# Patient Record
Sex: FEMALE | Race: WHITE | NOT HISPANIC OR LATINO | Employment: OTHER | URBAN - METROPOLITAN AREA
[De-identification: names, ages, dates, MRNs, and addresses within clinical notes are randomized per-mention and may not be internally consistent; named-entity substitution may affect disease eponyms.]

---

## 2020-02-20 ENCOUNTER — OFFICE VISIT (OUTPATIENT)
Dept: FAMILY MEDICINE | Facility: CLINIC | Age: 65
End: 2020-02-20
Payer: MEDICARE

## 2020-02-20 VITALS
HEIGHT: 61 IN | TEMPERATURE: 99 F | SYSTOLIC BLOOD PRESSURE: 152 MMHG | BODY MASS INDEX: 26.62 KG/M2 | WEIGHT: 141 LBS | HEART RATE: 93 BPM | DIASTOLIC BLOOD PRESSURE: 81 MMHG

## 2020-02-20 DIAGNOSIS — Z11.59 NEED FOR HEPATITIS C SCREENING TEST: ICD-10-CM

## 2020-02-20 DIAGNOSIS — Z23 NEEDS FLU SHOT: ICD-10-CM

## 2020-02-20 DIAGNOSIS — Z78.0 ASYMPTOMATIC MENOPAUSE: ICD-10-CM

## 2020-02-20 DIAGNOSIS — Z13.220 NEED FOR LIPID SCREENING: ICD-10-CM

## 2020-02-20 DIAGNOSIS — Z12.31 ENCOUNTER FOR SCREENING MAMMOGRAM FOR BREAST CANCER: ICD-10-CM

## 2020-02-20 DIAGNOSIS — R03.0 SINGLE EPISODE OF ELEVATED BLOOD PRESSURE: ICD-10-CM

## 2020-02-20 DIAGNOSIS — M54.6 THORACIC SPINE PAIN: Primary | ICD-10-CM

## 2020-02-20 PROBLEM — M54.50 CHRONIC MIDLINE LOW BACK PAIN WITHOUT SCIATICA: Status: ACTIVE | Noted: 2020-02-20

## 2020-02-20 PROBLEM — G89.29 CHRONIC MIDLINE LOW BACK PAIN WITHOUT SCIATICA: Status: ACTIVE | Noted: 2020-02-20

## 2020-02-20 PROCEDURE — 90662 IIV NO PRSV INCREASED AG IM: CPT | Mod: S$GLB,,, | Performed by: INTERNAL MEDICINE

## 2020-02-20 PROCEDURE — 3008F BODY MASS INDEX DOCD: CPT | Mod: S$GLB,,, | Performed by: INTERNAL MEDICINE

## 2020-02-20 PROCEDURE — 1101F PT FALLS ASSESS-DOCD LE1/YR: CPT | Mod: S$GLB,,, | Performed by: INTERNAL MEDICINE

## 2020-02-20 PROCEDURE — 1101F PR PT FALLS ASSESS DOC 0-1 FALLS W/OUT INJ PAST YR: ICD-10-PCS | Mod: S$GLB,,, | Performed by: INTERNAL MEDICINE

## 2020-02-20 PROCEDURE — G0008 FLU VACCINE - HIGH DOSE (65+) PRESERVATIVE FREE IM: ICD-10-PCS | Mod: S$GLB,,, | Performed by: INTERNAL MEDICINE

## 2020-02-20 PROCEDURE — G0008 ADMIN INFLUENZA VIRUS VAC: HCPCS | Mod: S$GLB,,, | Performed by: INTERNAL MEDICINE

## 2020-02-20 PROCEDURE — 99203 OFFICE O/P NEW LOW 30 MIN: CPT | Mod: 25,S$GLB,, | Performed by: INTERNAL MEDICINE

## 2020-02-20 PROCEDURE — 90662 FLU VACCINE - HIGH DOSE (65+) PRESERVATIVE FREE IM: ICD-10-PCS | Mod: S$GLB,,, | Performed by: INTERNAL MEDICINE

## 2020-02-20 PROCEDURE — 3008F PR BODY MASS INDEX (BMI) DOCUMENTED: ICD-10-PCS | Mod: S$GLB,,, | Performed by: INTERNAL MEDICINE

## 2020-02-20 PROCEDURE — 99203 PR OFFICE/OUTPT VISIT, NEW, LEVL III, 30-44 MIN: ICD-10-PCS | Mod: 25,S$GLB,, | Performed by: INTERNAL MEDICINE

## 2020-02-20 RX ORDER — LORATADINE 10 MG/1
10 TABLET ORAL DAILY
COMMUNITY

## 2020-02-20 RX ORDER — OMEPRAZOLE 20 MG/1
20 CAPSULE, DELAYED RELEASE ORAL DAILY
COMMUNITY

## 2020-02-20 NOTE — PROGRESS NOTES
Subjective:       Patient ID: Masha Hobbs is a 64 y.o. female.    Chief Complaint: Back Pain and Establish Care    This is the 1st appointment for patient Ms. Masha Hobbs who is a pleasant 64-year-old  female.    She has been referred by 1 of my established patient's.    Thus far she has not been diagnosed with any your medical issues.  She wants to establish primary physician given the fact that she has turned 64 now.    For last several weeks or so she has been experiencing midthoracic pain which was instigated by some med ablation done by a physical therapist she does not have a history of fall or trauma.  No history of flash injury.  This pain centers around midthoracic spine around T8 or T9.  Radiates to either side.  She denies any shortness of breath, cough or expectoration.  She denies any abdominal pain. No relationship with eating food.  Bending and stretching might either cause some discomfort or relief.  Pressure over the port does not make any significant difference.  Gradually the discomfort seems to be getting better over the days.    Thus far patient does not have any fever.  She did not have any doses of malignancy.  She is not on any anticoagulation.    She also has sinus problems for which she takes Claritin-D and day.  (Her blood pressure elevated in the office).  She is asymptomatic and denies any complains of petitioned, headaches, shortness of breath or feeling tired of fatigue.  In fact Claritin-D seems to help her with the sinus problem.  She has never checked her blood pressures at home.    She also has mild reflux symptoms for which she takes over-the-counter omeprazole.  For hair and nails she takes oral biotin.    Back Pain   This is a chronic problem. The current episode started more than 1 year ago. The problem occurs intermittently. The problem has been waxing and waning since onset. The pain is present in the thoracic spine. The quality of the pain is described as  aching. Radiates to: Laterally to each side. Pertinent negatives include no chest pain, dysuria, fever, headaches or pelvic pain. She has tried walking and bed rest for the symptoms. The treatment provided mild relief.       History reviewed. No pertinent past medical history.  Social History     Socioeconomic History    Marital status:      Spouse name: Not on file    Number of children: Not on file    Years of education: Not on file    Highest education level: Not on file   Occupational History    Not on file   Social Needs    Financial resource strain: Not on file    Food insecurity:     Worry: Not on file     Inability: Not on file    Transportation needs:     Medical: Not on file     Non-medical: Not on file   Tobacco Use    Smoking status: Former Smoker    Smokeless tobacco: Never Used   Substance and Sexual Activity    Alcohol use: Yes    Drug use: Not Currently    Sexual activity: Yes     Partners: Male   Lifestyle    Physical activity:     Days per week: Not on file     Minutes per session: Not on file    Stress: Not at all   Relationships    Social connections:     Talks on phone: Not on file     Gets together: Not on file     Attends Baptism service: Not on file     Active member of club or organization: Not on file     Attends meetings of clubs or organizations: Not on file     Relationship status: Not on file   Other Topics Concern    Not on file   Social History Narrative    Not on file     Past Surgical History:   Procedure Laterality Date    TONSILLECTOMY       Family History   Problem Relation Age of Onset    Diabetes Mother     Heart disease Mother     Cancer Mother     Cancer Father     Hyperlipidemia Father     Heart disease Father     Diabetes Father     Stroke Father        Review of Systems   Constitutional: Negative for activity change, chills, fatigue, fever and unexpected weight change.   HENT: Positive for congestion. Negative for facial swelling,  "hearing loss, postnasal drip, sinus pressure, trouble swallowing and voice change.    Eyes: Negative for pain, discharge and visual disturbance.   Respiratory: Negative for cough, chest tightness and shortness of breath.    Cardiovascular: Negative for chest pain, palpitations and leg swelling.   Gastrointestinal: Negative for abdominal distention, anal bleeding, constipation and diarrhea.        Reflux symptoms stable with omeprazole.   Genitourinary: Negative for difficulty urinating, dysuria, flank pain, frequency, menstrual problem and pelvic pain.   Musculoskeletal: Positive for back pain. Negative for arthralgias and joint swelling.        Midthoracic pain centered around T7 or T8.   Skin: Negative for color change, pallor and rash.        Patient takes over-the-counter biotin supplement for hair and nails.   Allergic/Immunologic: Negative for environmental allergies, food allergies and immunocompromised state.   Neurological: Negative for dizziness, tremors, seizures, syncope, light-headedness and headaches.   Hematological: Negative for adenopathy. Does not bruise/bleed easily.   Psychiatric/Behavioral: Positive for sleep disturbance. Negative for agitation, confusion and dysphoric mood. The patient is not nervous/anxious.          Objective:      Blood pressure (!) 152/81, pulse 93, height 5' 1" (1.549 m), weight 64 kg (141 lb), last menstrual period 01/01/1942. Body mass index is 26.64 kg/m².  Physical Exam   Constitutional: She is oriented to person, place, and time. She appears well-developed and well-nourished. She is cooperative. No distress.   BMI is 26.   HENT:   Head: Normocephalic and atraumatic.   Mouth/Throat: No oropharyngeal exudate.   Eyes: Conjunctivae, EOM and lids are normal. Lids are everted and swept, no foreign bodies found. No scleral icterus. Right pupil is round and reactive. Left pupil is round and reactive.   Neck: Trachea normal and normal range of motion. Neck supple. No tracheal " deviation present. No thyromegaly present.   Cardiovascular: Normal rate, regular rhythm, S1 normal, S2 normal and normal heart sounds.   Pulmonary/Chest: Breath sounds normal. No stridor. No respiratory distress. She has no wheezes. She has no rales.   Abdominal: Soft. Bowel sounds are normal. She exhibits no distension. There is no tenderness. There is no rigidity and no guarding.   Musculoskeletal: Normal range of motion. She exhibits no deformity.        Thoracic back: She exhibits pain. She exhibits no tenderness, no swelling, no edema and no deformity.        Back:    Lymphadenopathy:     She has no cervical adenopathy.     She has no axillary adenopathy.   Neurological: She is alert and oriented to person, place, and time. She displays no atrophy, no tremor and normal reflexes. No sensory deficit. She exhibits normal muscle tone. Coordination and gait normal.   Patient's muscle strength is good.  No sensory loss.  He is able to bend the spine without any problems.   Skin: Skin is warm and dry. No rash noted. There is erythema. No pallor.   Psychiatric: She has a normal mood and affect. Her behavior is normal.   Nursing note and vitals reviewed.        Assessment:       1. Thoracic spine pain    2. Single episode of elevated blood pressure    3. Asymptomatic menopause    4. Encounter for screening mammogram for breast cancer    5. Need for hepatitis C screening test    6. Needs flu shot    7. Need for lipid screening           No visits with results within 3 Month(s) from this visit.   Latest known visit with results is:   No results found for any previous visit.    I suspect patient spinal pain could be mild compression fracture.  I have recommended her stretch exercises.  No alram symptoms.  Warm compresses.  Will consider imaging in 6 weeks if no better.    Patient's blood pressures are elevated and I have advised her to stop Claritin-D.  She can take plain over-the-counter Claritin or Zyrtec.  We may  To get better and follow your care plan as instructed. consider over-the-counter sprays like Flonase or prescription sprays.  Salt water gargles and steam inhalation will be helpful or long-term basis for sinus allergies.  Avoid allergens.      Plan:           Thoracic spine pain  Comments:  Her for therapist was manipulating her back and she started experiencing pain in the midthoracic spine.  It radiates to the sides.  No history of fall.    Single episode of elevated blood pressure  Comments:  Patient's blood pressures are elevated and I have advised her to stop completely Claritin-D is.  She can take a plain Claritin and we may consider sinus sprays     Asymptomatic menopause  -     DXA Bone Density Appendicular Skeleton; Future; Expected date: 02/20/2020    Encounter for screening mammogram for breast cancer  -     Mammo Digital Screening Bilat; Future; Expected date: 02/20/2020    Need for hepatitis C screening test  -     Hepatitis C antibody; Future; Expected date: 02/20/2020    Needs flu shot  -     Influenza - High Dose (65+) (PF) (IM)    Need for lipid screening  -     Lipid panel; Future; Expected date: 02/20/2020     Patient is welcome to the practice at this point.    She has midthoracic pain and I have recommended her some stretch exercises and warm compresses.  If it does not get better in another 4-6 weeks then will consider imaging like x-ray or MRI.    Patient's blood pressures are elevated and I have advised her to stop the Claritin-D.  She can take plain Claritin.  On her way back to home she can stop by the pharmacy and again recheck her blood pressures.  I would encourage her to invest in a blood pressure machine.    Preventive care issues like mammograms will be ordered.  Today she will get a flu shot also.  I will do a screening for cholesterol and hepatitis C also.    I would like to see her back in 1 month time to review her for back pain and review her blood pressures again.        Current Outpatient Medications:     BIOTIN ORAL, Take by  mouth., Disp: , Rfl:     loratadine (CLARITIN) 10 mg tablet, Take 10 mg by mouth once daily., Disp: , Rfl:     omeprazole (PRILOSEC) 20 MG capsule, Take 20 mg by mouth once daily., Disp: , Rfl:     vit [id number] (BIOFLEX) 051-93-86-40 mg Tab, Take by mouth., Disp: , Rfl:     vits A-C-E-B complx-min-lutein (LIPOTRIAD, WITH LUTEIN,) 5,000 unit- 120 mg-60 unit TbSR, Take by mouth., Disp: , Rfl:

## 2020-02-20 NOTE — PATIENT INSTRUCTIONS
Back Care Tips    Caring for your back  These are things you can do to prevent a recurrence of acute back pain and to reduce symptoms from chronic back pain:  · Maintain a healthy weight. If you are overweight, losing weight will help most types of back pain.  · Exercise is an important part of recovery from most types of back pain. The muscles behind and in front of the spine support the back. This means strengthening both the back muscles and the abdominal muscles will provide better support for your spine.   · Swimming and brisk walking are good overall exercises to improve your fitness level.  · Practice safe lifting methods (below).  · Practice good posture when sitting, standing and walking. Avoid prolonged sitting. This puts more stress on the lower back than standing or walking.  · Wear quality shoes with sufficient arch support. Foot and ankle alignment can affect back symptoms. Women should avoid wearing high heels.  · Therapeutic massage can help relax the back muscles without stretching them.  · During the first 24 to 72 hours after an acute injury or flare-up of chronic back pain, apply an ice pack to the painful area for 20 minutes and then remove it for 20 minutes, over a period of 60 to 90 minutes, or several times a day. As a safety precaution, do not use a heating pad at bedtime. Sleeping on a heating pad can lead to skin burns or tissue damage.  · You can alternate ice and heat therapies.  Medications  Talk to your healthcare provider before using medicines, especially if you have other medical problems or are taking other medicines.  · You may use acetaminophen or ibuprofen to control pain, unless your healthcare provider prescribed other pain medicine. If you have chronic conditions like diabetes, liver or kidney disease, stomach ulcers, or gastrointestinal bleeding, or are taking blood thinners, talk with your healthcare provider before taking any medicines.  · Be careful if you are given  prescription pain medicines, narcotics, or medicine for muscle spasm. They can cause drowsiness, affect your coordination, reflexes, and judgment. Do not drive or operate heavy machinery while taking these types of medicines. Take prescription pain medicine only as prescribed by your healthcare provider.  Lumbar stretch  Here is a simple stretching exercise that will help relax muscle spasm and keep your back more limber. If exercise makes your back pain worse, dont do it.  · Lie on your back with your knees bent and both feet on the ground.  · Slowly raise your left knee to your chest as you flatten your lower back against the floor. Hold for 5 seconds.  · Relax and repeat the exercise with your right knee.  · Do 10 of these exercises for each leg.  Safe lifting method  · Dont bend over at the waist to lift an object off the floor.  Instead, bend your knees and hips in a squat.   · Keep your back and head upright  · Hold the object close to your body, directly in front of you.  · Straighten your legs to lift the object.   · Lower the object to the floor in the reverse fashion.  · If you must slide something across the floor, push it.  Posture tips  Sitting  Sit in chairs with straight backs or low-back support. Keep your knees lower than your hips, with your feet flat on the floor.  When driving, sit up straight. Adjust the seat forward so you are not leaning toward the steering wheel.  A small pillow or rolled towel behind your lower back may help if you are driving long distances.   Standing  When standing for long periods, shift most of your weight to one leg at a time. Alternate legs every few minutes.   Sleeping  The best way to sleep is on your side with your knees bent. Put a low pillow under your head to support your neck in a neutral spine position. Avoid thick pillows that bend your neck to one side. Put a pillow between your legs to further relax your lower back. If you sleep on your back, put pillows  under your knees to support your legs in a slightly flexed position. Use a firm mattress. If your mattress sags, replace it, or use a 1/2-inch plywood board under the mattress to add support.  Follow-up care  Follow up with your healthcare provider, or as advised.  If X-rays, a CT scan or an MRI scan were taken, they will be reviewed by a radiologist. You will be notified of any new findings that may affect your care.  Call 911  Seek emergency medical care if any of the following occur:  · Trouble breathing  · Confusion  · Very drowsy  · Fainting or loss of consciousness  · Rapid or very slow heart rate  · Loss of  bowel or bladder control  When to seek medical care  Call your healthcare provider if any of the following occur:  · Pain becomes worse or spreads to your arms or legs  · Weakness or numbness in one or both arms or legs  · Numbness in the groin area  Date Last Reviewed: 6/1/2016  © 4729-5976 [employer]. [address] [place] [address]. All rights reserved. This information is not intended as a substitute for professional medical care. Always follow your healthcare professional's instructions.        Back Pain (Acute or Chronic)    Back pain is one of the most common problems. The good news is that most people feel better in 1 to 2 weeks, and most of the rest in 1 to 2 months. Most people can remain active.  People experience and describe pain differently; not everyone is the same.  · The pain can be sharp, stabbing, shooting, aching, cramping or burning.  · Movement, standing, bending, lifting, sitting, or walking may worsen pain.  · It can be localized to one spot or area, or it can be more generalized.  · It can spread or radiate upwards, to the front, or go down your arms or legs (sciatica).  · It can cause muscle spasm.  Most of the time, mechanical problems with the muscles or spine cause the pain. Mechanical problems are usually caused by an injury to the muscles or  ligaments. While illness can cause back pain, it is usually not caused by a serious illness. Mechanical problems include:   · Physical activity such as sports, exercise, work, or normal activity  · Overexertion, lifting, pushing, pulling incorrectly or too aggressively  · Sudden twisting, bending, or stretching from an accident, or accidental movement  · Poor posture  · Stretching or moving wrong, without noticing pain at the time  · Poor coordination, lack of regular exercise (check with your doctor about this)  · Spinal disc disease or arthritis  · Stress  Pain can also be related to pregnancy, or illness like appendicitis, bladder or kidney infections, pelvic infections, and many other things.  Acute back pain usually gets better in 1 to 2 weeks. Back pain related to disk disease, arthritis in the spinal joints or spinal stenosis (narrowing of the spinal canal) can become chronic and last for months or years.  Unless you had a physical injury (for example, a car accident or fall) X-rays are usually not needed for the initial evaluation of back pain. If pain continues and does not respond to medical treatment, X-rays and other tests may be needed.  Home care  Try these home care recommendations:  · When in bed, try to find a position of comfort. A firm mattress is best. Try lying flat on your back with pillows under your knees. You can also try lying on your side with your knees bent up towards your chest and a pillow between your knees.  · At first, do not try to stretch out the sore spots. If there is a strain, it is not like the good soreness you get after exercising without an injury. In this case, stretching may make it worse.  · Avoid prolong sitting, long car rides, or travel. This puts more stress on the lower back than standing or walking.  · During the first 24 to 72 hours after an acute injury or flare up of chronic back pain, apply an ice pack to the painful area for 20 minutes and then remove it for  20 minutes. Do this over a period of 60 to 90 minutes or several times a day. This will reduce swelling and pain. Wrap the ice pack in a thin towel or plastic to protect your skin.  · You can start with ice, then switch to heat. Heat (hot shower, hot bath, or heating pad) reduces pain and works well for muscle spasms. Heat can be applied to the painful area for 20 minutes then remove it for 20 minutes. Do this over a period of 60 to 90 minutes or several times a day. Do not sleep on a heating pad. It can lead to skin burns or tissue damage.  · You can alternate ice and heat therapy. Talk with your doctor about the best treatment for your back pain.  · Therapeutic massage can help relax the back muscles without stretching them.  · Be aware of safe lifting methods and do not lift anything without stretching first.  Medicines  Talk to your doctor before using medicine, especially if you have other medical problems or are taking other medicines.  · You may use over-the-counter medicine as directed on the bottle to control pain, unless another pain medicine was prescribed. If you have chronic conditions like diabetes, liver or kidney disease, stomach ulcers, or gastrointestinal bleeding, or are taking blood thinners, talk to your doctor before taking any medicine.  · Be careful if you are given a prescription medicines, narcotics, or medicine for muscle spasms. They can cause drowsiness, affect your coordination, reflexes, and judgement. Do not drive or operate heavy machinery.  Follow-up care  Follow up with your healthcare provider, or as advised.   A radiologist will review any X-rays that were taken. Your provide will notify you of any new findings that may affect your care.  Call 911  Call emergency services if any of the following occur:  · Trouble breathing  · Confusion  · Very drowsy or trouble awakening  · Fainting or loss of consciousness  · Rapid or very slow heart rate  · Loss of bowel or bladder  control  When to seek medical advice  Call your healthcare provider right away if any of these occur:   · Pain becomes worse or spreads to your legs  · Weakness or numbness in one or both legs  · Numbness in the groin or genital area  Date Last Reviewed: 7/1/2016 © 2000-2017 [employer]. [address] [place] [address]. All rights reserved. This information is not intended as a substitute for professional medical care. Always follow your healthcare professional's instructions.        Taking Your Blood Pressure  Blood pressure is the force of blood against the artery wall as it moves from the heart through the blood vessels. You can take your own blood pressure reading using a digital monitor. Take your readings the same each time, using the same arm. Take readings as often as your healthcare provider instructs.  About blood pressure monitors  Blood pressure monitors are designed for certain ages and cases. You can find monitors for older adults, for pregnant women, and for children. Make sure the one you choose is the right one for your age and situation.  The American Heart Association recommends an automatic cuff monitor that fits on your upper arm (bicep). The cuff should fit your arm size. A cuff thats too large or too small will not give an accurate reading. Measure around your upper arm to find your size.  Monitors that attach to your finger or wrist are not as accurate as monitors for your upper arm.  Ask your healthcare provider for help in choosing a monitor. Bring your monitor to your next provider visit if you need help in using it the correct way.  The steps below are general instructions for using an automatic digital monitor.  Step 1. Relax    · Take your blood pressure at the same time every day, such as in the morning or evening, or at the time your healthcare provider recommends.  · Wait at least a half-hour after smoking, eating, or exercising. Don't drink coffee, tea,  soda, or other caffeinated beverages before checking your blood pressure.  · Sit comfortably at a table with both feet on the floor. Do not cross your legs or feet. Place the monitor near you.  · Rest for a few minutes before you begin.  Step 2. Wrap the cuff    · Place your arm on the table, palm up. Your arm should be at the level of your heart. Wrap the cuff around your upper arm, just above your elbow. Its best done on bare skin, not over clothing. Most cuffs will indicate where the brachial artery (the blood vessel in the middle of the arm at the inner side of the elbow) should line up with the cuff. Look in your monitor's instruction booklet for an illustration. You can also bring your cuff to your healthcare provider and have them show you how to correctly place the cuff.  Step 3. Inflate the cuff    · Push the button that starts the pump.  · The cuff will tighten, then loosen.  · The numbers will change. When they stop changing, your blood pressure reading will appear.  · Take 2 or 3 readings one minute apart.  Step 4. Write down the results of each reading    · Write down your blood pressure numbers for each reading. Note the date and time. Keep your results in one place, such as a notebook. Even if your monitor has a built-in memory, keep a hard copy of the readings.  · Remove the cuff from your arm. Turn off the machine.  · Bring your blood pressure records with your healthcare providers at each visit.  · If you start a new blood pressure medicine, note the day you started the new medicine. Also note the day if you change the dose of your medicine. This information goes on your blood pressure recording sheet. This will help your healthcare provider monitor how well the medicine changes are working.  · Ask your healthcare provider what numbers should prompt you to call him or her. Also ask what numbers should prompt you to get help right away.  Date Last Reviewed: 11/1/2016  © 9101-7526 [employer]. [address] [place] [address], [place] [address]. All rights reserved. This information is not intended as a substitute for professional medical care. Always follow your healthcare professional's instructions.

## 2020-02-21 ENCOUNTER — PATIENT MESSAGE (OUTPATIENT)
Dept: FAMILY MEDICINE | Facility: CLINIC | Age: 65
End: 2020-02-21

## 2020-03-02 ENCOUNTER — HOSPITAL ENCOUNTER (OUTPATIENT)
Dept: RADIOLOGY | Facility: HOSPITAL | Age: 65
Discharge: HOME | End: 2020-03-02
Attending: INTERNAL MEDICINE
Payer: MEDICARE

## 2020-03-02 VITALS — HEIGHT: 61 IN | BODY MASS INDEX: 26.65 KG/M2 | WEIGHT: 141.13 LBS

## 2020-03-02 DIAGNOSIS — Z12.31 ENCOUNTER FOR SCREENING MAMMOGRAM FOR BREAST CANCER: ICD-10-CM

## 2020-03-02 DIAGNOSIS — Z78.0 ASYMPTOMATIC MENOPAUSE: ICD-10-CM

## 2020-03-02 PROCEDURE — 77080 DXA BONE DENSITY AXIAL: CPT | Mod: TC,PO

## 2020-03-02 PROCEDURE — 77067 SCR MAMMO BI INCL CAD: CPT | Mod: TC,PO

## 2020-03-04 PROBLEM — M81.0 AGE-RELATED OSTEOPOROSIS WITHOUT CURRENT PATHOLOGICAL FRACTURE: Status: ACTIVE | Noted: 2020-03-04

## 2021-10-16 ENCOUNTER — HOSPITAL ENCOUNTER (EMERGENCY)
Facility: HOSPITAL | Age: 66
Discharge: HOME | End: 2021-10-16
Attending: EMERGENCY MEDICINE
Payer: MEDICARE

## 2021-10-16 VITALS
BODY MASS INDEX: 28.32 KG/M2 | DIASTOLIC BLOOD PRESSURE: 88 MMHG | OXYGEN SATURATION: 97 % | RESPIRATION RATE: 18 BRPM | HEIGHT: 61 IN | SYSTOLIC BLOOD PRESSURE: 175 MMHG | HEART RATE: 90 BPM | TEMPERATURE: 99 F | WEIGHT: 150 LBS

## 2021-10-16 DIAGNOSIS — S52.601A CLOSED FRACTURE OF DISTAL ENDS OF RIGHT RADIUS AND ULNA, INITIAL ENCOUNTER: Primary | ICD-10-CM

## 2021-10-16 DIAGNOSIS — T14.90XA INJURY: ICD-10-CM
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
## 2021-10-16 DIAGNOSIS — M25.531 RIGHT WRIST PAIN: ICD-10-CM

## 2021-10-16 DIAGNOSIS — S52.501A CLOSED FRACTURE OF DISTAL ENDS OF RIGHT RADIUS AND ULNA, INITIAL ENCOUNTER: Primary | ICD-10-CM

## 2021-10-16 PROCEDURE — 99283 EMERGENCY DEPT VISIT LOW MDM: CPT

## 2021-10-16 PROCEDURE — 25000003 PHARM REV CODE 250: Performed by: NURSE PRACTITIONER

## 2021-10-16 PROCEDURE — 29125 APPL SHORT ARM SPLINT STATIC: CPT | Mod: RT

## 2021-10-16 RX ORDER — HYDROCODONE BITARTRATE AND ACETAMINOPHEN 7.5; 325 MG/1; MG/1
1 TABLET ORAL EVERY 4 HOURS PRN
Qty: 11 TABLET | Refills: 0 | Status: SHIPPED | OUTPATIENT
Start: 2021-10-16

## 2021-10-16 RX ORDER — IBUPROFEN 400 MG/1
400 TABLET ORAL EVERY 6 HOURS PRN
Qty: 20 TABLET | Refills: 0 | Status: SHIPPED | OUTPATIENT
Start: 2021-10-16

## 2021-10-16 RX ORDER — HYDROCODONE BITARTRATE AND ACETAMINOPHEN 7.5; 325 MG/1; MG/1
1 TABLET ORAL
Status: DISCONTINUED | OUTPATIENT
Start: 2021-10-16 | End: 2021-10-17 | Stop reason: HOSPADM

## 2021-10-16 RX ADMIN — IBUPROFEN 600 MG: 400 TABLET ORAL at 08:10

## 2024-06-06 ENCOUNTER — HOSPITAL ENCOUNTER (EMERGENCY)
Facility: HOSPITAL | Age: 69
Discharge: HOME | End: 2024-06-06
Attending: EMERGENCY MEDICINE
Payer: MEDICARE

## 2024-06-06 VITALS
SYSTOLIC BLOOD PRESSURE: 153 MMHG | HEIGHT: 62 IN | BODY MASS INDEX: 25.21 KG/M2 | WEIGHT: 137 LBS | TEMPERATURE: 98 F | OXYGEN SATURATION: 100 % | DIASTOLIC BLOOD PRESSURE: 80 MMHG | RESPIRATION RATE: 18 BRPM | HEART RATE: 112 BPM
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
## 2024-06-06 DIAGNOSIS — T07.XXXA MULTIPLE ABRASIONS: ICD-10-CM

## 2024-06-06 DIAGNOSIS — S30.1XXA CONTUSION OF ABDOMINAL WALL, INITIAL ENCOUNTER: ICD-10-CM

## 2024-06-06 DIAGNOSIS — R07.89 CHEST WALL PAIN: ICD-10-CM

## 2024-06-06 DIAGNOSIS — S20.212A RIB CONTUSION, LEFT, INITIAL ENCOUNTER: ICD-10-CM

## 2024-06-06 LAB
ALBUMIN SERPL BCP-MCNC: 4.4 G/DL (ref 3.5–5.2)
ALP SERPL-CCNC: 64 U/L (ref 55–135)
ALT SERPL W/O P-5'-P-CCNC: 19 U/L (ref 10–44)
ANION GAP SERPL CALC-SCNC: 8 MMOL/L (ref 8–16)
AST SERPL-CCNC: 21 U/L (ref 10–40)
BASOPHILS # BLD AUTO: 0.08 K/UL (ref 0–0.2)
BASOPHILS NFR BLD: 0.7 % (ref 0–1.9)
BILIRUB SERPL-MCNC: 0.4 MG/DL (ref 0.1–1)
BUN SERPL-MCNC: 24 MG/DL (ref 8–23)
CALCIUM SERPL-MCNC: 10 MG/DL (ref 8.7–10.5)
CHLORIDE SERPL-SCNC: 104 MMOL/L (ref 95–110)
CO2 SERPL-SCNC: 26 MMOL/L (ref 23–29)
CREAT SERPL-MCNC: 0.9 MG/DL (ref 0.5–1.4)
CREAT SERPL-MCNC: 0.9 MG/DL (ref 0.5–1.4)
DIFFERENTIAL METHOD BLD: ABNORMAL
EOSINOPHIL # BLD AUTO: 0 K/UL (ref 0–0.5)
EOSINOPHIL NFR BLD: 0.3 % (ref 0–8)
ERYTHROCYTE [DISTWIDTH] IN BLOOD BY AUTOMATED COUNT: 13.2 % (ref 11.5–14.5)
EST. GFR  (NO RACE VARIABLE): >60 ML/MIN/1.73 M^2
GLUCOSE SERPL-MCNC: 82 MG/DL (ref 70–110)
HCT VFR BLD AUTO: 40.9 % (ref 37–48.5)
HGB BLD-MCNC: 13.5 G/DL (ref 12–16)
IMM GRANULOCYTES # BLD AUTO: 0.03 K/UL (ref 0–0.04)
IMM GRANULOCYTES NFR BLD AUTO: 0.3 % (ref 0–0.5)
INR PPP: 1 (ref 0.8–1.2)
LIPASE SERPL-CCNC: 8 U/L (ref 4–60)
LYMPHOCYTES # BLD AUTO: 2.5 K/UL (ref 1–4.8)
LYMPHOCYTES NFR BLD: 21.4 % (ref 18–48)
MCH RBC QN AUTO: 31.3 PG (ref 27–31)
MCHC RBC AUTO-ENTMCNC: 33 G/DL (ref 32–36)
MCV RBC AUTO: 95 FL (ref 82–98)
MONOCYTES # BLD AUTO: 0.9 K/UL (ref 0.3–1)
MONOCYTES NFR BLD: 7.4 % (ref 4–15)
NEUTROPHILS # BLD AUTO: 8.1 K/UL (ref 1.8–7.7)
NEUTROPHILS NFR BLD: 69.9 % (ref 38–73)
NRBC BLD-RTO: 0 /100 WBC
OHS QRS DURATION: 88 MS
OHS QTC CALCULATION: 623 MS
PLATELET # BLD AUTO: 317 K/UL (ref 150–450)
PMV BLD AUTO: 9.5 FL (ref 9.2–12.9)
POTASSIUM SERPL-SCNC: 4.2 MMOL/L (ref 3.5–5.1)
PROT SERPL-MCNC: 8.1 G/DL (ref 6–8.4)
PROTHROMBIN TIME: 11 SEC (ref 9–12.5)
RBC # BLD AUTO: 4.31 M/UL (ref 4–5.4)
SAMPLE: NORMAL
SODIUM SERPL-SCNC: 138 MMOL/L (ref 136–145)
WBC # BLD AUTO: 11.6 K/UL (ref 3.9–12.7)

## 2024-06-06 PROCEDURE — 83690 ASSAY OF LIPASE: CPT | Performed by: EMERGENCY MEDICINE

## 2024-06-06 PROCEDURE — 85025 COMPLETE CBC W/AUTO DIFF WBC: CPT | Performed by: EMERGENCY MEDICINE

## 2024-06-06 PROCEDURE — 93010 ELECTROCARDIOGRAM REPORT: CPT | Mod: ,,, | Performed by: INTERNAL MEDICINE

## 2024-06-06 PROCEDURE — 85610 PROTHROMBIN TIME: CPT | Performed by: EMERGENCY MEDICINE

## 2024-06-06 PROCEDURE — 25500020 PHARM REV CODE 255: Performed by: EMERGENCY MEDICINE

## 2024-06-06 PROCEDURE — 82565 ASSAY OF CREATININE: CPT

## 2024-06-06 PROCEDURE — 90715 TDAP VACCINE 7 YRS/> IM: CPT | Performed by: PHYSICIAN ASSISTANT

## 2024-06-06 PROCEDURE — 93005 ELECTROCARDIOGRAM TRACING: CPT | Performed by: INTERNAL MEDICINE

## 2024-06-06 PROCEDURE — 25000003 PHARM REV CODE 250: Performed by: EMERGENCY MEDICINE

## 2024-06-06 PROCEDURE — 99285 EMERGENCY DEPT VISIT HI MDM: CPT | Mod: 25

## 2024-06-06 PROCEDURE — 80053 COMPREHEN METABOLIC PANEL: CPT | Performed by: EMERGENCY MEDICINE

## 2024-06-06 PROCEDURE — 63600175 PHARM REV CODE 636 W HCPCS: Performed by: PHYSICIAN ASSISTANT

## 2024-06-06 PROCEDURE — 90471 IMMUNIZATION ADMIN: CPT | Performed by: PHYSICIAN ASSISTANT

## 2024-06-06 RX ORDER — HYDROCODONE BITARTRATE AND ACETAMINOPHEN 5; 325 MG/1; MG/1
1 TABLET ORAL EVERY 6 HOURS PRN
Qty: 12 TABLET | Refills: 0 | Status: SHIPPED | OUTPATIENT
Start: 2024-06-06

## 2024-06-06 RX ORDER — BACITRACIN 500 [USP'U]/G
OINTMENT TOPICAL
Status: COMPLETED | OUTPATIENT
Start: 2024-06-06 | End: 2024-06-06

## 2024-06-06 RX ORDER — METHOCARBAMOL 500 MG/1
500 TABLET, FILM COATED ORAL 3 TIMES DAILY PRN
Qty: 20 TABLET | Refills: 0 | Status: SHIPPED | OUTPATIENT
Start: 2024-06-06 | End: 2024-06-16

## 2024-06-06 RX ADMIN — IOHEXOL 100 ML: 350 INJECTION, SOLUTION INTRAVENOUS at 10:06

## 2024-06-06 RX ADMIN — CLOSTRIDIUM TETANI TOXOID ANTIGEN (FORMALDEHYDE INACTIVATED), CORYNEBACTERIUM DIPHTHERIAE TOXOID ANTIGEN (FORMALDEHYDE INACTIVATED), BORDETELLA PERTUSSIS TOXOID ANTIGEN (GLUTARALDEHYDE INACTIVATED), BORDETELLA PERTUSSIS FILAMENTOUS HEMAGGLUTININ ANTIGEN (FORMALDEHYDE INACTIVATED), BORDETELLA PERTUSSIS PERTACTIN ANTIGEN, AND BORDETELLA PERTUSSIS FIMBRIAE 2/3 ANTIGEN 0.5 ML: 5; 2; 2.5; 5; 3; 5 INJECTION, SUSPENSION INTRAMUSCULAR at 04:06

## 2024-06-06 RX ADMIN — BACITRACIN: 500 OINTMENT TOPICAL at 09:06
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
## 2024-06-13 ENCOUNTER — OFFICE VISIT (OUTPATIENT)
Dept: FAMILY MEDICINE | Facility: CLINIC | Age: 69
End: 2024-06-13
Payer: MEDICARE

## 2024-06-13 VITALS
HEIGHT: 62 IN | TEMPERATURE: 99 F | RESPIRATION RATE: 18 BRPM | OXYGEN SATURATION: 99 % | HEART RATE: 108 BPM | WEIGHT: 137 LBS | SYSTOLIC BLOOD PRESSURE: 160 MMHG | DIASTOLIC BLOOD PRESSURE: 100 MMHG | BODY MASS INDEX: 25.21 KG/M2
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
## 2024-06-13 DIAGNOSIS — M81.0 AGE-RELATED OSTEOPOROSIS WITHOUT CURRENT PATHOLOGICAL FRACTURE: ICD-10-CM

## 2024-06-13 DIAGNOSIS — M85.80 OSTEOPENIA, UNSPECIFIED LOCATION: Primary | ICD-10-CM

## 2024-06-13 DIAGNOSIS — Z12.11 COLON CANCER SCREENING: ICD-10-CM

## 2024-06-13 DIAGNOSIS — E78.2 MIXED HYPERLIPIDEMIA: ICD-10-CM

## 2024-06-13 PROCEDURE — 1125F AMNT PAIN NOTED PAIN PRSNT: CPT | Mod: HCNC,CPTII,S$GLB, | Performed by: FAMILY MEDICINE

## 2024-06-13 PROCEDURE — 1159F MED LIST DOCD IN RCRD: CPT | Mod: HCNC,CPTII,S$GLB, | Performed by: FAMILY MEDICINE

## 2024-06-13 PROCEDURE — 3008F BODY MASS INDEX DOCD: CPT | Mod: HCNC,CPTII,S$GLB, | Performed by: FAMILY MEDICINE

## 2024-06-13 PROCEDURE — 3288F FALL RISK ASSESSMENT DOCD: CPT | Mod: HCNC,CPTII,S$GLB, | Performed by: FAMILY MEDICINE

## 2024-06-13 PROCEDURE — 99999 PR PBB SHADOW E&M-EST. PATIENT-LVL IV: CPT | Mod: PBBFAC,HCNC,, | Performed by: FAMILY MEDICINE

## 2024-06-13 PROCEDURE — 99213 OFFICE O/P EST LOW 20 MIN: CPT | Mod: HCNC,S$GLB,, | Performed by: FAMILY MEDICINE

## 2024-06-13 PROCEDURE — 3077F SYST BP >= 140 MM HG: CPT | Mod: HCNC,CPTII,S$GLB, | Performed by: FAMILY MEDICINE

## 2024-06-13 PROCEDURE — 1101F PT FALLS ASSESS-DOCD LE1/YR: CPT | Mod: HCNC,CPTII,S$GLB, | Performed by: FAMILY MEDICINE

## 2024-06-13 PROCEDURE — 3080F DIAST BP >= 90 MM HG: CPT | Mod: HCNC,CPTII,S$GLB, | Performed by: FAMILY MEDICINE
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
## 2024-06-27 ENCOUNTER — CLINICAL SUPPORT (OUTPATIENT)
Dept: FAMILY MEDICINE | Facility: CLINIC | Age: 69
End: 2024-06-27
Payer: MEDICARE

## 2024-06-27 VITALS — SYSTOLIC BLOOD PRESSURE: 126 MMHG | DIASTOLIC BLOOD PRESSURE: 82 MMHG

## 2024-06-27 DIAGNOSIS — I10 HYPERTENSION, UNSPECIFIED TYPE: Primary | ICD-10-CM

## 2024-06-27 PROCEDURE — 99999 PR PBB SHADOW E&M-EST. PATIENT-LVL I: CPT | Mod: PBBFAC,HCNC,,

## 2024-07-02 ENCOUNTER — OFFICE VISIT (OUTPATIENT)
Dept: FAMILY MEDICINE | Facility: CLINIC | Age: 69
End: 2024-07-02
Payer: MEDICARE

## 2024-07-02 VITALS
SYSTOLIC BLOOD PRESSURE: 134 MMHG | HEIGHT: 62 IN | BODY MASS INDEX: 25.4 KG/M2 | HEART RATE: 101 BPM | TEMPERATURE: 98 F | WEIGHT: 138 LBS | RESPIRATION RATE: 18 BRPM | OXYGEN SATURATION: 99 % | DIASTOLIC BLOOD PRESSURE: 78 MMHG

## 2024-07-02 DIAGNOSIS — M81.0 AGE-RELATED OSTEOPOROSIS WITHOUT CURRENT PATHOLOGICAL FRACTURE: ICD-10-CM

## 2024-07-02 DIAGNOSIS — R07.81 RIB PAIN ON LEFT SIDE: Primary | ICD-10-CM

## 2024-07-02 DIAGNOSIS — M54.50 CHRONIC MIDLINE LOW BACK PAIN WITHOUT SCIATICA: ICD-10-CM

## 2024-07-02 DIAGNOSIS — G89.29 CHRONIC MIDLINE LOW BACK PAIN WITHOUT SCIATICA: ICD-10-CM

## 2024-07-02 DIAGNOSIS — Z12.31 ENCOUNTER FOR SCREENING MAMMOGRAM FOR MALIGNANT NEOPLASM OF BREAST: ICD-10-CM

## 2024-07-02 PROCEDURE — 3078F DIAST BP <80 MM HG: CPT | Mod: HCNC,CPTII,S$GLB, | Performed by: FAMILY MEDICINE

## 2024-07-02 PROCEDURE — 1125F AMNT PAIN NOTED PAIN PRSNT: CPT | Mod: HCNC,CPTII,S$GLB, | Performed by: FAMILY MEDICINE

## 2024-07-02 PROCEDURE — 1101F PT FALLS ASSESS-DOCD LE1/YR: CPT | Mod: HCNC,CPTII,S$GLB, | Performed by: FAMILY MEDICINE

## 2024-07-02 PROCEDURE — 3288F FALL RISK ASSESSMENT DOCD: CPT | Mod: HCNC,CPTII,S$GLB, | Performed by: FAMILY MEDICINE

## 2024-07-02 PROCEDURE — 3008F BODY MASS INDEX DOCD: CPT | Mod: HCNC,CPTII,S$GLB, | Performed by: FAMILY MEDICINE

## 2024-07-02 PROCEDURE — 99999 PR PBB SHADOW E&M-EST. PATIENT-LVL IV: CPT | Mod: PBBFAC,HCNC,, | Performed by: FAMILY MEDICINE

## 2024-07-02 PROCEDURE — 1159F MED LIST DOCD IN RCRD: CPT | Mod: HCNC,CPTII,S$GLB, | Performed by: FAMILY MEDICINE

## 2024-07-02 PROCEDURE — 99213 OFFICE O/P EST LOW 20 MIN: CPT | Mod: HCNC,S$GLB,, | Performed by: FAMILY MEDICINE

## 2024-07-02 PROCEDURE — 3075F SYST BP GE 130 - 139MM HG: CPT | Mod: HCNC,CPTII,S$GLB, | Performed by: FAMILY MEDICINE
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
## 2024-07-10 ENCOUNTER — HOSPITAL ENCOUNTER (OUTPATIENT)
Dept: RADIOLOGY | Facility: HOSPITAL | Age: 69
Discharge: HOME | End: 2024-07-10
Attending: FAMILY MEDICINE
Payer: MEDICARE

## 2024-07-10 VITALS — WEIGHT: 138 LBS | HEIGHT: 62 IN | BODY MASS INDEX: 25.4 KG/M2

## 2024-07-10 DIAGNOSIS — Z12.31 ENCOUNTER FOR SCREENING MAMMOGRAM FOR MALIGNANT NEOPLASM OF BREAST: ICD-10-CM

## 2024-07-10 DIAGNOSIS — R07.81 RIB PAIN ON LEFT SIDE: ICD-10-CM

## 2024-07-10 PROCEDURE — 77063 BREAST TOMOSYNTHESIS BI: CPT | Mod: 26,,, | Performed by: RADIOLOGY

## 2024-07-10 PROCEDURE — 77067 SCR MAMMO BI INCL CAD: CPT | Mod: TC,PO

## 2024-07-10 PROCEDURE — 77063 BREAST TOMOSYNTHESIS BI: CPT | Mod: TC,PO

## 2024-07-10 PROCEDURE — 77067 SCR MAMMO BI INCL CAD: CPT | Mod: 26,,, | Performed by: RADIOLOGY

## 2024-08-02 ENCOUNTER — PATIENT MESSAGE (OUTPATIENT)
Dept: ADMINISTRATIVE | Facility: HOSPITAL | Age: 69
End: 2024-08-02
Payer: MEDICARE

## 2024-08-12 ENCOUNTER — CLINICAL SUPPORT (OUTPATIENT)
Dept: REHABILITATION | Facility: HOSPITAL | Age: 69
End: 2024-08-12
Payer: MEDICARE

## 2024-08-12 ENCOUNTER — HOSPITAL ENCOUNTER (OUTPATIENT)
Dept: RADIOLOGY | Facility: HOSPITAL | Age: 69
Discharge: HOME | End: 2024-08-12
Attending: FAMILY MEDICINE
Payer: MEDICARE

## 2024-08-12 DIAGNOSIS — M81.0 AGE-RELATED OSTEOPOROSIS WITHOUT CURRENT PATHOLOGICAL FRACTURE: ICD-10-CM

## 2024-08-12 DIAGNOSIS — M81.0 OSTEOPOROSIS, UNSPECIFIED OSTEOPOROSIS TYPE, UNSPECIFIED PATHOLOGICAL FRACTURE PRESENCE: Primary | ICD-10-CM

## 2024-08-12 DIAGNOSIS — M85.80 OSTEOPENIA, UNSPECIFIED LOCATION: ICD-10-CM

## 2024-08-12 DIAGNOSIS — R29.898 WEAKNESS OF BOTH SHOULDERS: ICD-10-CM

## 2024-08-12 DIAGNOSIS — M53.84 DECREASED ROM OF THORACIC SPINE: Primary | ICD-10-CM

## 2024-08-12 DIAGNOSIS — R07.81 RIB PAIN ON LEFT SIDE: ICD-10-CM

## 2024-08-12 PROCEDURE — 77080 DXA BONE DENSITY AXIAL: CPT | Mod: 26,ICN,, | Performed by: RADIOLOGY

## 2024-08-12 PROCEDURE — 97161 PT EVAL LOW COMPLEX 20 MIN: CPT | Mod: HCNC,PN

## 2024-08-12 PROCEDURE — 97530 THERAPEUTIC ACTIVITIES: CPT | Mod: HCNC,PN

## 2024-08-12 PROCEDURE — 77080 DXA BONE DENSITY AXIAL: CPT | Mod: TC,PO

## 2024-08-12 RX ORDER — IBANDRONATE SODIUM 150 MG/1
150 TABLET, FILM COATED ORAL
Qty: 1 TABLET | Refills: 11 | Status: SHIPPED | OUTPATIENT
Start: 2024-08-12 | End: 2025-08-12

## 2024-08-12 RX ORDER — LYSINE HCL 500 MG
1 TABLET ORAL 2 TIMES DAILY
Qty: 180 TABLET | Refills: 2 | Status: SHIPPED | OUTPATIENT
Start: 2024-08-12 | End: 2025-02-11

## 2024-08-12 NOTE — PROGRESS NOTES
The bone density scan shows osteoporosis and osteopenia.  I would recommend Boniva once a month plus calcium and vitamin-D I will send in prescriptions.

## 2024-08-13 ENCOUNTER — TELEPHONE (OUTPATIENT)
Dept: FAMILY MEDICINE | Facility: CLINIC | Age: 69
End: 2024-08-13
Payer: MEDICARE

## 2024-08-13 PROBLEM — R29.898 WEAKNESS OF BOTH SHOULDERS: Status: ACTIVE | Noted: 2024-08-13

## 2024-08-13 PROBLEM — M53.84 DECREASED ROM OF THORACIC SPINE: Status: ACTIVE | Noted: 2024-08-13
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
## 2024-08-13 NOTE — TELEPHONE ENCOUNTER
----- Message from Roney Bang MD sent at 8/12/2024 11:39 AM CDT -----  The bone density scan shows osteoporosis and osteopenia.  I would recommend Boniva once a month plus calcium and vitamin-D I will send in prescriptions.

## 2024-08-30 ENCOUNTER — PATIENT MESSAGE (OUTPATIENT)
Dept: ADMINISTRATIVE | Facility: CLINIC | Age: 69
End: 2024-08-30
Payer: MEDICARE

## 2024-09-16 ENCOUNTER — HOSPITAL ENCOUNTER (INPATIENT)
Facility: HOSPITAL | Age: 69
LOS: 13 days | Discharge: HOME HEALTH | DRG: 094 | End: 2024-09-30
Attending: EMERGENCY MEDICINE | Admitting: INTERNAL MEDICINE
Payer: MEDICARE

## 2024-09-16 DIAGNOSIS — G93.40 ACUTE ENCEPHALOPATHY: ICD-10-CM

## 2024-09-16 DIAGNOSIS — R01.1 HEART MURMUR: ICD-10-CM

## 2024-09-16 DIAGNOSIS — G03.9 MENINGITIS: Primary | ICD-10-CM

## 2024-09-16 DIAGNOSIS — R00.0 TACHYCARDIA: ICD-10-CM

## 2024-09-16 LAB
ALBUMIN SERPL BCP-MCNC: 4.4 G/DL (ref 3.5–5.2)
ALP SERPL-CCNC: 76 U/L (ref 55–135)
ALT SERPL W/O P-5'-P-CCNC: 11 U/L (ref 10–44)
ANION GAP SERPL CALC-SCNC: 12 MMOL/L (ref 8–16)
AST SERPL-CCNC: 16 U/L (ref 10–40)
BACTERIA #/AREA URNS HPF: NORMAL /HPF
BASOPHILS # BLD AUTO: 0.04 K/UL (ref 0–0.2)
BASOPHILS NFR BLD: 0.3 % (ref 0–1.9)
BILIRUB SERPL-MCNC: 0.8 MG/DL (ref 0.1–1)
BILIRUB UR QL STRIP: NEGATIVE
BUN SERPL-MCNC: 18 MG/DL (ref 8–23)
CALCIUM SERPL-MCNC: 9.5 MG/DL (ref 8.7–10.5)
CHLORIDE SERPL-SCNC: 100 MMOL/L (ref 95–110)
CLARITY CSF: ABNORMAL
CLARITY UR: CLEAR
CO2 SERPL-SCNC: 23 MMOL/L (ref 23–29)
COLOR CSF: COLORLESS
COLOR UR: YELLOW
CREAT SERPL-MCNC: 0.9 MG/DL (ref 0.5–1.4)
CSF TUBE NUMBER: 1
CSF TUBE NUMBER: 1
DIFFERENTIAL METHOD BLD: ABNORMAL
EOSINOPHIL # BLD AUTO: 0 K/UL (ref 0–0.5)
EOSINOPHIL NFR BLD: 0 % (ref 0–8)
ERYTHROCYTE [DISTWIDTH] IN BLOOD BY AUTOMATED COUNT: 13 % (ref 11.5–14.5)
EST. GFR  (NO RACE VARIABLE): >60 ML/MIN/1.73 M^2
GLUCOSE CSF-MCNC: 50 MG/DL (ref 40–70)
GLUCOSE SERPL-MCNC: 130 MG/DL (ref 70–110)
GLUCOSE UR QL STRIP: NEGATIVE
HCT VFR BLD AUTO: 42.4 % (ref 37–48.5)
HGB BLD-MCNC: 14 G/DL (ref 12–16)
HGB UR QL STRIP: NEGATIVE
HYALINE CASTS #/AREA URNS LPF: 1 /LPF
IMM GRANULOCYTES # BLD AUTO: 0.06 K/UL (ref 0–0.04)
IMM GRANULOCYTES NFR BLD AUTO: 0.4 % (ref 0–0.5)
INFLUENZA A, MOLECULAR: NEGATIVE
INFLUENZA B, MOLECULAR: NEGATIVE
KETONES UR QL STRIP: ABNORMAL
LDH SERPL L TO P-CCNC: 1.18 MMOL/L (ref 0.5–2.2)
LEUKOCYTE ESTERASE UR QL STRIP: NEGATIVE
LYMPHOCYTES # BLD AUTO: 1.1 K/UL (ref 1–4.8)
LYMPHOCYTES NFR BLD: 7.7 % (ref 18–48)
LYMPHOCYTES NFR CSF MANUAL: 7 % (ref 40–80)
MAGNESIUM SERPL-MCNC: 1.9 MG/DL (ref 1.6–2.6)
MCH RBC QN AUTO: 31.7 PG (ref 27–31)
MCHC RBC AUTO-ENTMCNC: 33 G/DL (ref 32–36)
MCV RBC AUTO: 96 FL (ref 82–98)
MICROSCOPIC COMMENT: NORMAL
MONOCYTES # BLD AUTO: 0.5 K/UL (ref 0.3–1)
MONOCYTES NFR BLD: 3.5 % (ref 4–15)
MONOS+MACROS NFR CSF MANUAL: 4 % (ref 15–45)
NEUTROPHILS # BLD AUTO: 12.2 K/UL (ref 1.8–7.7)
NEUTROPHILS NFR BLD: 88.1 % (ref 38–73)
NEUTROPHILS NFR CSF MANUAL: 89 % (ref 0–6)
NITRITE UR QL STRIP: NEGATIVE
NRBC BLD-RTO: 0 /100 WBC
PH UR STRIP: 6 [PH] (ref 5–8)
PHOSPHATE SERPL-MCNC: 2 MG/DL (ref 2.7–4.5)
PLATELET # BLD AUTO: 350 K/UL (ref 150–450)
PMV BLD AUTO: 9.7 FL (ref 9.2–12.9)
POTASSIUM SERPL-SCNC: 4.1 MMOL/L (ref 3.5–5.1)
PROT CSF-MCNC: 173 MG/DL (ref 15–40)
PROT SERPL-MCNC: 8.8 G/DL (ref 6–8.4)
PROT UR QL STRIP: ABNORMAL
RBC # BLD AUTO: 4.41 M/UL (ref 4–5.4)
RBC # CSF: 14 /CU MM
RBC #/AREA URNS HPF: 1 /HPF (ref 0–4)
SAMPLE: NORMAL
SARS-COV-2 RDRP RESP QL NAA+PROBE: NEGATIVE
SODIUM SERPL-SCNC: 135 MMOL/L (ref 136–145)
SP GR UR STRIP: 1.03 (ref 1–1.03)
SPECIMEN SOURCE: NORMAL
SPECIMEN VOL CSF: 0.5 ML
SQUAMOUS #/AREA URNS HPF: 1 /HPF
URN SPEC COLLECT METH UR: ABNORMAL
UROBILINOGEN UR STRIP-ACNC: NEGATIVE EU/DL
WBC # BLD AUTO: 13.86 K/UL (ref 3.9–12.7)
WBC # CSF: 2038 /CU MM (ref 0–5)
WBC #/AREA URNS HPF: 3 /HPF (ref 0–5)

## 2024-09-16 PROCEDURE — 93010 ELECTROCARDIOGRAM REPORT: CPT | Mod: ,,, | Performed by: INTERNAL MEDICINE

## 2024-09-16 PROCEDURE — 87205 SMEAR GRAM STAIN: CPT | Performed by: EMERGENCY MEDICINE

## 2024-09-16 PROCEDURE — 009U3ZX DRAINAGE OF SPINAL CANAL, PERCUTANEOUS APPROACH, DIAGNOSTIC: ICD-10-PCS | Performed by: EMERGENCY MEDICINE

## 2024-09-16 PROCEDURE — 84100 ASSAY OF PHOSPHORUS: CPT

## 2024-09-16 PROCEDURE — 63600175 PHARM REV CODE 636 W HCPCS: Performed by: EMERGENCY MEDICINE

## 2024-09-16 PROCEDURE — 82945 GLUCOSE OTHER FLUID: CPT | Performed by: EMERGENCY MEDICINE

## 2024-09-16 PROCEDURE — G0378 HOSPITAL OBSERVATION PER HR: HCPCS

## 2024-09-16 PROCEDURE — 93005 ELECTROCARDIOGRAM TRACING: CPT | Performed by: INTERNAL MEDICINE

## 2024-09-16 PROCEDURE — 81001 URINALYSIS AUTO W/SCOPE: CPT

## 2024-09-16 PROCEDURE — 87502 INFLUENZA DNA AMP PROBE: CPT | Performed by: EMERGENCY MEDICINE

## 2024-09-16 PROCEDURE — 96361 HYDRATE IV INFUSION ADD-ON: CPT

## 2024-09-16 PROCEDURE — 99285 EMERGENCY DEPT VISIT HI MDM: CPT | Mod: 25

## 2024-09-16 PROCEDURE — 96375 TX/PRO/DX INJ NEW DRUG ADDON: CPT

## 2024-09-16 PROCEDURE — 96367 TX/PROPH/DG ADDL SEQ IV INF: CPT

## 2024-09-16 PROCEDURE — 89051 BODY FLUID CELL COUNT: CPT | Performed by: EMERGENCY MEDICINE

## 2024-09-16 PROCEDURE — 85025 COMPLETE CBC W/AUTO DIFF WBC: CPT

## 2024-09-16 PROCEDURE — 25000003 PHARM REV CODE 250: Performed by: EMERGENCY MEDICINE

## 2024-09-16 PROCEDURE — 51701 INSERT BLADDER CATHETER: CPT

## 2024-09-16 PROCEDURE — 87040 BLOOD CULTURE FOR BACTERIA: CPT

## 2024-09-16 PROCEDURE — 96365 THER/PROPH/DIAG IV INF INIT: CPT

## 2024-09-16 PROCEDURE — 83735 ASSAY OF MAGNESIUM: CPT

## 2024-09-16 PROCEDURE — 87483 CNS DNA AMP PROBE TYPE 12-25: CPT | Performed by: INTERNAL MEDICINE

## 2024-09-16 PROCEDURE — 84157 ASSAY OF PROTEIN OTHER: CPT | Performed by: EMERGENCY MEDICINE

## 2024-09-16 PROCEDURE — U0002 COVID-19 LAB TEST NON-CDC: HCPCS | Performed by: EMERGENCY MEDICINE

## 2024-09-16 PROCEDURE — 87070 CULTURE OTHR SPECIMN AEROBIC: CPT | Performed by: EMERGENCY MEDICINE

## 2024-09-16 PROCEDURE — 96366 THER/PROPH/DIAG IV INF ADDON: CPT

## 2024-09-16 PROCEDURE — 80053 COMPREHEN METABOLIC PANEL: CPT

## 2024-09-16 PROCEDURE — 36415 COLL VENOUS BLD VENIPUNCTURE: CPT

## 2024-09-16 RX ORDER — ACETAMINOPHEN 500 MG
1000 TABLET ORAL
Status: COMPLETED | OUTPATIENT
Start: 2024-09-16 | End: 2024-09-16

## 2024-09-16 RX ORDER — DEXAMETHASONE SODIUM PHOSPHATE 4 MG/ML
10 INJECTION, SOLUTION INTRA-ARTICULAR; INTRALESIONAL; INTRAMUSCULAR; INTRAVENOUS; SOFT TISSUE
Status: COMPLETED | OUTPATIENT
Start: 2024-09-16 | End: 2024-09-16

## 2024-09-16 RX ORDER — VALACYCLOVIR HYDROCHLORIDE 500 MG/1
1000 TABLET, FILM COATED ORAL
Status: COMPLETED | OUTPATIENT
Start: 2024-09-16 | End: 2024-09-16

## 2024-09-16 RX ORDER — KETOROLAC TROMETHAMINE 30 MG/ML
10 INJECTION, SOLUTION INTRAMUSCULAR; INTRAVENOUS
Status: COMPLETED | OUTPATIENT
Start: 2024-09-16 | End: 2024-09-16

## 2024-09-16 RX ORDER — LIDOCAINE HYDROCHLORIDE 10 MG/ML
5 INJECTION, SOLUTION EPIDURAL; INFILTRATION; INTRACAUDAL; PERINEURAL
Status: COMPLETED | OUTPATIENT
Start: 2024-09-16 | End: 2024-09-16

## 2024-09-16 RX ADMIN — VANCOMYCIN HYDROCHLORIDE 1250 MG: 1.25 INJECTION, POWDER, LYOPHILIZED, FOR SOLUTION INTRAVENOUS at 07:09

## 2024-09-16 RX ADMIN — SODIUM CHLORIDE, POTASSIUM CHLORIDE, SODIUM LACTATE AND CALCIUM CHLORIDE 1000 ML: 600; 310; 30; 20 INJECTION, SOLUTION INTRAVENOUS at 02:09

## 2024-09-16 RX ADMIN — LIDOCAINE HYDROCHLORIDE 50 MG: 10 INJECTION, SOLUTION EPIDURAL; INFILTRATION; INTRACAUDAL; PERINEURAL at 05:09

## 2024-09-16 RX ADMIN — DEXAMETHASONE SODIUM PHOSPHATE 10 MG: 4 INJECTION, SOLUTION INTRA-ARTICULAR; INTRALESIONAL; INTRAMUSCULAR; INTRAVENOUS; SOFT TISSUE at 07:09

## 2024-09-16 RX ADMIN — KETOROLAC TROMETHAMINE 10 MG: 30 INJECTION, SOLUTION INTRAMUSCULAR; INTRAVENOUS at 07:09

## 2024-09-16 RX ADMIN — ACETAMINOPHEN 1000 MG: 500 TABLET ORAL at 02:09

## 2024-09-16 RX ADMIN — CEFTRIAXONE SODIUM 2 G: 2 INJECTION, POWDER, FOR SOLUTION INTRAMUSCULAR; INTRAVENOUS at 07:09

## 2024-09-16 RX ADMIN — VALACYCLOVIR HYDROCHLORIDE 1000 MG: 500 TABLET, FILM COATED ORAL at 07:09

## 2024-09-16 NOTE — FIRST PROVIDER EVALUATION
Emergency Department TeleTriage Encounter Note      CHIEF COMPLAINT    Chief Complaint   Patient presents with    Altered Mental Status     Confusion that began yesterday at 4pm per pt's  along with weakness - pt presents to ER with fever and can state name and  and situation but not president.  Patient have been vomiting since 1pm yesterday    Fever    Headache    Emesis       VITAL SIGNS   Initial Vitals [24 1309]   BP Pulse Resp Temp SpO2   (!) 151/67 110 18 (!) 100.4 °F (38 °C) 95 %      MAP       --            ALLERGIES    Review of patient's allergies indicates:  No Known Allergies    PROVIDER TRIAGE NOTE  This is a teletriage evaluation of a 68 y.o. female presenting to the ED complaining of confusion. Patient confused, vomiting, and complaining of generalized weakness since yesterday afternoon.  denies any URI symptoms.     Patient is alert. She speaks in complete sentences. She is sitting upright in the chair in no distress.     Initial orders will be placed and care will be transferred to an alternate provider when patient is roomed for a full evaluation. Any additional orders and the final disposition will be determined by that provider.         ORDERS  Labs Reviewed   CULTURE, BLOOD   CULTURE, BLOOD   CBC W/ AUTO DIFFERENTIAL   COMPREHENSIVE METABOLIC PANEL   MAGNESIUM   PHOSPHORUS   URINALYSIS, REFLEX TO URINE CULTURE   POCT LACTATE       ED Orders (720h ago, onward)      Start Ordered     Status Ordering Provider    24 1719 24 1318  Lactic Acid Plasma #2  In 4 hours         Ordered ARTUR NAPOLES A    24 1329 24 1329  ED Preference List Used to Initiate Sepsis Orders  Until discontinued         Ordered CHANCE NERI    24 1329 24 1329  EKG 12-lead  Once         Ordered CHANCE NERI.    24 1319 24 1318  Blood culture x two cultures. Draw prior to antibiotics  Every 15 min      Comments: Aerobic and anaerobic     Order ID Start  Status Ordering Provider   [id number] 09/16/24 1319 Pending Collection POWER, ARTUR A   [id number] 09/16/24 1334 Pending Collection POWER, ARTUR A       Ordered POWER, ARTUR A    09/16/24 1319 09/16/24 1318  CBC auto differential  STAT         Pending Collection POWER, ARTUR A    09/16/24 1319 09/16/24 1318  Comprehensive metabolic panel  STAT         Pending Collection POWER, ARTUR A    09/16/24 1319 09/16/24 1318  POCT Lactate #1  Once         Ordered POWER, ARTUR A    09/16/24 1319 09/16/24 1318  Magnesium  STAT         Pending Collection POWER, ARTUR A    09/16/24 1319 09/16/24 1318  Phosphorus  STAT         Pending Collection POWER, ARTUR A    09/16/24 1319 09/16/24 1318  X-Ray Chest 1 View  1 time imaging         Ordered POWER, ARTUR A    09/16/24 1319 09/16/24 1318  Bed rest  Until discontinued         Ordered POWER, ARTUR A    09/16/24 1319 09/16/24 1318  Cardiac Monitoring - Adult  Continuous        Comments: Notify Physician If:    Ordered POWER, ARTUR A    09/16/24 1319 09/16/24 1318  Pulse Oximetry Continuous  Continuous         Ordered POWER, ARTUR A    09/16/24 1319 09/16/24 1318  Strict intake and output  Until discontinued         Ordered POWER, ARTUR A    09/16/24 1319 09/16/24 1318  Urinalysis, Reflex to Urine Culture Urine, Clean Catch  STAT         Ordered POWER, ARTUR A    09/16/24 1319 09/16/24 1318  Saline lock IV  Once         Ordered POWER, ARTUR A    Unscheduled 09/16/24 1318  Vital signs  Per comments      Comments: Every 15 minutes until SBP greater than 90 or MAP greater than 65, then every 30 minutes times one hours then every one hour    Ordered POWER, ARTUR A              Virtual Visit Note: The provider triage portion of this emergency department evaluation and documentation was performed via [employer], a HIPAA-compliant telemedicine application, in concert with a tele-presenter in the room. A face to face patient evaluation with one  of my colleagues will occur once the patient is placed in an emergency department room.      DISCLAIMER: This note was prepared with [employer] voice recognition transcription software. Garbled syntax, mangled pronouns, and other bizarre constructions may be attributed to that software system.

## 2024-09-16 NOTE — ED PROVIDER NOTES
Chief complaint:  Altered Mental Status (Confusion that began yesterday at 4pm per pt's  along with weakness - pt presents to ER with fever and can state name and  and situation but not president.  Patient have been vomiting since 1pm yesterday), Fever, Headache, and Emesis      HPI:  Masha Hobbs is a 68 y.o. female presenting with a two day history of fever up to 102 accompanied by frontal headache with nonbilious, nonbloody emesis and confusion.  Patient denies neck stiffness or neck pain.  Confusion was present yesterday with inappropriate answers to questions mildly residual but improved today.  She denies focal numbness or weakness, loss of consciousness or seizure, visual change, difficulty swallowing.  No travel history or known sick contacts.  She has not taken anything for symptoms today.  She denies cough or congestion.  She has had body aches malaise as well.  She denies urinary complaints such as frequency, urgency, dysuria, hematuria.    ROS: As per HPI and below:  No chest pain, dyspnea, abdominal pain, diarrhea, rash, swelling.    Review of patient's allergies indicates:  No Known Allergies    Patient's Medications   New Prescriptions    No medications on file   Previous Medications    BIOTIN ORAL    Take by mouth.    CALCIUM CARBONATE-VIT D3- MG CALCIUM- 400 UNIT TAB    Take 1 tablet by mouth 2 (two) times a day. for 365 doses    IBANDRONATE (BONIVA) 150 MG TABLET    Take 1 tablet (150 mg total) by mouth every 30 days.    IBUPROFEN (ADVIL,MOTRIN) 400 MG TABLET    Take 1 tablet (400 mg total) by mouth every 6 (six) hours as needed.    OMEPRAZOLE (PRILOSEC) 20 MG CAPSULE    Take 20 mg by mouth once daily.    VITS A-C-E-B COMPLX-MIN-LUTEIN (LIPOTRIAD, WITH LUTEIN,) 5,000 UNIT- 120 MG-60 UNIT TBSR    Take by mouth.   Modified Medications    No medications on file   Discontinued Medications    No medications on file       PMH:  As per HPI and below:  Past Medical History:   Diagnosis  Date    Age-related osteoporosis without current pathological fracture 3/4/2020    Based upon bone density showing osteoporosis both at hips and lumbar spine.  Patient notified.  May consider bisphosphonates.     Past Surgical History:   Procedure Laterality Date    TONSILLECTOMY      WRIST SURGERY Right        Social History     Socioeconomic History    Marital status:      Spouse name: Elias Hobbs    Number of children: 2   Occupational History    Occupation: Part Time realtor/   Tobacco Use    Smoking status: Former     Current packs/day: 0.00     Types: Cigarettes     Quit date: 1989     Years since quittin.7    Smokeless tobacco: Never   Substance and Sexual Activity    Alcohol use: Yes     Alcohol/week: 1.0 - 2.0 standard drink of alcohol     Types: 1 - 2 Shots of liquor per week     Comment: Rum [place] Milky     Drug use: Not Currently    Sexual activity: Yes     Partners: Male   Social History Narrative    G 39, B35     Social Determinants of Health     Financial Resource Strain: Patient Declined (2024)    Overall Financial Resource Strain (CARDIA)     Difficulty of Paying Living Expenses: Patient declined   Food Insecurity: No Food Insecurity (2024)    Hunger Vital Sign     Worried About Running Out of Food in the Last Year: Never true     Ran Out of Food in the Last Year: Never true   Physical Activity: Sufficiently Active (2024)    Exercise Vital Sign     Days of Exercise per Week: 4 days     Minutes of Exercise per Session: 120 min   Stress: Stress Concern Present (2024)    [employer] - Occupational Stress Questionnaire     Feeling of Stress : To some extent   Housing Stability: Unknown (2024)    Housing Stability Vital Sign     Unable to Pay for Housing in the Last Year: No       Family History   Problem Relation Name Age of Onset    Diabetes Mother      Heart disease Mother      Cancer Mother      Breast cancer Mother       Cancer Father      Hyperlipidemia Father      Heart disease Father      Diabetes Father      Stroke Father         Physical Exam:    Vitals:    09/16/24 1829   BP: (!) 131/57   Pulse: 83   Resp:    Temp:      GENERAL:  No apparent distress.  Alert.    HEENT:  Moist mucous membranes.  Normocephalic and atraumatic.    NECK:  No swelling.  Midline trachea.  Supple without nuchal rigidity.  CARDIOVASCULAR:  Tachycardia with regular rhythm.  2+ radial pulses.  No murmurs auscultated.  No rub.  PULMONARY:  Lungs clear to auscultation bilaterally.  No wheezes, rales, or rhonci.  Unlabored respirations.  ABDOMEN:  Non-tender and non-distended.    EXTREMITIES:  Warm and well perfused.  Brisk capillary refill.  No edema.  NEUROLOGICAL:  Normal mental status.  Appropriate and conversant.  GCS is 15.  Cranial nerves 3-12 intact.  5/5 strength sensation in the upper lower extremities bilaterally.  SKIN:  No rashes or ecchymoses.    BACK:  Atraumatic.  No CVA or midline vertebral tenderness to palpation.      Labs Reviewed   CBC W/ AUTO DIFFERENTIAL - Abnormal       Result Value    WBC 13.86 (*)     RBC 4.41      Hemoglobin 14.0      Hematocrit 42.4      MCV 96      MCH 31.7 (*)     MCHC 33.0      RDW 13.0      Platelets 350      MPV 9.7      Immature Granulocytes 0.4      Gran # (ANC) 12.2 (*)     Immature Grans (Abs) 0.06 (*)     Lymph # 1.1      Mono # 0.5      Eos # 0.0      Baso # 0.04      nRBC 0      Gran % 88.1 (*)     Lymph % 7.7 (*)     Mono % 3.5 (*)     Eosinophil % 0.0      Basophil % 0.3      Differential Method Automated     COMPREHENSIVE METABOLIC PANEL - Abnormal    Sodium 135 (*)     Potassium 4.1      Chloride 100      CO2 23      Glucose 130 (*)     BUN 18      Creatinine 0.9      Calcium 9.5      Total Protein 8.8 (*)     Albumin 4.4      Total Bilirubin 0.8      Alkaline Phosphatase 76      AST 16      ALT 11      eGFR >60.0      Anion Gap 12     PHOSPHORUS - Abnormal    Phosphorus 2.0 (*)    URINALYSIS,  REFLEX TO URINE CULTURE - Abnormal    Specimen UA Urine, Clean Catch      Color, UA Yellow      Appearance, UA Clear      pH, UA 6.0      Specific Gravity, UA 1.030      Protein, UA 2+ (*)     Glucose, UA Negative      Ketones, UA 3+ (*)     Bilirubin (UA) Negative      Occult Blood UA Negative      Nitrite, UA Negative      Urobilinogen, UA Negative      Leukocytes, UA Negative      Narrative:     Specimen Source->Urine   PROTEIN, CSF - Abnormal    CSF Tube Number 1      Protein,  (*)     Narrative:     Specimen Tube Number->1   CSF CELL COUNT WITH DIFFERENTIAL - Abnormal    [place] 0.5      Appearance, CSF Slightly hazy (*)     Color, CSF Colorless      WBC, CSF 2038 (*)     RBC, CSF 14 (*)     Segmented Neutrophils, CSF 89 (*)     Lymphs, CSF 7 (*)     Mono/Macrophage, CSF 4 (*)     Narrative:     Specimen Tube Number->4  WBC CSF critical result(s) repeated. Called and verbal readback   obtained from [facility], RN.  by SLT1 09/16/2024 18:40   CULTURE, CSF  (INCLUDES STAIN)    Gram Stain Result Few WBC's      Gram Stain Result No organisms seen      Narrative:     On which sequentially labeled tube should this analysis be  performed?->2   CULTURE, BLOOD   CULTURE, BLOOD   MAGNESIUM    Magnesium 1.9     SARS-COV-2 RNA AMPLIFICATION, QUAL    SARS-CoV-2 RNA, Amplification, Qual Negative     INFLUENZA A AND B ANTIGEN    Influenza A, Molecular Negative      Influenza B, Molecular Negative      Flu A & B Source Nasal swab      Narrative:     Specimen Source->Nasopharyngeal Swab   URINALYSIS MICROSCOPIC    RBC, UA 1      WBC, UA 3      Bacteria None      Squam Epithel, UA 1      Hyaline Casts, UA 1      Microscopic Comment SEE COMMENT      Narrative:     Specimen Source->Urine   GLUCOSE, CSF    CSF Tube Number 1      Glucose, CSF 50      Narrative:     Specimen Tube Number->1   ISTAT LACTATE    POC Lactate 1.18      Sample VENOUS     POCT LACTATE       Current Discharge Medication List         CONTINUE these medications which have NOT CHANGED    Details   BIOTIN ORAL Take by mouth.      calcium carbonate-vit D3-min 600 mg calcium- 400 unit Tab Take 1 tablet by mouth 2 (two) times a day. for 365 doses  Qty: 180 tablet, Refills: 2    Associated Diagnoses: Osteoporosis, unspecified osteoporosis type, unspecified pathological fracture presence      ibandronate (BONIVA) 150 mg tablet Take 1 tablet (150 mg total) by mouth every 30 days.  Qty: 1 tablet, Refills: 11    Associated Diagnoses: Osteoporosis, unspecified osteoporosis type, unspecified pathological fracture presence      ibuprofen (ADVIL,MOTRIN) 400 MG tablet Take 1 tablet (400 mg total) by mouth every 6 (six) hours as needed.  Qty: 20 tablet, Refills: 0      omeprazole (PRILOSEC) 20 MG capsule Take 20 mg by mouth once daily.      vits A-C-E-B complx-min-lutein (LIPOTRIAD, WITH LUTEIN,) 5,000 unit- 120 mg-60 unit TbSR Take by mouth.             Orders Placed This Encounter   Procedures    Blood culture x two cultures. Draw prior to antibiotics    CSF culture and Gram Stain (Tube 2)    X-Ray Chest 1 View    CT Head Without Contrast    CBC auto differential    Comprehensive metabolic panel    Magnesium    Phosphorus    Urinalysis, Reflex to Urine Culture Urine, Clean Catch    COVID-19 Rapid Screening    Influenza antigen Nasopharyngeal Swab    Urinalysis Microscopic    Glucose, CSF (Tube 1)    Protein, CSF (Tube 1)    CSF cell count with differential (Tube 4)    Vital signs    Cardiac Monitoring - Adult    Strict intake and output    ED Preference List Used to Initiate Sepsis Orders    Straight cath if unable to void    Pharmacy to dose Vancomycin consult    Pulse Oximetry Continuous    Oxygen Continuous    EKG 12-lead    Saline lock IV    Possible Hospitalization    Place in Observation       Imaging Results              X-Ray Chest 1 View (Final result)  Result time 09/16/24 14:53:24      Final result by Farrukh Ramos MD (09/16/24 14:53:24)                    Impression:      No acute cardiopulmonary abnormality.      Electronically signed by: Farrukh Ramos  Date:    09/16/2024  Time:    14:53               Narrative:    EXAMINATION:  XR CHEST 1 VIEW    CLINICAL HISTORY:  Sepsis;    FINDINGS:  Portable chest at 1441 compared with 06/06/2024 shows unchanged cardiomediastinal silhouette.    Lungs are clear. Pulmonary vasculature is normal. No acute osseous abnormality.                                       CT Head Without Contrast (Final result)  Result time 09/16/24 14:52:17      Final result by Wang Acuña MD (09/16/24 14:52:17)                   Impression:      No CT evidence of acute intracranial pathology.      Electronically signed by: Wang Acuña  Date:    09/16/2024  Time:    14:52               Narrative:    EXAMINATION:  CT HEAD WITHOUT CONTRAST    CLINICAL HISTORY:  Mental status change, unknown cause;    TECHNIQUE:  Axial CT imaging obtained through the brain without IV contrast.    CMS Mandated Quality Data-CT Radiation Dose-436    All CT scans at this facility dose modulation, iterative reconstruction, and or weight-based dosing when appropriate to reduce radiation dose to as low as reasonably achievable.    COMPARISON:  None    FINDINGS:  Negative for acute intracranial hemorrhage, midline shift, or mass effect.  Ventricles and sulci are normal in size.  Gray-white differentiation is maintained.  Mild periventricular and deep white matter hypoattenuation, consistent with microangiopathic change.  Cerebellar hemispheres and brainstem are unremarkable.  Atherosclerotic calcification of intracranial carotid arteries.    No calvarial lesion or fracture.  Mastoid air cells are clear.  Mucosal thickening right maxillary sinus.  Paranasal sinuses are otherwise clear.                                      ED Course as of 09/16/24 2029   Mon Sep 16, 2024   1418 EKG:  Sinus tachycardia, rate of 108, normal intervals and axis.  There are no acute ST  or T wave changes suggestive of acute ischemia or infarction.  (Independently interpreted by me) [MR]   1739 LUMBAR PUNCTURE:  Written consent was obtained prior to the procedure with explanation of risks, benefits, and alternatives.  The patient was sterilely prepared in a(n) upright position.  1 attempts were required.  CSF was clear.  There were no recognized complications and the patient tolerated the procedure well.      [MR]      ED Course User Index  [MR] Silvano Vasquez MD       MDM:    68 y.o. female with fever with headache and vomiting in the setting of recent resolved confusion over the last two days.  Broad differential reviewed with the family including possible viral process.  Point of care viral testing sent along with broad workup to exclude end-organ dysfunction including lactic acid for exclusion of sepsis.  IV fluids and antipyretic given here for initial treatment pending further workup.  Head CT done with consideration of confusion with no focal deficit to immediately suggest CVA.      Workup including head CT without acute process with lumbar puncture subsequently performed after discussion with patient consistent with meningitis.  This may be viral meningitis.  Mental status is currently normal on reassessment.  I have initiated antibacterial therapy along with steroids as well as antivirals pending results of culture.  I have discussed with hospital medicine who will admit.    Diagnoses:    1. Headache  2. Fever  3. Meningitis    Critical Care    Date/Time: 9/16/2024 8:30 PM    Performed by: Silvano Vasquez MD  Authorized by: Kehinde Simpson MD  Direct patient critical care time: 17 minutes  Additional history critical care time: 3 minutes  Ordering / reviewing critical care time: 6 minutes  Documentation critical care time: 5 minutes  Consulting other physicians critical care time: 3 minutes  Total critical care time (exclusive of procedural time) : 34 minutes  Critical care  time was exclusive of separately billable procedures and treating other patients.              Silvano Vasquez MD  09/16/24 2030

## 2024-09-17 PROBLEM — G93.40 ACUTE ENCEPHALOPATHY: Status: ACTIVE | Noted: 2024-09-17

## 2024-09-17 PROBLEM — D72.829 LEUKOCYTOSIS: Status: ACTIVE | Noted: 2024-09-17

## 2024-09-17 PROBLEM — G03.9 MENINGITIS: Status: ACTIVE | Noted: 2024-09-17

## 2024-09-17 LAB
ALBUMIN SERPL BCP-MCNC: 3.1 G/DL (ref 3.5–5.2)
ALP SERPL-CCNC: 59 U/L (ref 55–135)
ALT SERPL W/O P-5'-P-CCNC: 11 U/L (ref 10–44)
ANION GAP SERPL CALC-SCNC: 11 MMOL/L (ref 8–16)
AST SERPL-CCNC: 14 U/L (ref 10–40)
BILIRUB SERPL-MCNC: 0.5 MG/DL (ref 0.1–1)
BUN SERPL-MCNC: 23 MG/DL (ref 8–23)
C GATTII+NEOFOR DNA CSF QL NAA+NON-PROBE: NOT DETECTED
CALCIUM SERPL-MCNC: 9.1 MG/DL (ref 8.7–10.5)
CHLORIDE SERPL-SCNC: 102 MMOL/L (ref 95–110)
CMV DNA CSF QL NAA+NON-PROBE: NOT DETECTED
CO2 SERPL-SCNC: 22 MMOL/L (ref 23–29)
CREAT SERPL-MCNC: 0.9 MG/DL (ref 0.5–1.4)
E COLI K1 DNA CSF QL NAA+NON-PROBE: NOT DETECTED
EST. GFR  (NO RACE VARIABLE): >60 ML/MIN/1.73 M^2
EV RNA CSF QL NAA+NON-PROBE: NOT DETECTED
GLUCOSE SERPL-MCNC: 167 MG/DL (ref 70–110)
GP B STREP DNA CSF QL NAA+NON-PROBE: NOT DETECTED
HAEM INFLU DNA CSF QL NAA+NON-PROBE: NOT DETECTED
HAEM INFLU DNA CSF QL NAA+NON-PROBE: NOT DETECTED
HHV6 DNA CSF QL NAA+NON-PROBE: NOT DETECTED
HSV1 DNA CSF QL NAA+NON-PROBE: NOT DETECTED
HSV2 DNA CSF QL NAA+NON-PROBE: NOT DETECTED
MAGNESIUM SERPL-MCNC: 1.9 MG/DL (ref 1.6–2.6)
N MEN DNA CSF QL NAA+NON-PROBE: NOT DETECTED
PARECHOVIRUS A RNA CSF QL NAA+NON-PROBE: NOT DETECTED
PHOSPHATE SERPL-MCNC: 2.3 MG/DL (ref 2.7–4.5)
POTASSIUM SERPL-SCNC: 3.9 MMOL/L (ref 3.5–5.1)
PROT SERPL-MCNC: 7.2 G/DL (ref 6–8.4)
S PNEUM DNA CSF QL NAA+NON-PROBE: NOT DETECTED
SODIUM SERPL-SCNC: 135 MMOL/L (ref 136–145)
VZV DNA CSF QL NAA+NON-PROBE: NOT DETECTED

## 2024-09-17 PROCEDURE — 63600175 PHARM REV CODE 636 W HCPCS: Performed by: INTERNAL MEDICINE

## 2024-09-17 PROCEDURE — 25000003 PHARM REV CODE 250: Performed by: INTERNAL MEDICINE

## 2024-09-17 PROCEDURE — 99223 1ST HOSP IP/OBS HIGH 75: CPT | Mod: ,,, | Performed by: INTERNAL MEDICINE

## 2024-09-17 PROCEDURE — 99900031 HC PATIENT EDUCATION (STAT)

## 2024-09-17 PROCEDURE — 80053 COMPREHEN METABOLIC PANEL: CPT

## 2024-09-17 PROCEDURE — 97165 OT EVAL LOW COMPLEX 30 MIN: CPT

## 2024-09-17 PROCEDURE — 94799 UNLISTED PULMONARY SVC/PX: CPT

## 2024-09-17 PROCEDURE — 27000207 HC ISOLATION

## 2024-09-17 PROCEDURE — 36415 COLL VENOUS BLD VENIPUNCTURE: CPT

## 2024-09-17 PROCEDURE — A9585 GADOBUTROL INJECTION: HCPCS

## 2024-09-17 PROCEDURE — 25000003 PHARM REV CODE 250

## 2024-09-17 PROCEDURE — 36415 COLL VENOUS BLD VENIPUNCTURE: CPT | Performed by: INTERNAL MEDICINE

## 2024-09-17 PROCEDURE — 11000001 HC ACUTE MED/SURG PRIVATE ROOM

## 2024-09-17 PROCEDURE — 97535 SELF CARE MNGMENT TRAINING: CPT

## 2024-09-17 PROCEDURE — 83735 ASSAY OF MAGNESIUM: CPT

## 2024-09-17 PROCEDURE — 87529 HSV DNA AMP PROBE: CPT | Performed by: INTERNAL MEDICINE

## 2024-09-17 PROCEDURE — 84100 ASSAY OF PHOSPHORUS: CPT

## 2024-09-17 PROCEDURE — 25500020 PHARM REV CODE 255

## 2024-09-17 PROCEDURE — 99900035 HC TECH TIME PER 15 MIN (STAT)

## 2024-09-17 PROCEDURE — 63600175 PHARM REV CODE 636 W HCPCS

## 2024-09-17 PROCEDURE — 94761 N-INVAS EAR/PLS OXIMETRY MLT: CPT

## 2024-09-17 PROCEDURE — 97161 PT EVAL LOW COMPLEX 20 MIN: CPT

## 2024-09-17 RX ORDER — GADOBUTROL 604.72 MG/ML
INJECTION INTRAVENOUS
Status: COMPLETED
Start: 2024-09-17 | End: 2024-09-17

## 2024-09-17 RX ORDER — ACETAMINOPHEN 325 MG/1
650 TABLET ORAL EVERY 4 HOURS PRN
Status: DISCONTINUED | OUTPATIENT
Start: 2024-09-17 | End: 2024-09-17

## 2024-09-17 RX ORDER — SODIUM,POTASSIUM PHOSPHATES 280-250MG
2 POWDER IN PACKET (EA) ORAL
Status: DISCONTINUED | OUTPATIENT
Start: 2024-09-17 | End: 2024-09-28

## 2024-09-17 RX ORDER — SODIUM CHLORIDE 0.9 % (FLUSH) 0.9 %
10 SYRINGE (ML) INJECTION EVERY 12 HOURS PRN
Status: DISCONTINUED | OUTPATIENT
Start: 2024-09-17 | End: 2024-09-30 | Stop reason: HOSPADM

## 2024-09-17 RX ORDER — ONDANSETRON HYDROCHLORIDE 2 MG/ML
4 INJECTION, SOLUTION INTRAVENOUS EVERY 6 HOURS PRN
Status: DISCONTINUED | OUTPATIENT
Start: 2024-09-17 | End: 2024-09-30 | Stop reason: HOSPADM

## 2024-09-17 RX ORDER — LANOLIN ALCOHOL/MO/W.PET/CERES
800 CREAM (GRAM) TOPICAL
Status: DISCONTINUED | OUTPATIENT
Start: 2024-09-17 | End: 2024-09-28

## 2024-09-17 RX ORDER — IBUPROFEN 600 MG/1
600 TABLET ORAL EVERY 6 HOURS PRN
Status: DISCONTINUED | OUTPATIENT
Start: 2024-09-17 | End: 2024-09-24

## 2024-09-17 RX ORDER — TALC
9 POWDER (GRAM) TOPICAL NIGHTLY PRN
Status: DISCONTINUED | OUTPATIENT
Start: 2024-09-17 | End: 2024-09-30 | Stop reason: HOSPADM

## 2024-09-17 RX ORDER — NALOXONE HCL 0.4 MG/ML
0.02 VIAL (ML) INJECTION
Status: DISCONTINUED | OUTPATIENT
Start: 2024-09-17 | End: 2024-09-30 | Stop reason: HOSPADM

## 2024-09-17 RX ORDER — ALUMINUM HYDROXIDE, MAGNESIUM HYDROXIDE, AND SIMETHICONE 1200; 120; 1200 MG/30ML; MG/30ML; MG/30ML
30 SUSPENSION ORAL 4 TIMES DAILY PRN
Status: DISCONTINUED | OUTPATIENT
Start: 2024-09-17 | End: 2024-09-30 | Stop reason: HOSPADM

## 2024-09-17 RX ORDER — KETOROLAC TROMETHAMINE 30 MG/ML
15 INJECTION, SOLUTION INTRAMUSCULAR; INTRAVENOUS ONCE
Status: COMPLETED | OUTPATIENT
Start: 2024-09-17 | End: 2024-09-17

## 2024-09-17 RX ORDER — IBUPROFEN 200 MG
16 TABLET ORAL
Status: DISCONTINUED | OUTPATIENT
Start: 2024-09-17 | End: 2024-09-30 | Stop reason: HOSPADM

## 2024-09-17 RX ORDER — PROCHLORPERAZINE EDISYLATE 5 MG/ML
5 INJECTION INTRAMUSCULAR; INTRAVENOUS EVERY 6 HOURS PRN
Status: DISCONTINUED | OUTPATIENT
Start: 2024-09-17 | End: 2024-09-28

## 2024-09-17 RX ORDER — IBUPROFEN 400 MG/1
400 TABLET ORAL EVERY 6 HOURS PRN
Status: COMPLETED | OUTPATIENT
Start: 2024-09-17 | End: 2024-09-17

## 2024-09-17 RX ORDER — AMOXICILLIN 250 MG
1 CAPSULE ORAL 2 TIMES DAILY PRN
Status: DISCONTINUED | OUTPATIENT
Start: 2024-09-17 | End: 2024-09-30 | Stop reason: HOSPADM

## 2024-09-17 RX ORDER — GLUCAGON 1 MG
1 KIT INJECTION
Status: DISCONTINUED | OUTPATIENT
Start: 2024-09-17 | End: 2024-09-30 | Stop reason: HOSPADM

## 2024-09-17 RX ORDER — IBUPROFEN 200 MG
24 TABLET ORAL
Status: DISCONTINUED | OUTPATIENT
Start: 2024-09-17 | End: 2024-09-30 | Stop reason: HOSPADM

## 2024-09-17 RX ADMIN — CEFTRIAXONE 2 G: 2 INJECTION, POWDER, FOR SOLUTION INTRAMUSCULAR; INTRAVENOUS at 05:09

## 2024-09-17 RX ADMIN — AMPICILLIN 2 G: 2 INJECTION, POWDER, FOR SOLUTION INTRAMUSCULAR; INTRAVENOUS at 06:09

## 2024-09-17 RX ADMIN — VANCOMYCIN HYDROCHLORIDE 1250 MG: 1.25 INJECTION, POWDER, LYOPHILIZED, FOR SOLUTION INTRAVENOUS at 12:09

## 2024-09-17 RX ADMIN — KETOROLAC TROMETHAMINE 15 MG: 30 INJECTION, SOLUTION INTRAMUSCULAR at 03:09

## 2024-09-17 RX ADMIN — GADOBUTROL 6 ML: 604.72 INJECTION INTRAVENOUS at 03:09

## 2024-09-17 RX ADMIN — IBUPROFEN 600 MG: 600 TABLET ORAL at 10:09

## 2024-09-17 RX ADMIN — ALUMINUM HYDROXIDE, MAGNESIUM HYDROXIDE, AND SIMETHICONE 30 ML: 1200; 120; 1200 SUSPENSION ORAL at 04:09

## 2024-09-17 RX ADMIN — AMPICILLIN 2 G: 2 INJECTION, POWDER, FOR SOLUTION INTRAMUSCULAR; INTRAVENOUS at 09:09

## 2024-09-17 RX ADMIN — ACYCLOVIR SODIUM 480 MG: 50 INJECTION, SOLUTION INTRAVENOUS at 03:09

## 2024-09-17 RX ADMIN — POTASSIUM & SODIUM PHOSPHATES POWDER PACK 280-160-250 MG 2 PACKET: 280-160-250 PACK at 01:09

## 2024-09-17 RX ADMIN — IBUPROFEN 400 MG: 400 TABLET, FILM COATED ORAL at 01:09

## 2024-09-17 RX ADMIN — POTASSIUM & SODIUM PHOSPHATES POWDER PACK 280-160-250 MG 2 PACKET: 280-160-250 PACK at 06:09

## 2024-09-17 RX ADMIN — ACYCLOVIR SODIUM 480 MG: 50 INJECTION, SOLUTION INTRAVENOUS at 11:09

## 2024-09-17 NOTE — HPI
Masha Hobbs is a 68 year old female with a past medical history of osteoporosis and chronic lower back pain with radiculopathy who was transferred from [facility] due to confusion associated with headache, fever, and generalized weakness/body aches for several hours prior to arrival.  Per  patient was confused since 4:00 p.m. Upon assessment confusion has resolved.  She denies any complaints other than a intermittent frontal lobe headache that is improving after Toradol administration.  There are no acute neurological focal deficits.  She denies chest pain, shortness of breath, dizziness, lightheadedness, vision changes, speech changes, numbness/tingling, neck pain, abdominal pain, nausea, or vomiting.  ED workup reveals:  CT head negative for acute intracranial hemorrhage. Chest x-ray no evidence of acute cardiopulmonary findings.  Lumbar puncture performed in ED, pending results.  She was treated with vancomycin, Rocephin, valacyclovir p.o., and dexamethasone.  CBC with elevated white count 13.8 and stable H&H.  BMP with phosphorus 2.0 otherwise unremarkable.  Negative flu/COVID.  Urinalysis negative.  Lactic acid 1.1.  ECG negative for ischemia or infarct.  MRI brain pending.  Neurology consult placed.  Admitted to hospital medicine for further management and treatment.

## 2024-09-17 NOTE — PLAN OF CARE
Problem: Adult Inpatient Plan of Care  Goal: Plan of Care Review  Outcome: Progressing  Goal: Patient-Specific Goal (Individualized)  Outcome: Progressing  Goal: Absence of Hospital-Acquired Illness or Injury  Outcome: Progressing  Goal: Optimal Comfort and Wellbeing  Outcome: Progressing  Goal: Readiness for Transition of Care  Outcome: Progressing     Problem: Infection  Goal: Absence of Infection Signs and Symptoms  Outcome: Progressing     Problem: Pain Acute  Goal: Optimal Pain Control and Function  Outcome: Progressing     Problem: Fall Injury Risk  Goal: Absence of Fall and Fall-Related Injury  Outcome: Progressing     Problem: Meningitis/Encephalitis  Goal: Optimal Coping  Outcome: Progressing  Goal: Effective Cerebral Tissue Perfusion  Outcome: Progressing  Goal: Absence of Infection Signs and Symptoms  Outcome: Progressing     Problem: Nausea and Vomiting  Goal: Nausea and Vomiting Relief  Outcome: Progressing   Plan of care reviewed with patient. Patient verbalized complete understanding. Two hour patient rounding maintained throughout shift. All fall precautions maintained. Bed in lowest position, locked, call light within reach. Side rails up x 2. Slip resistant socks maintained. Needs attended to.

## 2024-09-17 NOTE — CARE UPDATE
09/17/24 0707   Patient Assessment/Suction   Level of Consciousness (AVPU) alert   Respiratory Effort Normal   Rhythm/Pattern, Respiratory unlabored   PRE-TX-O2   Device (Oxygen Therapy) room air   SpO2 95 %   Pulse Oximetry Type Intermittent   $ Pulse Oximetry - Multiple Charge Pulse Oximetry - Multiple   Pulse 72   Resp 18   Positioning HOB elevated 30 degrees   Education   $ Education 15 min

## 2024-09-17 NOTE — CONSULTS
"[facility]   Department of Infectious Disease  Consult Note        PATIENT NAME: Masha Hobbs  MRN: [id number]  TODAY'S DATE: 2024  ADMIT DATE: 2024  LOS: 0 days    CHIEF COMPLAINT: Altered Mental Status (Confusion that began yesterday at 4pm per pt's  along with weakness - pt presents to ER with fever and can state name and  and situation but not president.  Patient have been vomiting since 1pm yesterday), Fever, Headache, and Emesis      PRINCIPLE PROBLEM: Acute encephalopathy    REASON FOR CONSULT:     ASSESSMENT and PLAN   1. Meningitis.  Appears to be bacterial infection.  No brain parenchymal enhancement seen on MRI brain with contrast.  Add ampicillin and optimize ceftriaxone to 2 g Q 12 hours.  Follow CSF culture and also encephalitis panel.  We will streamline antimicrobials tomorrow.      2. Osteoporosis     3. Systolic heart murmur.  States she was never informed had murmur in the past.  Obtain TTE      4. Health maintenance.  She will benefit from pneumococcal vaccine i.e. Prevnar 20.  We will discuss with her some more tomorrow.     RECOMMENDATIONS:   Optimize ceftriaxone 2 g Q 12 hours   Add ampicillin 2 g Q 4 hours   Follow CSF and blood cultures and deescalate antibiotics appropriately  Obtain TTE    Thank you for this consult. Please send [employer] secure chat with any questions.    Antibiotics (From admission, onward)      Start     Stop Route Frequency Ordered    24 1645  ampicillin (OMNIPEN) 2 g in 0.9% NaCl 100 mL IVPB (MB+)         -- IV Every 4 hours (non-standard times) 24 1539    24 1645  cefTRIAXone (ROCEPHIN) 2 g in D5W 100 mL IVPB (MB+)         -- IV Every 12 hours (non-standard times) 24 1540    24 1300  vancomycin 1,250 mg in D5W 250 mL IVPB (admixture device)         -- IV Every 18 hours 24 0942    24 0342  vancomycin - pharmacy to dose  (vancomycin IVPB (PEDS and ADULTS))        Placed in "And" Linked Group "    -- IV pharmacy to manage frequency 09/17/24 0243          Antifungals (From admission, onward)      None           Antivirals (From admission, onward)          Stop Route Frequency     acyclovir (ZOVIRAX) injection         -- IV Every 8 hours (non-standard times)            HPI      Masha Hobbs is a 68 y.o. female with history of osteoporosis and chronic low back pain.  He presents to the ER 09/16/2024 with 1 day history of headache associated with nausea and vomiting and generalized body aches.  In the ER /67, pulse 110, respiratory rate 18, temperature 100.4°, oxygen 95%.  WBC 14 K, hematocrit 42, CMP unremarkable.  X-ray with no acute infiltrate.  CT head unremarkable.  LP was done.  CSF was abnormal.  It was slightly hazy, WBC 2038, RBC 14, neutrophils 89%, glucose 50, protein 173.  Accompanying serum glucose was 130.      She was admitted and commenced on antibiotics and dexamethasone for meningitis.  MRI brain without contrast today 97/24 read as showing ill-defined T2/FLAIR hyperintense signal within the right Arielle temporal lobe with differentials including infectious encephalitis vascularly HSV, autoimmune encephalitis.  Repeat MRI brain with contrast with no abnormal enhancement.  Id asked to assist with her care.    She recently returned from [place] on 09/13/2024 after a 1 week trip.  They had gone to several cota and and did a lot of sightseeing .  States she was washing her hands frequently.  They were around a lot of people.  No other sick contacts.  No pets at home.  Denies eating anything unusual.  Has not received age appropriate pneumococcal vaccine.    Antibiotic history:    Vancomycin: 09/16/2024-  Valtrex: 09/16/2024 x1 dose   Ceftriaxone: 09/16/2024-  IV Acyclovir: 09/17/2024-    Microbiology:    Blood culture 09/16/2024:  NGTD   CSF culture 09/16/2024:  In progress    Social History  Marital Status:   Alcohol History:  reports current alcohol use of about 1.0 - 2.0  standard drink of alcohol per week.  Tobacco History:  reports that she quit smoking about 35 years ago. Her smoking use included cigarettes. She has never used smokeless tobacco.  Drug History:  reports that she does not currently use drugs.      Review of patient's allergies indicates:  No Known Allergies  Past Medical History:   Diagnosis Date    Age-related osteoporosis without current pathological fracture 3/4/2020    Based upon bone density showing osteoporosis both at hips and lumbar spine.  Patient notified.  May consider bisphosphonates.     Past Surgical History:   Procedure Laterality Date    TONSILLECTOMY      WRIST SURGERY Right 2021     Family History   Problem Relation Name Age of Onset    Diabetes Mother      Heart disease Mother      Cancer Mother      Breast cancer Mother      Cancer Father      Hyperlipidemia Father      Heart disease Father      Diabetes Father      Stroke Father         SUBJECTIVE     Review of systems: 10 system review unremarkable.  As in HPI.     OBJECTIVE   Temp:  [98.2 °F (36.8 °C)-99.9 °F (37.7 °C)] 98.7 °F (37.1 °C)  Pulse:  [] 79  Resp:  [12-22] 18  SpO2:  [93 %-100 %] 96 %  BP: (107-155)/(54-72) 141/62  Temp:  [98.2 °F (36.8 °C)-99.9 °F (37.7 °C)]   Temp: 98.7 °F (37.1 °C) (09/17/24 1356)  Pulse: 79 (09/17/24 1118)  Resp: 18 (09/17/24 1118)  BP: (!) 141/62 (09/17/24 1118)  SpO2: 96 % (09/17/24 1118)    Intake/Output Summary (Last 24 hours) at 9/17/2024 1540  Last data filed at 9/17/2024 0637  Gross per 24 hour   Intake 500 ml   Output 0 ml   Net 500 ml       Physical Exam  General:  Pleasant well-groomed elderly woman lying quietly in bed in no acute distress   HEENT: Neck is supple   CNS: Cranial nerves 2-12 intact, no focal deficits   CVS: S1 and 2 heard, grade 3/6 systolic murmur  Respiratory: Clear to auscultation   Abdomen: Full, soft, nontender, no palpable organomegaly   Skin: No rash appreciated   Musculoskeletal system: No joint or bony abnormalities  "appreciated    VAD:  PIV  ISOLATION:  Droplet isolation    Wounds:  None    Significant Labs: All pertinent labs within the past 24 hours have been reviewed.    CBC LAST 7  Recent Labs   Lab 09/16/24  1347   WBC 13.86*   RBC 4.41   HGB 14.0   HCT 42.4   MCV 96   MCH 31.7*   MCHC 33.0   RDW 13.0      MPV 9.7   GRAN 88.1*  12.2*   LYMPH 7.7*  1.1   MONO 3.5*  0.5   BASO 0.04   NRBC 0       CHEMISTRY LAST 7  Recent Labs   Lab 09/16/24  1347 09/17/24  0507   * 135*   K 4.1 3.9    102   CO2 23 22*   ANIONGAP 12 11   BUN 18 23   CREATININE 0.9 0.9   * 167*   CALCIUM 9.5 9.1   MG 1.9 1.9   ALBUMIN 4.4 3.1*   PROT 8.8* 7.2   ALKPHOS 76 59   ALT 11 11   AST 16 14   BILITOT 0.8 0.5       Estimated Creatinine Clearance: 50.7 mL/min (based on SCr of 0.9 mg/dL).    INFLAMMATORY/PROCAL  LAST 7  No results for input(s): "PROCAL", "ESR", "CRP" in the last 168 hours.  No results found for: "ESR"  No results found for: "CRP"    PRIOR  MICROBIOLOGY:  Reviewed    No results found for the last 90 days.      LAST 7 DAYS MICROBIOLOGY   Microbiology Results (last 7 days)       Procedure Component Value Units Date/Time    Blood culture x two cultures. Draw prior to antibiotics [[id number]] Collected: 09/16/24 1347    Order Status: Completed Specimen: Blood from Peripheral, Antecubital, Right Updated: 09/17/24 1432     Blood Culture, Routine No Growth to date      No Growth to date    Narrative:      Aerobic and anaerobic    Blood culture x two cultures. Draw prior to antibiotics [[id number]] Collected: 09/16/24 1358    Order Status: Completed Specimen: Blood from Peripheral, Antecubital, Left Updated: 09/17/24 1432     Blood Culture, Routine No Growth to date      No Growth to date    Narrative:      Aerobic and anaerobic    CSF culture [[id number]]     Order Status: Canceled Specimen: CSF (Spinal Fluid)     CSF culture [[id number]]     Order Status: Canceled Specimen: CSF (Spinal Fluid)     CSF culture and Gram " Stain (Tube 2) [[id number]] Collected: 09/16/24 1740    Order Status: Completed Specimen: CSF (Spinal Fluid) from CSF Tap, Tube 2 Updated: 09/17/24 1053     CSF CULTURE No Growth to date     Gram Stain Result Few WBC's      No organisms seen    Narrative:      On which sequentially labeled tube should this analysis be  performed?->2            CURRENT/PREVIOUS VISIT EKG  Results for orders placed or performed during the hospital encounter of 09/16/24   EKG 12-lead    Collection Time: 09/16/24  2:04 PM   Result Value Ref Range    QRS Duration 102 ms    OHS QTC Calculation 452 ms    Narrative    Test Reason : R00.0,    Vent. Rate : 108 BPM     Atrial Rate : 108 BPM     P-R Int : 204 ms          QRS Dur : 102 ms      QT Int : 338 ms       P-R-T Axes : 030 065 020 degrees     QTc Int : 452 ms    Sinus tachycardia  Incomplete right bundle branch block  Septal infarct ,age undetermined  Abnormal ECG  When compared with ECG of 06-JUN-2024 16:29,  W[place] interval has decreased  The axis Shifted right  Non-specific change in ST segment in Anterior leads    Referred By:             Confirmed By:      Significant Imaging: I have reviewed all relevant and available imaging results/findings within the past 24 hours.    I spent a total of 70 minutes on the day of the visit.This includes face to face time and non-face to face time preparing to see the patient (eg, review of tests), obtaining and/or reviewing separately obtained history, documenting clinical information in the electronic or other health record, independently interpreting results and communicating results to the patient/family/caregiver, or care coordinator.    Danis Sepulveda MD  Date of Service: 09/17/2024      This note was created using BYTEGRID voice recognition software that occasionally misinterpreted phrases or words.

## 2024-09-17 NOTE — PT/OT/SLP EVAL
Physical Therapy Evaluation and Discharge Note    Patient Name:  Masha Hobbs   MRN:  [id number]    Recommendations:     Discharge Recommendations: No Therapy Indicated  Discharge Equipment Recommendations: none   Barriers to discharge: None    Assessment:     Masha Hobbs is a 68 y.o. female admitted with a medical diagnosis of Acute encephalopathy. .  Pt seen in room on droplet isolation, alert/motivated. Pt stated is ambulatory within room and to bathroom and is feeling much better.  Pt stated no headache as she was just medicated. Pt presents with functional strength and mobility and ambulated within room with no AD.  At this time, patient is functioning at their prior level of function and does not require further acute PT services.     Recent Surgery: * No surgery found *      Plan:     During this hospitalization, patient does not require further acute PT services.  Please re-consult if situation changes.      Subjective   Stated is feeling much better  It was the worse headache that she had  Stated that she and   just got  back home after touring all [place]   Chief Complaint: none  Patient/Family Comments/goals: get home soon  Pain/Comfort:  Pain Rating 1: 0/10    Patients cultural, spiritual, Latter day conflicts given the current situation:      Living Environment:  Home with spouse  Prior to admission, patients level of function was indep.  Equipment used at home: none.  DME owned (not currently used): none.  Upon discharge, patient will have assistance from family.    Objective:     Communicated with nurse Campo prior to session.  Patient found HOB elevated with peripheral IV upon PT entry to room.    General Precautions: Standard, fall, droplet, contact    Orthopedic Precautions:N/A   Braces: N/A  Respiratory Status: Room air    Exams:  Postural Exam:  Patient presented with the following abnormalities:    -       BMI 26.78  RLE ROM: WFL  RLE Strength: WFL  LLE ROM:  WFL  LLE Strength: WFL    Functional Mobility:  Bed Mobility:     Rolling Right: independence  Scooting: independence  Supine to Sit: independence  Sit to Supine: independence  Transfers:     Sit to Stand:  independence with no AD  Gait: 100ft within room with no ad- stable balance    AM-PAC 6 CLICK MOBILITY  Total Score:21       Treatment and Education:  Patient was educated on the importance of OOB activity and functional mobility to negate negative effects of prolonged bed rest during hospitalization, safe transfers and ambulation, and D/C planning   Pt ambulated within room only  Pt awaiting shower activity once spouse comes back    AM-PAC 6 CLICK MOBILITY  Total Score:21     Patient left HOB elevated with all lines intact, call button in reach, and nurse Jahaira notified.    GOALS:   Multidisciplinary Problems       Physical Therapy Goals       Not on file                    History:     Past Medical History:   Diagnosis Date    Age-related osteoporosis without current pathological fracture 3/4/2020    Based upon bone density showing osteoporosis both at hips and lumbar spine.  Patient notified.  May consider bisphosphonates.       Past Surgical History:   Procedure Laterality Date    TONSILLECTOMY      WRIST SURGERY Right 2021       Time Tracking:     PT Received On: 09/17/24  PT Start Time: 1420     PT Stop Time: 1433  PT Total Time (min): 13 min     Billable Minutes: Evaluation 13      09/17/2024

## 2024-09-17 NOTE — PLAN OF CARE
[facility] - Med/Surg  Initial Discharge Assessment       Primary Care Provider: Roney Bang MD    Admission Diagnosis: Meningitis [G03.9]    Admission Date: 9/16/2024  Expected Discharge Date: 9/20/2024    Transition of Care Barriers: None    Spoke to pt to complete assessment. Facesheet verified. Lives with spouse. Reports independence, drives self. PCP [facility]. Pharmacy [facility]. No DME. Denies hh/hd/coumadin. CM to follow    Payor: [employer] MANAGED MEDICARE / Plan: [employer] MEDICARE HMO / Product Type: Capitation /     Extended Emergency Contact Information  Primary Emergency Contact: Elias Hobbs  Address: [address] [place], [place] [address] [place] [address]  Home Phone: [phone]  Mobile Phone: [phone]  Relation: Spouse  Preferred language: English   needed? No    Discharge Plan A: Home with family  Discharge Plan B: Home      [id number] [address] & [address] [address]  Phone: [phone] Fax: [phone]      Initial Assessment (most recent)       Adult Discharge Assessment - 09/17/24 1015          Discharge Assessment    Assessment Type Discharge Planning Assessment     Confirmed/corrected address, phone number and insurance Yes     Confirmed Demographics Correct on Facesheet     Source of Information patient     People in Home spouse     Do you expect to return to your current living situation? Yes     Do you have help at home or someone to help you manage your care at home? Yes     Prior to hospitilization cognitive status: Alert/Oriented     Current cognitive status: Alert/Oriented     Walking or Climbing Stairs Difficulty no     Dressing/Bathing Difficulty no     Equipment Currently Used at Home none     Readmission within 30 days? No     Patient currently being followed by outpatient case management? No     Do you currently have service(s) that help you manage your  care at home? No     Do you take prescription medications? Yes     Do you have prescription coverage? Yes     Coverage [employer]     Do you have any problems affording any of your prescribed medications? No     Is the patient taking medications as prescribed? yes     How do you get to doctors appointments? family or friend will provide;car, drives self     Are you on dialysis? No     Do you take coumadin? No     Discharge Plan A Home with family     Discharge Plan B Home     DME Needed Upon Discharge  none     Discharge Plan discussed with: Patient     Transition of Care Barriers None

## 2024-09-17 NOTE — NURSING
Patient admits to room 305, transferred from [facility] via ground ambulance transfer. On arrival to the unit, patient is awake, alert, and upon further assessment, is oriented 4/4. Immediately on arrival, Hospital Medicine provider, MAYITO Pete DNP, is notified of patient's arrival, time 0155. Fall and safety precautions are initiated, reviewed, and maintained. Facility policies and procedures reviewed and POC discussed. Information is obtained from patient.

## 2024-09-17 NOTE — NURSING
Nurses Note -- 4 Eyes      9/17/2024   1:58 AM      Skin assessed during: Admit      [x] No Altered Skin Integrity Present    [x]Prevention Measures Documented      [] Yes- Altered Skin Integrity Present or Discovered   [] LDA Added if Not in Epic (Describe Wound)   [] New Altered Skin Integrity was Present on Admit and Documented in LDA   [] Wound Image Taken    Wound Care Consulted? No    Attending Nurse:  Humble Boland RN     Second RN/Staff Member:   Jeremy Carlisle RN

## 2024-09-17 NOTE — PLAN OF CARE
09/17/24 1015   ANDRES Message   Medicare Outpatient and Observation Notification regarding financial responsibility Explained to patient/caregiver;Signed/date by patient/caregiver   Date ANDRES was signed 09/17/24   Time ANDRES was signed 1000

## 2024-09-17 NOTE — PLAN OF CARE
Patient admitted with headache, AMS and vomiting. Her LP is suggestive of meningitis. WBC 2038 with 89% neutrophils. Her symptoms all resolved this morning. Discussed with neurology. Cont Vancomycin, rocephin and acyclovir. HSV panel ordered from CSF. ID has been consulted.

## 2024-09-17 NOTE — ASSESSMENT & PLAN NOTE
-monitor CBC  -antibiotics and antiviral medications administered  -lumbar puncture results pending

## 2024-09-17 NOTE — H&P
[facility]  History & Physical    Patient Name: Masha Hobbs  MRN: [id number]  Patient Class: OP- Observation  Admission Date: 2024  Attending Physician: Kehinde Simpson MD   Primary Care Provider: Roney Bang MD         Patient information was obtained from patient, past medical records, and ER records.     Subjective:     Principal Problem:Acute encephalopathy    Chief Complaint:   Chief Complaint   Patient presents with    Altered Mental Status     Confusion that began yesterday at 4pm per pt's  along with weakness - pt presents to ER with fever and can state name and  and situation but not president.  Patient have been vomiting since 1pm yesterday    Fever    Headache    Emesis        HPI: Masha Hobbs is a 68 year old female with a past medical history of osteoporosis and chronic lower back pain with radiculopathy who was transferred from [facility] due to confusion associated with headache, fever, and generalized weakness/body aches for several hours prior to arrival.  Per  patient was confused since 4:00 p.m. Upon assessment confusion has resolved.  She denies any complaints other than a intermittent frontal lobe headache that is improving after Toradol administration.  There are no acute neurological focal deficits.  She denies chest pain, shortness of breath, dizziness, lightheadedness, vision changes, speech changes, numbness/tingling, neck pain, abdominal pain, nausea, or vomiting.  ED workup reveals:  CT head negative for acute intracranial hemorrhage. Chest x-ray no evidence of acute cardiopulmonary findings.  Lumbar puncture performed in ED, pending results.  She was treated with vancomycin, Rocephin, valacyclovir p.o., and dexamethasone.  CBC with elevated white count 13.8 and stable H&H.  BMP with phosphorus 2.0 otherwise unremarkable.  Negative flu/COVID.  Urinalysis negative.  Lactic acid 1.1.  ECG negative for ischemia or infarct.   MRI brain pending.  Neurology consult placed.  Admitted to hospital medicine for further management and treatment.            Past Medical History:   Diagnosis Date    Age-related osteoporosis without current pathological fracture 3/4/2020    Based upon bone density showing osteoporosis both at hips and lumbar spine.  Patient notified.  May consider bisphosphonates.       Past Surgical History:   Procedure Laterality Date    TONSILLECTOMY      WRIST SURGERY Right        Review of patient's allergies indicates:  No Known Allergies    No current facility-administered medications on file prior to encounter.     Current Outpatient Medications on File Prior to Encounter   Medication Sig    calcium carbonate-vit D3-min 600 mg calcium- 400 unit Tab Take 1 tablet by mouth 2 (two) times a day. for 365 doses (Patient taking differently: Take 1 tablet by mouth 3 (three) times daily.)    ibuprofen (ADVIL,MOTRIN) 400 MG tablet Take 1 tablet (400 mg total) by mouth every 6 (six) hours as needed. (Patient taking differently: Take 400 mg by mouth every 6 (six) hours as needed for Temperature greater than.)    omeprazole (PRILOSEC) 20 MG capsule Take 20 mg by mouth once daily.    vits A-C-E-B complx-min-lutein (LIPOTRIAD, WITH LUTEIN,) 5,000 unit- 120 mg-60 unit TbSR Take 1 tablet by mouth once daily.    ibandronate (BONIVA) 150 mg tablet Take 1 tablet (150 mg total) by mouth every 30 days. (Patient not taking: Reported on 2024)     Family History       Problem Relation (Age of Onset)    Breast cancer Mother    Cancer Mother, Father    Diabetes Mother, Father    Heart disease Mother, Father    Hyperlipidemia Father    Stroke Father          Tobacco Use    Smoking status: Former     Current packs/day: 0.00     Types: Cigarettes     Quit date: 1989     Years since quittin.7    Smokeless tobacco: Never   Substance and Sexual Activity    Alcohol use: Yes     Alcohol/week: 1.0 - 2.0 standard drink of alcohol     Types:  "1 - 2 Shots of liquor per week     Comment: "Occasional"    Drug use: Not Currently    Sexual activity: Yes     Partners: Male     Review of Systems   Constitutional:  Positive for fever. Negative for activity change, appetite change, chills, diaphoresis, fatigue and unexpected weight change.   Respiratory:  Negative for cough and shortness of breath.    Cardiovascular:  Negative for chest pain and leg swelling.   Gastrointestinal:  Negative for abdominal distention, abdominal pain, constipation, diarrhea, nausea and vomiting.   Genitourinary:  Negative for difficulty urinating, flank pain and hematuria.   Musculoskeletal:  Negative for arthralgias, back pain, neck pain and neck stiffness.   Neurological:  Positive for headaches. Negative for dizziness, seizures, syncope, facial asymmetry, speech difficulty, weakness, light-headedness and numbness.   Psychiatric/Behavioral:  Positive for confusion.      Objective:     Vital Signs (Most Recent):  Temp: 98.4 °F (36.9 °C) (09/17/24 0336)  Pulse: 98 (09/17/24 0336)  Resp: 18 (09/17/24 0336)  BP: (!) 155/72 (09/17/24 0336)  SpO2: (!) 93 % (09/17/24 0336) Vital Signs (24h Range):  Temp:  [98.2 °F (36.8 °C)-100.4 °F (38 °C)] 98.4 °F (36.9 °C)  Pulse:  [] 98  Resp:  [12-22] 18  SpO2:  [90 %-100 %] 93 %  BP: (107-157)/(54-72) 155/72     Weight: 64.3 kg (141 lb 12.1 oz)  Body mass index is 26.78 kg/m².     Physical Exam  Vitals and nursing note reviewed.   Constitutional:       Appearance: She is not ill-appearing.   Eyes:      General: No visual field deficit.     Pupils: Pupils are equal, round, and reactive to light.   Cardiovascular:      Rate and Rhythm: Normal rate and regular rhythm.      Pulses: Normal pulses.      Heart sounds: Normal heart sounds.   Pulmonary:      Effort: Pulmonary effort is normal. No respiratory distress.      Breath sounds: Normal breath sounds. No wheezing.   Abdominal:      General: Bowel sounds are normal. There is no distension.      " Palpations: Abdomen is soft.      Tenderness: There is no abdominal tenderness.   Musculoskeletal:      Right lower leg: No edema.      Left lower leg: No edema.   Skin:     General: Skin is warm and dry.      Capillary Refill: Capillary refill takes less than 2 seconds.   Neurological:      Mental Status: She is alert and oriented to person, place, and time. Mental status is at baseline.      GCS: GCS eye subscore is 4. GCS verbal subscore is 5. GCS motor subscore is 6.      Cranial Nerves: No cranial nerve deficit, dysarthria or facial asymmetry.      Sensory: Sensation is intact.      Motor: Motor function is intact. No weakness.              CRANIAL NERVES     CN III, IV, VI   Pupils are equal, round, and reactive to light.       Significant Labs: All pertinent labs within the past 24 hours have been reviewed.    Bilirubin:   Recent Labs   Lab 09/16/24  1347   BILITOT 0.8     Blood Culture:   Recent Labs   Lab 09/16/24  1347 09/16/24  1358   LABBLOO No Growth to date No Growth to date     BMP:   Recent Labs   Lab 09/16/24  1347   *   *   K 4.1      CO2 23   BUN 18   CREATININE 0.9   CALCIUM 9.5   MG 1.9     CBC:   Recent Labs   Lab 09/16/24  1347   WBC 13.86*   HGB 14.0   HCT 42.4        CMP:   Recent Labs   Lab 09/16/24  1347   *   K 4.1      CO2 23   *   BUN 18   CREATININE 0.9   CALCIUM 9.5   PROT 8.8*   ALBUMIN 4.4   BILITOT 0.8   ALKPHOS 76   AST 16   ALT 11   ANIONGAP 12       Magnesium:   Recent Labs   Lab 09/16/24  1347   MG 1.9     Pathology Results  (Last 10 years)      None            Urine Studies:   Recent Labs   Lab 09/16/24  1523   COLORU Yellow   APPEARANCEUA Clear   PHUR 6.0   SPECGRAV 1.030   PROTEINUA 2+*   GLUCUA Negative   KETONESU 3+*   BILIRUBINUA Negative   OCCULTUA Negative   NITRITE Negative   UROBILINOGEN Negative   LEUKOCYTESUR Negative   RBCUA 1   WBCUA 3   BACTERIA None   SQUAMEPITHEL 1   HYALINECASTS 1       Significant Imaging: I have  reviewed all pertinent imaging results/findings within the past 24 hours.  X-Ray Chest 1 View  Order: [id number]  Status: Final result       Visible to patient: Yes (seen)       Next appt: 09/18/2024 at 03:30 PM in Family Medicine (Roney Bang MD)    0 Result Notes  Details    Reading Physician Reading Date Result Priority   Farrukh Ramos MD  [phone] 9/16/2024 STAT     Narrative & Impression  EXAMINATION:  XR CHEST 1 VIEW     CLINICAL HISTORY:  Sepsis;     FINDINGS:  Portable chest at 1441 compared with 06/06/2024 shows unchanged cardiomediastinal silhouette.     Lungs are clear. Pulmonary vasculature is normal. No acute osseous abnormality.     Impression:     No acute cardiopulmonary abnormality.        Electronically signed by:Farrukh Ramos  Date:                                            09/16/2024  Time:                                           14:53           Exam Ended: 09/16/24 14:48 CDT Last Resulted: 09/16/24 14:53 CDT           CT Head Without Contrast  Order: [id number]  Status: Final result       Visible to patient: Yes (seen)       Next appt: 09/18/2024 at 03:30 PM in Family Medicine (Roney Bang MD)    0 Result Notes  Details    Reading Physician Reading Date Result Priority   Wang Acuña MD  [phone] 9/16/2024 STAT     Narrative & Impression  EXAMINATION:  CT HEAD WITHOUT CONTRAST     CLINICAL HISTORY:  Mental status change, unknown cause;     TECHNIQUE:  Axial CT imaging obtained through the brain without IV contrast.     CMS Mandated Quality Data-CT Radiation Dose-436     All CT scans at this facility dose modulation, iterative reconstruction, and or weight-based dosing when appropriate to reduce radiation dose to as low as reasonably achievable.     COMPARISON:  None     FINDINGS:  Negative for acute intracranial hemorrhage, midline shift, or mass effect.  Ventricles and sulci are normal in size.  Gray-white differentiation is maintained.  Mild periventricular and deep  white matter hypoattenuation, consistent with microangiopathic change.  Cerebellar hemispheres and brainstem are unremarkable.  Atherosclerotic calcification of intracranial carotid arteries.     No calvarial lesion or fracture.  Mastoid air cells are clear.  Mucosal thickening right maxillary sinus.  Paranasal sinuses are otherwise clear.     Impression:     No CT evidence of acute intracranial pathology.        Electronically signed by:Wang Acuña  Date:                                            09/16/2024  Time:                                           14:52           Exam Ended: 09/16/24 14:38 CDT Last Resulted: 09/16/24 14:52 CDT           Assessment/Plan:     * Acute encephalopathy  -acute encephalopathy concerning meningitis  -lumbar puncture results pending  -treated with vancomycin, Rocephin, valacyclovir, and dexamethasone  -CT head completed  -MRI pending  -Neurology consult placed  -isolation precautions  -neuro checks      Leukocytosis  -monitor CBC  -antibiotics and antiviral medications administered  -lumbar puncture results pending      Meningitis  -lumbar puncture results pending  -treated with vancomycin, Rocephin, acyclovir, dexamethasone  -neurology consult; MRI pending  -isolation precautions  -CT head completed        VTE Risk Mitigation (From admission, onward)           Ordered     IP VTE LOW RISK PATIENT  Once         09/17/24 0239     Place sequential compression device  Until discontinued         09/17/24 0239                              Poly Pete DNP  Department of Hospital Medicine  [facility]/Surg

## 2024-09-17 NOTE — SUBJECTIVE & OBJECTIVE
"Past Medical History:   Diagnosis Date    Age-related osteoporosis without current pathological fracture 3/4/2020    Based upon bone density showing osteoporosis both at hips and lumbar spine.  Patient notified.  May consider bisphosphonates.       Past Surgical History:   Procedure Laterality Date    TONSILLECTOMY      WRIST SURGERY Right        Review of patient's allergies indicates:  No Known Allergies    No current facility-administered medications on file prior to encounter.     Current Outpatient Medications on File Prior to Encounter   Medication Sig    calcium carbonate-vit D3-min 600 mg calcium- 400 unit Tab Take 1 tablet by mouth 2 (two) times a day. for 365 doses (Patient taking differently: Take 1 tablet by mouth 3 (three) times daily.)    ibuprofen (ADVIL,MOTRIN) 400 MG tablet Take 1 tablet (400 mg total) by mouth every 6 (six) hours as needed. (Patient taking differently: Take 400 mg by mouth every 6 (six) hours as needed for Temperature greater than.)    omeprazole (PRILOSEC) 20 MG capsule Take 20 mg by mouth once daily.    vits A-C-E-B complx-min-lutein (LIPOTRIAD, WITH LUTEIN,) 5,000 unit- 120 mg-60 unit TbSR Take 1 tablet by mouth once daily.    ibandronate (BONIVA) 150 mg tablet Take 1 tablet (150 mg total) by mouth every 30 days. (Patient not taking: Reported on 2024)     Family History       Problem Relation (Age of Onset)    Breast cancer Mother    Cancer Mother, Father    Diabetes Mother, Father    Heart disease Mother, Father    Hyperlipidemia Father    Stroke Father          Tobacco Use    Smoking status: Former     Current packs/day: 0.00     Types: Cigarettes     Quit date: 1989     Years since quittin.7    Smokeless tobacco: Never   Substance and Sexual Activity    Alcohol use: Yes     Alcohol/week: 1.0 - 2.0 standard drink of alcohol     Types: 1 - 2 Shots of liquor per week     Comment: "Occasional"    Drug use: Not Currently    Sexual activity: Yes     Partners: Male "     Review of Systems   Constitutional:  Positive for fever. Negative for activity change, appetite change, chills, diaphoresis, fatigue and unexpected weight change.   Respiratory:  Negative for cough and shortness of breath.    Cardiovascular:  Negative for chest pain and leg swelling.   Gastrointestinal:  Negative for abdominal distention, abdominal pain, constipation, diarrhea, nausea and vomiting.   Genitourinary:  Negative for difficulty urinating, flank pain and hematuria.   Musculoskeletal:  Negative for arthralgias, back pain, neck pain and neck stiffness.   Neurological:  Positive for headaches. Negative for dizziness, seizures, syncope, facial asymmetry, speech difficulty, weakness, light-headedness and numbness.   Psychiatric/Behavioral:  Positive for confusion.      Objective:     Vital Signs (Most Recent):  Temp: 98.4 °F (36.9 °C) (09/17/24 0336)  Pulse: 98 (09/17/24 0336)  Resp: 18 (09/17/24 0336)  BP: (!) 155/72 (09/17/24 0336)  SpO2: (!) 93 % (09/17/24 0336) Vital Signs (24h Range):  Temp:  [98.2 °F (36.8 °C)-100.4 °F (38 °C)] 98.4 °F (36.9 °C)  Pulse:  [] 98  Resp:  [12-22] 18  SpO2:  [90 %-100 %] 93 %  BP: (107-157)/(54-72) 155/72     Weight: 64.3 kg (141 lb 12.1 oz)  Body mass index is 26.78 kg/m².     Physical Exam  Vitals and nursing note reviewed.   Constitutional:       Appearance: She is not ill-appearing.   Eyes:      General: No visual field deficit.     Pupils: Pupils are equal, round, and reactive to light.   Cardiovascular:      Rate and Rhythm: Normal rate and regular rhythm.      Pulses: Normal pulses.      Heart sounds: Normal heart sounds.   Pulmonary:      Effort: Pulmonary effort is normal. No respiratory distress.      Breath sounds: Normal breath sounds. No wheezing.   Abdominal:      General: Bowel sounds are normal. There is no distension.      Palpations: Abdomen is soft.      Tenderness: There is no abdominal tenderness.   Musculoskeletal:      Right lower leg: No  edema.      Left lower leg: No edema.   Skin:     General: Skin is warm and dry.      Capillary Refill: Capillary refill takes less than 2 seconds.   Neurological:      Mental Status: She is alert and oriented to person, place, and time. Mental status is at baseline.      GCS: GCS eye subscore is 4. GCS verbal subscore is 5. GCS motor subscore is 6.      Cranial Nerves: No cranial nerve deficit, dysarthria or facial asymmetry.      Sensory: Sensation is intact.      Motor: Motor function is intact. No weakness.              CRANIAL NERVES     CN III, IV, VI   Pupils are equal, round, and reactive to light.       Significant Labs: All pertinent labs within the past 24 hours have been reviewed.    Bilirubin:   Recent Labs   Lab 09/16/24  1347   BILITOT 0.8     Blood Culture:   Recent Labs   Lab 09/16/24  1347 09/16/24  1358   LABBLOO No Growth to date No Growth to date     BMP:   Recent Labs   Lab 09/16/24  1347   *   *   K 4.1      CO2 23   BUN 18   CREATININE 0.9   CALCIUM 9.5   MG 1.9     CBC:   Recent Labs   Lab 09/16/24  1347   WBC 13.86*   HGB 14.0   HCT 42.4        CMP:   Recent Labs   Lab 09/16/24  1347   *   K 4.1      CO2 23   *   BUN 18   CREATININE 0.9   CALCIUM 9.5   PROT 8.8*   ALBUMIN 4.4   BILITOT 0.8   ALKPHOS 76   AST 16   ALT 11   ANIONGAP 12       Magnesium:   Recent Labs   Lab 09/16/24  1347   MG 1.9     Pathology Results  (Last 10 years)      None            Urine Studies:   Recent Labs   Lab 09/16/24  1523   COLORU Yellow   APPEARANCEUA Clear   PHUR 6.0   SPECGRAV 1.030   PROTEINUA 2+*   GLUCUA Negative   KETONESU 3+*   BILIRUBINUA Negative   OCCULTUA Negative   NITRITE Negative   UROBILINOGEN Negative   LEUKOCYTESUR Negative   RBCUA 1   WBCUA 3   BACTERIA None   SQUAMEPITHEL 1   HYALINECASTS 1       Significant Imaging: I have reviewed all pertinent imaging results/findings within the past 24 hours.  X-Ray Chest 1 View  Order: [id number]  Status:  Final result       Visible to patient: Yes (seen)       Next appt: 09/18/2024 at 03:30 PM in Family Medicine (Roney Bang MD)    0 Result Notes  Details    Reading Physician Reading Date Result Priority   Farrukh Ramos MD  [phone] 9/16/2024 STAT     Narrative & Impression  EXAMINATION:  XR CHEST 1 VIEW     CLINICAL HISTORY:  Sepsis;     FINDINGS:  Portable chest at 1441 compared with 06/06/2024 shows unchanged cardiomediastinal silhouette.     Lungs are clear. Pulmonary vasculature is normal. No acute osseous abnormality.     Impression:     No acute cardiopulmonary abnormality.        Electronically signed by:Farrukh Ramos  Date:                                            09/16/2024  Time:                                           14:53           Exam Ended: 09/16/24 14:48 CDT Last Resulted: 09/16/24 14:53 CDT           CT Head Without Contrast  Order: [id number]  Status: Final result       Visible to patient: Yes (seen)       Next appt: 09/18/2024 at 03:30 PM in Family Medicine (Roney Bang MD)    0 Result Notes  Details    Reading Physician Reading Date Result Priority   Wang Acuña MD  [phone] 9/16/2024 STAT     Narrative & Impression  EXAMINATION:  CT HEAD WITHOUT CONTRAST     CLINICAL HISTORY:  Mental status change, unknown cause;     TECHNIQUE:  Axial CT imaging obtained through the brain without IV contrast.     CMS Mandated Quality Data-CT Radiation Dose-436     All CT scans at this facility dose modulation, iterative reconstruction, and or weight-based dosing when appropriate to reduce radiation dose to as low as reasonably achievable.     COMPARISON:  None     FINDINGS:  Negative for acute intracranial hemorrhage, midline shift, or mass effect.  Ventricles and sulci are normal in size.  Gray-white differentiation is maintained.  Mild periventricular and deep white matter hypoattenuation, consistent with microangiopathic change.  Cerebellar hemispheres and brainstem are  unremarkable.  Atherosclerotic calcification of intracranial carotid arteries.     No calvarial lesion or fracture.  Mastoid air cells are clear.  Mucosal thickening right maxillary sinus.  Paranasal sinuses are otherwise clear.     Impression:     No CT evidence of acute intracranial pathology.        Electronically signed by:Wang Acuña  Date:                                            09/16/2024  Time:                                           14:52           Exam Ended: 09/16/24 14:38 CDT Last Resulted: 09/16/24 14:52 CDT

## 2024-09-17 NOTE — PT/OT/SLP EVAL
Occupational Therapy   Evaluation and Discharge Note    Name: Masha Hobbs  MRN: [id number]  Admitting Diagnosis: Acute encephalopathy  Recent Surgery: * No surgery found *      Recommendations:     Discharge Recommendations: No Therapy Indicated  Discharge Equipment Recommendations: none  Barriers to discharge:       Assessment:     Masha Hobbs is a 68 y.o. female with a medical diagnosis of Acute encephalopathy. At this time, patient is functioning at their prior level of function for ADL's and does not require further acute OT services.     Plan:     During this hospitalization, patient does not require further acute OT services.  Please re-consult if situation changes.    Plan of Care Reviewed with: patient, spouse    Subjective     Chief Complaint: Headache  Patient/Family Comments/goals: To go home    Occupational Profile:  Living Environment: Pt lives with spouse in 1 story home with no KARLIE. Pt has a tub/shower and standard toilet.  Previous level of function: Independent  Roles and Routines: Spouse  Equipment Used at home: none  Assistance upon Discharge: Spouse    Pain/Comfort:  Pain Rating 1: 2/10  Location 1: head  Pain Addressed 1: Nurse notified  Pain Rating Post-Intervention 1: 2/10    Patients cultural, spiritual, Jew conflicts given the current situation:      Objective:     Communicated with: Nurse Campo prior to session.  Patient found HOB elevated with peripheral IV upon OT entry to room.    General Precautions: Standard, fall, droplet  Orthopedic Precautions: N/A  Braces: N/A  Respiratory Status: Room air     Occupational Performance:    Bed Mobility:    Patient completed Rolling/Turning to Left with  independence  Patient completed Rolling/Turning to Right with independence  Patient completed Scooting/Bridging with independence  Patient completed Supine to Sit with independence  Patient completed Sit to Supine with independence    Functional Mobility/Transfers:  Patient completed Sit  <> Stand Transfer with independence  with  no assistive device       Activities of Daily Living:  Feeding:  independence    Grooming: independence    Upper Body Dressing: independence    Lower Body Dressing: independence    Toileting: independence      Cognitive/Visual Perceptual:  Pt alert and oriented    Physical Exam:  Upper Extremity Strength:    -       Right Upper Extremity: WNL  -       Left Upper Extremity: WNL    AMPAC 6 Click ADL:  AMPA Total Score: 24    Treatment & Education:  OT provided education in role of OT. Patient verbalized understanding and participated in OT.  OT provided instruction in home safety with ADL/IADL including review of home set up and DME/AE. Patient/spouse verbalized understanding.  OT provided education in calling for assist. Patient verbalized understanding.        Patient left HOB elevated with all lines intact, call button in reach, Nurse Jahaira  notified, and Spouse  present    GOALS:   Multidisciplinary Problems       Occupational Therapy Goals       Not on file                    History:     Past Medical History:   Diagnosis Date    Age-related osteoporosis without current pathological fracture 3/4/2020    Based upon bone density showing osteoporosis both at hips and lumbar spine.  Patient notified.  May consider bisphosphonates.         Past Surgical History:   Procedure Laterality Date    TONSILLECTOMY      WRIST SURGERY Right 2021       Time Tracking:     OT Date of Treatment: 09/17/24  OT Start Time: 1326  OT Stop Time: 1350  OT Total Time (min): 24 min    Billable Minutes:Evaluation 8  Self Care/Home Management 16    9/17/2024

## 2024-09-17 NOTE — ASSESSMENT & PLAN NOTE
-lumbar puncture results pending  -treated with vancomycin, Rocephin, acyclovir, dexamethasone  -neurology consult; MRI pending  -isolation precautions  -CT head completed

## 2024-09-17 NOTE — PLAN OF CARE
Problem: Adult Inpatient Plan of Care  Goal: Plan of Care Review  Outcome: Progressing  Goal: Patient-Specific Goal (Individualized)  Outcome: Progressing  Goal: Optimal Comfort and Wellbeing  Outcome: Progressing     Problem: Infection  Goal: Absence of Infection Signs and Symptoms  Outcome: Progressing     Problem: Pain Acute  Goal: Optimal Pain Control and Function  Outcome: Progressing     Problem: Fall Injury Risk  Goal: Absence of Fall and Fall-Related Injury  Outcome: Progressing     Problem: Meningitis/Encephalitis  Goal: Optimal Coping  Outcome: Progressing  Goal: Effective Cerebral Tissue Perfusion  Outcome: Progressing  Goal: Absence of Infection Signs and Symptoms  Outcome: Progressing     Problem: Nausea and Vomiting  Goal: Nausea and Vomiting Relief  Outcome: Progressing

## 2024-09-17 NOTE — CONSULTS
[facility]  Department of Neurology  Neurology Consultation Note        PATIENT NAME: Masha Hobbs  MRN: [id number]  CSN: [id number]      TODAY'S DATE: 09/17/2024  ADMIT DATE: 9/16/2024                            CONSULTING PROVIDER: Joe Galeana MD  CONSULT REQUESTED BY: Beatriz Boogie MD      Reason for consult:  Encephalitis/meningitis      History obtained from chart review and the patient.    HPI Masha Hobbs is a 68 y.o. female presented to the hospital with headache, generalized weakness and fever.  She started having headache about 2 days ago which was holocephalic, 8/10 intensity and the same day she had a fever of 103.  Her  told her that she acted confused around that time.  He presented to the hospital for these symptoms.  She denies any unilateral weakness or sensory changes, neck pain or stiffness, nausea or vomiting.    In the ER, she had a CT head which was negative for acute pathology, lumbar puncture and was treated with Rocephin, vancomycin, valacyclovir and dexamethasone.  She was admitted to the hospital for further workup and management.    She states the headache is not getting better and it is about 2 to 3/10 in intensity.  Denies any other complaints at this time.      PREVIOUS MEDICAL HISTORY:  Past Medical History:   Diagnosis Date    Age-related osteoporosis without current pathological fracture 3/4/2020    Based upon bone density showing osteoporosis both at hips and lumbar spine.  Patient notified.  May consider bisphosphonates.     PREVIOUS SURGICAL HISTORY:  Past Surgical History:   Procedure Laterality Date    TONSILLECTOMY      WRIST SURGERY Right 2021     FAMILY MEDICAL HISTORY:  Family History   Problem Relation Name Age of Onset    Diabetes Mother      Heart disease Mother      Cancer Mother      Breast cancer Mother      Cancer Father      Hyperlipidemia Father      Heart disease Father      Diabetes Father      Stroke Father        ALLERGIES:  Review of patient's allergies indicates:  No Known Allergies  HOME MEDICATIONS:  Prior to Admission medications    Medication Sig Start Date End Date Taking? Authorizing Provider   calcium carbonate-vit D3-min 600 mg calcium- 400 unit Tab Take 1 tablet by mouth 2 (two) times a day. for 365 doses  Patient taking differently: Take 1 tablet by mouth 3 (three) times daily. 8/12/24 2/11/25 Yes Roney Bang MD   ibuprofen (ADVIL,MOTRIN) 400 MG tablet Take 1 tablet (400 mg total) by mouth every 6 (six) hours as needed.  Patient taking differently: Take 400 mg by mouth every 6 (six) hours as needed for Temperature greater than. 10/16/21  Yes Devora Kim NP   omeprazole (PRILOSEC) 20 MG capsule Take 20 mg by mouth once daily.   Yes Provider, Historical   vits A-C-E-B complx-min-lutein (LIPOTRIAD, WITH LUTEIN,) 5,000 unit- 120 mg-60 unit TbSR Take 1 tablet by mouth once daily.   Yes Provider, Historical   ibandronate (BONIVA) 150 mg tablet Take 1 tablet (150 mg total) by mouth every 30 days.  Patient not taking: Reported on 9/16/2024 8/12/24 8/12/25  Roney Bang MD     CURRENT SCHEDULED MEDICATIONS:   cefTRIAXone (Rocephin) IV (PEDS and ADULTS)  1 g Intravenous Q24H    vancomycin (VANCOCIN) IV (PEDS and ADULTS)  1,250 mg Intravenous Q18H     CURRENT INFUSIONS:    CURRENT PRN MEDICATIONS:    Current Facility-Administered Medications:     acetaminophen, 650 mg, Oral, Q4H PRN    aluminum-magnesium hydroxide-simethicone, 30 mL, Oral, QID PRN    dextrose 10%, 12.5 g, Intravenous, PRN    dextrose 10%, 25 g, Intravenous, PRN    glucagon (human recombinant), 1 mg, Intramuscular, PRN    glucose, 16 g, Oral, PRN    glucose, 24 g, Oral, PRN    magnesium oxide, 800 mg, Oral, PRN    magnesium oxide, 800 mg, Oral, PRN    melatonin, 9 mg, Oral, Nightly PRN    naloxone, 0.02 mg, Intravenous, PRN    ondansetron, 4 mg, Intravenous, Q6H PRN    potassium bicarbonate, 35 mEq, Oral, PRN    potassium bicarbonate,  "50 mEq, Oral, PRN    potassium bicarbonate, 60 mEq, Oral, PRN    potassium, sodium phosphates, 2 packet, Oral, PRN    potassium, sodium phosphates, 2 packet, Oral, PRN    potassium, sodium phosphates, 2 packet, Oral, PRN    prochlorperazine, 5 mg, Intravenous, Q6H PRN    senna-docusate 8.6-50 mg, 1 tablet, Oral, BID PRN    sodium chloride 0.9%, 10 mL, Intravenous, Q12H PRN    [COMPLETED] Pharmacy to dose Vancomycin consult, , , Once **AND** vancomycin - pharmacy to dose, , Intravenous, pharmacy to manage frequency    REVIEW OF SYSTEMS:  Please refer to the HPI for all pertinent positive and negative findings. A comprehensive review of all other systems was negative.    PHYSICAL EXAM:  Patient Vitals for the past 24 hrs:   BP Temp Temp src Pulse Resp SpO2 Height Weight   09/17/24 0815 (!) 122/58 98.4 °F (36.9 °C) Oral 78 18 (!) 94 % -- --   09/17/24 0812 (!) 122/58 98.4 °F (36.9 °C) Oral 78 18 (!) 94 % -- --   09/17/24 0707 -- -- -- 72 18 95 % -- --   09/17/24 0336 (!) 155/72 98.4 °F (36.9 °C) Oral 98 18 (!) 93 % -- --   09/17/24 0325 (!) 155/72 98.4 °F (36.9 °C) Oral 98 19 (!) 93 % -- --   09/17/24 0234 -- -- -- -- -- 97 % -- --   09/17/24 0157 137/62 98.2 °F (36.8 °C) Oral 89 15 97 % 5' 1" (1.549 m) 64.3 kg (141 lb 12.1 oz)   09/17/24 0127 (!) 115/54 -- -- 84 (!) 21 100 % -- --   09/17/24 0054 (!) 111/59 -- -- 72 15 97 % -- --   09/16/24 2327 (!) 119/58 -- -- 69 16 98 % -- --   09/16/24 2118 107/72 98.4 °F (36.9 °C) Oral 81 (!) 22 99 % -- --   09/16/24 2059 (!) 119/57 -- -- 73 18 96 % -- --   09/16/24 2029 128/60 -- -- 72 17 96 % -- --   09/16/24 1829 (!) 131/57 -- -- 83 -- 99 % -- --   09/16/24 1759 (!) 121/59 -- -- 78 -- 100 % -- --   09/16/24 1729 (!) 123/59 -- -- 101 12 99 % -- --   09/16/24 1700 (!) 126/59 -- -- 92 -- 95 % -- --   09/16/24 1600 (!) 124/58 99.9 °F (37.7 °C) Oral 99 -- 96 % -- --   09/16/24 1528 -- -- -- 104 -- 96 % -- --   09/16/24 1505 -- -- -- 109 -- 96 % -- --   09/16/24 1502 (!) 137/59 -- " -- (!) 111 20 (!) 90 % -- --   09/16/24 1501 -- -- -- 107 -- (!) 90 % -- --   09/16/24 1439 (!) 157/67 -- -- -- -- -- -- --   09/16/24 1419 -- -- -- 109 19 96 % -- --   09/16/24 1404 -- -- -- 108 -- (!) 94 % -- --   09/16/24 1359 (!) 149/67 -- -- 108 -- -- -- --   09/16/24 1309 (!) 151/67 (!) 100.4 °F (38 °C) Oral 110 18 95 % -- 62.6 kg (138 lb)       GENERAL APPEARANCE: Alert, well-developed, well-nourished female in no acute distress.  HEENT: Normocephalic and atraumatic. PERRL. Oropharynx unremarkable.  PULM: Normal respiratory effort. No accessory muscle use.  CV: RRR.  ABDOMEN: Soft, nontender.  EXTREMITIES: No obvious signs of vascular compromise. Pulses present. No cyanosis, clubbing or edema.  SKIN: Clear; no rashes, lesions or skin breaks in exposed areas.    NEURO:  MENTAL STATUS: Patient awake and oriented to time, place, and person, recent/remote memory normal, attention span/concentration normal, and speech fluent without paraphasic errors.  Affect euthymic.    CRANIAL NERVES:  CN I: Not tested.  CN II: Fundoscopic exam deferred.  CN III, IV, VI: Pupils equal, round and reactive to light.  Extraocular movements full and intact.  CN V: Facial sensation normal.  CN VII: Facial asymmetry absent.  CN VIII: Hearing grossly normal and equal bilaterally.  No skew deviation or pathologic nystagmus.  CN IX, X: Palate elevates symmetrically. Speech/articulation is clear without dysarthria.  CN XI: Shoulder shrug and chin rotation equal with good strength.  CN XII: Tongue protrusion midline.    MOTOR:  Bulk normal. Tone normal and symmetric throughout.  Abnormal movements absent.  Tremor: none present.  Strength 5/5 throughout.    REFLEXES:  DTRs 2+ throughout.  Plantar response equivocal bilaterally.  SENSATION: grossly intact throughout.  COORDINATION: normal finger-to-nose.  STATION: not tested.  GAIT: not tested.       Labs:  Recent Labs   Lab 09/16/24  1347 09/17/24  0507   * 135*   K 4.1 3.9     "102   CO2 23 22*   BUN 18 23   CREATININE 0.9 0.9   * 167*   CALCIUM 9.5 9.1   PHOS 2.0* 2.3*   MG 1.9 1.9     Recent Labs   Lab 09/16/24  1347   WBC 13.86*   HGB 14.0   HCT 42.4        Recent Labs   Lab 09/16/24  1347 09/17/24  0507   ALBUMIN 4.4 3.1*   PROT 8.8* 7.2   BILITOT 0.8 0.5   ALKPHOS 76 59   ALT 11 11   AST 16 14     Lab Results   Component Value Date    INR 1.0 06/06/2024     Lab Results   Component Value Date    TRIG 71 03/02/2020    HDL 70 03/02/2020    CHOLHDL 32.6 03/02/2020     No results found for: "HGBA1C"  Lab Results   Component Value Date    PROTEINCSF 173 (H) 09/16/2024    GLUCCSF 50 09/16/2024       Imaging:  I have reviewed and interpreted the pertinent imaging and lab results.      X-Ray Chest 1 View  Narrative: EXAMINATION:  XR CHEST 1 VIEW    CLINICAL HISTORY:  Sepsis;    FINDINGS:  Portable chest at 1441 compared with 06/06/2024 shows unchanged cardiomediastinal silhouette.    Lungs are clear. Pulmonary vasculature is normal. No acute osseous abnormality.  Impression: No acute cardiopulmonary abnormality.    Electronically signed by: Farrukh Ramos  Date:    09/16/2024  Time:    14:53  CT Head Without Contrast  Narrative: EXAMINATION:  CT HEAD WITHOUT CONTRAST    CLINICAL HISTORY:  Mental status change, unknown cause;    TECHNIQUE:  Axial CT imaging obtained through the brain without IV contrast.    CMS Mandated Quality Data-CT Radiation Dose-436    All CT scans at this facility dose modulation, iterative reconstruction, and or weight-based dosing when appropriate to reduce radiation dose to as low as reasonably achievable.    COMPARISON:  None    FINDINGS:  Negative for acute intracranial hemorrhage, midline shift, or mass effect.  Ventricles and sulci are normal in size.  Gray-white differentiation is maintained.  Mild periventricular and deep white matter hypoattenuation, consistent with microangiopathic change.  Cerebellar hemispheres and brainstem are unremarkable.  " Atherosclerotic calcification of intracranial carotid arteries.    No calvarial lesion or fracture.  Mastoid air cells are clear.  Mucosal thickening right maxillary sinus.  Paranasal sinuses are otherwise clear.  Impression: No CT evidence of acute intracranial pathology.    Electronically signed by: Wang Acuña  Date:    09/16/2024  Time:    14:52         ASSESSMENT & PLAN:      Meningoencephalitis   Headache    Plan:   LP with CSF analysis showed WBC 2038 (neutrophil predominant), protein 173 and glucose 50.  MRI brain showed hyperintensity in the right medial temporal lobe on T2 FLAIR images concerning for encephalitis. MRI brain with contrast ordered and pending  Currently on vancomycin, Rocephin, acyclovir.  Infectious diseases consulted.    Further CSF workup with meningitis encephalitis panel PCR pending  P.r.n. Tylenol/Toradol for headache.    PT OT evaluate and treat.    Will follow        Thank you kindly for including us in the care of this patient. Please do not hesitate to contact us with any questions.      Critical Care:  42 minutes of critical care time has been spent evaluating with the patient. Time includes chart review not limited to diagnostic imaging, labs, and vitals, patient assessment, discussion with family and nursing, current order evaluations, and new order entries.       Joe Galeana MD  Neurology/vascular Neurology  Date of Service: 09/17/2024  9:37 AM    --------------------------------------------------------------------------------------------------------------------------------------------------------------------------------------------------------------------------------------------------------------  Please note: This note was transcribed using voice recognition software. Because of this technology there are often uinintended grammatical, spelling, and other transcription errors. Please disregard these errors.

## 2024-09-17 NOTE — CONSULTS
"Pharmacokinetic Initial Assessment: IV Vancomycin    Assessment/Plan:    Initiate intravenous vancomycin with  dose of vancomycin 1250mg IV every 18 hours  Desired empiric serum trough concentration is 15 to 20 mcg/mL  Draw vancomycin trough level 60 min prior to third dose on 09/18/24 at approximately 0600  Pharmacy will continue to follow and monitor vancomycin.      Please contact pharmacy at extension [phone] with any questions regarding this assessment.     Thank you for the consult,   Vini Juliouyen       Patient brief summary:  Masha Hobbs is a 68 y.o. female initiated on antimicrobial therapy with IV Vancomycin for treatment of suspected meningitis    Drug Allergies:   Review of patient's allergies indicates:  No Known Allergies    Actual Body Weight:   64.3kg    Renal Function:   Estimated Creatinine Clearance: 50.7 mL/min (based on SCr of 0.9 mg/dL).,     CBC (last 72 hours):  Recent Labs   Lab Result Units 09/16/24  1347   WBC K/uL 13.86*   Hemoglobin g/dL 14.0   Hematocrit % 42.4   Platelets K/uL 350   Gran % % 88.1*   Lymph % % 7.7*   Mono % % 3.5*   Eosinophil % % 0.0   Basophil % % 0.3   Differential Method  Automated       Metabolic Panel (last 72 hours):  Recent Labs   Lab Result Units 09/16/24  1347 09/16/24  1523 09/16/24  1739   Sodium mmol/L 135*  --   --    Potassium mmol/L 4.1  --   --    Chloride mmol/L 100  --   --    CO2 mmol/L 23  --   --    Glucose mg/dL 130*  --   --    Glucose, CSF mg/dL  --   --  50   Glucose, UA   --  Negative  --    BUN mg/dL 18  --   --    Creatinine mg/dL 0.9  --   --    Albumin g/dL 4.4  --   --    Total Bilirubin mg/dL 0.8  --   --    Alkaline Phosphatase U/L 76  --   --    AST U/L 16  --   --    ALT U/L 11  --   --    Magnesium mg/dL 1.9  --   --    Phosphorus mg/dL 2.0*  --   --        Drug levels (last 3 results):  No results for input(s): "VANCOMYCINRA", "VANCORANDOM", "VANCOMYCINPE", "VANCOPEAK", "VANCOMYCINTR", "VANCOTROUGH" in the last 72 " hours.    Microbiologic Results:  Microbiology Results (last 7 days)       Procedure Component Value Units Date/Time    Blood culture x two cultures. Draw prior to antibiotics [[id number]] Collected: 09/16/24 1358    Order Status: Completed Specimen: Blood from Peripheral, Antecubital, Left Updated: 09/16/24 2117     Blood Culture, Routine No Growth to date    Narrative:      Aerobic and anaerobic    Blood culture x two cultures. Draw prior to antibiotics [[id number]] Collected: 09/16/24 1347    Order Status: Completed Specimen: Blood from Peripheral, Antecubital, Right Updated: 09/16/24 2117     Blood Culture, Routine No Growth to date    Narrative:      Aerobic and anaerobic    CSF culture and Gram Stain (Tube 2) [[id number]] Collected: 09/16/24 1740    Order Status: Completed Specimen: CSF (Spinal Fluid) from CSF Tap, Tube 2 Updated: 09/16/24 1838     Gram Stain Result Few WBC's      No organisms seen    Narrative:      On which sequentially labeled tube should this analysis be  performed?->2

## 2024-09-17 NOTE — NURSING
"Contact made with admitting provider, MAYITO Pete DNP, at [phone] regarding patient reporting headache on admission. Notified patient is offered Tylenol but reports "Tylenol gives me restless legs." Upon contacting provider, one-time order is later noted as entered by DNP and is administered to patient with education offered.   "

## 2024-09-17 NOTE — NURSING
Provider at bedside seeing patient at this time, 0422. Spoke with provider regarding ordering precautions. Order for droplet precautions received.

## 2024-09-18 LAB
ALBUMIN SERPL BCP-MCNC: 3.3 G/DL (ref 3.5–5.2)
ALP SERPL-CCNC: 69 U/L (ref 55–135)
ALT SERPL W/O P-5'-P-CCNC: 12 U/L (ref 10–44)
AMMONIA PLAS-SCNC: 32 UMOL/L (ref 10–50)
ANION GAP SERPL CALC-SCNC: 12 MMOL/L (ref 8–16)
AST SERPL-CCNC: 14 U/L (ref 10–40)
BILIRUB SERPL-MCNC: 0.3 MG/DL (ref 0.1–1)
BUN SERPL-MCNC: 16 MG/DL (ref 8–23)
CALCIUM SERPL-MCNC: 9 MG/DL (ref 8.7–10.5)
CHLORIDE SERPL-SCNC: 101 MMOL/L (ref 95–110)
CO2 SERPL-SCNC: 22 MMOL/L (ref 23–29)
CREAT SERPL-MCNC: 0.9 MG/DL (ref 0.5–1.4)
EST. GFR  (NO RACE VARIABLE): >60 ML/MIN/1.73 M^2
GLUCOSE SERPL-MCNC: 112 MG/DL (ref 70–110)
POCT GLUCOSE: 121 MG/DL (ref 70–110)
POTASSIUM SERPL-SCNC: 3.7 MMOL/L (ref 3.5–5.1)
PROT SERPL-MCNC: 7.6 G/DL (ref 6–8.4)
SODIUM SERPL-SCNC: 135 MMOL/L (ref 136–145)
TSH SERPL DL<=0.005 MIU/L-ACNC: 0.62 UIU/ML (ref 0.4–4)
VANCOMYCIN TROUGH SERPL-MCNC: 15.6 UG/ML (ref 10–22)

## 2024-09-18 PROCEDURE — C1751 CATH, INF, PER/CENT/MIDLINE: HCPCS

## 2024-09-18 PROCEDURE — 36415 COLL VENOUS BLD VENIPUNCTURE: CPT | Performed by: STUDENT IN AN ORGANIZED HEALTH CARE EDUCATION/TRAINING PROGRAM

## 2024-09-18 PROCEDURE — 87899 AGENT NOS ASSAY W/OPTIC: CPT | Performed by: INTERNAL MEDICINE

## 2024-09-18 PROCEDURE — 84443 ASSAY THYROID STIM HORMONE: CPT | Performed by: INTERNAL MEDICINE

## 2024-09-18 PROCEDURE — 94761 N-INVAS EAR/PLS OXIMETRY MLT: CPT

## 2024-09-18 PROCEDURE — 25000003 PHARM REV CODE 250

## 2024-09-18 PROCEDURE — 80053 COMPREHEN METABOLIC PANEL: CPT

## 2024-09-18 PROCEDURE — 11000001 HC ACUTE MED/SURG PRIVATE ROOM

## 2024-09-18 PROCEDURE — 82140 ASSAY OF AMMONIA: CPT | Performed by: INTERNAL MEDICINE

## 2024-09-18 PROCEDURE — 36410 VNPNXR 3YR/> PHY/QHP DX/THER: CPT

## 2024-09-18 PROCEDURE — 63600175 PHARM REV CODE 636 W HCPCS: Performed by: INTERNAL MEDICINE

## 2024-09-18 PROCEDURE — 76937 US GUIDE VASCULAR ACCESS: CPT

## 2024-09-18 PROCEDURE — 36415 COLL VENOUS BLD VENIPUNCTURE: CPT | Performed by: INTERNAL MEDICINE

## 2024-09-18 PROCEDURE — 25000003 PHARM REV CODE 250: Performed by: INTERNAL MEDICINE

## 2024-09-18 PROCEDURE — 80202 ASSAY OF VANCOMYCIN: CPT | Performed by: STUDENT IN AN ORGANIZED HEALTH CARE EDUCATION/TRAINING PROGRAM

## 2024-09-18 PROCEDURE — 95819 EEG AWAKE AND ASLEEP: CPT | Mod: 26,,, | Performed by: STUDENT IN AN ORGANIZED HEALTH CARE EDUCATION/TRAINING PROGRAM

## 2024-09-18 PROCEDURE — 99233 SBSQ HOSP IP/OBS HIGH 50: CPT | Mod: ,,, | Performed by: INTERNAL MEDICINE

## 2024-09-18 RX ADMIN — AMPICILLIN 2 G: 2 INJECTION, POWDER, FOR SOLUTION INTRAMUSCULAR; INTRAVENOUS at 07:09

## 2024-09-18 RX ADMIN — CEFTRIAXONE 2 G: 2 INJECTION, POWDER, FOR SOLUTION INTRAMUSCULAR; INTRAVENOUS at 04:09

## 2024-09-18 RX ADMIN — VANCOMYCIN HYDROCHLORIDE 1250 MG: 1.25 INJECTION, POWDER, LYOPHILIZED, FOR SOLUTION INTRAVENOUS at 06:09

## 2024-09-18 RX ADMIN — IBUPROFEN 600 MG: 600 TABLET ORAL at 02:09

## 2024-09-18 RX ADMIN — AMPICILLIN 2 G: 2 INJECTION, POWDER, FOR SOLUTION INTRAMUSCULAR; INTRAVENOUS at 09:09

## 2024-09-18 RX ADMIN — VANCOMYCIN HYDROCHLORIDE 1250 MG: 1.25 INJECTION, POWDER, LYOPHILIZED, FOR SOLUTION INTRAVENOUS at 11:09

## 2024-09-18 RX ADMIN — AMPICILLIN 2 G: 2 INJECTION, POWDER, FOR SOLUTION INTRAMUSCULAR; INTRAVENOUS at 01:09

## 2024-09-18 RX ADMIN — AMPICILLIN 2 G: 2 INJECTION, POWDER, FOR SOLUTION INTRAMUSCULAR; INTRAVENOUS at 06:09

## 2024-09-18 RX ADMIN — ACYCLOVIR SODIUM 480 MG: 50 INJECTION, SOLUTION INTRAVENOUS at 06:09

## 2024-09-18 NOTE — PROGRESS NOTES
[facility]  Department of Neurology  Progress Note  Date: 2024 9:43 AM          Patient Name: Masha Hobbs   MRN: [id number]   : 1955    AGE: 68 y.o.    LOS: 1 days Hospital Day: 3  Admit date: 2024  1:02 PM         HPI Masha Hobbs is a 68 y.o. female presented to the hospital with headache, generalized weakness and fever.  She started having headache about 2 days ago which was holocephalic, 8/10 intensity and the same day she had a fever of 103.  Her  told her that she acted confused around that time.  He presented to the hospital for these symptoms.  She denies any unilateral weakness or sensory changes, neck pain or stiffness, nausea or vomiting.     In the ER, she had a CT head which was negative for acute pathology, lumbar puncture and was treated with Rocephin, vancomycin, valacyclovir and dexamethasone.  She was admitted to the hospital for further workup and management.     She states the headache is not getting better and it is about 2 to 3/10 in intensity.  Denies any other complaints at this time.       2024: No acute events overnight. Patient was seen and examined by me this morning.  She does have some receptive aphasia on exam today.    Vitals:  Patient Vitals for the past 24 hrs:   BP Temp Temp src Pulse Resp SpO2 Weight   24 0928 (!) 121/56 98 °F (36.7 °C) Oral 89 18 (!) 92 % --   24 0904 -- -- -- 100 18 (!) 94 % --   24 0530 -- -- -- -- -- -- 64.8 kg (142 lb 13.7 oz)   24 0444 (!) 165/74 -- -- -- -- -- --   24 0408 (!) 139/90 98.1 °F (36.7 °C) Oral 97 18 (!) 93 % --   24 2313 139/62 99.1 °F (37.3 °C) Oral 83 16 96 % --   24 -- -- -- -- 18 -- --   24 129/61 98 °F (36.7 °C) Oral 90 18 (!) 94 % --   24 1356 -- 98.7 °F (37.1 °C) -- -- -- -- --   24 1118 (!) 141/62 98.4 °F (36.9 °C) Oral 79 18 96 % --     PHYSICAL EXAM:     GENERAL APPEARANCE: Well-developed, well-nourished female in no  "acute distress.  HEENT: Normocephalic and atraumatic. PERRL. Oropharynx unremarkable.  PULM: Comfortable on room air.  CV: RRR.  ABDOMEN: Soft, nontender.  EXTREMITIES: No signs of vascular compromise. Pulses present. No cyanosis, clubbing or edema.  SKIN: Clear; no rashes, lesions or skin breaks in exposed areas.      NEURO:   MENTAL STATUS: Patient awake and oriented to time, place, and person. Affect normal.  CRANIAL NERVES II-XII: Pupils equal, round and reactive to light. Extraocular movements full and intact. No facial asymmetry.  Mild receptive aphasia  MOTOR: Normal bulk. Tone normal and symmetrical throughout.  No abnormal movements. No tremor.   Strength 5/5 throughout unless specified below.  REFLEXES: DTRs 2+; normal and symmetric throughout.   SENSATION: Sensation grossly intact to fine touch.  COORDINATION: Finger-to-nose normal for age and symmetric.  STATION: Romberg deferred.  GAIT: Deferred.    CURRENT SCHEDULED MEDICATIONS:   acyclovir  10 mg/kg (Ideal) Intravenous Q8H    ampicillin IV (PEDS and ADULTS)  2 g Intravenous Q4H    cefTRIAXone (Rocephin) IV (PEDS and ADULTS)  2 g Intravenous Q12H    vancomycin (VANCOCIN) IV (PEDS and ADULTS)  1,250 mg Intravenous Q18H     CURRENT INFUSIONS:    DATA:  Recent Labs   Lab 09/16/24  1347 09/17/24  0507 09/18/24  0536   * 135* 135*   K 4.1 3.9 3.7    102 101   CO2 23 22* 22*   BUN 18 23 16   CREATININE 0.9 0.9 0.9   * 167* 112*   CALCIUM 9.5 9.1 9.0   PHOS 2.0* 2.3*  --    MG 1.9 1.9  --    AST 16 14 14   ALT 11 11 12     Recent Labs   Lab 09/16/24  1347   WBC 13.86*   HGB 14.0   HCT 42.4        Lab Results   Component Value Date    PROTEINCSF 173 (H) 09/16/2024    GLUCCSF 50 09/16/2024     No results found for: "HGBA1C"         I have personally reviewed and interpreted the pertinent imaging and lab results.  Imaging Results              X-Ray Chest 1 View (Final result)  Result time 09/16/24 14:53:24      Final result by Rachel" MD Farrukh (09/16/24 14:53:24)                   Impression:      No acute cardiopulmonary abnormality.      Electronically signed by: Farrukh Ramos  Date:    09/16/2024  Time:    14:53               Narrative:    EXAMINATION:  XR CHEST 1 VIEW    CLINICAL HISTORY:  Sepsis;    FINDINGS:  Portable chest at 1441 compared with 06/06/2024 shows unchanged cardiomediastinal silhouette.    Lungs are clear. Pulmonary vasculature is normal. No acute osseous abnormality.                                       CT Head Without Contrast (Final result)  Result time 09/16/24 14:52:17      Final result by Wang Acuña MD (09/16/24 14:52:17)                   Impression:      No CT evidence of acute intracranial pathology.      Electronically signed by: Wang Acuña  Date:    09/16/2024  Time:    14:52               Narrative:    EXAMINATION:  CT HEAD WITHOUT CONTRAST    CLINICAL HISTORY:  Mental status change, unknown cause;    TECHNIQUE:  Axial CT imaging obtained through the brain without IV contrast.    CMS Mandated Quality Data-CT Radiation Dose-436    All CT scans at this facility dose modulation, iterative reconstruction, and or weight-based dosing when appropriate to reduce radiation dose to as low as reasonably achievable.    COMPARISON:  None    FINDINGS:  Negative for acute intracranial hemorrhage, midline shift, or mass effect.  Ventricles and sulci are normal in size.  Gray-white differentiation is maintained.  Mild periventricular and deep white matter hypoattenuation, consistent with microangiopathic change.  Cerebellar hemispheres and brainstem are unremarkable.  Atherosclerotic calcification of intracranial carotid arteries.    No calvarial lesion or fracture.  Mastoid air cells are clear.  Mucosal thickening right maxillary sinus.  Paranasal sinuses are otherwise clear.                                           ASSESSMENT AND PLAN:     Encephalitis   Headache  Encephalopathy     Plan:   LP with CSF analysis  showed WBC 2038 (neutrophil predominant), protein 173 and glucose 50.  MRI brain showed hyperintensity in the right medial temporal lobe on T2 FLAIR images concerning for encephalitis. MRI brain with contrast showed no contrast enhancement  Currently on vancomycin, Rocephin, ampicillin.  Acyclovir has been discontinued with negative meningitis/encephalitis PCR panel.  Infectious diseases following-appreciate further recommendations.    CSF workup with meningitis encephalitis panel PCR panel was negative  P.r.n. Tylenol/Toradol for headache.    EEG ordered and pending.    Speech therapy evaluate and treat  PT OT evaluate and treat.    Will follow            Joe Galeana MD  Neurology/vascular Neurology  Date of Service: 09/18/2024  9:43 AM    Please note: This note was transcribed using voice recognition software. Because of this technology there are often uinintended grammatical, spelling, and other transcription errors. Please disregard these errors.

## 2024-09-18 NOTE — PLAN OF CARE
Problem: Adult Inpatient Plan of Care  Goal: Plan of Care Review  Outcome: Progressing  Goal: Patient-Specific Goal (Individualized)  Outcome: Progressing  Goal: Optimal Comfort and Wellbeing  Outcome: Progressing     Problem: Infection  Goal: Absence of Infection Signs and Symptoms  Outcome: Progressing     Problem: Pain Acute  Goal: Optimal Pain Control and Function  Outcome: Progressing     Problem: Fall Injury Risk  Goal: Absence of Fall and Fall-Related Injury  Outcome: Progressing     Problem: Meningitis/Encephalitis  Goal: Optimal Coping  Outcome: Progressing  Goal: Effective Cerebral Tissue Perfusion  Outcome: Progressing  Goal: Absence of Infection Signs and Symptoms  Outcome: Progressing     Problem: Nausea and Vomiting  Goal: Nausea and Vomiting Relief  Outcome: Progressing     Problem: Social Isolation  Goal: Social Connection Supported  Outcome: Progressing

## 2024-09-18 NOTE — NURSING
"Upon assessment, patient reports a "slight headache" and does request a dose of "Ibuprofen." Patient continues to prefer not to take Tylenol. At 2131, contact is made with Hospital Med provider, MAYITO Pete DNP, regarding patient's request. Reviewed relevant and pertinent patient information and last dose of Ibuprofen received. Upon contacting, order is noted for Motrin PRN as entered by DNP. Medication is administered to patient with education and reviewed PRN regimen.   "

## 2024-09-18 NOTE — PROCEDURES
VIDEO ELECTROENCEPHALOGRAM  REPORT    DATE OF SERVICE:9/18.24  EEG NUMBER: ON   REQUESTED BY:  Dr Galeana  LOCATION OF SERVICE:  [facility]   Electroencephalographic (EEG) recording is with electrodes placed according to the International 10-20 placement system.  Thirty two (32) channels of digital signal (sampling rate of 512/sec) including T1 and T2 was simultaneously recorded from the scalp and may include  EKG, EMG, and/or eye monitors.  Recording band pass was 0.1 to 512 hz.  Digital video recording of the patient is simultaneously recorded with the EEG.  The patient is instructed report clinical symptoms which may occur during the recording session.  EEG and video recording is stored and archived in digital format.  Activation procedures which include photic stimulation, hyperventilation and instructing patients to perform simple task are done in selected patients.   The EEG is displayed on a monitor screen and can be reviewed using different montages.  Computer assisted analysis is employed to detect spike and electrographic seizure activity.   The entire record is submitted for computer analysis.  The entire recording is visually reviewed and the times identified by computer analysis as being spikes or seizures are reviewed again.  Compresses spectral analysis (CSA) is also performed on the activity recorded from each individual channel.  This is displayed as a power display of frequencies from 0 to 30 Hz over time.   The CSA is reviewed looking for asymmetries in power between homologous areas of the scalp and then compared with the original EEG recording.     [employer] software was also utilized in the review of this study.  This software suite analyzes the EEG recording in multiple domains.  Coherence and rhythmicity is computed to identify EEG sections which may contain organized seizures.  Each channel undergoes analysis to detect presence of spike and sharp waves which have  special and morphological characteristic of epileptic activity.  The routine EEG recording is converted from spacial into frequency domain.  This is then displayed comparing homologous areas to identify areas of significant asymmetry.  Algorithm to identify non-cortically generated artifact is used to separate eye movement, EMG and other artifact from the EEG.      ELECTROENCEPHALOGRAM:    Indication: 69 yo female came in for high fever and AMS, is being treated for menigitis and encephalitis, she had a chnage in mental status from yesterday.    State of Consciousness:   Lethargy    Background:   The background is moderately disorganized, symmetric and continuous.  It mainly consists of intermixed theta and delta activity reaching up to 5-6 hz, with some overriding faster frequencies.  Occasional triphasic waves are noted.    Sleep:   No sleep architecture is noted, but there is cycling of the background.    Epileptiform Abnormalities  None    Seizures/Events:   None    EKG:   Regular rate and rhythm on single lead EKG    Activating procedures:   - Hyperventilation: not performed  - Photic stimulation: no photic driving or epileptiform discharges elicited     Impression:   Abnormal routine EEG due to slowing and disorganization of the background consistent with a moderate to severe diffuse encephalopathy.  No epileptiform discharges or focal abnormalities are noted.      Shonda Patrick MD  [facility]   Department of Neurology/Epilepsy

## 2024-09-18 NOTE — ASSESSMENT & PLAN NOTE
Likely secondary to bacterial meningitis.   Mental status worsened today  Acyclovir has been discontinued   MRI shows encephalitis and ventriculitis findings   Ammonia was ordered   Neurology following   EEG pending

## 2024-09-18 NOTE — PROGRESS NOTES
[facility] Medicine  Progress Note    Patient Name: Masha Hobbs  MRN: [id number]  Patient Class: IP- Inpatient   Admission Date: 9/16/2024  Length of Stay: 1 days  Attending Physician: Beatriz Boogie MD  Primary Care Provider: Roney Bang MD        Subjective:     Principal Problem:Acute encephalopathy        HPI:  Masha Hobbs is a 68 year old female with a past medical history of osteoporosis and chronic lower back pain with radiculopathy who was transferred from [facility] due to confusion associated with headache, fever, and generalized weakness/body aches for several hours prior to arrival.  Per  patient was confused since 4:00 p.m. Upon assessment confusion has resolved.  She denies any complaints other than a intermittent frontal lobe headache that is improving after Toradol administration.  There are no acute neurological focal deficits.  She denies chest pain, shortness of breath, dizziness, lightheadedness, vision changes, speech changes, numbness/tingling, neck pain, abdominal pain, nausea, or vomiting.  ED workup reveals:  CT head negative for acute intracranial hemorrhage. Chest x-ray no evidence of acute cardiopulmonary findings.  Lumbar puncture performed in ED, pending results.  She was treated with vancomycin, Rocephin, valacyclovir p.o., and dexamethasone.  CBC with elevated white count 13.8 and stable H&H.  BMP with phosphorus 2.0 otherwise unremarkable.  Negative flu/COVID.  Urinalysis negative.  Lactic acid 1.1.  ECG negative for ischemia or infarct.  MRI brain pending.  Neurology consult placed.  Admitted to hospital medicine for further management and treatment.            Overview/Hospital Course:  Patient is a 68 year old female admitted with AMS, headache and fever. LP was suggestive of meningitis with 2K WBC, mainly neutrophils. HSV panel was negative. Patient was started on Vancomycin, Ampicillin and Rocephin. Acyclovir was discontinued  after D1. MRI shows encephalitis findings with ventriculitis. Patient is being followed by neurology and ID. EEG has been ordered.     Interval History: Patient is very lethargic today. She did not open eyes to talk. When asks whether she has headache, she has mild one. She was running low grade fever 100.8. compared to yesterday, there is mental status change. Discussed with daughter and  and also the neurologist.     Review of Systems  Objective:     Vital Signs (Most Recent):  Temp: (!) 100.8 °F (38.2 °C) (09/18/24 1131)  Pulse: 93 (09/18/24 1131)  Resp: (!) 22 (09/18/24 1131)  BP: (!) 122/58 (09/18/24 1131)  SpO2: (!) 92 % (09/18/24 1131) Vital Signs (24h Range):  Temp:  [98 °F (36.7 °C)-100.8 °F (38.2 °C)] 100.8 °F (38.2 °C)  Pulse:  [] 93  Resp:  [16-22] 22  SpO2:  [92 %-96 %] 92 %  BP: (121-165)/(56-90) 122/58     Weight: 64.8 kg (142 lb 13.7 oz)  Body mass index is 26.99 kg/m².    Intake/Output Summary (Last 24 hours) at 9/18/2024 1359  Last data filed at 9/18/2024 0647  Gross per 24 hour   Intake 1631.38 ml   Output 1175 ml   Net 456.38 ml         Physical Exam  Vitals and nursing note reviewed.   Constitutional:       Appearance: She is not ill-appearing.   Eyes:      General: No visual field deficit.     Pupils: Pupils are equal, round, and reactive to light.   Cardiovascular:      Rate and Rhythm: Normal rate and regular rhythm.      Pulses: Normal pulses.      Heart sounds: Normal heart sounds.   Pulmonary:      Effort: Pulmonary effort is normal. No respiratory distress.      Breath sounds: Normal breath sounds. No wheezing.   Abdominal:      General: Bowel sounds are normal. There is no distension.      Palpations: Abdomen is soft.      Tenderness: There is no abdominal tenderness.   Musculoskeletal:      Right lower leg: No edema.      Left lower leg: No edema.   Skin:     General: Skin is warm and dry.      Capillary Refill: Capillary refill takes less than 2 seconds.   Neurological:       Mental Status: she is very lethargic compared to yesterday. Some aphasia present     Significant Labs: All pertinent labs within the past 24 hours have been reviewed.  CMP:   Recent Labs   Lab 09/17/24  0507 09/18/24  0536   * 135*   K 3.9 3.7    101   CO2 22* 22*   * 112*   BUN 23 16   CREATININE 0.9 0.9   CALCIUM 9.1 9.0   PROT 7.2 7.6   ALBUMIN 3.1* 3.3*   BILITOT 0.5 0.3   ALKPHOS 59 69   AST 14 14   ALT 11 12   ANIONGAP 11 12       Significant Imaging: I have reviewed all pertinent imaging results/findings within the past 24 hours.    Assessment/Plan:      * Acute encephalopathy  Likely secondary to bacterial meningitis.   Mental status worsened today  Acyclovir has been discontinued   MRI shows encephalitis and ventriculitis findings   Ammonia was ordered   Neurology following   EEG pending       Meningitis  LP shows > 2000 WBC with neurophil predominance, protein is high normal glucose   MRI shows encephalitis and ventriculitis findings   HSV panel was negative   - Cont Vancomycin, Ampicillin and Rocephin for now   - Follow CSF culture  - Follow EEG         Leukocytosis  Due to above         VTE Risk Mitigation (From admission, onward)           Ordered     IP VTE LOW RISK PATIENT  Once         09/17/24 0239     Place sequential compression device  Until discontinued         09/17/24 0239                    Discharge Planning   WILVER: 9/20/2024     Code Status: Full Code   Is the patient medically ready for discharge?:     Reason for patient still in hospital (select all that apply): Treatment  Discharge Plan A: Home with family                  Beatriz Boogie MD  Department of Hospital Medicine   [facility]/Surg

## 2024-09-18 NOTE — ASSESSMENT & PLAN NOTE
LP shows > 2000 WBC with neurophil predominance, protein is high normal glucose   MRI shows encephalitis and ventriculitis findings   HSV panel was negative   - Cont Vancomycin, Ampicillin and Rocephin for now   - Follow CSF culture  - Follow EEG

## 2024-09-18 NOTE — HOSPITAL COURSE
Patient is a 68 year old female admitted with AMS, headache and fever. LP was suggestive of meningitis with 2K WBC, mainly neutrophils. HSV panel was negative. Patient was started on Vancomycin, Ampicillin and Rocephin. Acyclovir was discontinued after D1. MRI shows encephalitis findings with ventriculitis. Patient is being followed by neurology and ID. EEG has been ordered which is consistent with encephalopathy but no epileptiform activity. Patient continue to spike fever and is vomiting. Stat MRI was repeated with rpt LP was ordered. Rpt MRI did not show any changes. LP shows reduced WBC compared to the first one. Patient's mental status improved. Patient continued with daily fevers.LP shows > 2000 WBC with neurophil predominance, protein is high normal glucose   MRI shows encephalitis and ventriculitis findings   HSV and meningitis panel was all negative   CSF Cryptococcus negative, HIV,  [place] n[place] virus , Lyme disease, Dane mountain spotted fever antibody negative, CSF VDRL negative.     EEG shows encephalopathy and no epileptiform activity   Discussed with neurology and ID  MRI and LP was repeated on 9/19 shows WBC improved, but other wise similar findings.   Antibiotics narrowed down to p.o. doxycycline and IV Rocephin.  Patient is seems responded to very well to this regimen, mental status much improved, fever resolved, leukocytosis resolved.     D Dimer was elevated, and CTA chest was done. This revealed no PE, but some pleural effusions. Lasix was given. Patient's appetite was poor and clinimix was added temporarily. Continued with poor appetite. Appetite stimulant added.     Patient has a recently trip to [place]   I highly suspect patient may have HGA or HME CNS infection.   Check ehrlichiosis antibody panel, check blood peripheral smear to look for  morale inclusions.   ID note appreciated, continue IV Rocephin and p.o. doxycycline, anticipate last dose of IV Rocephin on 9/30.       Patient is  cleared for discharge home with home health, follow up ID in 2 weeks.  Patient also has new diagnosed cardiomyopathy with LVEF 35%, evaluated by cardiologist, recommended outpatient follow up, started on low-dose metoprolol.

## 2024-09-18 NOTE — PLAN OF CARE
[facility]   Midline placed today. CM to follow       09/18/24 8204   Post-Acute Status   Post-Acute Authorization IV Infusion   IV Infusion Status Referral(s) sent

## 2024-09-18 NOTE — CONSULTS
18 gauge x 10cm Midline placed to patient's left basilic vein with the use of ultrasound guidance.     Ultrasound guidance: yes  Vessel Caliber: large and patent, compressibility normal  Needle advanced into vessel with real time Ultrasound guidance.  Guidewire confirmed in vessel.  Image recorded and saved.  Sterile sheath used.  Sterile dressing applied  Arm circumference- 26cm  Dressing dated   Limb alert applied.

## 2024-09-18 NOTE — PROGRESS NOTES
"[facility]   Department of Infectious Disease  Progress Note        PATIENT NAME: Masha Hobbs  MRN: [id number]  TODAY'S DATE: 2024  ADMIT DATE: 2024  LOS: 1 days    CHIEF COMPLAINT: Altered Mental Status (Confusion that began yesterday at 4pm per pt's  along with weakness - pt presents to ER with fever and can state name and  and situation but not president.  Patient have been vomiting since 1pm yesterday), Fever, Headache, and Emesis      PRINCIPLE PROBLEM: Acute encephalopathy    INTERVAL HISTORY      2024:  She is more somnolent today.  Not eating very well.  Afebrile.  She answers questions appropriately.      Antibiotics (From admission, onward)      Start     Stop Route Frequency Ordered    24 1730  ampicillin (OMNIPEN) 2 g in 0.9% NaCl 100 mL IVPB (MB+)         -- IV Every 4 hours (non-standard times) 24 1539    24 1645  cefTRIAXone (ROCEPHIN) 2 g in D5W 100 mL IVPB (MB+)         -- IV Every 12 hours (non-standard times) 24 1540    24 1300  vancomycin 1,250 mg in D5W 250 mL IVPB (admixture device)         -- IV Every 18 hours 24 0942    24 0342  vancomycin - pharmacy to dose  (vancomycin IVPB (PEDS and ADULTS))        Placed in "And" Linked Group    -- IV pharmacy to manage frequency 24 0243          Antifungals (From admission, onward)      None           Antivirals (From admission, onward)          Stop Route Frequency     acyclovir (ZOVIRAX) injection         -- IV Every 8 hours (non-standard times)            ASSESSMENT and PLAN      1. Meningitis.  Appears to be bacterial infection.  No brain parenchymal enhancement seen on MRI brain with contrast.  Add ampicillin and optimize ceftriaxone to 2 g Q 12 hours.  Follow CSF culture and also encephalitis panel.  We will streamline antimicrobials tomorrow.       2. Osteoporosis      3. Systolic heart murmur.  States she was never informed had murmur in the past.  " Obtain TTE       4. Health maintenance.  She will benefit from pneumococcal vaccine i.e. Prevnar 20.  We will discuss with her some more tomorrow.    5. Lethargy.  Maybe related to steroids or side effects of her antimicrobials.  Hold steroids for now and monitor.  May consider repeat brain imaging and or LP if persists.    RECOMMENDATIONS:    DC dexamethasone, DC acyclovir  Follow cultures    Please send Epic secure chat with any questions.  Discussed with hospitalist.  Discussed with her daughter and  at bedside.      SUBJECTIVE    Masha Hobbs is a 68 y.o. female with history of osteoporosis and chronic low back pain.  He presents to the ER 09/16/2024 with 1 day history of headache associated with nausea and vomiting and generalized body aches.  In the ER /67, pulse 110, respiratory rate 18, temperature 100.4°, oxygen 95%.  WBC 14 K, hematocrit 42, CMP unremarkable.  X-ray with no acute infiltrate.  CT head unremarkable.  LP was done.  CSF was abnormal.  It was slightly hazy, WBC 2038, RBC 14, neutrophils 89%, glucose 50, protein 173.  Accompanying serum glucose was 130.       She was admitted and commenced on antibiotics and dexamethasone for meningitis.  MRI brain without contrast today 97/24 read as showing ill-defined T2/FLAIR hyperintense signal within the right Arielle temporal lobe with differentials including infectious encephalitis vascularly HSV, autoimmune encephalitis.  Repeat MRI brain with contrast with no abnormal enhancement.  Id asked to assist with her care.     She recently returned from [place] on 09/13/2024 after a 1 week trip.  They had gone to several c[place] and and did a lot of sightseeing .  States she was washing her hands frequently.  They were around a lot of people.  No other sick contacts.  No pets at home.  Denies eating anything unusual.  Has not received age appropriate pneumococcal vaccine.     Antibiotic history:    Vancomycin: 09/16/2024-  Valtrex:  09/16/2024 x1 dose   Ceftriaxone: 09/16/2024-  IV Acyclovir: 09/17/2024-     Microbiology:    Blood culture 09/16/2024:  NGTD   CSF culture 09/16/2024:  In progress    Review of Systems  Negative except as stated above in Interval History     OBJECTIVE   Temp:  [98 °F (36.7 °C)-99.1 °F (37.3 °C)] 98 °F (36.7 °C)  Pulse:  [] 89  Resp:  [16-18] 18  SpO2:  [92 %-96 %] 92 %  BP: (121-165)/(56-90) 121/56  Temp:  [98 °F (36.7 °C)-99.1 °F (37.3 °C)]   Temp: 98 °F (36.7 °C) (09/18/24 0928)  Pulse: 89 (09/18/24 0928)  Resp: 18 (09/18/24 0928)  BP: (!) 121/56 (09/18/24 0928)  SpO2: (!) 92 % (09/18/24 0928)    Intake/Output Summary (Last 24 hours) at 9/18/2024 1046  Last data filed at 9/18/2024 0647  Gross per 24 hour   Intake 1871.38 ml   Output 1625 ml   Net 246.38 ml       Physical Exam  General:  She is somnolent but arousable.  Answers questions appropriately.    HEENT: Neck is supple   CNS: Cranial nerves 2-12 intact, no focal deficits   CVS: S1 and 2 heard, grade 3/6 systolic murmur  Respiratory: Clear to auscultation   Abdomen: Full, soft, nontender, no palpable organomegaly   Skin: No rash appreciated   Musculoskeletal system: No joint or bony abnormalities appreciated     VAD:  PIV  ISOLATION:  Droplet isolation     Wounds:  None    Significant Labs: All pertinent labs within the past 24 hours have been reviewed.    CBC LAST 7 DAYS  Recent Labs   Lab 09/16/24  1347   WBC 13.86*   RBC 4.41   HGB 14.0   HCT 42.4   MCV 96   MCH 31.7*   MCHC 33.0   RDW 13.0      MPV 9.7   GRAN 88.1*  12.2*   LYMPH 7.7*  1.1   MONO 3.5*  0.5   BASO 0.04   NRBC 0       CHEMISTRY LAST 7 DAYS  Recent Labs   Lab 09/16/24  1347 09/17/24  0507 09/18/24  0536   * 135* 135*   K 4.1 3.9 3.7    102 101   CO2 23 22* 22*   ANIONGAP 12 11 12   BUN 18 23 16   CREATININE 0.9 0.9 0.9   * 167* 112*   CALCIUM 9.5 9.1 9.0   MG 1.9 1.9  --    ALBUMIN 4.4 3.1* 3.3*   PROT 8.8* 7.2 7.6   ALKPHOS 76 59 69   ALT 11 11 12   AST  "16 14 14   BILITOT 0.8 0.5 0.3       Estimated Creatinine Clearance: 50.9 mL/min (based on SCr of 0.9 mg/dL).    INFLAMMATORY/PROCAL  LAST 7 DAYS  No results for input(s): "PROCAL", "ESR", "CRP" in the last 168 hours.  No results found for: "ESR"  No results found for: "CRP"    PRIOR  MICROBIOLOGY:    No results found for the last 90 days.      LAST 7 DAYS MICROBIOLOGY   Microbiology Results (last 7 days)       Procedure Component Value Units Date/Time    CSF culture and Gram Stain (Tube 2) [[id number]] Collected: 09/16/24 1740    Order Status: Completed Specimen: CSF (Spinal Fluid) from CSF Tap, Tube 2 Updated: 09/18/24 0848     CSF CULTURE No Growth to date     Gram Stain Result Few WBC's      No organisms seen    Narrative:      On which sequentially labeled tube should this analysis be  performed?->2    Blood culture x two cultures. Draw prior to antibiotics [[id number]] Collected: 09/16/24 1347    Order Status: Completed Specimen: Blood from Peripheral, Antecubital, Right Updated: 09/17/24 1432     Blood Culture, Routine No Growth to date      No Growth to date    Narrative:      Aerobic and anaerobic    Blood culture x two cultures. Draw prior to antibiotics [[id number]] Collected: 09/16/24 1358    Order Status: Completed Specimen: Blood from Peripheral, Antecubital, Left Updated: 09/17/24 1432     Blood Culture, Routine No Growth to date      No Growth to date    Narrative:      Aerobic and anaerobic    CSF culture [[id number]]     Order Status: Canceled Specimen: CSF (Spinal Fluid)     CSF culture [[id number]]     Order Status: Canceled Specimen: CSF (Spinal Fluid)           CURRENT/PREVIOUS VISIT EKG  Results for orders placed or performed during the hospital encounter of 09/16/24   EKG 12-lead    Collection Time: 09/16/24  2:04 PM   Result Value Ref Range    QRS Duration 102 ms    [facility] QTC Calculation 452 ms    Narrative    Test Reason : R00.0,    Vent. Rate : 108 BPM     Atrial Rate : 108 BPM     P-R Int : " 204 ms          QRS Dur : 102 ms      QT Int : 338 ms       P-R-T Axes : 030 065 020 degrees     QTc Int : 452 ms    Sinus tachycardia  Incomplete right bundle branch block  Septal infarct ,age undetermined  Abnormal ECG  When compared with ECG of 06-JUN-2024 16:29,  NC interval has decreased  The axis Shifted right  Non-specific change in ST segment in Anterior leads    Referred By:             Confirmed By:      Significant Imaging: I have reviewed all relevant and available imaging results/findings within the past 24 hours.    I spent a total of 60 minutes on the day of the visit.This includes face to face time and non-face to face time preparing to see the patient (eg, review of tests), obtaining and/or reviewing separately obtained history, documenting clinical information in the electronic or other health record, independently interpreting results and communicating results to the patient/family/caregiver, or care coordinator.    Dansi Sepulveda MD  Date of Service: 09/18/2024      This note was created using M Modal voice recognition software that occasionally misinterpreted phrases or words.

## 2024-09-18 NOTE — SUBJECTIVE & OBJECTIVE
Interval History: Patient is very lethargic today. She did not open eyes to talk. When asks whether she has headache, she has mild one. She was running low grade fever 100.8. compared to yesterday, there is mental status change. Discussed with daughter and  and also the neurologist.     Review of Systems  Objective:     Vital Signs (Most Recent):  Temp: (!) 100.8 °F (38.2 °C) (09/18/24 1131)  Pulse: 93 (09/18/24 1131)  Resp: (!) 22 (09/18/24 1131)  BP: (!) 122/58 (09/18/24 1131)  SpO2: (!) 92 % (09/18/24 1131) Vital Signs (24h Range):  Temp:  [98 °F (36.7 °C)-100.8 °F (38.2 °C)] 100.8 °F (38.2 °C)  Pulse:  [] 93  Resp:  [16-22] 22  SpO2:  [92 %-96 %] 92 %  BP: (121-165)/(56-90) 122/58     Weight: 64.8 kg (142 lb 13.7 oz)  Body mass index is 26.99 kg/m².    Intake/Output Summary (Last 24 hours) at 9/18/2024 1359  Last data filed at 9/18/2024 0647  Gross per 24 hour   Intake 1631.38 ml   Output 1175 ml   Net 456.38 ml         Physical Exam  Vitals and nursing note reviewed.   Constitutional:       Appearance: She is not ill-appearing.   Eyes:      General: No visual field deficit.     Pupils: Pupils are equal, round, and reactive to light.   Cardiovascular:      Rate and Rhythm: Normal rate and regular rhythm.      Pulses: Normal pulses.      Heart sounds: Normal heart sounds.   Pulmonary:      Effort: Pulmonary effort is normal. No respiratory distress.      Breath sounds: Normal breath sounds. No wheezing.   Abdominal:      General: Bowel sounds are normal. There is no distension.      Palpations: Abdomen is soft.      Tenderness: There is no abdominal tenderness.   Musculoskeletal:      Right lower leg: No edema.      Left lower leg: No edema.   Skin:     General: Skin is warm and dry.      Capillary Refill: Capillary refill takes less than 2 seconds.   Neurological:      Mental Status: she is very lethargic compared to yesterday. Some aphasia present     Significant Labs: All pertinent labs within the  past 24 hours have been reviewed.  CMP:   Recent Labs   Lab 09/17/24  0507 09/18/24  0536   * 135*   K 3.9 3.7    101   CO2 22* 22*   * 112*   BUN 23 16   CREATININE 0.9 0.9   CALCIUM 9.1 9.0   PROT 7.2 7.6   ALBUMIN 3.1* 3.3*   BILITOT 0.5 0.3   ALKPHOS 59 69   AST 14 14   ALT 11 12   ANIONGAP 11 12       Significant Imaging: I have reviewed all pertinent imaging results/findings within the past 24 hours.

## 2024-09-18 NOTE — PROGRESS NOTES
Pharmacokinetic Assessment Follow Up: IV Vancomycin    Vancomycin serum concentration assessment(s):    The trough level was drawn correctly and can be used to guide therapy at this time. The measurement is within the desired definitive target range of 15 to 20 mcg/mL.    Vancomycin Regimen Plan:    Continue regimen to Vancomycin 1250 mg IV every 18 hours with next serum trough concentration measured at 1730 prior to 3rd dose on 09/19/24    Drug levels (last 3 results):  Recent Labs   Lab Result Units 09/18/24  0536   Vancomycin-Trough ug/mL 15.6       Pharmacy will continue to follow and monitor vancomycin.    Please contact pharmacy at extension [phone] for questions regarding this assessment.    Thank you for the consult,   Vini Bishop       Patient brief summary:  Masha Hobbs is a 68 y.o. female initiated on antimicrobial therapy with IV Vancomycin for treatment of meningitis    The patient's current regimen is 1250mg q18h    Drug Allergies:   Review of patient's allergies indicates:  No Known Allergies    Actual Body Weight:   64.8kg    Renal Function:   Estimated Creatinine Clearance: 50.9 mL/min (based on SCr of 0.9 mg/dL).,     CBC (last 72 hours):  Recent Labs   Lab Result Units 09/16/24  1347   WBC K/uL 13.86*   Hemoglobin g/dL 14.0   Hematocrit % 42.4   Platelets K/uL 350   Gran % % 88.1*   Lymph % % 7.7*   Mono % % 3.5*   Eosinophil % % 0.0   Basophil % % 0.3   Differential Method  Automated       Metabolic Panel (last 72 hours):  Recent Labs   Lab Result Units 09/16/24  1347 09/16/24  1523 09/16/24  1739 09/17/24  0507   Sodium mmol/L 135*  --   --  135*   Potassium mmol/L 4.1  --   --  3.9   Chloride mmol/L 100  --   --  102   CO2 mmol/L 23  --   --  22*   Glucose mg/dL 130*  --   --  167*   Glucose, CSF mg/dL  --   --  50  --    Glucose, UA   --  Negative  --   --    BUN mg/dL 18  --   --  23   Creatinine mg/dL 0.9  --   --  0.9   Albumin g/dL 4.4  --   --  3.1*   Total Bilirubin mg/dL 0.8  --   --   0.5   Alkaline Phosphatase U/L 76  --   --  59   AST U/L 16  --   --  14   ALT U/L 11  --   --  11   Magnesium mg/dL 1.9  --   --  1.9   Phosphorus mg/dL 2.0*  --   --  2.3*       Vancomycin Administrations:  vancomycin given in the last 96 hours                     vancomycin 1,250 mg in D5W 250 mL IVPB (admixture device) (mg) 1,250 mg New Bag 09/17/24 1257    vancomycin 1.25 g in dextrose 5% 250 mL IVPB (ready to mix) (mg) 1,250 mg New Bag 09/16/24 1919                    Microbiologic Results:  Microbiology Results (last 7 days)       Procedure Component Value Units Date/Time    Blood culture x two cultures. Draw prior to antibiotics [[id number]] Collected: 09/16/24 1347    Order Status: Completed Specimen: Blood from Peripheral, Antecubital, Right Updated: 09/17/24 1432     Blood Culture, Routine No Growth to date      No Growth to date    Narrative:      Aerobic and anaerobic    Blood culture x two cultures. Draw prior to antibiotics [[id number]] Collected: 09/16/24 1358    Order Status: Completed Specimen: Blood from Peripheral, Antecubital, Left Updated: 09/17/24 1432     Blood Culture, Routine No Growth to date      No Growth to date    Narrative:      Aerobic and anaerobic    CSF culture [[id number]]     Order Status: Canceled Specimen: CSF (Spinal Fluid)     CSF culture [[id number]]     Order Status: Canceled Specimen: CSF (Spinal Fluid)     CSF culture and Gram Stain (Tube 2) [[id number]] Collected: 09/16/24 1740    Order Status: Completed Specimen: CSF (Spinal Fluid) from CSF Tap, Tube 2 Updated: 09/17/24 1053     CSF CULTURE No Growth to date     Gram Stain Result Few WBC's      No organisms seen    Narrative:      On which sequentially labeled tube should this analysis be  performed?->2

## 2024-09-19 PROBLEM — I50.20 HFREF (HEART FAILURE WITH REDUCED EJECTION FRACTION): Status: ACTIVE | Noted: 2024-09-19

## 2024-09-19 LAB
ALBUMIN SERPL BCP-MCNC: 2.7 G/DL (ref 3.5–5.2)
ALP SERPL-CCNC: 60 U/L (ref 55–135)
ALT SERPL W/O P-5'-P-CCNC: 11 U/L (ref 10–44)
ANION GAP SERPL CALC-SCNC: 12 MMOL/L (ref 8–16)
AORTIC ROOT ANNULUS: 2.61 CM
AORTIC VALVE CUSP SEPERATION: 1.82 CM
APICAL FOUR CHAMBER EJECTION FRACTION: 37 %
APICAL TWO CHAMBER EJECTION FRACTION: 26 %
ASCENDING AORTA: 2.63 CM
AST SERPL-CCNC: 24 U/L (ref 10–40)
AV INDEX (PROSTH): 0.73
AV MEAN GRADIENT: 7 MMHG
AV PEAK GRADIENT: 10 MMHG
AV VALVE AREA BY VELOCITY RATIO: 3.1 CM²
AV VALVE AREA: 3.49 CM²
AV VELOCITY RATIO: 0.65
BASOPHILS # BLD AUTO: 0.06 K/UL (ref 0–0.2)
BASOPHILS NFR BLD: 0.4 % (ref 0–1.9)
BASOPHILS NFR CSF MANUAL: 0 %
BILIRUB SERPL-MCNC: 0.4 MG/DL (ref 0.1–1)
BSA FOR ECHO PROCEDURE: 1.66 M2
BUN SERPL-MCNC: 14 MG/DL (ref 8–23)
CALCIUM SERPL-MCNC: 8.3 MG/DL (ref 8.7–10.5)
CHLORIDE SERPL-SCNC: 98 MMOL/L (ref 95–110)
CLARITY CSF: CLEAR
CO2 SERPL-SCNC: 24 MMOL/L (ref 23–29)
COLOR CSF: COLORLESS
CREAT SERPL-MCNC: 0.8 MG/DL (ref 0.5–1.4)
CSF TUBE NUMBER: 1
CSF TUBE NUMBER: 1
CV ECHO LV RWT: 0.32 CM
DIFFERENTIAL METHOD BLD: ABNORMAL
DOP CALC AO PEAK VEL: 1.62 M/S
DOP CALC AO VTI: 26.9 CM
DOP CALC LVOT AREA: 4.8 CM2
DOP CALC LVOT DIAMETER: 2.47 CM
DOP CALC LVOT PEAK VEL: 1.05 M/S
DOP CALC LVOT STROKE VOLUME: 93.87 CM3
DOP CALC MV VTI: 20.7 CM
DOP CALCLVOT PEAK VEL VTI: 19.6 CM
E WAVE DECELERATION TIME: 213.52 MSEC
E/A RATIO: 0.77
E/E' RATIO: 24.5 M/S
ECHO LV POSTERIOR WALL: 0.75 CM (ref 0.6–1.1)
EOSINOPHIL # BLD AUTO: 0.1 K/UL (ref 0–0.5)
EOSINOPHIL NFR BLD: 0.4 % (ref 0–8)
EOSINOPHIL NFR CSF MANUAL: 0 %
ERYTHROCYTE [DISTWIDTH] IN BLOOD BY AUTOMATED COUNT: 12.5 % (ref 11.5–14.5)
EST. GFR  (NO RACE VARIABLE): >60 ML/MIN/1.73 M^2
FRACTIONAL SHORTENING: 19 % (ref 28–44)
GLUCOSE CSF-MCNC: 51 MG/DL (ref 40–70)
GLUCOSE SERPL-MCNC: 126 MG/DL (ref 70–110)
HCT VFR BLD AUTO: 35.2 % (ref 37–48.5)
HGB BLD-MCNC: 11.6 G/DL (ref 12–16)
HR MV ECHO: 95 BPM
HSV-1 DNA BY PCR: NEGATIVE
HSV-2 DNA BY PCR: NEGATIVE
IMM GRANULOCYTES # BLD AUTO: 0.05 K/UL (ref 0–0.04)
IMM GRANULOCYTES NFR BLD AUTO: 0.3 % (ref 0–0.5)
INTERVENTRICULAR SEPTUM: 0.78 CM (ref 0.6–1.1)
IVRT: 87.54 MSEC
LA MAJOR: 5.23 CM
LEFT ATRIUM AREA SYSTOLIC (APICAL 2 CHAMBER): 15.82 CM2
LEFT ATRIUM AREA SYSTOLIC (APICAL 4 CHAMBER): 14.82 CM2
LEFT ATRIUM VOLUME INDEX MOD: 24 ML/M2
LEFT ATRIUM VOLUME MOD: 39.17 CM3
LEFT INTERNAL DIMENSION IN SYSTOLE: 3.78 CM (ref 2.1–4)
LEFT VENTRICLE DIASTOLIC VOLUME INDEX: 62.48 ML/M2
LEFT VENTRICLE DIASTOLIC VOLUME: 101.84 ML
LEFT VENTRICLE END DIASTOLIC VOLUME APICAL 2 CHAMBER: 78.01 ML
LEFT VENTRICLE END DIASTOLIC VOLUME APICAL 4 CHAMBER: 71.95 ML
LEFT VENTRICLE END SYSTOLIC VOLUME APICAL 2 CHAMBER: 42.19 ML
LEFT VENTRICLE END SYSTOLIC VOLUME APICAL 4 CHAMBER: 33.03 ML
LEFT VENTRICLE MASS INDEX: 71 G/M2
LEFT VENTRICLE SYSTOLIC VOLUME INDEX: 37.6 ML/M2
LEFT VENTRICLE SYSTOLIC VOLUME: 61.34 ML
LEFT VENTRICULAR INTERNAL DIMENSION IN DIASTOLE: 4.69 CM (ref 3.5–6)
LEFT VENTRICULAR MASS: 114.98 G
LV LATERAL E/E' RATIO: 19.6 M/S
LV SEPTAL E/E' RATIO: 32.67 M/S
LVED V (TEICH): 101.84 ML
LVES V (TEICH): 61.34 ML
LVOT MG: 3.12 MMHG
LVOT MV: 0.84 CM/S
LYMPHOCYTES # BLD AUTO: 2.2 K/UL (ref 1–4.8)
LYMPHOCYTES NFR BLD: 13.8 % (ref 18–48)
LYMPHOCYTES NFR CSF MANUAL: 15 % (ref 40–80)
MCH RBC QN AUTO: 32.2 PG (ref 27–31)
MCHC RBC AUTO-ENTMCNC: 33 G/DL (ref 32–36)
MCV RBC AUTO: 98 FL (ref 82–98)
MONOCYTES # BLD AUTO: 1.1 K/UL (ref 0.3–1)
MONOCYTES NFR BLD: 6.8 % (ref 4–15)
MONOS+MACROS NFR CSF MANUAL: 22 % (ref 15–45)
MV MEAN GRADIENT: 3 MMHG
MV PEAK A VEL: 1.27 M/S
MV PEAK E VEL: 0.98 M/S
MV PEAK GRADIENT: 8 MMHG
MV STENOSIS PRESSURE HALF TIME: 60.1 MS
MV VALVE AREA BY CONTINUITY EQUATION: 4.53 CM2
MV VALVE AREA P 1/2 METHOD: 3.66 CM2
NEUTROPHILS # BLD AUTO: 12.3 K/UL (ref 1.8–7.7)
NEUTROPHILS NFR BLD: 78.3 % (ref 38–73)
NEUTROPHILS NFR CSF MANUAL: 63 % (ref 0–6)
NRBC BLD-RTO: 0 /100 WBC
OHS CV RV/LV RATIO: 0.48 CM
OHS LV EJECTION FRACTION SIMPSONS BIPLANE MOD: 29 %
PISA MRMAX VEL: 4.66 M/S
PISA TR MAX VEL: 3.09 M/S
PLATELET # BLD AUTO: 342 K/UL (ref 150–450)
PMV BLD AUTO: 9.9 FL (ref 9.2–12.9)
POTASSIUM SERPL-SCNC: 3 MMOL/L (ref 3.5–5.1)
POTASSIUM SERPL-SCNC: 3.1 MMOL/L (ref 3.5–5.1)
PROT CSF-MCNC: 126 MG/DL (ref 15–40)
PROT SERPL-MCNC: 6.7 G/DL (ref 6–8.4)
PV MV: 0.69 M/S
PV PEAK GRADIENT: 3 MMHG
PV PEAK VELOCITY: 0.87 M/S
RA MAJOR: 3.46 CM
RA PRESSURE ESTIMATED: 8 MMHG
RBC # BLD AUTO: 3.6 M/UL (ref 4–5.4)
RBC # CSF: 3 /CU MM
RIGHT VENTRICLE DIASTOLIC BASEL DIMENSION: 2.7 CM
RIGHT VENTRICLE DIASTOLIC LENGTH: 4.2 CM
RIGHT VENTRICULAR END-DIASTOLIC DIMENSION: 2.24 CM
RIGHT VENTRICULAR LENGTH IN DIASTOLE (APICAL 4-CHAMBER VIEW): 4.23 CM
RV TB RVSP: 11 MMHG
RV TISSUE DOPPLER FREE WALL SYSTOLIC VELOCITY 1 (APICAL 4 CHAMBER VIEW): 14.69 CM/S
SODIUM SERPL-SCNC: 134 MMOL/L (ref 136–145)
SPECIMEN VOL CSF: 4 ML
STJ: 2.57 CM
TDI LATERAL: 0.05 M/S
TDI SEPTAL: 0.03 M/S
TDI: 0.04 M/S
TR MAX PG: 38 MMHG
TRICUSPID ANNULAR PLANE SYSTOLIC EXCURSION: 3.99 CM
TV REST PULMONARY ARTERY PRESSURE: 46 MMHG
VANCOMYCIN TROUGH SERPL-MCNC: 12.2 UG/ML (ref 10–22)
WBC # BLD AUTO: 15.66 K/UL (ref 3.9–12.7)
WBC # CSF: 148 /CU MM (ref 0–5)
Z-SCORE OF LEFT VENTRICULAR DIMENSION IN END DIASTOLE: 0.2
Z-SCORE OF LEFT VENTRICULAR DIMENSION IN END SYSTOLE: 2.23

## 2024-09-19 PROCEDURE — 95720 EEG PHY/QHP EA INCR W/VEEG: CPT | Mod: ,,, | Performed by: STUDENT IN AN ORGANIZED HEALTH CARE EDUCATION/TRAINING PROGRAM

## 2024-09-19 PROCEDURE — 89051 BODY FLUID CELL COUNT: CPT | Performed by: INTERNAL MEDICINE

## 2024-09-19 PROCEDURE — 86788 WEST NILE VIRUS AB IGM: CPT | Performed by: INTERNAL MEDICINE

## 2024-09-19 PROCEDURE — 36415 COLL VENOUS BLD VENIPUNCTURE: CPT | Performed by: INTERNAL MEDICINE

## 2024-09-19 PROCEDURE — 87483 CNS DNA AMP PROBE TYPE 12-25: CPT | Performed by: INTERNAL MEDICINE

## 2024-09-19 PROCEDURE — 63600175 PHARM REV CODE 636 W HCPCS: Performed by: INTERNAL MEDICINE

## 2024-09-19 PROCEDURE — 25000003 PHARM REV CODE 250

## 2024-09-19 PROCEDURE — 63600175 PHARM REV CODE 636 W HCPCS

## 2024-09-19 PROCEDURE — 80202 ASSAY OF VANCOMYCIN: CPT | Performed by: INTERNAL MEDICINE

## 2024-09-19 PROCEDURE — 86592 SYPHILIS TEST NON-TREP QUAL: CPT | Performed by: INTERNAL MEDICINE

## 2024-09-19 PROCEDURE — 87070 CULTURE OTHR SPECIMN AEROBIC: CPT | Performed by: INTERNAL MEDICINE

## 2024-09-19 PROCEDURE — 25000003 PHARM REV CODE 250: Performed by: INTERNAL MEDICINE

## 2024-09-19 PROCEDURE — 87205 SMEAR GRAM STAIN: CPT | Performed by: INTERNAL MEDICINE

## 2024-09-19 PROCEDURE — 82945 GLUCOSE OTHER FLUID: CPT | Performed by: INTERNAL MEDICINE

## 2024-09-19 PROCEDURE — 99233 SBSQ HOSP IP/OBS HIGH 50: CPT | Mod: ,,, | Performed by: INTERNAL MEDICINE

## 2024-09-19 PROCEDURE — 84157 ASSAY OF PROTEIN OTHER: CPT | Performed by: INTERNAL MEDICINE

## 2024-09-19 PROCEDURE — 86255 FLUORESCENT ANTIBODY SCREEN: CPT | Mod: 59 | Performed by: INTERNAL MEDICINE

## 2024-09-19 PROCEDURE — 36415 COLL VENOUS BLD VENIPUNCTURE: CPT

## 2024-09-19 PROCEDURE — 009U3ZX DRAINAGE OF SPINAL CANAL, PERCUTANEOUS APPROACH, DIAGNOSTIC: ICD-10-PCS | Performed by: RADIOLOGY

## 2024-09-19 PROCEDURE — 11000001 HC ACUTE MED/SURG PRIVATE ROOM

## 2024-09-19 PROCEDURE — 80053 COMPREHEN METABOLIC PANEL: CPT

## 2024-09-19 PROCEDURE — 20600001 HC STEP DOWN PRIVATE ROOM

## 2024-09-19 PROCEDURE — 85025 COMPLETE CBC W/AUTO DIFF WBC: CPT | Performed by: INTERNAL MEDICINE

## 2024-09-19 PROCEDURE — 84132 ASSAY OF SERUM POTASSIUM: CPT | Performed by: INTERNAL MEDICINE

## 2024-09-19 RX ORDER — ACETAMINOPHEN 650 MG/1
650 SUPPOSITORY RECTAL ONCE
Status: DISCONTINUED | OUTPATIENT
Start: 2024-09-19 | End: 2024-09-19

## 2024-09-19 RX ORDER — FUROSEMIDE 10 MG/ML
40 INJECTION INTRAMUSCULAR; INTRAVENOUS ONCE
Status: COMPLETED | OUTPATIENT
Start: 2024-09-19 | End: 2024-09-19

## 2024-09-19 RX ORDER — ACETAMINOPHEN 650 MG/1
650 SUPPOSITORY RECTAL ONCE
Status: COMPLETED | OUTPATIENT
Start: 2024-09-19 | End: 2024-09-19

## 2024-09-19 RX ORDER — ACETAMINOPHEN 325 MG/1
650 TABLET ORAL EVERY 6 HOURS PRN
Status: DISCONTINUED | OUTPATIENT
Start: 2024-09-19 | End: 2024-09-19

## 2024-09-19 RX ORDER — LIDOCAINE HYDROCHLORIDE 10 MG/ML
INJECTION, SOLUTION EPIDURAL; INFILTRATION; INTRACAUDAL; PERINEURAL
Status: DISCONTINUED
Start: 2024-09-19 | End: 2024-09-19 | Stop reason: WASHOUT

## 2024-09-19 RX ORDER — POTASSIUM CHLORIDE 7.45 MG/ML
10 INJECTION INTRAVENOUS
Status: COMPLETED | OUTPATIENT
Start: 2024-09-19 | End: 2024-09-19

## 2024-09-19 RX ADMIN — IBUPROFEN 600 MG: 600 TABLET ORAL at 05:09

## 2024-09-19 RX ADMIN — POTASSIUM CHLORIDE 10 MEQ: 7.46 INJECTION, SOLUTION INTRAVENOUS at 04:09

## 2024-09-19 RX ADMIN — ONDANSETRON 4 MG: 2 INJECTION INTRAMUSCULAR; INTRAVENOUS at 05:09

## 2024-09-19 RX ADMIN — FUROSEMIDE 40 MG: 10 INJECTION, SOLUTION INTRAVENOUS at 04:09

## 2024-09-19 RX ADMIN — AMPICILLIN 2 G: 2 INJECTION, POWDER, FOR SOLUTION INTRAMUSCULAR; INTRAVENOUS at 10:09

## 2024-09-19 RX ADMIN — POTASSIUM BICARBONATE 35 MEQ: 391 TABLET, EFFERVESCENT ORAL at 06:09

## 2024-09-19 RX ADMIN — AMPICILLIN 2 G: 2 INJECTION, POWDER, FOR SOLUTION INTRAMUSCULAR; INTRAVENOUS at 01:09

## 2024-09-19 RX ADMIN — VANCOMYCIN HYDROCHLORIDE 1500 MG: 1.5 INJECTION, POWDER, LYOPHILIZED, FOR SOLUTION INTRAVENOUS at 07:09

## 2024-09-19 RX ADMIN — CEFTRIAXONE 2 G: 2 INJECTION, POWDER, FOR SOLUTION INTRAMUSCULAR; INTRAVENOUS at 03:09

## 2024-09-19 RX ADMIN — VANCOMYCIN HYDROCHLORIDE 1250 MG: 1.25 INJECTION, POWDER, LYOPHILIZED, FOR SOLUTION INTRAVENOUS at 06:09

## 2024-09-19 RX ADMIN — ACETAMINOPHEN 650 MG: 650 SUPPOSITORY RECTAL at 12:09

## 2024-09-19 RX ADMIN — POTASSIUM CHLORIDE 10 MEQ: 7.46 INJECTION, SOLUTION INTRAVENOUS at 06:09

## 2024-09-19 RX ADMIN — SODIUM CHLORIDE 500 ML: 9 INJECTION, SOLUTION INTRAVENOUS at 07:09

## 2024-09-19 RX ADMIN — ONDANSETRON 4 MG: 2 INJECTION INTRAMUSCULAR; INTRAVENOUS at 08:09

## 2024-09-19 RX ADMIN — AMPICILLIN 2 G: 2 INJECTION, POWDER, FOR SOLUTION INTRAMUSCULAR; INTRAVENOUS at 05:09

## 2024-09-19 RX ADMIN — POTASSIUM CHLORIDE 10 MEQ: 7.46 INJECTION, SOLUTION INTRAVENOUS at 07:09

## 2024-09-19 RX ADMIN — POTASSIUM CHLORIDE 10 MEQ: 7.46 INJECTION, SOLUTION INTRAVENOUS at 05:09

## 2024-09-19 RX ADMIN — ACYCLOVIR SODIUM 480 MG: 50 INJECTION, SOLUTION INTRAVENOUS at 08:09

## 2024-09-19 RX ADMIN — AMPICILLIN 2 G: 2 INJECTION, POWDER, FOR SOLUTION INTRAMUSCULAR; INTRAVENOUS at 09:09

## 2024-09-19 RX ADMIN — ACYCLOVIR SODIUM 480 MG: 50 INJECTION, SOLUTION INTRAVENOUS at 06:09

## 2024-09-19 RX ADMIN — CEFTRIAXONE 2 G: 2 INJECTION, POWDER, FOR SOLUTION INTRAMUSCULAR; INTRAVENOUS at 04:09

## 2024-09-19 NOTE — ASSESSMENT & PLAN NOTE
Likely secondary to bacterial meningitis.   Mental status worsened   MRI shows encephalitis and ventriculitis findings   Ammonia, TSH is normal   Neurology following   EEG shows encephalopathy, no epileptiform activity noted.   - MRI and LP to be repeated

## 2024-09-19 NOTE — NURSING
S/p MRI and LP as ordered. Bandaide to mid back dry and intact. Pt opening eyes spontaneously- able to follow commands appropriately. Pt able to correctly answer place and situation- aware of diagnosis. Pt smiling, interacting with family at bedside. Updated on plan of care for elevated temp, bp readings.

## 2024-09-19 NOTE — ASSESSMENT & PLAN NOTE
LP shows > 2000 WBC with neurophil predominance, protein is high normal glucose   MRI shows encephalitis and ventriculitis findings   HSV and meningitis panel was all negative   EEG shows encephalopathy and no epileptiform activity   Discussed with neurology and ID  - Cont Vancomycin, Ampicillin and Rocephin for now, acyclovir has been restarted    - MRI and LP to be repeated

## 2024-09-19 NOTE — PROGRESS NOTES
"[facility]   Department of Infectious Disease  Progress Note        PATIENT NAME: Masha Hobbs  MRN: [id number]  TODAY'S DATE: 2024  ADMIT DATE: 2024  LOS: 2 days    CHIEF COMPLAINT: Altered Mental Status (Confusion that began yesterday at 4pm per pt's  along with weakness - pt presents to ER with fever and can state name and  and situation but not president.  Patient have been vomiting since 1pm yesterday), Fever, Headache, and Emesis      PRINCIPLE PROBLEM: Acute encephalopathy    INTERVAL HISTORY      2024:  She is more somnolent today.  Not eating very well.  Afebrile.  She answers questions appropriately.      2024:  Had fever last night to 101.  Still lethargic and in bed.  EEG with with diffuse slowing consistent with metabolic encephalopathy.  CSF culture remain negative.    Antibiotics (From admission, onward)      Start     Stop Route Frequency Ordered    24 1730  ampicillin (OMNIPEN) 2 g in 0.9% NaCl 100 mL IVPB (MB+)         -- IV Every 4 hours (non-standard times) 24 1539    24 1645  cefTRIAXone (ROCEPHIN) 2 g in D5W 100 mL IVPB (MB+)         -- IV Every 12 hours (non-standard times) 24 1540    24 1300  vancomycin 1,250 mg in D5W 250 mL IVPB (admixture device)         -- IV Every 18 hours 24 0942    24 0342  vancomycin - pharmacy to dose  (vancomycin IVPB (PEDS and ADULTS))        Placed in "And" Linked Group    -- IV pharmacy to manage frequency 24 0243          Antifungals (From admission, onward)      None           Antivirals (From admission, onward)      None            ASSESSMENT and PLAN      1. Meningitis.  Appears to be bacterial infection.  No brain parenchymal enhancement seen on MRI brain with contrast.  However, she has become altered and still with fever despite being on broad-spectrum antibiotics.  Steroid discontinued yesterday.  We will repeat CT brain and also repeat LP.  Restart " acyclovir.  Anticipate DC vancomycin after reviewing new CT and LP results.       2. Osteoporosis      3. Systolic heart murmur.  States she was never informed had murmur in the past.  Obtain TTE       4. Health maintenance.  She will benefit from pneumococcal vaccine i.e. Prevnar 20.  We will discuss with her some more tomorrow.    5. Lethargy.  Maybe related to steroids or side effects of her antimicrobials.  Hold steroids for now and monitor.  May consider repeat brain imaging and or LP if persists.    RECOMMENDATIONS:    Restart IV acyclovir   CT head   Repeat diagnostic LP  Follow cultures    Please send Epic secure chat with any questions.       SUBJECTIVE    Masha Hobbs is a 68 y.o. female with history of osteoporosis and chronic low back pain.  He presents to the ER 09/16/2024 with 1 day history of headache associated with nausea and vomiting and generalized body aches.  In the ER /67, pulse 110, respiratory rate 18, temperature 100.4°, oxygen 95%.  WBC 14 K, hematocrit 42, CMP unremarkable.  X-ray with no acute infiltrate.  CT head unremarkable.  LP was done.  CSF was abnormal.  It was slightly hazy, WBC 2038, RBC 14, neutrophils 89%, glucose 50, protein 173.  Accompanying serum glucose was 130.       She was admitted and commenced on antibiotics and dexamethasone for meningitis.  MRI brain without contrast today 97/24 read as showing ill-defined T2/FLAIR hyperintense signal within the right Arielle temporal lobe with differentials including infectious encephalitis vascularly HSV, autoimmune encephalitis.  Repeat MRI brain with contrast with no abnormal enhancement.  Id asked to assist with her care.     She recently returned from [place] on 09/13/2024 after a 1 week trip.  They had gone to several cota and and did a lot of sightseeing .  States she was washing her hands frequently.  They were around a lot of people.  No other sick contacts.  No pets at home.  Denies eating anything unusual.   Has not received age appropriate pneumococcal vaccine.     Antibiotic history:    Vancomycin: 09/16/2024-  Valtrex: 09/16/2024 x1 dose   Ceftriaxone: 09/16/2024-  IV Acyclovir: 09/17/2024-     Microbiology:    Blood culture 09/16/2024:  NGTD   CSF culture 09/16/2024:  In progress    Review of Systems  Negative except as stated above in Interval History     OBJECTIVE   Temp:  [97 °F (36.1 °C)-101 °F (38.3 °C)] 101 °F (38.3 °C)  Pulse:  [] 92  Resp:  [16-22] 16  SpO2:  [92 %-98 %] 96 %  BP: (104-124)/(55-60) 124/60  Temp:  [97 °F (36.1 °C)-101 °F (38.3 °C)]   Temp: (!) 101 °F (38.3 °C) (09/19/24 0803)  Pulse: 92 (09/19/24 0803)  Resp: 16 (09/19/24 0803)  BP: 124/60 (09/19/24 0803)  SpO2: 96 % (09/19/24 0803)    Intake/Output Summary (Last 24 hours) at 9/19/2024 0807  Last data filed at 9/19/2024 0131  Gross per 24 hour   Intake 923.79 ml   Output 1400 ml   Net -476.21 ml       Physical Exam  General:  Lethargic.  Answers questions appropriately.    HEENT: Neck is supple   CNS: Cranial nerves 2-12 intact, no focal deficits   CVS: S1 and 2 heard, grade 3/6 systolic murmur  Respiratory: Clear to auscultation   Abdomen: Full, soft, nontender, no palpable organomegaly   Skin: No rash appreciated   Musculoskeletal system: No joint or bony abnormalities appreciated     VAD:  PIV  ISOLATION:  Droplet isolation     Wounds:  None    Significant Labs: All pertinent labs within the past 24 hours have been reviewed.    CBC LAST 7 DAYS  Recent Labs   Lab 09/16/24  1347   WBC 13.86*   RBC 4.41   HGB 14.0   HCT 42.4   MCV 96   MCH 31.7*   MCHC 33.0   RDW 13.0      MPV 9.7   GRAN 88.1*  12.2*   LYMPH 7.7*  1.1   MONO 3.5*  0.5   BASO 0.04   NRBC 0       CHEMISTRY LAST 7 DAYS  Recent Labs   Lab 09/16/24  1347 09/17/24  0507 09/18/24  0536 09/19/24  0457   * 135* 135* 134*   K 4.1 3.9 3.7 3.1*    102 101 98   CO2 23 22* 22* 24   ANIONGAP 12 11 12 12   BUN 18 23 16 14   CREATININE 0.9 0.9 0.9 0.8   *  "167* 112* 126*   CALCIUM 9.5 9.1 9.0 8.3*   MG 1.9 1.9  --   --    ALBUMIN 4.4 3.1* 3.3* 2.7*   PROT 8.8* 7.2 7.6 6.7   ALKPHOS 76 59 69 60   ALT 11 11 12 11   AST 16 14 14 24   BILITOT 0.8 0.5 0.3 0.4       Estimated Creatinine Clearance: 57.2 mL/min (based on SCr of 0.8 mg/dL).    INFLAMMATORY/PROCAL  LAST 7 DAYS  No results for input(s): "PROCAL", "ESR", "CRP" in the last 168 hours.  No results found for: "ESR"  No results found for: "CRP"    PRIOR  MICROBIOLOGY:    No results found for the last 90 days.      LAST 7 DAYS MICROBIOLOGY   Microbiology Results (last 7 days)       Procedure Component Value Units Date/Time    Blood culture x two cultures. Draw prior to antibiotics [[id number]] Collected: 09/16/24 1347    Order Status: Completed Specimen: Blood from Peripheral, Antecubital, Right Updated: 09/18/24 1432     Blood Culture, Routine No Growth to date      No Growth to date      No Growth to date    Narrative:      Aerobic and anaerobic    Blood culture x two cultures. Draw prior to antibiotics [[id number]] Collected: 09/16/24 1358    Order Status: Completed Specimen: Blood from Peripheral, Antecubital, Left Updated: 09/18/24 1432     Blood Culture, Routine No Growth to date      No Growth to date      No Growth to date    Narrative:      Aerobic and anaerobic    CSF culture and Gram Stain (Tube 2) [[id number]] Collected: 09/16/24 1740    Order Status: Completed Specimen: CSF (Spinal Fluid) from CSF Tap, Tube 2 Updated: 09/18/24 0848     CSF CULTURE No Growth to date     Gram Stain Result Few WBC's      No organisms seen    Narrative:      On which sequentially labeled tube should this analysis be  performed?->2    CSF culture [[id number]]     Order Status: Canceled Specimen: CSF (Spinal Fluid)     CSF culture [[id number]]     Order Status: Canceled Specimen: CSF (Spinal Fluid)           CURRENT/PREVIOUS VISIT EKG  Results for orders placed or performed during the hospital encounter of 09/16/24   EKG 12-lead "    Collection Time: 09/16/24  2:04 PM   Result Value Ref Range    QRS Duration 102 ms    [facility] QTC Calculation 452 ms    Narrative    Test Reason : R00.0,    Vent. Rate : 108 BPM     Atrial Rate : 108 BPM     P-R Int : 204 ms          QRS Dur : 102 ms      QT Int : 338 ms       P-R-T Axes : 030 065 020 degrees     QTc Int : 452 ms    Sinus tachycardia  Incomplete right bundle branch block  Septal infarct ,age undetermined  Abnormal ECG  When compared with ECG of 06-JUN-2024 16:29,  GA interval has decreased  The axis Shifted right  Non-specific change in ST segment in Anterior leads    Referred By:             Confirmed By:      Significant Imaging: I have reviewed all relevant and available imaging results/findings within the past 24 hours.    I spent a total of 55 minutes on the day of the visit.This includes face to face time and non-face to face time preparing to see the patient (eg, review of tests), obtaining and/or reviewing separately obtained history, documenting clinical information in the electronic or other health record, independently interpreting results and communicating results to the patient/family/caregiver, or care coordinator.    Danis Sepulveda MD  Date of Service: 09/19/2024      This note was created using [email or web address] voice recognition software that occasionally misinterpreted phrases or words.

## 2024-09-19 NOTE — SUBJECTIVE & OBJECTIVE
Interval History: Patient continue to be somnolent today. She continue to spike fever 102, she vomited this morning. Discussed with neurology and ID. Given worsening mental status, fevers and vomiting, Stat MRI was done and rpt LP was ordered.     Review of Systems  Objective:     Vital Signs (Most Recent):  Temp: (!) 102.8 °F (39.3 °C) (09/19/24 1228)  Pulse: 92 (09/19/24 0803)  Resp: (!) 24 (09/19/24 1228)  BP: 124/60 (09/19/24 0803)  SpO2: 97 % (09/19/24 1228) Vital Signs (24h Range):  Temp:  [97 °F (36.1 °C)-102.8 °F (39.3 °C)] 102.8 °F (39.3 °C)  Pulse:  [78-92] 92  Resp:  [16-24] 24  SpO2:  [95 %-98 %] 97 %  BP: (104-124)/(55-60) 124/60     Weight: 64.4 kg (142 lb)  Body mass index is 26.83 kg/m².    Intake/Output Summary (Last 24 hours) at 9/19/2024 1236  Last data filed at 9/19/2024 0131  Gross per 24 hour   Intake 803.79 ml   Output 1000 ml   Net -196.21 ml         Physical Exam  Vitals and nursing note reviewed.   Constitutional:       Appearance: She is ill-appearing, somnolent.   Eyes:      General: No visual field deficit.     Pupils: Pupils are equal, round, and reactive to light.   Cardiovascular:      Rate and Rhythm: Normal rate and regular rhythm.      Pulses: Normal pulses.      Heart sounds: Normal heart sounds.   Pulmonary:      Effort: Pulmonary effort is normal. No respiratory distress.      Breath sounds: Normal breath sounds. No wheezing.   Abdominal:      General: Bowel sounds are normal. There is no distension.      Palpations: Abdomen is soft.      Tenderness: There is no abdominal tenderness.   Musculoskeletal:      Right lower leg: No edema.      Left lower leg: No edema.   Skin:     General: Skin is warm and dry.      Capillary Refill: Capillary refill takes less than 2 seconds.   Neurological:      Mental Status: she is very lethargic compared to yesterday. Some aphasia present, moves all 4 extremities.      Significant Labs: All pertinent labs within the past 24 hours have been  "reviewed.  CBC: No results for input(s): "WBC", "HGB", "HCT", "PLT" in the last 48 hours.  CMP:   Recent Labs   Lab 09/18/24  0536 09/19/24  0457   * 134*   K 3.7 3.1*    98   CO2 22* 24   * 126*   BUN 16 14   CREATININE 0.9 0.8   CALCIUM 9.0 8.3*   PROT 7.6 6.7   ALBUMIN 3.3* 2.7*   BILITOT 0.3 0.4   ALKPHOS 69 60   AST 14 24   ALT 12 11   ANIONGAP 12 12       Significant Imaging: I have reviewed all pertinent imaging results/findings within the past 24 hours.  "

## 2024-09-19 NOTE — PLAN OF CARE
Problem: Adult Inpatient Plan of Care  Goal: Plan of Care Review  Outcome: Progressing     Problem: Adult Inpatient Plan of Care  Goal: Absence of Hospital-Acquired Illness or Injury  Outcome: Progressing     Problem: Infection  Goal: Absence of Infection Signs and Symptoms  Outcome: Progressing     Problem: Pain Acute  Goal: Optimal Pain Control and Function  Outcome: Progressing     Problem: Fall Injury Risk  Goal: Absence of Fall and Fall-Related Injury  Outcome: Progressing     Problem: Meningitis/Encephalitis  Goal: Optimal Coping  Outcome: Progressing     Problem: Meningitis/Encephalitis  Goal: Effective Cerebral Tissue Perfusion  Outcome: Progressing     Problem: Meningitis/Encephalitis  Goal: Absence of Infection Signs and Symptoms  Outcome: Progressing     Problem: Nausea and Vomiting  Goal: Nausea and Vomiting Relief  Outcome: Progressing     Problem: Social Isolation  Goal: Social Connection Supported  Outcome: Progressing

## 2024-09-19 NOTE — NURSING
Pt transferred to PCU unit for higher level of care. Pt transferred by resource nurse, MONIQUE Noble and Charge nurse MONIQUE Corado with all belongings.

## 2024-09-19 NOTE — NURSING
Able to eat half slice of toast- no difficulty noted with swallowing, no choking. Zofran 4mg IVP given for nausea. Motrin 400mg po given for elevated temp.

## 2024-09-19 NOTE — NURSING
1145-pt obtunded- following some simple command to straighten arm, not opening eyes however to command or speaking to verbal prompts. Reported that pt has axillary temp 101.8.- bathed with cool wet clothes. Dr Boogie updated by MELISSA Barros RN. Noted to have 2 IVs- midline upper left arm, and left lateral forearm. Spontaneously opening eyes briefly- closing them. Occasional nod or shake of head noted- pt denies pain, admits to feeling cold. Occasional dry non productive coughing noted.

## 2024-09-19 NOTE — NURSING
"Pt opening eyes, speaking to family members at bedside. Following most commands- unable to lift left leg to command. Pt states "no" when asked if anything hurts or has any nausea present. Pt states she needs something to drink- assisted with oral care. Speech clear when stating her name, delayed response but correctly able to relay her . Pt is able to identify she is in a hospital but couldn't state the word. Moving extremities spontaneously. Family states pt behaved similarly yesterday when the am fever broke, pt became more vocal. Pt and family members updated on plan of care re: upcoming procedures today. Family verbally expressing their understanding.  "

## 2024-09-19 NOTE — PROGRESS NOTES
[facility] Medicine  Progress Note    Patient Name: Masha Hobbs  MRN: [id number]  Patient Class: IP- Inpatient   Admission Date: 9/16/2024  Length of Stay: 2 days  Attending Physician: Beatriz Boogie MD  Primary Care Provider: Roney Bang MD        Subjective:     Principal Problem:Acute encephalopathy        HPI:  Masha Hobbs is a 68 year old female with a past medical history of osteoporosis and chronic lower back pain with radiculopathy who was transferred from [facility] due to confusion associated with headache, fever, and generalized weakness/body aches for several hours prior to arrival.  Per  patient was confused since 4:00 p.m. Upon assessment confusion has resolved.  She denies any complaints other than a intermittent frontal lobe headache that is improving after Toradol administration.  There are no acute neurological focal deficits.  She denies chest pain, shortness of breath, dizziness, lightheadedness, vision changes, speech changes, numbness/tingling, neck pain, abdominal pain, nausea, or vomiting.  ED workup reveals:  CT head negative for acute intracranial hemorrhage. Chest x-ray no evidence of acute cardiopulmonary findings.  Lumbar puncture performed in ED, pending results.  She was treated with vancomycin, Rocephin, valacyclovir p.o., and dexamethasone.  CBC with elevated white count 13.8 and stable H&H.  BMP with phosphorus 2.0 otherwise unremarkable.  Negative flu/COVID.  Urinalysis negative.  Lactic acid 1.1.  ECG negative for ischemia or infarct.  MRI brain pending.  Neurology consult placed.  Admitted to hospital medicine for further management and treatment.            Overview/Hospital Course:  Patient is a 68 year old female admitted with AMS, headache and fever. LP was suggestive of meningitis with 2K WBC, mainly neutrophils. HSV panel was negative. Patient was started on Vancomycin, Ampicillin and Rocephin. Acyclovir was discontinued  after D1. MRI shows encephalitis findings with ventriculitis. Patient is being followed by neurology and ID. EEG has been ordered which is consistent with encephalopathy but no epileptiform activity. Patient continue to spike fever and is vomiting. Stat MRI was repeated with rpt LP was ordered.     Interval History: Patient continue to be somnolent today. She continue to spike fever 102, she vomited this morning. Discussed with neurology and ID. Given worsening mental status, fevers and vomiting, Stat MRI was done and rpt LP was ordered.     Review of Systems  Objective:     Vital Signs (Most Recent):  Temp: (!) 102.8 °F (39.3 °C) (09/19/24 1228)  Pulse: 92 (09/19/24 0803)  Resp: (!) 24 (09/19/24 1228)  BP: 124/60 (09/19/24 0803)  SpO2: 97 % (09/19/24 1228) Vital Signs (24h Range):  Temp:  [97 °F (36.1 °C)-102.8 °F (39.3 °C)] 102.8 °F (39.3 °C)  Pulse:  [78-92] 92  Resp:  [16-24] 24  SpO2:  [95 %-98 %] 97 %  BP: (104-124)/(55-60) 124/60     Weight: 64.4 kg (142 lb)  Body mass index is 26.83 kg/m².    Intake/Output Summary (Last 24 hours) at 9/19/2024 1236  Last data filed at 9/19/2024 0131  Gross per 24 hour   Intake 803.79 ml   Output 1000 ml   Net -196.21 ml         Physical Exam  Vitals and nursing note reviewed.   Constitutional:       Appearance: She is ill-appearing, somnolent.   Eyes:      General: No visual field deficit.     Pupils: Pupils are equal, round, and reactive to light.   Cardiovascular:      Rate and Rhythm: Normal rate and regular rhythm.      Pulses: Normal pulses.      Heart sounds: Normal heart sounds.   Pulmonary:      Effort: Pulmonary effort is normal. No respiratory distress.      Breath sounds: Normal breath sounds. No wheezing.   Abdominal:      General: Bowel sounds are normal. There is no distension.      Palpations: Abdomen is soft.      Tenderness: There is no abdominal tenderness.   Musculoskeletal:      Right lower leg: No edema.      Left lower leg: No edema.   Skin:     General:  "Skin is warm and dry.      Capillary Refill: Capillary refill takes less than 2 seconds.   Neurological:      Mental Status: she is very lethargic compared to yesterday. Some aphasia present, moves all 4 extremities.      Significant Labs: All pertinent labs within the past 24 hours have been reviewed.  CBC: No results for input(s): "WBC", "HGB", "HCT", "PLT" in the last 48 hours.  CMP:   Recent Labs   Lab 09/18/24  0536 09/19/24  0457   * 134*   K 3.7 3.1*    98   CO2 22* 24   * 126*   BUN 16 14   CREATININE 0.9 0.8   CALCIUM 9.0 8.3*   PROT 7.6 6.7   ALBUMIN 3.3* 2.7*   BILITOT 0.3 0.4   ALKPHOS 69 60   AST 14 24   ALT 12 11   ANIONGAP 12 12       Significant Imaging: I have reviewed all pertinent imaging results/findings within the past 24 hours.    Assessment/Plan:      * Acute encephalopathy  Likely secondary to bacterial meningitis.   Mental status worsened   MRI shows encephalitis and ventriculitis findings   Ammonia, TSH is normal   Neurology following   EEG shows encephalopathy, no epileptiform activity noted.   - MRI and LP to be repeated       Meningitis  LP shows > 2000 WBC with neurophil predominance, protein is high normal glucose   MRI shows encephalitis and ventriculitis findings   HSV and meningitis panel was all negative   EEG shows encephalopathy and no epileptiform activity   Discussed with neurology and ID  - Cont Vancomycin, Ampicillin and Rocephin for now, acyclovir has been restarted    - MRI and LP to be repeated         HFrEF (heart failure with reduced ejection fraction)  New onset   Echo show Takotsubo pattern   Cardiology was consulted   Clinically euvolemic        Leukocytosis  Due to above         VTE Risk Mitigation (From admission, onward)           Ordered     IP VTE LOW RISK PATIENT  Once         09/17/24 0239     Place sequential compression device  Until discontinued         09/17/24 0239                    Discharge Planning   WILVER:      Code Status: Full Code "   Is the patient medically ready for discharge?:     Reason for patient still in hospital (select all that apply): Treatment  Discharge Plan A: Home with family                  Beatriz Boogie MD  Department of Hospital Medicine   [facility]

## 2024-09-19 NOTE — PROGRESS NOTES
[facility]  Department of Neurology  Progress Note  Date: 2024 9:43 AM          Patient Name: Masha Hobbs   MRN: [id number]   : 1955    AGE: 68 y.o.    LOS: 2 days Hospital Day: 4  Admit date: 2024  1:02 PM         HPI Masha Hobbs is a 68 y.o. female presented to the hospital with headache, generalized weakness and fever.  She started having headache about 2 days ago which was holocephalic, 8/10 intensity and the same day she had a fever of 103.  Her  told her that she acted confused around that time.  He presented to the hospital for these symptoms.  She denies any unilateral weakness or sensory changes, neck pain or stiffness, nausea or vomiting.     In the ER, she had a CT head which was negative for acute pathology, lumbar puncture and was treated with Rocephin, vancomycin, valacyclovir and dexamethasone.  She was admitted to the hospital for further workup and management.     She states the headache is not getting better and it is about 2 to 3/10 in intensity.  Denies any other complaints at this time.       2024: No acute events overnight. Patient was seen and examined by me this morning.  She does have some receptive aphasia on exam today.    :  Patient was seen and examined.  She is somnolent, only opens eyes to repeated stimulus however does not follow commands or answer to any questions.    Vitals:  Patient Vitals for the past 24 hrs:   BP Temp Temp src Pulse Resp SpO2   24 0803 124/60 (!) 101 °F (38.3 °C) Oral 92 16 96 %   24 0338 (!) 108/56 97.1 °F (36.2 °C) Oral 89 16 98 %   24 0000 (!) 109/55 97 °F (36.1 °C) Oral 91 16 98 %   24 -- -- -- 80 16 98 %   24 1937 (!) 108/59 97 °F (36.1 °C) Oral 78 16 98 %   24 1616 (!) 104/58 98.1 °F (36.7 °C) Oral 84 18 95 %   24 1131 (!) 122/58 (!) 100.8 °F (38.2 °C) Oral 93 (!) 22 (!) 92 %     PHYSICAL EXAM:     GENERAL APPEARANCE:  Somnolent, only opens eyes to repeated  stimulus however does not answer questions or follow commands.     HEENT: Normocephalic and atraumatic. PERRL. Oropharynx unremarkable.  PULM: Comfortable on room air.  CV: RRR.  ABDOMEN: Soft, nontender.  EXTREMITIES: No signs of vascular compromise. Pulses present. No cyanosis, clubbing or edema.  SKIN: Clear; no rashes, lesions or skin breaks in exposed areas.      NEURO:   MENTAL STATUS: Patient is somnolent, only opens eyes to repeated stimulus however does not answer questions or follow commands.    CRANIAL NERVES II-XII: Pupils equal, round and reactive to light.  No facial asymmetry.  Rest of cranial nerves can not be tested due to mental status.  MOTOR: Normal bulk. Tone normal and symmetrical throughout.  No abnormal movements. No tremor.   Strength: Moves all extremities symmetrically to stimulus.  Can not check exact strength today.  REFLEXES: DTRs 1+; normal and symmetric throughout.   SENSATION: Sensation not tested due to mental status.  COORDINATION:  Could not be tested  STATION: [place] deferred.  GAIT: Deferred.    CURRENT SCHEDULED MEDICATIONS:   acyclovir  10 mg/kg (Ideal) Intravenous Q8H    ampicillin IV (PEDS and ADULTS)  2 g Intravenous Q4H    cefTRIAXone (Rocephin) IV (PEDS and ADULTS)  2 g Intravenous Q12H    vancomycin (VANCOCIN) IV (PEDS and ADULTS)  1,250 mg Intravenous Q18H     CURRENT INFUSIONS:    DATA:  Recent Labs   Lab 09/16/24  1347 09/17/24  0507 09/18/24  0536 09/18/24  1451 09/19/24  0457   * 135* 135*  --  134*   K 4.1 3.9 3.7  --  3.1*    102 101  --  98   CO2 23 22* 22*  --  24   BUN 18 23 16  --  14   CREATININE 0.9 0.9 0.9  --  0.8   * 167* 112*  --  126*   CALCIUM 9.5 9.1 9.0  --  8.3*   PHOS 2.0* 2.3*  --   --   --    MG 1.9 1.9  --   --   --    AST 16 14 14  --  24   ALT 11 11 12  --  11   AMMONIA  --   --   --  32  --      Recent Labs   Lab 09/16/24  1347   WBC 13.86*   HGB 14.0   HCT 42.4        Lab Results   Component Value Date     "PROTEINCSF 173 (H) 09/16/2024    GLUCCSF 50 09/16/2024     No results found for: "HGBA1C"         I have personally reviewed and interpreted the pertinent imaging and lab results.  Imaging Results              X-Ray Chest 1 View (Final result)  Result time 09/16/24 14:53:24      Final result by Farrukh Ramos MD (09/16/24 14:53:24)                   Impression:      No acute cardiopulmonary abnormality.      Electronically signed by: Farrukh Ramos  Date:    09/16/2024  Time:    14:53               Narrative:    EXAMINATION:  XR CHEST 1 VIEW    CLINICAL HISTORY:  Sepsis;    FINDINGS:  Portable chest at 1441 compared with 06/06/2024 shows unchanged cardiomediastinal silhouette.    Lungs are clear. Pulmonary vasculature is normal. No acute osseous abnormality.                                       CT Head Without Contrast (Final result)  Result time 09/16/24 14:52:17      Final result by Wang Acuña MD (09/16/24 14:52:17)                   Impression:      No CT evidence of acute intracranial pathology.      Electronically signed by: Wang Acuña  Date:    09/16/2024  Time:    14:52               Narrative:    EXAMINATION:  CT HEAD WITHOUT CONTRAST    CLINICAL HISTORY:  Mental status change, unknown cause;    TECHNIQUE:  Axial CT imaging obtained through the brain without IV contrast.    CMS Mandated Quality Data-CT Radiation Dose-436    All CT scans at this facility dose modulation, iterative reconstruction, and or weight-based dosing when appropriate to reduce radiation dose to as low as reasonably achievable.    COMPARISON:  None    FINDINGS:  Negative for acute intracranial hemorrhage, midline shift, or mass effect.  Ventricles and sulci are normal in size.  Gray-white differentiation is maintained.  Mild periventricular and deep white matter hypoattenuation, consistent with microangiopathic change.  Cerebellar hemispheres and brainstem are unremarkable.  Atherosclerotic calcification of intracranial carotid " arteries.    No calvarial lesion or fracture.  Mastoid air cells are clear.  Mucosal thickening right maxillary sinus.  Paranasal sinuses are otherwise clear.                                           ASSESSMENT AND PLAN:     Encephalitis   Headache  Encephalopathy     Plan:   LP with CSF analysis showed WBC 2038 (neutrophil predominant), protein 173 and glucose 50.  Negative meningitis encephalitis panel and CSF culture so far  MRI brain showed hyperintensity in the right medial temporal lobe on T2 FLAIR images concerning for encephalitis. MRI brain with contrast showed no contrast enhancement  Currently on vancomycin, Rocephin, ampicillin.  Acyclovir has been discontinued with negative meningitis/encephalitis PCR panel.  Infectious diseases following-appreciate further recommendations.    Will repeat MRI brain and lumbar puncture given worsening mental status.  P.r.n. Tylenol/Toradol for headache.    EEG showed no seizures or epileptiform activity.  If MRI brain and LP showed no significant changes to explain her change in mental status, will consider LTM EEG to look for subclinical seizures  Speech therapy evaluate and treat  PT OT evaluate and treat.    Will follow            Joe Galeana MD  Neurology/vascular Neurology  Date of Service: 09/19/2024  9:43 AM    Please note: This note was transcribed using voice recognition software. Because of this technology there are often uinintended grammatical, spelling, and other transcription errors. Please disregard these errors.

## 2024-09-19 NOTE — NURSING
MD Boogie notified of patient's current temp of 101 via secure chat. Awaiting response. Pt had 1 episode of vomiting, PRN Zofran administered. Pt confused and somnolent this AM only able to tell me her name at this time. Pt at side of bed urinating thinking she's on commode. Pt redirected and reoriented. Safety measures in place. Bed alarm on.  present at bedside. ABILIO.

## 2024-09-19 NOTE — CARE UPDATE
09/18/24 2012   Patient Assessment/Suction   Level of Consciousness (AVPU) alert   Respiratory Effort Unlabored   Expansion/Accessory Muscles/Retractions expansion symmetric;no retractions;no use of accessory muscles   PRE-TX-O2   Device (Oxygen Therapy) room air   SpO2 98 %   Pulse Oximetry Type Intermittent   Pulse 80   Resp 16

## 2024-09-19 NOTE — PLAN OF CARE
Problem: Adult Inpatient Plan of Care  Goal: Plan of Care Review  Outcome: Progressing  Goal: Patient-Specific Goal (Individualized)  Outcome: Progressing  Goal: Absence of Hospital-Acquired Illness or Injury  Outcome: Progressing  Goal: Optimal Comfort and Wellbeing  Outcome: Progressing  Goal: Readiness for Transition of Care  Outcome: Progressing     Problem: Fall Injury Risk  Goal: Absence of Fall and Fall-Related Injury  Outcome: Progressing     Problem: Meningitis/Encephalitis  Goal: Optimal Coping  Outcome: Progressing  Goal: Effective Cerebral Tissue Perfusion  Outcome: Progressing  Goal: Absence of Infection Signs and Symptoms  Outcome: Progressing     Problem: Nausea and Vomiting  Goal: Nausea and Vomiting Relief  Outcome: Progressing

## 2024-09-20 LAB
ALBUMIN SERPL BCP-MCNC: 2.4 G/DL (ref 3.5–5.2)
ALP SERPL-CCNC: 63 U/L (ref 55–135)
ALT SERPL W/O P-5'-P-CCNC: 13 U/L (ref 10–44)
ANION GAP SERPL CALC-SCNC: 10 MMOL/L (ref 8–16)
AST SERPL-CCNC: 26 U/L (ref 10–40)
BACTERIA CSF CULT: NO GROWTH
BILIRUB SERPL-MCNC: 0.4 MG/DL (ref 0.1–1)
BUN SERPL-MCNC: 17 MG/DL (ref 8–23)
C GATTII+NEOFOR DNA CSF QL NAA+NON-PROBE: NOT DETECTED
CALCIUM SERPL-MCNC: 8 MG/DL (ref 8.7–10.5)
CHLORIDE SERPL-SCNC: 99 MMOL/L (ref 95–110)
CMV DNA CSF QL NAA+NON-PROBE: NOT DETECTED
CO2 SERPL-SCNC: 26 MMOL/L (ref 23–29)
CREAT SERPL-MCNC: 0.8 MG/DL (ref 0.5–1.4)
E COLI K1 DNA CSF QL NAA+NON-PROBE: NOT DETECTED
EST. GFR  (NO RACE VARIABLE): >60 ML/MIN/1.73 M^2
EV RNA CSF QL NAA+NON-PROBE: NOT DETECTED
GLUCOSE SERPL-MCNC: 113 MG/DL (ref 70–110)
GP B STREP DNA CSF QL NAA+NON-PROBE: NOT DETECTED
GRAM STN SPEC: NORMAL
GRAM STN SPEC: NORMAL
HAEM INFLU DNA CSF QL NAA+NON-PROBE: NOT DETECTED
HAEM INFLU DNA CSF QL NAA+NON-PROBE: NOT DETECTED
HHV6 DNA CSF QL NAA+NON-PROBE: NOT DETECTED
HSV1 DNA CSF QL NAA+NON-PROBE: NOT DETECTED
HSV2 DNA CSF QL NAA+NON-PROBE: NOT DETECTED
N MEN DNA CSF QL NAA+NON-PROBE: NOT DETECTED
PARECHOVIRUS A RNA CSF QL NAA+NON-PROBE: NOT DETECTED
POTASSIUM SERPL-SCNC: 3.3 MMOL/L (ref 3.5–5.1)
PROT SERPL-MCNC: 6.1 G/DL (ref 6–8.4)
S PNEUM DNA CSF QL NAA+NON-PROBE: NOT DETECTED
SODIUM SERPL-SCNC: 135 MMOL/L (ref 136–145)
VZV DNA CSF QL NAA+NON-PROBE: NOT DETECTED

## 2024-09-20 PROCEDURE — 20600001 HC STEP DOWN PRIVATE ROOM

## 2024-09-20 PROCEDURE — 25000003 PHARM REV CODE 250

## 2024-09-20 PROCEDURE — 99233 SBSQ HOSP IP/OBS HIGH 50: CPT | Mod: ,,, | Performed by: INTERNAL MEDICINE

## 2024-09-20 PROCEDURE — 63600175 PHARM REV CODE 636 W HCPCS: Performed by: INTERNAL MEDICINE

## 2024-09-20 PROCEDURE — 36415 COLL VENOUS BLD VENIPUNCTURE: CPT

## 2024-09-20 PROCEDURE — 94761 N-INVAS EAR/PLS OXIMETRY MLT: CPT

## 2024-09-20 PROCEDURE — 11000001 HC ACUTE MED/SURG PRIVATE ROOM

## 2024-09-20 PROCEDURE — 97161 PT EVAL LOW COMPLEX 20 MIN: CPT

## 2024-09-20 PROCEDURE — 87899 AGENT NOS ASSAY W/OPTIC: CPT | Performed by: INTERNAL MEDICINE

## 2024-09-20 PROCEDURE — 36415 COLL VENOUS BLD VENIPUNCTURE: CPT | Performed by: INTERNAL MEDICINE

## 2024-09-20 PROCEDURE — 27000221 HC OXYGEN, UP TO 24 HOURS

## 2024-09-20 PROCEDURE — 25000003 PHARM REV CODE 250: Performed by: INTERNAL MEDICINE

## 2024-09-20 PROCEDURE — 80053 COMPREHEN METABOLIC PANEL: CPT

## 2024-09-20 RX ORDER — POTASSIUM CHLORIDE 20 MEQ/1
40 TABLET, EXTENDED RELEASE ORAL ONCE
Status: COMPLETED | OUTPATIENT
Start: 2024-09-20 | End: 2024-09-20

## 2024-09-20 RX ORDER — FAMOTIDINE 20 MG/1
40 TABLET, FILM COATED ORAL DAILY
Status: DISCONTINUED | OUTPATIENT
Start: 2024-09-20 | End: 2024-09-30 | Stop reason: HOSPADM

## 2024-09-20 RX ADMIN — AMPICILLIN 2 G: 2 INJECTION, POWDER, FOR SOLUTION INTRAMUSCULAR; INTRAVENOUS at 01:09

## 2024-09-20 RX ADMIN — AMPICILLIN 2 G: 2 INJECTION, POWDER, FOR SOLUTION INTRAMUSCULAR; INTRAVENOUS at 05:09

## 2024-09-20 RX ADMIN — FAMOTIDINE 40 MG: 20 TABLET, FILM COATED ORAL at 12:09

## 2024-09-20 RX ADMIN — IBUPROFEN 600 MG: 600 TABLET ORAL at 06:09

## 2024-09-20 RX ADMIN — ACYCLOVIR SODIUM 480 MG: 50 INJECTION, SOLUTION INTRAVENOUS at 02:09

## 2024-09-20 RX ADMIN — CEFTRIAXONE 2 G: 2 INJECTION, POWDER, FOR SOLUTION INTRAMUSCULAR; INTRAVENOUS at 04:09

## 2024-09-20 RX ADMIN — ACYCLOVIR SODIUM 480 MG: 50 INJECTION, SOLUTION INTRAVENOUS at 06:09

## 2024-09-20 RX ADMIN — AMPICILLIN 2 G: 2 INJECTION, POWDER, FOR SOLUTION INTRAMUSCULAR; INTRAVENOUS at 04:09

## 2024-09-20 RX ADMIN — ACYCLOVIR SODIUM 480 MG: 50 INJECTION, SOLUTION INTRAVENOUS at 09:09

## 2024-09-20 RX ADMIN — VANCOMYCIN HYDROCHLORIDE 1500 MG: 1.5 INJECTION, POWDER, LYOPHILIZED, FOR SOLUTION INTRAVENOUS at 03:09

## 2024-09-20 RX ADMIN — POTASSIUM CHLORIDE 40 MEQ: 1500 TABLET, EXTENDED RELEASE ORAL at 09:09

## 2024-09-20 RX ADMIN — AMPICILLIN 2 G: 2 INJECTION, POWDER, FOR SOLUTION INTRAMUSCULAR; INTRAVENOUS at 10:09

## 2024-09-20 RX ADMIN — AMPICILLIN 2 G: 2 INJECTION, POWDER, FOR SOLUTION INTRAMUSCULAR; INTRAVENOUS at 09:09

## 2024-09-20 RX ADMIN — POTASSIUM BICARBONATE 35 MEQ: 391 TABLET, EFFERVESCENT ORAL at 06:09

## 2024-09-20 RX ADMIN — CEFTRIAXONE 2 G: 2 INJECTION, POWDER, FOR SOLUTION INTRAMUSCULAR; INTRAVENOUS at 03:09

## 2024-09-20 NOTE — ASSESSMENT & PLAN NOTE
LP shows > 2000 WBC with neurophil predominance, protein is high normal glucose   MRI shows encephalitis and ventriculitis findings   HSV and meningitis panel was all negative   EEG shows encephalopathy and no epileptiform activity   Discussed with neurology and ID  MRI and LP was repeated on 9/19 shows WBC improved, but other wise similar findings.   - Cont Vancomycin, Ampicillin and Rocephin for now, acyclovir has been restarted

## 2024-09-20 NOTE — PLAN OF CARE
Problem: Adult Inpatient Plan of Care  Goal: Plan of Care Review  Outcome: Progressing  Goal: Patient-Specific Goal (Individualized)  Outcome: Progressing  Goal: Absence of Hospital-Acquired Illness or Injury  Outcome: Progressing  Goal: Optimal Comfort and Wellbeing  Outcome: Progressing  Goal: Readiness for Transition of Care  Outcome: Progressing     Problem: Infection  Goal: Absence of Infection Signs and Symptoms  Outcome: Progressing     Problem: Pain Acute  Goal: Optimal Pain Control and Function  Outcome: Progressing     Problem: Fall Injury Risk  Goal: Absence of Fall and Fall-Related Injury  Outcome: Progressing     Problem: Meningitis/Encephalitis  Goal: Optimal Coping  Outcome: Progressing  Goal: Effective Cerebral Tissue Perfusion  Outcome: Progressing  Goal: Absence of Infection Signs and Symptoms  Outcome: Progressing     Problem: Nausea and Vomiting  Goal: Nausea and Vomiting Relief  Outcome: Progressing     Problem: Social Isolation  Goal: Social Connection Supported  Outcome: Progressing

## 2024-09-20 NOTE — CONSULTS
"[facility]  Department of Cardiology  Consult Note      PATIENT NAME: Masha Hobbs    MRN: [id number]  TODAY'S DATE: 09/20/2024  ADMIT DATE: 9/16/2024                          CONSULT REQUESTED BY: Beatriz Boogie MD    SUBJECTIVE     PRINCIPAL PROBLEM: Acute encephalopathy      REASON FOR CONSULT:  LV dysfunction      HPI:  68-year-old female with no previous cardiac history presented to the hospital with altered mental status, headache, fever and generalized weakness.  She is being treated for suspected bacterial meningitis.  Her mental status has improved at this point.  She is alert and oriented.  She is answering questions properly.  Back to her baseline as per family.  An echocardiogram was obtained which showed LVEF of 30-35% with wall motion abnormalities suggestive of takotsubo cardiomyopathy.  There is no acute ischemic changes on EKG.  She has no chest pain, shortness of breath, orthopnea, PND, swelling of extremities or palpitations.  She has been febrile during this admission.  Last spiked a fever yesterday around 5:00 p.m.      FROM H&P 9/17/2024  "Masha Hobbs is a 68 year old female with a past medical history of osteoporosis and chronic lower back pain with radiculopathy who was transferred from [facility] due to confusion associated with headache, fever, and generalized weakness/body aches for several hours prior to arrival. Per  patient was confused since 4:00 p.m. Upon assessment confusion has resolved. She denies any complaints other than a intermittent frontal lobe headache that is improving after Toradol administration. There are no acute neurological focal deficits. She denies chest pain, shortness of breath, dizziness, lightheadedness, vision changes, speech changes, numbness/tingling, neck pain, abdominal pain, nausea, or vomiting. ED workup reveals: CT head negative for acute intracranial hemorrhage. Chest x-ray no evidence of acute cardiopulmonary findings. " "Lumbar puncture performed in ED, pending results. She was treated with vancomycin, Rocephin, valacyclovir p.o., and dexamethasone. CBC with elevated white count 13.8 and stable H&H. BMP with phosphorus 2.0 otherwise unremarkable. Negative flu/COVID. Urinalysis negative. Lactic acid 1.1. ECG negative for ischemia or infarct. MRI brain pending. Neurology consult placed. Admitted to hospital medicine for further management and treatment."    Review of patient's allergies indicates:  No Known Allergies    Past Medical History:   Diagnosis Date    Age-related osteoporosis without current pathological fracture 3/4/2020    Based upon bone density showing osteoporosis both at hips and lumbar spine.  Patient notified.  May consider bisphosphonates.     Past Surgical History:   Procedure Laterality Date    TONSILLECTOMY      WRIST SURGERY Right      Social History     Tobacco Use    Smoking status: Former     Current packs/day: 0.00     Types: Cigarettes     Quit date: 1989     Years since quittin.7    Smokeless tobacco: Never   Substance Use Topics    Alcohol use: Yes     Alcohol/week: 1.0 - 2.0 standard drink of alcohol     Types: 1 - 2 Shots of liquor per week     Comment: "Occasional"    Drug use: Not Currently        REVIEW OF SYSTEMS  Constitutional:  Positive for fever.Positive for generalized weakness. Negative for activity change, appetite change, chills, diaphoresis,and unexpected weight change.   Respiratory:  Negative for cough and shortness of breath.    Cardiovascular:  Negative for chest pain and leg swelling.   Gastrointestinal:  Negative for abdominal distention, abdominal pain, constipation, diarrhea, nausea and vomiting.   Genitourinary:  Negative for difficulty urinating, flank pain and hematuria.   Musculoskeletal:  Negative for arthralgias, back pain, neck pain and neck stiffness.   Neurological:  Positive for headaches. Negative for dizziness, seizures, syncope, facial asymmetry, speech " difficulty, weakness, light-headedness and numbness.   Psychiatric/Behavioral:  Positive for confusion.    OBJECTIVE     VITAL SIGNS (Most Recent)  Temp: 99.8 °F (37.7 °C) (09/20/24 1700)  Pulse: 104 (09/20/24 1700)  Resp: 19 (09/20/24 1615)  BP: 120/62 (09/20/24 1700)  SpO2: (!) 92 % (09/20/24 1700)    VENTILATION STATUS  Resp: 19 (09/20/24 1615)  SpO2: (!) 92 % (09/20/24 1700)           I & O (Last 24H):  Intake/Output Summary (Last 24 hours) at 9/20/2024 1803  Last data filed at 9/20/2024 1330  Gross per 24 hour   Intake 760 ml   Output 700 ml   Net 60 ml       WEIGHTS  Wt Readings from Last 1 Encounters:   09/19/24 0803 64.4 kg (142 lb)   09/18/24 0530 64.8 kg (142 lb 13.7 oz)   09/17/24 0157 64.3 kg (141 lb 12.1 oz)   09/16/24 1309 62.6 kg (138 lb)       PHYSICAL EXAM  Constitutional:       Appearance: NAD  Eyes:      General: No visual field deficit.     Pupils: Pupils are equal, round, and reactive to light.   Cardiovascular:      Rate and Rhythm: Normal rate and regular rhythm.      Pulses: Normal pulses.      Heart sounds: Normal heart sounds.   Pulmonary:      Effort: Pulmonary effort is normal. No respiratory distress.      Breath sounds: Normal breath sounds. No wheezing.   Abdominal:      General: Bowel sounds are normal. There is no distension.      Palpations: Abdomen is soft.      Tenderness: There is no abdominal tenderness.   Musculoskeletal:      Right lower leg: No edema.      Left lower leg: No edema.   Skin:     General: Skin is warm and dry.      Capillary Refill: Capillary refill takes less than 2 seconds.   Neurological:      Mental Status:  alert and oriented X 3     HOME MEDICATIONS:  No current facility-administered medications on file prior to encounter.     Current Outpatient Medications on File Prior to Encounter   Medication Sig Dispense Refill    calcium carbonate-vit D3-min 600 mg calcium- 400 unit Tab Take 1 tablet by mouth 2 (two) times a day. for 365 doses (Patient taking  differently: Take 1 tablet by mouth 3 (three) times daily.) 180 tablet 2    ibuprofen (ADVIL,MOTRIN) 400 MG tablet Take 1 tablet (400 mg total) by mouth every 6 (six) hours as needed. (Patient taking differently: Take 400 mg by mouth every 6 (six) hours as needed for Temperature greater than.) 20 tablet 0    omeprazole (PRILOSEC) 20 MG capsule Take 20 mg by mouth once daily.      vits A-C-E-B complx-min-lutein (LIPOTRIAD, WITH LUTEIN,) 5,000 unit- 120 mg-60 unit TbSR Take 1 tablet by mouth once daily.      ibandronate (BONIVA) 150 mg tablet Take 1 tablet (150 mg total) by mouth every 30 days. (Patient not taking: Reported on 9/16/2024) 1 tablet 11       SCHEDULED MEDS:   acyclovir  10 mg/kg (Ideal) Intravenous Q8H    ampicillin IV (PEDS and ADULTS)  2 g Intravenous Q4H    cefTRIAXone (Rocephin) IV (PEDS and ADULTS)  2 g Intravenous Q12H    famotidine  40 mg Oral Daily    vancomycin (VANCOCIN) IV (PEDS and ADULTS)  1,500 mg Intravenous Q18H       CONTINUOUS INFUSIONS:    PRN MEDS:  Current Facility-Administered Medications:     aluminum-magnesium hydroxide-simethicone, 30 mL, Oral, QID PRN    dextrose 10%, 12.5 g, Intravenous, PRN    dextrose 10%, 25 g, Intravenous, PRN    glucagon (human recombinant), 1 mg, Intramuscular, PRN    glucose, 16 g, Oral, PRN    glucose, 24 g, Oral, PRN    ibuprofen, 600 mg, Oral, Q6H PRN    magnesium oxide, 800 mg, Oral, PRN    magnesium oxide, 800 mg, Oral, PRN    melatonin, 9 mg, Oral, Nightly PRN    naloxone, 0.02 mg, Intravenous, PRN    ondansetron, 4 mg, Intravenous, Q6H PRN    potassium bicarbonate, 35 mEq, Oral, PRN    potassium bicarbonate, 50 mEq, Oral, PRN    potassium bicarbonate, 60 mEq, Oral, PRN    potassium, sodium phosphates, 2 packet, Oral, PRN    potassium, sodium phosphates, 2 packet, Oral, PRN    potassium, sodium phosphates, 2 packet, Oral, PRN    prochlorperazine, 5 mg, Intravenous, Q6H PRN    senna-docusate 8.6-50 mg, 1 tablet, Oral, BID PRN    sodium chloride  "0.9%, 10 mL, Intravenous, Q12H PRN    [COMPLETED] Pharmacy to dose Vancomycin consult, , , Once **AND** vancomycin - pharmacy to dose, , Intravenous, pharmacy to manage frequency    LABS AND DIAGNOSTICS     CBC LAST 3 DAYS  Recent Labs   Lab 09/16/24  1347 09/19/24  0457   WBC 13.86* 15.66*   RBC 4.41 3.60*   HGB 14.0 11.6*   HCT 42.4 35.2*   MCV 96 98   MCH 31.7* 32.2*   MCHC 33.0 33.0   RDW 13.0 12.5    342   MPV 9.7 9.9   GRAN 88.1*  12.2* 78.3*  12.3*   LYMPH 7.7*  1.1 13.8*  2.2   MONO 3.5*  0.5 6.8  1.1*   BASO 0.04 0.06   NRBC 0 0       COAGULATION LAST 3 DAYS  No results for input(s): "LABPT", "INR", "APTT" in the last 168 hours.    CHEMISTRY LAST 3 DAYS  Recent Labs   Lab 09/16/24  1347 09/17/24  0507 09/18/24  0536 09/19/24  0457 09/19/24  2053 09/20/24  0356   * 135* 135* 134*  --  135*   K 4.1 3.9 3.7 3.1* 3.0* 3.3*    102 101 98  --  99   CO2 23 22* 22* 24  --  26   ANIONGAP 12 11 12 12  --  10   BUN 18 23 16 14  --  17   CREATININE 0.9 0.9 0.9 0.8  --  0.8   * 167* 112* 126*  --  113*   CALCIUM 9.5 9.1 9.0 8.3*  --  8.0*   MG 1.9 1.9  --   --   --   --    ALBUMIN 4.4 3.1* 3.3* 2.7*  --  2.4*   PROT 8.8* 7.2 7.6 6.7  --  6.1   ALKPHOS 76 59 69 60  --  63   ALT 11 11 12 11  --  13   AST 16 14 14 24  --  26   BILITOT 0.8 0.5 0.3 0.4  --  0.4       CARDIAC PROFILE LAST 3 DAYS  No results for input(s): "BNP", "CPK", "CPKMB", "LDH", "TROPONINI", "TROPONINIHS" in the last 168 hours.    ENDOCRINE LAST 3 DAYS  Recent Labs   Lab 09/18/24  1451   TSH 0.622       LAST ARTERIAL BLOOD GAS  ABG  No results for input(s): "PH", "PO2", "PCO2", "HCO3", "BE" in the last 168 hours.    LAST 7 DAYS MICROBIOLOGY   Microbiology Results (last 7 days)       Procedure Component Value Units Date/Time    Blood culture x two cultures. Draw prior to antibiotics [[id number]] Collected: 09/16/24 1347    Order Status: Completed Specimen: Blood from Peripheral, Antecubital, Right Updated: 09/20/24 1432     " Blood Culture, Routine No Growth to date      No Growth to date      No Growth to date      No Growth to date      No Growth to date    Narrative:      Aerobic and anaerobic    Blood culture x two cultures. Draw prior to antibiotics [[id number]] Collected: 09/16/24 1358    Order Status: Completed Specimen: Blood from Peripheral, Antecubital, Left Updated: 09/20/24 1432     Blood Culture, Routine No Growth to date      No Growth to date      No Growth to date      No Growth to date      No Growth to date    Narrative:      Aerobic and anaerobic    CSF culture [[id number]] Collected: 09/19/24 1527    Order Status: Completed Specimen: CSF (Spinal Fluid) from CSF Tap, Tube 2 Updated: 09/20/24 1143     CSF CULTURE No Growth to date     Gram Stain Result No epithelial cells      No organisms seen      Many WBC's      09/19/2024  17:32 tn3    Cryptococcal antigen, blood [[id number]] Collected: 09/20/24 1106    Order Status: Sent Specimen: Blood, Venous Updated: 09/20/24 1112    CSF culture and Gram Stain (Tube 2) [[id number]] Collected: 09/16/24 1740    Order Status: Completed Specimen: CSF (Spinal Fluid) from CSF Tap, Tube 2 Updated: 09/20/24 1040     CSF CULTURE No Growth     Gram Stain Result Few WBC's      No organisms seen    Narrative:      On which sequentially labeled tube should this analysis be  performed?->2    CSF culture [[id number]]     Order Status: Canceled Specimen: CSF (Spinal Fluid)     CSF culture [[id number]]     Order Status: Canceled Specimen: CSF (Spinal Fluid)             MOST RECENT IMAGING  MRI Brain Without Contrast  Narrative: EXAMINATION:  MRI BRAIN WITHOUT CONTRAST    CLINICAL HISTORY:  Repeat-for encephalopathy;.    TECHNIQUE:  Multiplanar multisequence MR imaging of the brain was performed without the administration of intravenous contrast.    COMPARISON:  MRI brain with contrast 09/17/2024, MRI brain without contrast 09/17/2020    FINDINGS:  No significant interval detrimental change in  the appearance of the brain as compared to the prior exam dated 09/17/2024.  Stable-appearing ill-defined T2/FLAIR hyperintense signal within the right mesial temporal lobe without associated diffusion restriction or hemorrhage, stable in size and configuration as compared to the prior exam.  Additional stable-appearing patchy T2/FLAIR hyperintense signal throughout the bilateral periventricular, deep, subcortical white matter and stable involvement of the left splenium of the corpus callosum.  No definite new parenchymal edema or mass effect.  No abnormal intracranial enhancement on prior exam.    Ventricles appear stable in comparison to the prior exam without evidence of hydrocephalus.    Scattered incidental bilateral hippocampal sulcus remnant cysts.  Diffusion-weighted images demonstrate no evidence of an acute infarct.   Susceptibility weighted images demonstrate no evidence of acute or chronic hemorrhage.    Normal vascular flow voids are present.    Bone marrow signal intensity is normal. The paranasal sinuses are normal.  The visualized portions of the mastoids are unremarkable.    Orbits are unremarkable.  Impression: 1. No significant interval detrimental change in the appearance of the brain as compared to the prior exam dated 09/17/2024.  Persistent scattered abnormal T2/FLAIR hyperintense signal within the supratentorial brain, particularly involving the right mesial temporal lobe, similar to prior exam.  No definite new edema or mass effect.  Findings again are nonspecific with similar differential considerations including inflammatory/infectious, or autoimmune/paraneoplastic encephalitis, status epilepticus, or potential neoplastic process.  Recommend continued close clinical surveillance and consider short-term follow-up imaging with contrast enhanced MRI of the brain, as clinically warranted.    Electronically signed by: Jacob Armenta  Date:    09/19/2024  Time:    16:07  FL Lumbar Puncture  (xpd)  Narrative: EXAMINATION:  FL LUMBAR PUNCTURE DIAGNOSTIC WITH IMAGING    CLINICAL HISTORY:  Encephalitis;    TECHNIQUE:  Informed written consent was obtained from the patient.  The risks, benefits, and alternatives to the exam were discussed.    The patient was placed in the prone position on the fluoroscopy table and was prepped and draped in a sterile fashion.  Local anesthesia was achieved using 1% lidocaine solution.    The L4-5 interspace was localized and a 22-gauge spinal needle and stylet were advanced into the thecal sac under fluoroscopic guidance.    Fluoroscopy time: 0.7 minutes.    COMPARISON:  None.    FINDINGS:  Approximately 16 cc of clear cerebrospinal fluid was obtained and sent to the laboratory with orders per referring physician.  The opening pressure was 21 cm H2O.The stylet was replaced and the spinal needle was removed.  There were no immediate post procedure complications.  Impression: Successful lumbar puncture under fluoroscopic guidance as detailed above.    Electronically signed by: Silvano Pearson  Date:    09/19/2024  Time:    15:45  X-Ray Chest 1 View  Narrative: EXAMINATION:  XR CHEST 1 VIEW    CLINICAL HISTORY:  r/o aspiration;    TECHNIQUE:  Single frontal view of the chest was performed.    COMPARISON:  Chest x-ray of September 16, 2024    FINDINGS:  There is mild cardiomegaly.  There is prominence of the pulmonary vasculature and pulmonary edema identified bilaterally consistent with congestive heart failure.  A solid mass is not identified.  No pneumothorax is noted.  Impression: Mild cardiomegaly with development of bilateral pulmonary edema consistent with congestive heart failure.    This report was flagged in Epic as abnormal.    Electronically signed by: Easton Hollingsworth MD  Date:    09/19/2024  Time:    13:12  Echo    Left Ventricle: The left ventricle is normal in size. Normal wall   thickness. Regional wall motion abnormalities present.  There is akinesis   of mid  to distal segments and the apex.  Normal contractility of the basal   segments.  Pattern suggestive of takotsubo cardiomyopathy. There is   moderately reduced systolic function with a visually estimated ejection   fraction of 30 - 35%. Grade II diastolic dysfunction.    Right Ventricle: Normal right ventricular cavity size. Systolic   function is normal.    Mitral Valve: There is mild regurgitation.    Tricuspid Valve: There is mild to moderate regurgitation.    Pulmonary Artery: There is pulmonary hypertension. The estimated   pulmonary artery systolic pressure is 46 mmHg.    IVC/SVC: Intermediate venous pressure at 8 mmHg.      ECHOCARDIOGRAM RESULTS (last 5)  Results for orders placed during the hospital encounter of 09/16/24    Echo    Interpretation Summary    Left Ventricle: The left ventricle is normal in size. Normal wall thickness. Regional wall motion abnormalities present.  There is akinesis of mid to distal segments and the apex.  Normal contractility of the basal segments.  Pattern suggestive of takotsubo cardiomyopathy. There is moderately reduced systolic function with a visually estimated ejection fraction of 30 - 35%. Grade II diastolic dysfunction.    Right Ventricle: Normal right ventricular cavity size. Systolic function is normal.    Mitral Valve: There is mild regurgitation.    Tricuspid Valve: There is mild to moderate regurgitation.    Pulmonary Artery: There is pulmonary hypertension. The estimated pulmonary artery systolic pressure is 46 mmHg.    IVC/SVC: Intermediate venous pressure at 8 mmHg.      CURRENT/PREVIOUS VISIT EKG  Results for orders placed or performed during the hospital encounter of 09/16/24   EKG 12-lead    Collection Time: 09/16/24  2:04 PM   Result Value Ref Range    QRS Duration 102 ms    [facility] QTC Calculation 452 ms    Narrative    Test Reason : R00.0,    Vent. Rate : 108 BPM     Atrial Rate : 108 BPM     P-R Int : 204 ms          QRS Dur : 102 ms      QT Int : 338 ms        P-R-T Axes : 030 065 020 degrees     QTc Int : 452 ms    Sinus tachycardia  Incomplete right bundle branch block  Septal infarct ,age undetermined  Abnormal ECG  When compared with ECG of 06-JUN-2024 16:29,  ND interval has decreased  The axis Shifted right  Non-specific change in ST segment in Anterior leads    Referred By:             Confirmed By:            ASSESSMENT/PLAN:     Active Hospital Problems    Diagnosis    *Acute encephalopathy    HFrEF (heart failure with reduced ejection fraction)    Meningitis    Leukocytosis       ASSESSMENT & PLAN:   New onset cardiomyopathy, LVEF 30-35  Acute encephalopathy, suspected bacterial meningitis    68-year-old female with no previous cardiac history presented to the hospital with altered mental status, headache, fever and generalized weakness.  She is being treated for suspected bacterial meningitis.  Her mental status has improved at this point.  She is alert and oriented.  She is answering questions properly.  Back to her baseline as per family.  An echocardiogram was obtained which showed LVEF of 30-35% with wall motion abnormalities suggestive of takotsubo cardiomyopathy.  There is no acute ischemic changes on EKG.  She has no chest pain, shortness of breath, orthopnea, PND, swelling of extremities or palpitations.  She has been febrile during this admission.  Last spiked a fever yesterday around 5:00 p.m.    RECOMMENDATIONS:  Her echocardiogram this admission shows LVEF of 30-35%.  Wall motion abnormality pattern is suggestive of takotsubo cardiomyopathy.  She has not had any angina or anginal equivalent symptoms.  EKG with no acute ischemic changes.  She is euvolemic.  Recommend ischemic evaluation once she has recovered fully and has been treated adequately for the meningitis.  Start GDMT for the cardiomyopathy at that time as well.  Will hold off on starting at this time as she is being actively treated with IV antibiotics for the infection and her blood  pressure has been on the softer side.  Would recommend starting with low-dose Toprol and Entresto when she is ready and then optimize further.  Avoid aggressive IV fluid hydration given the low LVEF.  Judicious use if she does needed.  Would avoid continuous infusion.  If she needs it then give small boluses in increments with close re-evaluation.  I will sign off at this time.  Please re-consult when she has been treated and recovered from neurological standpoint.      Kayley Huang MD  Date of Service: 09/20/2024  6:03 PM    86

## 2024-09-20 NOTE — PT/OT/SLP PROGRESS
Occupational Therapy      Patient Name:  Masha Hobbs   MRN:  [id number]    Patient not seen today secondary to patient was unable to wake up enough to safely participate in OT. Will follow-up 9/21/2024.    9/20/2024

## 2024-09-20 NOTE — PHARMACY MED REC
Pharmacokinetic Assessment Follow Up: IV Vancomycin    Vancomycin serum concentration assessment(s):    The trough level was drawn correctly and can be used to guide therapy at this time. The measurement is below the desired definitive target range of 15 to 20 mcg/mL.    Vancomycin Regimen Plan:    Continue regimen to Vancomycin 1500 mg IV every 18 hours with next serum trough concentration measured at 0700 prior to 3rd dose on 09/21/24    Drug levels (last 3 results):  Recent Labs   Lab Result Units 09/18/24  0536 09/19/24  1726   Vancomycin-Trough ug/mL 15.6 12.2       Pharmacy will continue to follow and monitor vancomycin.    Please contact pharmacy at extension [phone] for questions regarding this assessment.    Thank you for the consult,   Jamie José       Patient brief summary:  Masha Hobbs is a 68 y.o. female initiated on antimicrobial therapy with IV Vancomycin for treatment of meningitis      Drug Allergies:   Review of patient's allergies indicates:  No Known Allergies    Actual Body Weight:   54.4    Renal Function:   Estimated Creatinine Clearance: 57 mL/min (based on SCr of 0.8 mg/dL).,     Dialysis Method (if applicable):  N/A    CBC (last 72 hours):  Recent Labs   Lab Result Units 09/19/24  0457   WBC K/uL 15.66*   Hemoglobin g/dL 11.6*   Hematocrit % 35.2*   Platelets K/uL 342   Gran % % 78.3*   Lymph % % 13.8*   Mono % % 6.8   Eosinophil % % 0.4   Basophil % % 0.4   Differential Method  Automated       Metabolic Panel (last 72 hours):  Recent Labs   Lab Result Units 09/17/24  0507 09/18/24  0536 09/19/24  0457 09/19/24  1527   Sodium mmol/L 135* 135* 134*  --    Potassium mmol/L 3.9 3.7 3.1*  --    Chloride mmol/L 102 101 98  --    CO2 mmol/L 22* 22* 24  --    Glucose mg/dL 167* 112* 126*  --    Glucose, CSF mg/dL  --   --   --  51   BUN mg/dL 23 16 14  --    Creatinine mg/dL 0.9 0.9 0.8  --    Albumin g/dL 3.1* 3.3* 2.7*  --    Total Bilirubin mg/dL 0.5 0.3 0.4  --    Alkaline Phosphatase U/L  59 69 60  --    AST U/L 14 14 24  --    ALT U/L 11 12 11  --    Magnesium mg/dL 1.9  --   --   --    Phosphorus mg/dL 2.3*  --   --   --        Vancomycin Administrations:  vancomycin given in the last 96 hours                     vancomycin 1,250 mg in D5W 250 mL IVPB (admixture device) (mg) 1,250 mg New Bag 09/19/24 1845     1,250 mg New Bag 09/18/24 2358      Restarted  0749     1,250 mg New Bag  0645     1,250 mg New Bag 09/17/24 1257    vancomycin 1.25 g in dextrose 5% 250 mL IVPB (ready to mix) (mg) 1,250 mg New Bag 09/16/24 1919                    Microbiologic Results:  Microbiology Results (last 7 days)       Procedure Component Value Units Date/Time    CSF culture [[id number]] Collected: 09/19/24 1527    Order Status: Completed Specimen: CSF (Spinal Fluid) from CSF Tap, Tube 2 Updated: 09/19/24 1733     Gram Stain Result No epithelial cells      No organisms seen      Many WBC's      09/19/2024  17:32 tn3    Blood culture x two cultures. Draw prior to antibiotics [[id number]] Collected: 09/16/24 1347    Order Status: Completed Specimen: Blood from Peripheral, Antecubital, Right Updated: 09/19/24 1432     Blood Culture, Routine No Growth to date      No Growth to date      No Growth to date      No Growth to date    Narrative:      Aerobic and anaerobic    Blood culture x two cultures. Draw prior to antibiotics [[id number]] Collected: 09/16/24 1358    Order Status: Completed Specimen: Blood from Peripheral, Antecubital, Left Updated: 09/19/24 1432     Blood Culture, Routine No Growth to date      No Growth to date      No Growth to date      No Growth to date    Narrative:      Aerobic and anaerobic    CSF culture and Gram Stain (Tube 2) [[id number]] Collected: 09/16/24 1740    Order Status: Completed Specimen: CSF (Spinal Fluid) from CSF Tap, Tube 2 Updated: 09/19/24 0951     CSF CULTURE No Growth to date     Gram Stain Result Few WBC's      No organisms seen    Narrative:      On which sequentially  labeled tube should this analysis be  performed?->2    CSF culture [[id number]]     Order Status: Canceled Specimen: CSF (Spinal Fluid)     CSF culture [[id number]]     Order Status: Canceled Specimen: CSF (Spinal Fluid)

## 2024-09-20 NOTE — PLAN OF CARE
Problem: Adult Inpatient Plan of Care  Goal: Plan of Care Review  Outcome: Progressing  Goal: Patient-Specific Goal (Individualized)  Outcome: Progressing  Goal: Absence of Hospital-Acquired Illness or Injury  Outcome: Progressing  Goal: Optimal Comfort and Wellbeing  Outcome: Progressing  Goal: Readiness for Transition of Care  Outcome: Progressing     Problem: Fall Injury Risk  Goal: Absence of Fall and Fall-Related Injury  Outcome: Progressing     Problem: Meningitis/Encephalitis  Goal: Optimal Coping  Outcome: Progressing  Goal: Effective Cerebral Tissue Perfusion  Outcome: Progressing  Goal: Absence of Infection Signs and Symptoms  Outcome: Progressing     Problem: Social Isolation  Goal: Social Connection Supported  Outcome: Progressing

## 2024-09-20 NOTE — PROGRESS NOTES
"[facility]   Department of Infectious Disease  Progress Note        PATIENT NAME: Masha Hobbs  MRN: [id number]  TODAY'S DATE: 2024  ADMIT DATE: 2024  LOS: 3 days    CHIEF COMPLAINT: Altered Mental Status (Confusion that began yesterday at 4pm per pt's  along with weakness - pt presents to ER with fever and can state name and  and situation but not president.  Patient have been vomiting since 1pm yesterday), Fever, Headache, and Emesis      PRINCIPLE PROBLEM: Acute encephalopathy    INTERVAL HISTORY      2024:  She is more somnolent today.  Not eating very well.  Afebrile.  She answers questions appropriately.      2024:  Had fever last night to 101.  Still lethargic and in bed.  EEG with with diffuse slowing consistent with metabolic encephalopathy.  CSF culture remain negative.    2024:  Had fever yesterday with T-max 102.9.  Repeat MRI brain without contrast 2024 same as 2024.  Had repeat LP with improved CSF parameters.  Repeat CSF encephalitis panel in progress.  She is more awake and alert today.  Also interactive.    Antibiotics (From admission, onward)      Start     Stop Route Frequency Ordered    24  vancomycin 1,500 mg in D5W 250 mL IVPB (admixture device)         -- IV Every 18 hours 24 1928    24 1730  ampicillin (OMNIPEN) 2 g in 0.9% NaCl 100 mL IVPB (MB+)         -- IV Every 4 hours (non-standard times) 24 1539    24 1645  cefTRIAXone (ROCEPHIN) 2 g in D5W 100 mL IVPB (MB+)         -- IV Every 12 hours (non-standard times) 24 1540    24 0342  vancomycin - pharmacy to dose  (vancomycin IVPB (PEDS and ADULTS))        Placed in "And" Linked Group    -- IV pharmacy to manage frequency 24 0243          Antifungals (From admission, onward)      None           Antivirals (From admission, onward)          Stop Route Frequency     acyclovir (ZOVIRAX) injection         -- IV Every 8 hours " (non-standard times)            ASSESSMENT and PLAN     1. Meningitis.  She is on broad-spectrum antimicrobials.  Had a biphasic illness. Repeat LP with improving CSF parameters.  We will DC acyclovir if HSV PCR negative in CSF 09/19/2024.     2. Osteoporosis      3. Systolic heart murmur.  TTE 09/19/2024 showed EF 35%, grade 2 diastolic dysfunction and pulmonary hypertension with PASP 46mmHg.  Management as per hospitalist.      4. Health maintenance.  She will benefit from pneumococcal vaccine i.e. Prevnar 20 at discharge.    5. Lethargy.  Maybe related to steroids or side effects of her antimicrobials.    RECOMMENDATIONS:    Follow repeat CSF encephalitis panel   Continue current antimicrobials including acyclovir  Discontinue vancomycin 921/24 if CSF cultures remain negative.  Reassess with pending CSF results    Please send Epic secure chat with any questions.  Discussed with neurologist Dr. Palla.  Discussed with her  and daughter at bedside.      SUBJECTIVE    Masha Hobbs is a 68 y.o. female with history of osteoporosis and chronic low back pain.  He presents to the ER 09/16/2024 with 1 day history of headache associated with nausea and vomiting and generalized body aches.  In the ER /67, pulse 110, respiratory rate 18, temperature 100.4°, oxygen 95%.  WBC 14 K, hematocrit 42, CMP unremarkable.  X-ray with no acute infiltrate.  CT head unremarkable.  LP was done.  CSF was abnormal.  It was slightly hazy, WBC 2038, RBC 14, neutrophils 89%, glucose 50, protein 173.  Accompanying serum glucose was 130.       She was admitted and commenced on antibiotics and dexamethasone for meningitis.  MRI brain without contrast today 97/24 read as showing ill-defined T2/FLAIR hyperintense signal within the right medial temporal lobe with differentials including infectious encephalitis vascularly HSV, autoimmune encephalitis.  Repeat MRI brain with contrast with no abnormal enhancement.  Id asked to assist with  her care.     She recently returned from [place] on 09/13/2024 after a 1 week trip.  They had gone to several cota and and did a lot of sightseeing .  States she was washing her hands frequently.  They were around a lot of people.  No other sick contacts.  No pets at home.  Denies eating anything unusual.  Has not received age appropriate pneumococcal vaccine.     Antibiotic history:    Vancomycin: 09/16/2024-  Valtrex: 09/16/2024 x1 dose   Ceftriaxone: 09/16/2024-  IV Acyclovir: 09/17/2024-     Microbiology:    Blood culture 09/16/2024:  NGTD   CSF culture 09/16/2024:  In progress    Review of Systems  Negative except as stated above in Interval History     OBJECTIVE   Temp:  [98 °F (36.7 °C)-102.9 °F (39.4 °C)] 98.6 °F (37 °C)  Pulse:  [] 71  Resp:  [11-32] 18  SpO2:  [94 %-100 %] 95 %  BP: ()/(40-65) 106/53  Temp:  [98 °F (36.7 °C)-102.9 °F (39.4 °C)]   Temp: 98.6 °F (37 °C) (09/20/24 0426)  Pulse: 71 (09/20/24 0600)  Resp: 18 (09/20/24 0600)  BP: (!) 106/53 (09/20/24 0545)  SpO2: 95 % (09/20/24 0600)    Intake/Output Summary (Last 24 hours) at 9/20/2024 0739  Last data filed at 9/20/2024 0607  Gross per 24 hour   Intake 200 ml   Output 1050 ml   Net -850 ml       Physical Exam  General:  She is awake, alert and interactive. Moving all extremities spontaneously   HEENT: Neck is supple   CNS: Cranial nerves 2-12 intact, no focal deficits   CVS: S1 and 2 heard, grade 3/6 systolic murmur  Respiratory: Clear to auscultation   Abdomen: Full, soft, nontender, no palpable organomegaly   Skin: No rash appreciated   Musculoskeletal system: No joint or bony abnormalities appreciated     VAD:  PIV  ISOLATION:  Droplet isolation     Wounds:  None    Significant Labs: All pertinent labs within the past 24 hours have been reviewed.    CBC LAST 7 DAYS  Recent Labs   Lab 09/16/24  1347 09/19/24  0457   WBC 13.86* 15.66*   RBC 4.41 3.60*   HGB 14.0 11.6*   HCT 42.4 35.2*   MCV 96 98   MCH 31.7* 32.2*   MCHC 33.0  "33.0   RDW 13.0 12.5    342   MPV 9.7 9.9   GRAN 88.1*  12.2* 78.3*  12.3*   LYMPH 7.7*  1.1 13.8*  2.2   MONO 3.5*  0.5 6.8  1.1*   BASO 0.04 0.06   NRBC 0 0       CHEMISTRY LAST 7 DAYS  Recent Labs   Lab 09/16/24  1347 09/17/24  0507 09/18/24  0536 09/19/24  0457 09/19/24  2053 09/20/24  0356   * 135* 135* 134*  --  135*   K 4.1 3.9 3.7 3.1* 3.0* 3.3*    102 101 98  --  99   CO2 23 22* 22* 24  --  26   ANIONGAP 12 11 12 12  --  10   BUN 18 23 16 14  --  17   CREATININE 0.9 0.9 0.9 0.8  --  0.8   * 167* 112* 126*  --  113*   CALCIUM 9.5 9.1 9.0 8.3*  --  8.0*   MG 1.9 1.9  --   --   --   --    ALBUMIN 4.4 3.1* 3.3* 2.7*  --  2.4*   PROT 8.8* 7.2 7.6 6.7  --  6.1   ALKPHOS 76 59 69 60  --  63   ALT 11 11 12 11  --  13   AST 16 14 14 24  --  26   BILITOT 0.8 0.5 0.3 0.4  --  0.4       Estimated Creatinine Clearance: 57 mL/min (based on SCr of 0.8 mg/dL).    INFLAMMATORY/PROCAL  LAST 7 DAYS  No results for input(s): "PROCAL", "ESR", "CRP" in the last 168 hours.  No results found for: "ESR"  No results found for: "CRP"    PRIOR  MICROBIOLOGY:    No results found for the last 90 days.      LAST 7 DAYS MICROBIOLOGY   Microbiology Results (last 7 days)       Procedure Component Value Units Date/Time    CSF culture [[id number]] Collected: 09/19/24 1520    Order Status: Completed Specimen: CSF (Spinal Fluid) from CSF Tap, Tube 2 Updated: 09/19/24 8217     Gram Stain Result No epithelial cells      No organisms seen      Many WBC's      09/19/2024  17:32 tn3    Blood culture x two cultures. Draw prior to antibiotics [[id number]] Collected: 09/16/24 1347    Order Status: Completed Specimen: Blood from Peripheral, Antecubital, Right Updated: 09/19/24 1432     Blood Culture, Routine No Growth to date      No Growth to date      No Growth to date      No Growth to date    Narrative:      Aerobic and anaerobic    Blood culture x two cultures. Draw prior to antibiotics [[id number]] Collected: " 09/16/24 1358    Order Status: Completed Specimen: Blood from Peripheral, Antecubital, Left Updated: 09/19/24 1432     Blood Culture, Routine No Growth to date      No Growth to date      No Growth to date      No Growth to date    Narrative:      Aerobic and anaerobic    CSF culture and Gram Stain (Tube 2) [[id number]] Collected: 09/16/24 1740    Order Status: Completed Specimen: CSF (Spinal Fluid) from CSF Tap, Tube 2 Updated: 09/19/24 0951     CSF CULTURE No Growth to date     Gram Stain Result Few WBC's      No organisms seen    Narrative:      On which sequentially labeled tube should this analysis be  performed?->2    CSF culture [[id number]]     Order Status: Canceled Specimen: CSF (Spinal Fluid)     CSF culture [[id number]]     Order Status: Canceled Specimen: CSF (Spinal Fluid)           CURRENT/PREVIOUS VISIT EKG  Results for orders placed or performed during the hospital encounter of 09/16/24   EKG 12-lead    Collection Time: 09/16/24  2:04 PM   Result Value Ref Range    QRS Duration 102 ms    OHS QTC Calculation 452 ms    Narrative    Test Reason : R00.0,    Vent. Rate : 108 BPM     Atrial Rate : 108 BPM     P-R Int : 204 ms          QRS Dur : 102 ms      QT Int : 338 ms       P-R-T Axes : 030 065 020 degrees     QTc Int : 452 ms    Sinus tachycardia  Incomplete right bundle branch block  Septal infarct ,age undetermined  Abnormal ECG  When compared with ECG of 06-JUN-2024 16:29,  CO interval has decreased  The axis Shifted right  Non-specific change in ST segment in Anterior leads    Referred By:             Confirmed By:      Significant Imaging: I have reviewed all relevant and available imaging results/findings within the past 24 hours.    I spent a total of 55 minutes on the day of the visit.This includes face to face time and non-face to face time preparing to see the patient (eg, review of tests), obtaining and/or reviewing separately obtained history, documenting clinical information in the  electronic or other health record, independently interpreting results and communicating results to the patient/family/caregiver, or care coordinator.    Danis Sepulveda MD  Date of Service: 09/20/2024      This note was created using [employer] voice recognition software that occasionally misinterpreted phrases or words.

## 2024-09-20 NOTE — PROGRESS NOTES
Review data.  Discussed with neurologist.  MRI brain without contrast repeated today.  Same as on 09/17/2024.  Had repeat LP as well.  CSF parameter significantly improved.  WBC now 148, neutrophils 63%, glucose 51, protein 126.  Encephalitis panel in progress.    Continue current antimicrobials and reassess with pending CSF results.

## 2024-09-20 NOTE — ASSESSMENT & PLAN NOTE
Likely secondary to bacterial meningitis.   Mental status returned to baseline today   MRI shows encephalitis and ventriculitis findings   Ammonia, TSH is normal   Neurology following   EEG shows encephalopathy, no epileptiform activity noted.   Rpt LP shows improved WBC and MRI similar to prior   Opening pressures are normal

## 2024-09-20 NOTE — CARE UPDATE
09/20/24 0808 09/20/24 0809   Patient Assessment/Suction   Level of Consciousness (AVPU) alert alert   Respiratory Effort Normal;Unlabored  --    Expansion/Accessory Muscles/Retractions no retractions;no use of accessory muscles;expansion symmetric  --    Rhythm/Pattern, Respiratory unlabored;pattern regular;depth regular;no shortness of breath reported  --    PRE-TX-O2   Device (Oxygen Therapy) nasal cannula room air   $ Is the patient on Low Flow Oxygen? Yes  --    Flow (L/min) (Oxygen Therapy) 1.5  --    SpO2 99 % 100 %   Pulse Oximetry Type Continuous Continuous   $ Pulse Oximetry - Multiple Charge Pulse Oximetry - Multiple Pulse Oximetry - Multiple   Pulse 84 79   Resp 18 18

## 2024-09-20 NOTE — PROGRESS NOTES
[facility] Medicine  Progress Note    Patient Name: Masha Hobbs  MRN: [id number]  Patient Class: IP- Inpatient   Admission Date: 9/16/2024  Length of Stay: 3 days  Attending Physician: Beatriz Boogie MD  Primary Care Provider: Roney Bang MD        Subjective:     Principal Problem:Acute encephalopathy        HPI:  Masha Hobbs is a 68 year old female with a past medical history of osteoporosis and chronic lower back pain with radiculopathy who was transferred from [facility] due to confusion associated with headache, fever, and generalized weakness/body aches for several hours prior to arrival.  Per  patient was confused since 4:00 p.m. Upon assessment confusion has resolved.  She denies any complaints other than a intermittent frontal lobe headache that is improving after Toradol administration.  There are no acute neurological focal deficits.  She denies chest pain, shortness of breath, dizziness, lightheadedness, vision changes, speech changes, numbness/tingling, neck pain, abdominal pain, nausea, or vomiting.  ED workup reveals:  CT head negative for acute intracranial hemorrhage. Chest x-ray no evidence of acute cardiopulmonary findings.  Lumbar puncture performed in ED, pending results.  She was treated with vancomycin, Rocephin, valacyclovir p.o., and dexamethasone.  CBC with elevated white count 13.8 and stable H&H.  BMP with phosphorus 2.0 otherwise unremarkable.  Negative flu/COVID.  Urinalysis negative.  Lactic acid 1.1.  ECG negative for ischemia or infarct.  MRI brain pending.  Neurology consult placed.  Admitted to hospital medicine for further management and treatment.            Overview/Hospital Course:  Patient is a 68 year old female admitted with AMS, headache and fever. LP was suggestive of meningitis with 2K WBC, mainly neutrophils. HSV panel was negative. Patient was started on Vancomycin, Ampicillin and Rocephin. Acyclovir was discontinued  after D1. MRI shows encephalitis findings with ventriculitis. Patient is being followed by neurology and ID. EEG has been ordered which is consistent with encephalopathy but no epileptiform activity. Patient continue to spike fever and is vomiting. Stat MRI was repeated with rpt LP was ordered. Rpt MRI did not show any changes. LP shows reduced WBC compared to the first one, however Patient is awake and alert this morning.    Interval History: Patient is alert and oriented and completely back to baseline today. Discussed with the neurologist the possibility that patient feels better after the LP. However the opening pressures were normal. Cryptococcal antigen is negative in the meningitis panel as well.     Review of Systems  Objective:     Vital Signs (Most Recent):  Temp: 98.6 °F (37 °C) (09/20/24 0900)  Pulse: 93 (09/20/24 1130)  Resp: 20 (09/20/24 1130)  BP: 130/63 (09/20/24 1130)  SpO2: 96 % (09/20/24 1130) Vital Signs (24h Range):  Temp:  [98 °F (36.7 °C)-102.9 °F (39.4 °C)] 98.6 °F (37 °C)  Pulse:  [] 93  Resp:  [11-32] 20  SpO2:  [91 %-100 %] 96 %  BP: ()/(40-63) 130/63     Weight: 64.4 kg (142 lb)  Body mass index is 26.83 kg/m².    Intake/Output Summary (Last 24 hours) at 9/20/2024 1258  Last data filed at 9/20/2024 0607  Gross per 24 hour   Intake 200 ml   Output 1050 ml   Net -850 ml         Physical Exam  Vitals and nursing note reviewed.   Constitutional:       Appearance: She is ill-appearing, somnolent.   Eyes:      General: No visual field deficit.     Pupils: Pupils are equal, round, and reactive to light.   Cardiovascular:      Rate and Rhythm: Normal rate and regular rhythm.      Pulses: Normal pulses.      Heart sounds: Normal heart sounds.   Pulmonary:      Effort: Pulmonary effort is normal. No respiratory distress.      Breath sounds: Normal breath sounds. No wheezing.   Abdominal:      General: Bowel sounds are normal. There is no distension.      Palpations: Abdomen is soft.       Tenderness: There is no abdominal tenderness.   Musculoskeletal:      Right lower leg: No edema.      Left lower leg: No edema.   Skin:     General: Skin is warm and dry.      Capillary Refill: Capillary refill takes less than 2 seconds.   Neurological:      Mental Status: back to baseline, alert and oriented X 3     Significant Labs: All pertinent labs within the past 24 hours have been reviewed.  CBC:   Recent Labs   Lab 09/19/24 0457   WBC 15.66*   HGB 11.6*   HCT 35.2*        CMP:   Recent Labs   Lab 09/19/24 0457 09/19/24 2053 09/20/24  0356   *  --  135*   K 3.1* 3.0* 3.3*   CL 98  --  99   CO2 24  --  26   *  --  113*   BUN 14  --  17   CREATININE 0.8  --  0.8   CALCIUM 8.3*  --  8.0*   PROT 6.7  --  6.1   ALBUMIN 2.7*  --  2.4*   BILITOT 0.4  --  0.4   ALKPHOS 60  --  63   AST 24  --  26   ALT 11  --  13   ANIONGAP 12  --  10       Significant Imaging: I have reviewed all pertinent imaging results/findings within the past 24 hours.    Assessment/Plan:      * Acute encephalopathy  Likely secondary to bacterial meningitis.   Mental status returned to baseline today   MRI shows encephalitis and ventriculitis findings   Ammonia, TSH is normal   Neurology following   EEG shows encephalopathy, no epileptiform activity noted.   Rpt LP shows improved WBC and MRI similar to prior   Opening pressures are normal         Meningitis  LP shows > 2000 WBC with neurophil predominance, protein is high normal glucose   MRI shows encephalitis and ventriculitis findings   HSV and meningitis panel was all negative   EEG shows encephalopathy and no epileptiform activity   Discussed with neurology and ID  MRI and LP was repeated on 9/19 shows WBC improved, but other wise similar findings.   - Cont Vancomycin, Ampicillin and Rocephin for now, acyclovir has been restarted          HFrEF (heart failure with reduced ejection fraction)  New onset   Echo show Takotsubo pattern   Cardiology was consulted    Clinically euvolemic        Leukocytosis  Due to above         VTE Risk Mitigation (From admission, onward)           Ordered     IP VTE LOW RISK PATIENT  Once         09/17/24 0239     Place sequential compression device  Until discontinued         09/17/24 0239                    Discharge Planning   WILVER:      Code Status: Full Code   Is the patient medically ready for discharge?:     Reason for patient still in hospital (select all that apply): Treatment  Discharge Plan A: Home with family                  Beatriz Boogie MD  Department of Hospital Medicine   [facility]

## 2024-09-20 NOTE — PROCEDURES
Patient confirmed scheduled appointment:  Date: 4/23/24  Time: 12 pm  Visit type: RTN ID  Provider: Louise  Location: [facility] [place]  Testing/imaging: labs at  location 4/19/24     VIDEO ELECTROENCEPHALOGRAM  REPORT    DATE OF SERVICE:9/19-9/20/24  EEG NUMBER: ON -1  REQUESTED BY:  Dr Galeana  LOCATION OF SERVICE:  [facility]   Electroencephalographic (EEG) recording is with electrodes placed according to the International 10-20 placement system.  Thirty two (32) channels of digital signal (sampling rate of 512/sec) including T1 and T2 was simultaneously recorded from the scalp and may include  EKG, EMG, and/or eye monitors.  Recording band pass was 0.1 to 512 hz.  Digital video recording of the patient is simultaneously recorded with the EEG.  The patient is instructed report clinical symptoms which may occur during the recording session.  EEG and video recording is stored and archived in digital format.  Activation procedures which include photic stimulation, hyperventilation and instructing patients to perform simple task are done in selected patients.   The EEG is displayed on a monitor screen and can be reviewed using different montages.  Computer assisted analysis is employed to detect spike and electrographic seizure activity.   The entire record is submitted for computer analysis.  The entire recording is visually reviewed and the times identified by computer analysis as being spikes or seizures are reviewed again.  Compresses spectral analysis (CSA) is also performed on the activity recorded from each individual channel.  This is displayed as a power display of frequencies from 0 to 30 Hz over time.   The CSA is reviewed looking for asymmetries in power between homologous areas of the scalp and then compared with the original EEG recording.     Planspot software was also utilized in the review of this study.  This software suite analyzes the EEG recording in multiple domains.  Coherence and rhythmicity is computed to identify EEG sections which may contain organized seizures.  Each channel undergoes analysis to detect presence of spike and sharp waves which have special  and morphological characteristic of epileptic activity.  The routine EEG recording is converted from spacial into frequency domain.  This is then displayed comparing homologous areas to identify areas of significant asymmetry.  Algorithm to identify non-cortically generated artifact is used to separate eye movement, EMG and other artifact from the EEG.      ELECTROENCEPHALOGRAM:    RECORDING TIMES:  Start on 9/19/24 at 18:30  Stop on 9/20/24 at 05:30   Start 9/20/24 at 05:30  Stop 9/20/24 at 08:51  14 hours and 20 minutes recorded    Indication: 68 year old female admitted with headache, fever, and generalized weakness.  She was noted to be encephalopathic and an LP showed concern for meningitis.  EEG to evaluate for subclinical seizures.     State of Consciousness:   Awake and asleep    Background:   The background is mildly disorganized, symmetric and continuous.  The background is composed mainly of intermixed theta and alpha frequencies with mild beta activity present anteriorly.  The posterior dominant rhythm is poorly sustained but may reach up to 7-8 hz at maximum alertness.  There are intermittent bursts of diffuse delta slowing noted.      Sleep:   Transition into stage 1 sleep is characterized by attenuation of the posterior dominant rhythm, bilateral theta activity, and vertex waves.  There is also transition into stage II sleep with symmetric vertex waves, sleep spindles, and k complexes noted.     Epileptiform Abnormalities  None    Seizures/Events:   No seizures; two event button activations that were tests.    EKG:   Regular rate and rhythm on single lead EKG    Activating procedures:   Hyperventilation and photic stimulation are not performed     Impression:   This is an abnormal EEG study due to diffuse slowing and disorganization of the background of the background consistent with a moderate diffuse encephalopathy of nonspecific etiology.  No epileptiform discharges or focal abnormalities are  noted.       Shonda Patrick MD  [facility]   Department of Neurology/Epilepsy

## 2024-09-20 NOTE — PLAN OF CARE
Hospital follow up scheduled       09/20/24 1521   Post-Acute Status   Hospital Resources/Appts/Education Provided Appointments scheduled and added to AVS

## 2024-09-20 NOTE — PLAN OF CARE
[facility] has accepted pt if she needs home infusion. They will also provide line care/removal. ID note from today sent via [employer]. CM continues to follow       09/20/24 8679   Discharge Reassessment   Assessment Type Discharge Planning Reassessment   Did the patient's condition or plan change since previous assessment? Yes   Discharge Plan discussed with: Patient;Adult children;Spouse/sig other   Discharge Plan A Home with family   Discharge Plan B Home   Why the patient remains in the hospital Requires continued medical care   Post-Acute Status   Post-Acute Authorization IV Infusion

## 2024-09-20 NOTE — PT/OT/SLP EVAL
Physical Therapy Evaluation    Patient Name:  Masha Hobbs   MRN:  [id number]    Recommendations:     Discharge Recommendations: No Therapy Indicated   Discharge Equipment Recommendations: none   Barriers to discharge: None    Assessment:     Masha Hobbs is a 68 y.o. female admitted with a medical diagnosis of Acute encephalopathy.  She presents with the following impairments/functional limitations: weakness, impaired endurance, impaired functional mobility, gait instability, impaired cardiopulmonary response to activity .  Patient agreeable to PT evaluation this afternoon.  Patient presented sitting in chair at bedside and was able to stand with CGA and then ambulated x 50 feet in room no AD cGA.  Patient was previously evaluated by PT and was independent so discharged form inpatient but then experience a medical decline so PT re-ordered today and patient now appropriate for skilled PT.    Rehab Prognosis: Good; patient would benefit from acute skilled PT services to address these deficits and reach maximum level of function.    Recent Surgery: * No surgery found *      Plan:     During this hospitalization, patient to be seen 6 x/week to address the identified rehab impairments via gait training, therapeutic activities, therapeutic exercises and progress toward the following goals:    Plan of Care Expires:  10/21/24    Subjective     Chief Complaint: fatigue  Patient/Family Comments/goals: get stronger  Pain/Comfort:       Patients cultural, spiritual, Adventist conflicts given the current situation:      Living Environment:  Currently lives with spouse in 1 story home.  Prior to admission, patients level of function was independent.  Equipment used at home: none.  DME owned (not currently used): none.  Upon discharge, patient will have assistance from family.    Objective:     Communicated with nurse prior to session.  Patient found supine with peripheral IV, pulse ox (continuous), telemetry  upon PT entry to  room.    General Precautions: Standard, fall  Orthopedic Precautions:N/A   Braces: N/A  Respiratory Status: Room air    Exams:  RLE ROM: WFL  RLE Strength: WNL  LLE ROM: WFL  LLE Strength: WNL    Functional Mobility:  Transfers:     Sit to Stand:  contact guard assistance with no AD  Gait: x 50 feet no AD CGA      AM-PAC 6 CLICK MOBILITY  Total Score:        Treatment & Education:  None given    Patient left up in chair with call button in reach, nurse notified, and spouse present.    GOALS:   Multidisciplinary Problems       Physical Therapy Goals          Problem: Physical Therapy    Goal Priority Disciplines Outcome Goal Variances Interventions   Physical Therapy Goal     PT, PT/OT Progressing     Description: Goals to be met by: 24     Patient will increase functional independence with mobility by performin. Supine to sit with Crowley  2. Sit to supine with Crowley  3. Sit to stand transfer with Stand-by Assistance  4. Bed to chair transfer with Stand-by Assistance using Rolling Walker  5. Gait  x 250 feet with Stand-by Assistance using Rolling Walker.                          History:     Past Medical History:   Diagnosis Date    Age-related osteoporosis without current pathological fracture 3/4/2020    Based upon bone density showing osteoporosis both at hips and lumbar spine.  Patient notified.  May consider bisphosphonates.       Past Surgical History:   Procedure Laterality Date    TONSILLECTOMY      WRIST SURGERY Right        Time Tracking:     PT Received On: 24  PT Start Time: 1408     PT Stop Time: 1425  PT Total Time (min): 17 min     Billable Minutes: Evaluation 17      2024

## 2024-09-20 NOTE — SUBJECTIVE & OBJECTIVE
Interval History: Patient is alert and oriented and completely back to baseline today. Discussed with the neurologist the possibility that patient feels better after the LP. However the opening pressures were normal. Cryptococcal antigen is negative in the meningitis panel as well.     Review of Systems  Objective:     Vital Signs (Most Recent):  Temp: 98.6 °F (37 °C) (09/20/24 0900)  Pulse: 93 (09/20/24 1130)  Resp: 20 (09/20/24 1130)  BP: 130/63 (09/20/24 1130)  SpO2: 96 % (09/20/24 1130) Vital Signs (24h Range):  Temp:  [98 °F (36.7 °C)-102.9 °F (39.4 °C)] 98.6 °F (37 °C)  Pulse:  [] 93  Resp:  [11-32] 20  SpO2:  [91 %-100 %] 96 %  BP: ()/(40-63) 130/63     Weight: 64.4 kg (142 lb)  Body mass index is 26.83 kg/m².    Intake/Output Summary (Last 24 hours) at 9/20/2024 1258  Last data filed at 9/20/2024 0607  Gross per 24 hour   Intake 200 ml   Output 1050 ml   Net -850 ml         Physical Exam  Vitals and nursing note reviewed.   Constitutional:       Appearance: She is ill-appearing, somnolent.   Eyes:      General: No visual field deficit.     Pupils: Pupils are equal, round, and reactive to light.   Cardiovascular:      Rate and Rhythm: Normal rate and regular rhythm.      Pulses: Normal pulses.      Heart sounds: Normal heart sounds.   Pulmonary:      Effort: Pulmonary effort is normal. No respiratory distress.      Breath sounds: Normal breath sounds. No wheezing.   Abdominal:      General: Bowel sounds are normal. There is no distension.      Palpations: Abdomen is soft.      Tenderness: There is no abdominal tenderness.   Musculoskeletal:      Right lower leg: No edema.      Left lower leg: No edema.   Skin:     General: Skin is warm and dry.      Capillary Refill: Capillary refill takes less than 2 seconds.   Neurological:      Mental Status: back to baseline, alert and oriented X 3     Significant Labs: All pertinent labs within the past 24 hours have been reviewed.  CBC:   Recent Labs   Lab  09/19/24 0457   WBC 15.66*   HGB 11.6*   HCT 35.2*        CMP:   Recent Labs   Lab 09/19/24 0457 09/19/24 2053 09/20/24 0356   *  --  135*   K 3.1* 3.0* 3.3*   CL 98  --  99   CO2 24  --  26   *  --  113*   BUN 14  --  17   CREATININE 0.8  --  0.8   CALCIUM 8.3*  --  8.0*   PROT 6.7  --  6.1   ALBUMIN 2.7*  --  2.4*   BILITOT 0.4  --  0.4   ALKPHOS 60  --  63   AST 24  --  26   ALT 11  --  13   ANIONGAP 12  --  10       Significant Imaging: I have reviewed all pertinent imaging results/findings within the past 24 hours.

## 2024-09-20 NOTE — PROGRESS NOTES
[facility]  Department of Neurology  Progress Note  Date: 2024 9:43 AM          Patient Name: Masha Hobbs   MRN: [id number]   : 1955    AGE: 68 y.o.    LOS: 3 days Hospital Day: 5  Admit date: 2024  1:02 PM         HPI Masha Hobbs is a 68 y.o. female presented to the hospital with headache, generalized weakness and fever.  She started having headache about 2 days ago which was holocephalic, 8/10 intensity and the same day she had a fever of 103.  Her  told her that she acted confused around that time.  He presented to the hospital for these symptoms.  She denies any unilateral weakness or sensory changes, neck pain or stiffness, nausea or vomiting.     In the ER, she had a CT head which was negative for acute pathology, lumbar puncture and was treated with Rocephin, vancomycin, valacyclovir and dexamethasone.  She was admitted to the hospital for further workup and management.     She states the headache is not getting better and it is about 2 to 3/10 in intensity.  Denies any other complaints at this time.       2024: No acute events overnight. Patient was seen and examined by me this morning.  She does have some receptive aphasia on exam today.    :  Patient was seen and examined.  She is somnolent, only opens eyes to repeated stimulus however does not follow commands or answer to any questions.    :  Patient was seen and examined by me.  Significantly improved mental status today.  Lumbar puncture and MRI were repeated yesterday.  She is currently oriented x3 and denies any complaints.  Family is at bedside.    Vitals:  Patient Vitals for the past 24 hrs:   BP Temp Temp src Pulse Resp SpO2   24 0809 -- -- -- 79 18 100 %   24 0808 -- -- -- 84 18 99 %   24 0600 -- -- -- 71 18 95 %   24 0545 (!) 106/53 -- -- 77 (!) 22 99 %   24 0530 -- -- -- 77 (!) 22 (!) 94 %   24 0515 -- -- -- 76 (!) 21 98 %   24 0500 -- -- -- 77  19 (!) 94 %   09/20/24 0445 -- -- -- 78 20 96 %   09/20/24 0430 -- -- -- 77 (!) 21 100 %   09/20/24 0426 -- 98.6 °F (37 °C) Oral -- -- --   09/20/24 0415 -- -- -- 77 (!) 21 96 %   09/20/24 0400 (!) 111/55 -- -- 76 (!) 29 98 %   09/20/24 0030 (!) 88/53 -- -- 68 19 98 %   09/20/24 0015 (!) 91/52 -- -- 68 19 98 %   09/20/24 0000 (!) 93/54 -- -- 68 18 (!) 94 %   09/19/24 2354 -- 98 °F (36.7 °C) Oral -- -- --   09/19/24 2345 (!) 108/55 -- -- 69 (!) 21 99 %   09/19/24 2330 (!) 98/57 -- -- 71 (!) 21 96 %   09/19/24 2315 (!) 95/54 -- -- 74 (!) 23 99 %   09/19/24 2300 (!) 94/53 -- -- 73 20 100 %   09/19/24 2245 (!) 91/54 -- -- 67 19 99 %   09/19/24 2230 (!) 99/58 -- -- 68 (!) 21 100 %   09/19/24 2215 (!) 95/52 -- -- 70 (!) 23 99 %   09/19/24 2200 (!) 85/53 -- -- 66 (!) 21 98 %   09/19/24 2145 (!) 95/54 -- -- 68 18 98 %   09/19/24 2130 (!) 99/56 -- -- 70 18 98 %   09/19/24 2115 (!) 88/49 -- -- 67 19 98 %   09/19/24 2100 (!) 90/55 -- -- 70 18 98 %   09/19/24 2045 (!) 90/53 -- -- 70 20 97 %   09/19/24 2030 (!) 91/53 -- -- 70 19 96 %   09/19/24 2015 (!) 95/54 -- -- 74 19 99 %   09/19/24 2000 (!) 90/51 98.2 °F (36.8 °C) -- 72 17 97 %   09/19/24 1830 (!) 95/54 -- -- 92 20 96 %   09/19/24 1815 (!) 97/53 -- -- 100 20 95 %   09/19/24 1800 (!) 105/53 100 °F (37.8 °C) -- 93 (!) 25 95 %   09/19/24 1745 (!) 107/54 -- -- 95 20 96 %   09/19/24 1730 (!) 105/54 -- -- 99 20 95 %   09/19/24 1729 (!) 105/54 (!) 102.2 °F (39 °C) -- 98 (!) 22 96 %   09/19/24 1710 (!) 96/55 -- -- 92 20 95 %   09/19/24 1700 (!) 104/55 -- -- 94 (!) 26 98 %   09/19/24 1650 (!) 107/56 -- -- 93 (!) 26 98 %   09/19/24 1640 111/61 -- -- 95 (!) 32 98 %   09/19/24 1630 (!) 101/55 -- -- 91 (!) 30 98 %   09/19/24 1620 (!) 97/54 -- -- 93 (!) 24 96 %   09/19/24 1610 (!) 91/50 -- -- 97 (!) 25 97 %   09/19/24 1605 (!) 87/50 -- -- 94 11 99 %   09/19/24 1600 (!) 83/47 -- -- 93 (!) 23 98 %   09/19/24 1555 (!) 83/40 -- -- 90 (!) 23 99 %   09/19/24 1545 (!) 88/48 (!) 102.2 °F (39 °C)  Oral -- -- --   09/19/24 1330 (!) 108/55 -- -- 101 20 96 %   09/19/24 1315 -- (!) 102.9 °F (39.4 °C) Oral -- -- --   09/19/24 1255 134/65 -- -- 105 -- 98 %   09/19/24 1251 -- (!) 102.8 °F (39.3 °C) -- -- -- --   09/19/24 1228 -- (!) 102.8 °F (39.3 °C) Oral -- (!) 24 97 %     PHYSICAL EXAM:     GENERAL APPEARANCE: Alert, well-developed, well-nourished in no acute distress.  HEENT: Normocephalic and atraumatic. PERRL. Oropharynx unremarkable.  PULM: Normal respiratory effort. No accessory muscle use.  CV: RRR.  ABDOMEN: Soft, nontender.  EXTREMITIES: No obvious signs of vascular compromise. Pulses present. No cyanosis, clubbing or edema.  SKIN: Clear; no rashes, lesions or skin breaks in exposed areas.    NEURO:NEURO:  MENTAL STATUS: Patient awake and oriented to time, place, and person. Recent/remote memory normal. Attention span/concentration normal. Speech fluent. Good comprehension, naming, and repetition. Fund of knowledge appropriate for patient's level of education. Affect normal.    CRANIAL NERVES:  Pupils equal round and reactive to light, extraocular movements are intact, face is grossly symmetric.    MOTOR: Normal bulk. Tone normal and symmetric throughout.  Strength 5/5 throughout.  No abnormal movements. No tremor.    REFLEXES: DTRs 2+; normal and symmetric throughout. Plantar response downgoing.    SENSATION: Sensation grossly intact to fine touch, pain/temperature, vibration and position.    COORDINATION: Finger-to-nose and heel to shin normal for age and symmetric. Finger tapping and alternating movements normal.    STATION and GAIT: not assessed       CURRENT SCHEDULED MEDICATIONS:   acyclovir  10 mg/kg (Ideal) Intravenous Q8H    ampicillin IV (PEDS and ADULTS)  2 g Intravenous Q4H    cefTRIAXone (Rocephin) IV (PEDS and ADULTS)  2 g Intravenous Q12H    vancomycin (VANCOCIN) IV (PEDS and ADULTS)  1,500 mg Intravenous Q18H     CURRENT INFUSIONS:    DATA:  Recent Labs   Lab 09/16/24  1347 09/17/24  0507  "09/18/24  0536 09/18/24  1451 09/19/24  0457 09/19/24 2053 09/20/24  0356   * 135* 135*  --  134*  --  135*   K 4.1 3.9 3.7  --  3.1* 3.0* 3.3*    102 101  --  98  --  99   CO2 23 22* 22*  --  24  --  26   BUN 18 23 16  --  14  --  17   CREATININE 0.9 0.9 0.9  --  0.8  --  0.8   * 167* 112*  --  126*  --  113*   CALCIUM 9.5 9.1 9.0  --  8.3*  --  8.0*   PHOS 2.0* 2.3*  --   --   --   --   --    MG 1.9 1.9  --   --   --   --   --    AST 16 14 14  --  24  --  26   ALT 11 11 12  --  11  --  13   AMMONIA  --   --   --  32  --   --   --      Recent Labs   Lab 09/16/24  1347 09/19/24 0457   WBC 13.86* 15.66*   HGB 14.0 11.6*   HCT 42.4 35.2*    342     Lab Results   Component Value Date    PROTEINCSF 126 (H) 09/19/2024    GLUCCSF 51 09/19/2024     No results found for: "HGBA1C"         I have personally reviewed and interpreted the pertinent imaging and lab results.  Imaging Results              X-Ray Chest 1 View (Final result)  Result time 09/16/24 14:53:24      Final result by Farrukh Ramos MD (09/16/24 14:53:24)                   Impression:      No acute cardiopulmonary abnormality.      Electronically signed by: Farrukh Ramos  Date:    09/16/2024  Time:    14:53               Narrative:    EXAMINATION:  XR CHEST 1 VIEW    CLINICAL HISTORY:  Sepsis;    FINDINGS:  Portable chest at 1441 compared with 06/06/2024 shows unchanged cardiomediastinal silhouette.    Lungs are clear. Pulmonary vasculature is normal. No acute osseous abnormality.                                       CT Head Without Contrast (Final result)  Result time 09/16/24 14:52:17      Final result by Wang Acuña MD (09/16/24 14:52:17)                   Impression:      No CT evidence of acute intracranial pathology.      Electronically signed by: Wang Acuña  Date:    09/16/2024  Time:    14:52               Narrative:    EXAMINATION:  CT HEAD WITHOUT CONTRAST    CLINICAL HISTORY:  Mental status change, unknown " cause;    TECHNIQUE:  Axial CT imaging obtained through the brain without IV contrast.    CMS Mandated Quality Data-CT Radiation Dose-436    All CT scans at this facility dose modulation, iterative reconstruction, and or weight-based dosing when appropriate to reduce radiation dose to as low as reasonably achievable.    COMPARISON:  None    FINDINGS:  Negative for acute intracranial hemorrhage, midline shift, or mass effect.  Ventricles and sulci are normal in size.  Gray-white differentiation is maintained.  Mild periventricular and deep white matter hypoattenuation, consistent with microangiopathic change.  Cerebellar hemispheres and brainstem are unremarkable.  Atherosclerotic calcification of intracranial carotid arteries.    No calvarial lesion or fracture.  Mastoid air cells are clear.  Mucosal thickening right maxillary sinus.  Paranasal sinuses are otherwise clear.                                           ASSESSMENT AND PLAN:     Encephalitis   Headache  Encephalopathy     Plan:   LP with CSF analysis showed WBC 2038 (neutrophil predominant), protein 173 and glucose 50.  Negative meningitis encephalitis panel and CSF culture so far  MRI brain showed hyperintensity in the right medial temporal lobe on T2 FLAIR images concerning for encephalitis. MRI brain with contrast showed no contrast enhancement  Currently on vancomycin, Rocephin, ampicillin, acyclovir.  Negative meningitis/encephalitis PCR panel on CSF.  Infectious diseases following-appreciate further recommendations.    Repeat lumbar puncture showed CSF , protein 126 and glucose 51.  Negative CSF culture.  P.r.n. Tylenol/Toradol for headache.    EEG showed no seizures or epileptiform activity.  LTM EEG overnight showed no seizures or epileptiform activity.  Speech therapy evaluate and treat  PT OT evaluate and treat.    If patient's mental status had to decline again, will consider pulse dose steroids to treat as autoimmune encephalitis (as  patient is CSF PCR panel has been negative and culture also been negative so far). Will wait for repeat PCR meningitis/encephalitis panel which is pending now  Will follow             Joe Galeana MD  Neurology/vascular Neurology  Date of Service: 09/20/2024  9:43 AM    Please note: This note was transcribed using voice recognition software. Because of this technology there are often uinintended grammatical, spelling, and other transcription errors. Please disregard these errors.

## 2024-09-20 NOTE — NURSING
"Awake alert and oriented to person, place and time. Unable to recall events from yesterday- reoriented to events leading to transfer to StepDown room. Pt denies headache pain but states she has intermittent discomfort "in my sinus area". Also denies c/o nausea at present. Admits to feeling soreness to mid-back area- LP site. FREEMAN. Speech clear, coherent and without facial asymmetry. EEG in place- being removed by tech at bedside. Pt continues to have no appetite.  "

## 2024-09-20 NOTE — EICU
Intervention Initiated From:  [facility] / EICU    Tay intervened regarding:  Rounding (Video assessment)    Nurse Notified:  Yes, RN is at the BS    Doctor Notified:  No    Comments: Awake and alert, NAD  HR 88, B/P 90/53. RR 23, Sats 95%

## 2024-09-21 LAB
ALBUMIN SERPL BCP-MCNC: 2.5 G/DL (ref 3.5–5.2)
ALP SERPL-CCNC: 51 U/L (ref 55–135)
ALT SERPL W/O P-5'-P-CCNC: 16 U/L (ref 10–44)
ANION GAP SERPL CALC-SCNC: 9 MMOL/L (ref 8–16)
AST SERPL-CCNC: 19 U/L (ref 10–40)
BACTERIA BLD CULT: NORMAL
BACTERIA BLD CULT: NORMAL
BILIRUB SERPL-MCNC: 0.3 MG/DL (ref 0.1–1)
BUN SERPL-MCNC: 15 MG/DL (ref 8–23)
CALCIUM SERPL-MCNC: 8.4 MG/DL (ref 8.7–10.5)
CHLORIDE SERPL-SCNC: 97 MMOL/L (ref 95–110)
CO2 SERPL-SCNC: 27 MMOL/L (ref 23–29)
CREAT SERPL-MCNC: 0.8 MG/DL (ref 0.5–1.4)
EST. GFR  (NO RACE VARIABLE): >60 ML/MIN/1.73 M^2
GLUCOSE SERPL-MCNC: 107 MG/DL (ref 70–110)
Lab: NEGATIVE
POTASSIUM SERPL-SCNC: 3.8 MMOL/L (ref 3.5–5.1)
PROT SERPL-MCNC: 6.1 G/DL (ref 6–8.4)
SODIUM SERPL-SCNC: 133 MMOL/L (ref 136–145)
VANCOMYCIN TROUGH SERPL-MCNC: 12.9 UG/ML (ref 10–22)

## 2024-09-21 PROCEDURE — 25000003 PHARM REV CODE 250: Performed by: INTERNAL MEDICINE

## 2024-09-21 PROCEDURE — 94761 N-INVAS EAR/PLS OXIMETRY MLT: CPT

## 2024-09-21 PROCEDURE — 99233 SBSQ HOSP IP/OBS HIGH 50: CPT | Mod: ,,, | Performed by: INTERNAL MEDICINE

## 2024-09-21 PROCEDURE — 97165 OT EVAL LOW COMPLEX 30 MIN: CPT

## 2024-09-21 PROCEDURE — 97535 SELF CARE MNGMENT TRAINING: CPT

## 2024-09-21 PROCEDURE — 80202 ASSAY OF VANCOMYCIN: CPT | Performed by: INTERNAL MEDICINE

## 2024-09-21 PROCEDURE — 25000003 PHARM REV CODE 250

## 2024-09-21 PROCEDURE — 63600175 PHARM REV CODE 636 W HCPCS: Performed by: INTERNAL MEDICINE

## 2024-09-21 PROCEDURE — 97116 GAIT TRAINING THERAPY: CPT | Mod: CQ

## 2024-09-21 PROCEDURE — 80053 COMPREHEN METABOLIC PANEL: CPT

## 2024-09-21 PROCEDURE — 20600001 HC STEP DOWN PRIVATE ROOM

## 2024-09-21 RX ADMIN — AMPICILLIN 2 G: 2 INJECTION, POWDER, FOR SOLUTION INTRAMUSCULAR; INTRAVENOUS at 05:09

## 2024-09-21 RX ADMIN — FAMOTIDINE 40 MG: 20 TABLET, FILM COATED ORAL at 10:09

## 2024-09-21 RX ADMIN — CEFTRIAXONE 2 G: 2 INJECTION, POWDER, FOR SOLUTION INTRAMUSCULAR; INTRAVENOUS at 03:09

## 2024-09-21 RX ADMIN — AMPICILLIN 2 G: 2 INJECTION, POWDER, FOR SOLUTION INTRAMUSCULAR; INTRAVENOUS at 01:09

## 2024-09-21 RX ADMIN — ACYCLOVIR SODIUM 480 MG: 50 INJECTION, SOLUTION INTRAVENOUS at 02:09

## 2024-09-21 RX ADMIN — DOXYCYCLINE 100 MG: 100 INJECTION, POWDER, LYOPHILIZED, FOR SOLUTION INTRAVENOUS at 10:09

## 2024-09-21 RX ADMIN — ACYCLOVIR SODIUM 480 MG: 50 INJECTION, SOLUTION INTRAVENOUS at 11:09

## 2024-09-21 RX ADMIN — IBUPROFEN 600 MG: 600 TABLET ORAL at 04:09

## 2024-09-21 RX ADMIN — ACYCLOVIR SODIUM 480 MG: 50 INJECTION, SOLUTION INTRAVENOUS at 06:09

## 2024-09-21 NOTE — PT/OT/SLP EVAL
Occupational Therapy   Evaluation    Name: Masha Hobbs  MRN: [id number]  Admitting Diagnosis: Acute encephalopathy  Recent Surgery: * No surgery found *      Recommendations:     Discharge Recommendations:  (TBD)  Discharge Equipment Recommendations:  none  Barriers to discharge:       Assessment:     Masha Hobbs is a 68 y.o. female with a medical diagnosis of Acute encephalopathy.  She presents with the following performance deficits affecting function: weakness, impaired endurance, impaired self care skills, impaired functional mobility, impaired cardiopulmonary response to activity.  Pt was up in chair and agreeable to OT. Pt was previously evaluated by OT, but had a medical decline since that time.    Rehab Prognosis: Good; patient would benefit from acute skilled OT services to address these deficits and reach maximum level of function.       Plan:     Patient to be seen 5 x/week to address the above listed problems via self-care/home management, therapeutic activities, therapeutic exercises  Plan of Care Expires: 10/19/24  Plan of Care Reviewed with: patient    Subjective     Chief Complaint: weakness  Patient/Family Comments/goals: To get better    Occupational Profile:  Living Environment: Pt lives with spouse in 1 story home with no KARLIE; Pt has a tub/shower and standard toilet. OT provided education in use of a shower chair as needed to increase safety with bathing.  Previous level of function: Independent  Roles and Routines: Spouse, Mother  Equipment Used at Home: none  Assistance upon Discharge: Family    Pain/Comfort:  Pain Rating 1: 0/10    Patients cultural, spiritual, Jewish conflicts given the current situation:      Objective:     Communicated with: Nurse Muñoz prior to session.  Patient found up in chair with chair check, blood pressure cuff, peripheral IV, PureWick, pulse ox (continuous), telemetry upon OT entry to room.    General Precautions: Standard, fall  Orthopedic Precautions:  N/A  Braces: N/A  Respiratory Status: Room air    Occupational Performance:      Functional Mobility/Transfers:  Patient completed Sit <> Stand Transfer with contact guard assistance  with  no assistive device   Functional Mobility: Pt was able to take several steps next to bed with no AD and CGA/SBA    Activities of Daily Living:  Feeding:  independence    Grooming: stand by assistance set up in sitting  Upper Body Dressing: minimum assistance in sitting secondary to monitors/lines  Lower Body Dressing: contact guard assistance    Toileting: Purewick in place      Cognitive/Visual Perceptual:  Pt alert and oriented    Physical Exam:  Upper Extremity Strength:    -       Right Upper Extremity: WFL  -       Left Upper Extremity: WFL    AMPAC 6 Click ADL:  AMPAC Total Score: 19    Treatment & Education:  OT provided education in role of OT. Patient verbalized understanding and participated in OT.  OT provided instruction in home safety with ADL/IADL including review of home set up and DME/AE. Patient verbalized understanding.  OT provided education in calling for assist. Patient verbalized understanding.        Patient left up in chair with all lines intact, call button in reach, and chair alarm on    GOALS:   Multidisciplinary Problems       Occupational Therapy Goals          Problem: Occupational Therapy    Goal Priority Disciplines Outcome Interventions   Occupational Therapy Goal     OT, PT/OT     Description: Goals to be met by: 10/19/24     Patient will increase functional independence with ADLs by performing:    UE Dressing with Modified [place].  LE Dressing with Modified [place].  Grooming while standing at sink with Modified [place].  Toileting from toilet with Modified [place] for hygiene and clothing management.   Bathing from  shower chair/bench with Modified [place].  Toilet transfer to toilet with Modified [place].  Increased strength and functional activity tolerance for  ADL's/IADL's                         History:     Past Medical History:   Diagnosis Date    Age-related osteoporosis without current pathological fracture 3/4/2020    Based upon bone density showing osteoporosis both at hips and lumbar spine.  Patient notified.  May consider bisphosphonates.         Past Surgical History:   Procedure Laterality Date    TONSILLECTOMY      WRIST SURGERY Right 2021       Time Tracking:     OT Date of Treatment: 09/21/24  OT Start Time: 1049  OT Stop Time: 1123  OT Total Time (min): 34 min    Billable Minutes:Evaluation 8  Self Care/Home Management 26    9/21/2024

## 2024-09-21 NOTE — PROGRESS NOTES
[facility]  Department of Neurology  Progress Note  Date: 2024 9:43 AM          Patient Name: Masha Hobbs   MRN: [id number]   : 1955    AGE: 68 y.o.    LOS: 4 days Hospital Day: 6  Admit date: 2024  1:02 PM         HPI Masha Hobbs is a 68 y.o. female presented to the hospital with headache, generalized weakness and fever.  She started having headache about 2 days ago which was holocephalic, 8/10 intensity and the same day she had a fever of 103.  Her  told her that she acted confused around that time.  He presented to the hospital for these symptoms.  She denies any unilateral weakness or sensory changes, neck pain or stiffness, nausea or vomiting.     In the ER, she had a CT head which was negative for acute pathology, lumbar puncture and was treated with Rocephin, vancomycin, valacyclovir and dexamethasone.  She was admitted to the hospital for further workup and management.     She states the headache is not getting better and it is about 2 to 3/10 in intensity.  Denies any other complaints at this time.       2024: No acute events overnight. Patient was seen and examined by me this morning.  She does have some receptive aphasia on exam today.    :  Patient was seen and examined.  She is somnolent, only opens eyes to repeated stimulus however does not follow commands or answer to any questions.    :  Patient was seen and examined by me.  Significantly improved mental status today.  Lumbar puncture and MRI were repeated yesterday.  She is currently oriented x3 and denies any complaints.  Family is at bedside.    : Patient seen and examined with Dr. Pardo. Discussed POC. Patient reports feeling much better. She is AAOx4. Denies HA. MRI with no significant interval change. Menningitis CSF panel negative.     Vitals:  Patient Vitals for the past 24 hrs:   BP Temp Temp src Pulse Resp SpO2   24 0530 -- 98.4 °F (36.9 °C) -- -- -- --   24 0500  107/64 -- -- 79 -- 98 %   09/21/24 0400 (!) 98/52 -- -- 84 -- 99 %   09/21/24 0300 (!) 107/55 -- -- 78 -- 97 %   09/21/24 0200 (!) 113/53 -- -- 76 -- 100 %   09/21/24 0100 (!) 103/53 98.7 °F (37.1 °C) -- 74 -- 98 %   09/21/24 0000 (!) 97/55 -- -- 91 -- 99 %   09/20/24 2300 (!) 106/57 -- -- 74 -- 97 %   09/20/24 2200 (!) 100/58 -- -- 76 16 98 %   09/20/24 2100 (!) 85/48 -- -- 91 -- 95 %   09/20/24 2000 (!) 98/56 -- -- 110 -- 97 %   09/20/24 1955 -- -- -- 110 18 97 %   09/20/24 1945 -- 98.3 °F (36.8 °C) -- -- -- --   09/20/24 1900 114/70 -- -- (!) 121 -- (!) 93 %   09/20/24 1837 125/80 (!) 102.2 °F (39 °C) -- (!) 118 -- (!) 90 %   09/20/24 1830 125/80 -- -- (!) 116 -- (!) 90 %   09/20/24 1800 (!) 119/58 -- -- (!) 113 -- (!) 90 %   09/20/24 1730 124/70 -- -- 109 -- (!) 90 %   09/20/24 1700 120/62 99.8 °F (37.7 °C) -- 104 -- (!) 92 %   09/20/24 1630 124/84 -- -- 105 -- (!) 90 %   09/20/24 1615 119/61 99.5 °F (37.5 °C) Oral 100 19 (!) 90 %   09/20/24 1530 139/63 -- -- 99 -- (!) 93 %   09/20/24 1500 117/60 -- -- 99 -- 97 %   09/20/24 1430 114/64 -- -- 92 -- (!) 92 %   09/20/24 1400 129/60 -- -- 90 19 95 %   09/20/24 1330 (!) 101/56 -- -- 91 20 96 %   09/20/24 1230 -- 98.8 °F (37.1 °C) Oral -- -- --   09/20/24 1200 133/61 -- -- 90 (!) 21 (!) 92 %   09/20/24 1130 130/63 -- -- 93 20 96 %   09/20/24 1100 (!) 117/57 98.8 °F (37.1 °C) -- 94 20 (!) 94 %   09/20/24 1030 (!) 120/58 -- -- 98 20 (!) 94 %   09/20/24 1000 (!) 118/56 -- -- 85 20 (!) 91 %   09/20/24 0930 (!) 100/54 -- -- 84 20 96 %   09/20/24 0900 115/63 98.6 °F (37 °C) -- 84 20 (!) 91 %   09/20/24 0809 -- -- -- 79 18 100 %   09/20/24 0808 -- -- -- 84 18 99 %     PHYSICAL EXAM:     GENERAL APPEARANCE: Alert, well-developed, well-nourished in no acute distress.  HEENT: Normocephalic and atraumatic. PERRL. Oropharynx unremarkable.  PULM: Normal respiratory effort. No accessory muscle use.  CV: RRR.  ABDOMEN: Soft, nontender.  EXTREMITIES: No obvious signs of vascular  compromise. Pulses present. No cyanosis, clubbing or edema.  SKIN: Clear; no rashes, lesions or skin breaks in exposed areas.    NEURO:NEURO:  MENTAL STATUS: Patient awake and oriented to time, place, and person. Recent/remote memory normal. Attention span/concentration normal. Speech fluent. Good comprehension, naming, and repetition. Fund of knowledge appropriate for patient's level of education. Affect normal.    CRANIAL NERVES:  Pupils equal round and reactive to light, extraocular movements are intact, face is grossly symmetric.    MOTOR: Normal bulk. Tone normal and symmetric throughout.  Strength 5/5 throughout.  No abnormal movements. No tremor.    REFLEXES: DTRs 2+; normal and symmetric throughout. Plantar response downgoing.    SENSATION: Sensation grossly intact to fine touch, pain/temperature, vibration and position.    COORDINATION: Finger-to-nose and heel to shin normal for age and symmetric. Finger tapping and alternating movements normal.    STATION and GAIT: not assessed       CURRENT SCHEDULED MEDICATIONS:   acyclovir  10 mg/kg (Ideal) Intravenous Q8H    ampicillin IV (PEDS and ADULTS)  2 g Intravenous Q4H    cefTRIAXone (Rocephin) IV (PEDS and ADULTS)  2 g Intravenous Q12H    famotidine  40 mg Oral Daily    vancomycin (VANCOCIN) IV (PEDS and ADULTS)  1,500 mg Intravenous Q18H     CURRENT INFUSIONS:    DATA:  Recent Labs   Lab 09/16/24  1347 09/17/24  0507 09/18/24  0536 09/18/24  1451 09/19/24  0457 09/19/24  2053 09/20/24  0356   * 135* 135*  --  134*  --  135*   K 4.1 3.9 3.7  --  3.1* 3.0* 3.3*    102 101  --  98  --  99   CO2 23 22* 22*  --  24  --  26   BUN 18 23 16  --  14  --  17   CREATININE 0.9 0.9 0.9  --  0.8  --  0.8   * 167* 112*  --  126*  --  113*   CALCIUM 9.5 9.1 9.0  --  8.3*  --  8.0*   PHOS 2.0* 2.3*  --   --   --   --   --    MG 1.9 1.9  --   --   --   --   --    AST 16 14 14  --  24  --  26   ALT 11 11 12  --  11  --  13   AMMONIA  --   --   --  32  --   --  "  --      Recent Labs   Lab 09/16/24  1347 09/19/24  0457   WBC 13.86* 15.66*   HGB 14.0 11.6*   HCT 42.4 35.2*    342     Lab Results   Component Value Date    PROTEINCSF 126 (H) 09/19/2024    GLUCCSF 51 09/19/2024     No results found for: "HGBA1C"         I have personally reviewed and interpreted the pertinent imaging and lab results.  Imaging Results              X-Ray Chest 1 View (Final result)  Result time 09/16/24 14:53:24      Final result by Farrukh Ramos MD (09/16/24 14:53:24)                   Impression:      No acute cardiopulmonary abnormality.      Electronically signed by: Farrukh Ramos  Date:    09/16/2024  Time:    14:53               Narrative:    EXAMINATION:  XR CHEST 1 VIEW    CLINICAL HISTORY:  Sepsis;    FINDINGS:  Portable chest at 1441 compared with 06/06/2024 shows unchanged cardiomediastinal silhouette.    Lungs are clear. Pulmonary vasculature is normal. No acute osseous abnormality.                                       CT Head Without Contrast (Final result)  Result time 09/16/24 14:52:17      Final result by Wang Acuña MD (09/16/24 14:52:17)                   Impression:      No CT evidence of acute intracranial pathology.      Electronically signed by: Wang Acuña  Date:    09/16/2024  Time:    14:52               Narrative:    EXAMINATION:  CT HEAD WITHOUT CONTRAST    CLINICAL HISTORY:  Mental status change, unknown cause;    TECHNIQUE:  Axial CT imaging obtained through the brain without IV contrast.    CMS Mandated Quality Data-CT Radiation Dose-436    All CT scans at this facility dose modulation, iterative reconstruction, and or weight-based dosing when appropriate to reduce radiation dose to as low as reasonably achievable.    COMPARISON:  None    FINDINGS:  Negative for acute intracranial hemorrhage, midline shift, or mass effect.  Ventricles and sulci are normal in size.  Gray-white differentiation is maintained.  Mild periventricular and deep white matter " hypoattenuation, consistent with microangiopathic change.  Cerebellar hemispheres and brainstem are unremarkable.  Atherosclerotic calcification of intracranial carotid arteries.    No calvarial lesion or fracture.  Mastoid air cells are clear.  Mucosal thickening right maxillary sinus.  Paranasal sinuses are otherwise clear.                                           ASSESSMENT AND PLAN:     Encephalitis   Headache  Encephalopathy     Plan:   LP with CSF analysis showed WBC 2038 (neutrophil predominant), protein 173 and glucose 50.  Negative meningitis encephalitis panel and CSF culture so far  MRI brain showed hyperintensity in the right medial temporal lobe on T2 FLAIR images concerning for encephalitis. MRI brain with contrast showed no contrast enhancement  Currently on vancomycin, Rocephin, ampicillin, acyclovir.  Negative meningitis/encephalitis PCR panel on CSF.  Infectious diseases following-appreciate further recommendations.    Repeat lumbar puncture showed CSF , protein 126 and glucose 51.  Negative CSF culture.  P.r.n. Tylenol/Toradol for headache.    EEG showed no seizures or epileptiform activity.  LTM EEG overnight showed no seizures or epileptiform activity.  Speech therapy evaluate and treat  PT OT evaluate and treat.    If patient's mental status had to decline again, will consider pulse dose steroids to treat as autoimmune encephalitis (as patient is CSF PCR panel has been negative and culture also been negative so far).   Repeat PCR meningitis/encephalitis panel Negative  Repeat MRI with no detrimental interval change  Will follow             Jacinta Mccann NP  Neurology/vascular Neurology  Date of Service: 09/21/2024  9:43 AM    Please note: This note was transcribed using voice recognition software. Because of this technology there are often uinintended grammatical, spelling, and other transcription errors. Please disregard these errors.    I, Dr. Rico Mendez, have personally seen and  examined the patient with my advanced provider and agree with above. I personally did a focused exam, and reviewed all necessary clinical information. I discussed my management plan with my NP and agree with above.   All side effects of medications were discussed with the patient and/or next of kin including severe mood changes, organ failure, lab abnormalities, rash, passing out, low blood pressure, glaucoma, cognitive changes, life threatening bleeding, passing out, glaucoma, rash, fatigue, weight gain and or weight loss, and death.  No driving until follow up at [facility].  Follow up at [facility] 3 days post discharge. Telehealth available.   Stroke (Including any acute neurological symptoms to return to the ER immediately and call 911, like acute weakness, severe headaches, sensory, visual or speech changes)  and seizure education provided.    At baseline. Recommend LTM outpatient. F/u neurology clinic.    Josemanuel Mendez MD. FAAN.    [facility]  [phone]

## 2024-09-21 NOTE — PLAN OF CARE
Goals to be met by: 10/19/24     Patient will increase functional independence with ADLs by performing:    UE Dressing with Modified [place].  LE Dressing with Modified [place].  Grooming while standing at sink with Modified [place].  Toileting from toilet with Modified [place] for hygiene and clothing management.   Bathing from  shower chair/bench with Modified [place].  Toilet transfer to toilet with Modified [place].  Increased strength and functional activity tolerance for ADL'

## 2024-09-21 NOTE — PROGRESS NOTES
Pharmacokinetic Assessment Follow Up: IV Vancomycin    Vancomycin serum concentration assessment(s):    The trough level was drawn correctly and can be used to guide therapy at this time. The measurement is below the desired definitive target range of 15 to 20 mcg/mL.    Vancomycin Regimen Plan:    Continue regimen to Vancomycin 1500 mg IV every 18 hours with next serum trough concentration measured at 2000 prior to third dose on 9/22    Drug levels (last 3 results):  Recent Labs   Lab Result Units 09/19/24  1726 09/21/24  0645   Vancomycin-Trough ug/mL 12.2 12.9       Pharmacy will continue to follow and monitor vancomycin.    Please contact pharmacy at extension [phone] for questions regarding this assessment.    Thank you for the consult,   Zahra Canales       Patient brief summary:  Masha Hobbs is a 68 y.o. female initiated on antimicrobial therapy with IV Vancomycin for treatment of meningitis    The patient's current regimen is every 18 hours    Drug Allergies:   Review of patient's allergies indicates:  No Known Allergies    Actual Body Weight:   64.4 kg    Renal Function:   Estimated Creatinine Clearance: 57 mL/min (based on SCr of 0.8 mg/dL).,     Dialysis Method (if applicable):  N/A    CBC (last 72 hours):  Recent Labs   Lab Result Units 09/19/24  0457   WBC K/uL 15.66*   Hemoglobin g/dL 11.6*   Hematocrit % 35.2*   Platelets K/uL 342   Gran % % 78.3*   Lymph % % 13.8*   Mono % % 6.8   Eosinophil % % 0.4   Basophil % % 0.4   Differential Method  Automated       Metabolic Panel (last 72 hours):  Recent Labs   Lab Result Units 09/19/24  0457 09/19/24  1527 09/19/24  2053 09/20/24  0356 09/21/24  0645   Sodium mmol/L 134*  --   --  135* 133*   Potassium mmol/L 3.1*  --  3.0* 3.3* 3.8   Chloride mmol/L 98  --   --  99 97   CO2 mmol/L 24  --   --  26 27   Glucose mg/dL 126*  --   --  113* 107   Glucose, CSF mg/dL  --  51  --   --   --    BUN mg/dL 14  --   --  17 15   Creatinine mg/dL 0.8  --   --  0.8 0.8    Albumin g/dL 2.7*  --   --  2.4* 2.5*   Total Bilirubin mg/dL 0.4  --   --  0.4 0.3   Alkaline Phosphatase U/L 60  --   --  63 51*   AST U/L 24  --   --  26 19   ALT U/L 11  --   --  13 16       Vancomycin Administrations:  vancomycin given in the last 96 hours                     vancomycin 1,500 mg in D5W 250 mL IVPB (admixture device) (mg) 1,500 mg New Bag 09/20/24 1508     1,500 mg New Bag 09/19/24 1948    vancomycin 1,250 mg in D5W 250 mL IVPB (admixture device) (mg) 1,250 mg New Bag 09/19/24 1845     1,250 mg New Bag 09/18/24 2358      Restarted  0749     1,250 mg New Bag  0645     1,250 mg New Bag 09/17/24 1257                    Microbiologic Results:  Microbiology Results (last 7 days)       Procedure Component Value Units Date/Time    CSF culture [[id number]] Collected: 09/19/24 1527    Order Status: Completed Specimen: CSF (Spinal Fluid) from CSF Tap, Tube 2 Updated: 09/21/24 0740     CSF CULTURE No Growth to date     Gram Stain Result No epithelial cells      No organisms seen      Many WBC's      09/19/2024  17:32 tn3    Blood culture x two cultures. Draw prior to antibiotics [[id number]] Collected: 09/16/24 1347    Order Status: Completed Specimen: Blood from Peripheral, Antecubital, Right Updated: 09/20/24 1432     Blood Culture, Routine No Growth to date      No Growth to date      No Growth to date      No Growth to date      No Growth to date    Narrative:      Aerobic and anaerobic    Blood culture x two cultures. Draw prior to antibiotics [[id number]] Collected: 09/16/24 1358    Order Status: Completed Specimen: Blood from Peripheral, Antecubital, Left Updated: 09/20/24 1432     Blood Culture, Routine No Growth to date      No Growth to date      No Growth to date      No Growth to date      No Growth to date    Narrative:      Aerobic and anaerobic    Cryptococcal antigen, blood [[id number]] Collected: 09/20/24 1106    Order Status: Sent Specimen: Blood, Venous Updated: 09/20/24 1112    CSF  culture and Gram Stain (Tube 2) [[id number]] Collected: 09/16/24 1740    Order Status: Completed Specimen: CSF (Spinal Fluid) from CSF Tap, Tube 2 Updated: 09/20/24 1040     CSF CULTURE No Growth     Gram Stain Result Few WBC's      No organisms seen    Narrative:      On which sequentially labeled tube should this analysis be  performed?->2    CSF culture [[id number]]     Order Status: Canceled Specimen: CSF (Spinal Fluid)     CSF culture [[id number]]     Order Status: Canceled Specimen: CSF (Spinal Fluid)
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
## 2024-09-21 NOTE — PT/OT/SLP PROGRESS
Physical Therapy Treatment    Patient Name:  Masha Hobbs   MRN:  [id number]    Recommendations:     Discharge Recommendations: No Therapy Indicated  Discharge Equipment Recommendations: none  Barriers to discharge: None    Assessment:     Masha Hobbs is a 68 y.o. female admitted with a medical diagnosis of Acute encephalopathy.  She presents with the following impairments/functional limitations: weakness, impaired endurance, impaired functional mobility, gait instability, impaired cardiopulmonary response to activity . Awake , alert, supine in bed with family at bedside.  Agreed to participate in therapy.  Sup > sit EOB with CGA.  Donned socks and gown.  Sit > stand with CGA.  Ambulated 250' with CGA , IV and O2 in tow.  Returned to room , sat EOB briefly. Dr. Torres arrived.  Ambulated to chair at bedside with CGA.     Rehab Prognosis: Good; patient would benefit from acute skilled PT services to address these deficits and reach maximum level of function.    Recent Surgery: * No surgery found *      Plan:     During this hospitalization, patient to be seen 6 x/week to address the identified rehab impairments via gait training, therapeutic activities, therapeutic exercises and progress toward the following goals:    Plan of Care Expires:  10/21/24    Subjective     Chief Complaint: weakness  Patient/Family Comments/goals: to return home  Pain/Comfort:  Pain Rating 1: 0/10      Objective:     Communicated with nurse Muñoz prior to session.  Patient found supine with bed alarm, blood pressure cuff, oxygen, PureWick, pulse ox (continuous), telemetry upon PT entry to room.     General Precautions: Standard, fall  Orthopedic Precautions: N/A  Braces: N/A  Respiratory Status: Nasal cannula, flow 2 L/min     Functional Mobility:  Bed Mobility:     Supine to Sit: contact guard assistance  Transfers:     Sit to Stand:  contact guard assistance with no AD  Gait: 250' with CGA, IV and O2 in tow.      AM-PAC 6 CLICK  MOBILITY          Treatment & Education:  Ambulated with CGA for safety.     Patient left up in chair with all lines intact, call button in reach, chair alarm on, nurse Wanda notified, and family and Dr. galloway..    GOALS:   Multidisciplinary Problems       Physical Therapy Goals       Not on file                    Time Tracking:     PT Received On: 09/21/24  PT Start Time: 0902     PT Stop Time: 0918  PT Total Time (min): 16 min     Billable Minutes: Gait Training 16min    Treatment Type: Treatment  PT/PTA: PTA     Number of PTA visits since last PT visit: 1 09/21/2024

## 2024-09-21 NOTE — PROGRESS NOTES
Masha Hobbs [id number] is a 68 y.o. female who has been consulted for vancomycin dosing.    Pharmacy consult for vancomycin dosing in no longer required.  Vancomycin was discontinued.    Thank you for allowing us to participate in this patient's care.     Zahra Canales, PharmD

## 2024-09-21 NOTE — PROGRESS NOTES
[facility] Medicine  Progress Note    Patient Name: Masha Hobbs  MRN: [id number]  Patient Class: IP- Inpatient   Admission Date: 9/16/2024  Length of Stay: 4 days  Attending Physician: Berta Chandra MD  Primary Care Provider: Roney Bang MD        Subjective:     Principal Problem:Acute encephalopathy        HPI:  Masha Hobbs is a 68 year old female with a past medical history of osteoporosis and chronic lower back pain with radiculopathy who was transferred from [facility] due to confusion associated with headache, fever, and generalized weakness/body aches for several hours prior to arrival.  Per  patient was confused since 4:00 p.m. Upon assessment confusion has resolved.  She denies any complaints other than a intermittent frontal lobe headache that is improving after Toradol administration.  There are no acute neurological focal deficits.  She denies chest pain, shortness of breath, dizziness, lightheadedness, vision changes, speech changes, numbness/tingling, neck pain, abdominal pain, nausea, or vomiting.  ED workup reveals:  CT head negative for acute intracranial hemorrhage. Chest x-ray no evidence of acute cardiopulmonary findings.  Lumbar puncture performed in ED, pending results.  She was treated with vancomycin, Rocephin, valacyclovir p.o., and dexamethasone.  CBC with elevated white count 13.8 and stable H&H.  BMP with phosphorus 2.0 otherwise unremarkable.  Negative flu/COVID.  Urinalysis negative.  Lactic acid 1.1.  ECG negative for ischemia or infarct.  MRI brain pending.  Neurology consult placed.  Admitted to hospital medicine for further management and treatment.            Overview/Hospital Course:  Patient is a 68 year old female admitted with AMS, headache and fever. LP was suggestive of meningitis with 2K WBC, mainly neutrophils. HSV panel was negative. Patient was started on Vancomycin, Ampicillin and Rocephin. Acyclovir was discontinued after  D1. MRI shows encephalitis findings with ventriculitis. Patient is being followed by neurology and ID. EEG has been ordered which is consistent with encephalopathy but no epileptiform activity. Patient continue to spike fever and is vomiting. Stat MRI was repeated with rpt LP was ordered. Rpt MRI did not show any changes. LP shows reduced WBC compared to the first one. Patient's mental status improved.     Interval History: Patient is alert and oriented and completely back to baseline today. ID has made adjustments in the abx therapy.    Review of Systems   Constitutional:  Negative for activity change, appetite change, chills and fever.   HENT:  Negative for congestion, ear pain, nosebleeds and sinus pain.    Eyes:  Negative for discharge and itching.   Respiratory:  Negative for apnea, cough, chest tightness and shortness of breath.    Cardiovascular:  Negative for chest pain, palpitations and leg swelling.   Gastrointestinal:  Negative for abdominal distention, abdominal pain and vomiting.   Genitourinary:  Negative for difficulty urinating, dysuria, flank pain and frequency.   Musculoskeletal:  Negative for arthralgias, back pain, joint swelling and myalgias.   Skin:  Negative for color change, pallor and rash.   Neurological:  Negative for dizziness, weakness, light-headedness and headaches.   Psychiatric/Behavioral:  Negative for agitation, behavioral problems, confusion and suicidal ideas.      Objective:     Vital Signs (Most Recent):  Temp: 98.4 °F (36.9 °C) (09/21/24 0530)  Pulse: 83 (09/21/24 1004)  Resp: 16 (09/21/24 1004)  BP: 116/66 (09/21/24 1004)  SpO2: 99 % (09/21/24 1004) Vital Signs (24h Range):  Temp:  [98.3 °F (36.8 °C)-102.2 °F (39 °C)] 98.4 °F (36.9 °C)  Pulse:  [] 83  Resp:  [16-21] 16  SpO2:  [90 %-100 %] 99 %  BP: ()/(48-84) 116/66     Weight: 64.4 kg (142 lb)  Body mass index is 26.83 kg/m².    Intake/Output Summary (Last 24 hours) at 9/21/2024 1131  Last data filed at  "9/21/2024 0534  Gross per 24 hour   Intake 510 ml   Output 500 ml   Net 10 ml         Physical Exam  Vitals and nursing note reviewed.   Constitutional:     Alert  Eyes:      General: No visual field deficit.     Pupils: Pupils are equal, round, and reactive to light.   Cardiovascular:      Rate and Rhythm: Normal rate and regular rhythm.      Pulses: Normal pulses.      Heart sounds: Normal heart sounds.   Pulmonary:      Effort: Pulmonary effort is normal. No respiratory distress.      Breath sounds: Normal breath sounds. No wheezing.   Abdominal:      General: Bowel sounds are normal. There is no distension.      Palpations: Abdomen is soft.      Tenderness: There is no abdominal tenderness.   Musculoskeletal:      Right lower leg: No edema.      Left lower leg: No edema.   Skin:     General: Skin is warm and dry.      Capillary Refill: Capillary refill takes less than 2 seconds.   Neurological:      Mental Status: back to baseline, alert and oriented X 3     Significant Labs: All pertinent labs within the past 24 hours have been reviewed.  CBC:   No results for input(s): "WBC", "HGB", "HCT", "PLT" in the last 48 hours.    CMP:   Recent Labs   Lab 09/19/24 2053 09/20/24  0356 09/21/24  0645   NA  --  135* 133*   K 3.0* 3.3* 3.8   CL  --  99 97   CO2  --  26 27   GLU  --  113* 107   BUN  --  17 15   CREATININE  --  0.8 0.8   CALCIUM  --  8.0* 8.4*   PROT  --  6.1 6.1   ALBUMIN  --  2.4* 2.5*   BILITOT  --  0.4 0.3   ALKPHOS  --  63 51*   AST  --  26 19   ALT  --  13 16   ANIONGAP  --  10 9       Significant Imaging: I have reviewed all pertinent imaging results/findings within the past 24 hours.  Physical Exam  Constitutional:       Appearance: Normal appearance.         Assessment/Plan:      * Acute encephalopathy  Likely secondary to bacterial meningitis.   Mental status returned to baseline today   MRI shows encephalitis and ventriculitis findings   Ammonia, TSH is normal   Neurology following   EEG shows " encephalopathy, no epileptiform activity noted.   Rpt LP shows improved WBC and MRI similar to prior   Opening pressures are normal         HFrEF (heart failure with reduced ejection fraction)  New onset   Echo show Takotsubo pattern   Cardiology was consulted and recommend ischemic work up when completely treated for meningitis, and starting toprol at that time. They have signed off until meningitis treatment is complete.  Clinically euvolemic        Leukocytosis  Due to above       Meningitis  LP shows > 2000 WBC with neurophil predominance, protein is high normal glucose   MRI shows encephalitis and ventriculitis findings   HSV and meningitis panel was all negative   EEG shows encephalopathy and no epileptiform activity   Discussed with neurology and ID  MRI and LP was repeated on 9/19 shows WBC improved, but other wise similar findings.   - Cont abx and acyclovir as per ID recommendations          VTE Risk Mitigation (From admission, onward)           Ordered     IP VTE LOW RISK PATIENT  Once         09/17/24 0239     Place sequential compression device  Until discontinued         09/17/24 0239                    Discharge Planning   WILVER: 9/23/2024     Code Status: Full Code   Is the patient medically ready for discharge?:     Reason for patient still in hospital (select all that apply): Patient trending condition  Discharge Plan A: Home with family                  Berta Chandra MD, MD  Department of Hospital Medicine   [place]/Surg

## 2024-09-21 NOTE — ASSESSMENT & PLAN NOTE
LP shows > 2000 WBC with neurophil predominance, protein is high normal glucose   MRI shows encephalitis and ventriculitis findings   HSV and meningitis panel was all negative   EEG shows encephalopathy and no epileptiform activity   Discussed with neurology and ID  MRI and LP was repeated on 9/19 shows WBC improved, but other wise similar findings.   - Cont abx and acyclovir as per ID recommendations

## 2024-09-21 NOTE — CARE UPDATE
09/21/24 1004   Patient Assessment/Suction   Level of Consciousness (AVPU) alert   PRE-TX-O2   Device (Oxygen Therapy) room air   SpO2 99 %   Pulse Oximetry Type Continuous   $ Pulse Oximetry - Multiple Charge Pulse Oximetry - Multiple   Oximetry Probe Status Changed;Intact   Pulse 83   Resp 16   /66

## 2024-09-21 NOTE — SUBJECTIVE & OBJECTIVE
Interval History: Patient is alert and oriented and completely back to baseline today. ID has made adjustments in the abx therapy.    Review of Systems   Constitutional:  Negative for activity change, appetite change, chills and fever.   HENT:  Negative for congestion, ear pain, nosebleeds and sinus pain.    Eyes:  Negative for discharge and itching.   Respiratory:  Negative for apnea, cough, chest tightness and shortness of breath.    Cardiovascular:  Negative for chest pain, palpitations and leg swelling.   Gastrointestinal:  Negative for abdominal distention, abdominal pain and vomiting.   Genitourinary:  Negative for difficulty urinating, dysuria, flank pain and frequency.   Musculoskeletal:  Negative for arthralgias, back pain, joint swelling and myalgias.   Skin:  Negative for color change, pallor and rash.   Neurological:  Negative for dizziness, weakness, light-headedness and headaches.   Psychiatric/Behavioral:  Negative for agitation, behavioral problems, confusion and suicidal ideas.      Objective:     Vital Signs (Most Recent):  Temp: 98.4 °F (36.9 °C) (09/21/24 0530)  Pulse: 83 (09/21/24 1004)  Resp: 16 (09/21/24 1004)  BP: 116/66 (09/21/24 1004)  SpO2: 99 % (09/21/24 1004) Vital Signs (24h Range):  Temp:  [98.3 °F (36.8 °C)-102.2 °F (39 °C)] 98.4 °F (36.9 °C)  Pulse:  [] 83  Resp:  [16-21] 16  SpO2:  [90 %-100 %] 99 %  BP: ()/(48-84) 116/66     Weight: 64.4 kg (142 lb)  Body mass index is 26.83 kg/m².    Intake/Output Summary (Last 24 hours) at 9/21/2024 1131  Last data filed at 9/21/2024 0534  Gross per 24 hour   Intake 510 ml   Output 500 ml   Net 10 ml         Physical Exam  Vitals and nursing note reviewed.   Constitutional:     Alert  Eyes:      General: No visual field deficit.     Pupils: Pupils are equal, round, and reactive to light.   Cardiovascular:      Rate and Rhythm: Normal rate and regular rhythm.      Pulses: Normal pulses.      Heart sounds: Normal heart sounds.  "  Pulmonary:      Effort: Pulmonary effort is normal. No respiratory distress.      Breath sounds: Normal breath sounds. No wheezing.   Abdominal:      General: Bowel sounds are normal. There is no distension.      Palpations: Abdomen is soft.      Tenderness: There is no abdominal tenderness.   Musculoskeletal:      Right lower leg: No edema.      Left lower leg: No edema.   Skin:     General: Skin is warm and dry.      Capillary Refill: Capillary refill takes less than 2 seconds.   Neurological:      Mental Status: back to baseline, alert and oriented X 3     Significant Labs: All pertinent labs within the past 24 hours have been reviewed.  CBC:   No results for input(s): "WBC", "HGB", "HCT", "PLT" in the last 48 hours.    CMP:   Recent Labs   Lab 09/19/24  2053 09/20/24  0356 09/21/24  0645   NA  --  135* 133*   K 3.0* 3.3* 3.8   CL  --  99 97   CO2  --  26 27   GLU  --  113* 107   BUN  --  17 15   CREATININE  --  0.8 0.8   CALCIUM  --  8.0* 8.4*   PROT  --  6.1 6.1   ALBUMIN  --  2.4* 2.5*   BILITOT  --  0.4 0.3   ALKPHOS  --  63 51*   AST  --  26 19   ALT  --  13 16   ANIONGAP  --  10 9       Significant Imaging: I have reviewed all pertinent imaging results/findings within the past 24 hours.  Physical Exam  Constitutional:       Appearance: Normal appearance.       "

## 2024-09-21 NOTE — EICU
Intervention Initiated From:  [facility] / [facility] Cross intervened regarding:  Rounding (Video assessment)    Comments: Video rounding complete. Pt sitting up in chair, voices no complaints at present. No acute distress noted, VS per flowsheet.

## 2024-09-21 NOTE — ASSESSMENT & PLAN NOTE
New onset   Echo show Takotsubo pattern   Cardiology was consulted and recommend ischemic work up when completely treated for meningitis, and starting toprol at that time. They have signed off until meningitis treatment is complete.  Clinically euvolemic

## 2024-09-21 NOTE — EICU
Intervention Initiated From:  [facility] / EICU    Tay intervened regarding:  Rounding (Video assessment)    Nurse Notified:  Yes    Comments: Remote video rounding performed. Patient found resting in bed. No signs of acute distress noted.         Fever of 102.2 oral charted at 1837.   I've sent a secure chat to the primary nurse, to see if there is plan to administer antipyretic.           For questions or patient concerns, please reach out to eICU.

## 2024-09-22 LAB
ALBUMIN SERPL BCP-MCNC: 2.7 G/DL (ref 3.5–5.2)
ALP SERPL-CCNC: 57 U/L (ref 55–135)
ALT SERPL W/O P-5'-P-CCNC: 20 U/L (ref 10–44)
ANION GAP SERPL CALC-SCNC: 12 MMOL/L (ref 8–16)
AST SERPL-CCNC: 20 U/L (ref 10–40)
BASOPHILS # BLD AUTO: 0.1 K/UL (ref 0–0.2)
BASOPHILS NFR BLD: 0.6 % (ref 0–1.9)
BILIRUB SERPL-MCNC: 0.5 MG/DL (ref 0.1–1)
BUN SERPL-MCNC: 14 MG/DL (ref 8–23)
CALCIUM SERPL-MCNC: 8.9 MG/DL (ref 8.7–10.5)
CHLORIDE SERPL-SCNC: 98 MMOL/L (ref 95–110)
CO2 SERPL-SCNC: 21 MMOL/L (ref 23–29)
CREAT SERPL-MCNC: 0.8 MG/DL (ref 0.5–1.4)
DIFFERENTIAL METHOD BLD: ABNORMAL
EOSINOPHIL # BLD AUTO: 0.3 K/UL (ref 0–0.5)
EOSINOPHIL NFR BLD: 1.7 % (ref 0–8)
ERYTHROCYTE [DISTWIDTH] IN BLOOD BY AUTOMATED COUNT: 12.2 % (ref 11.5–14.5)
EST. GFR  (NO RACE VARIABLE): >60 ML/MIN/1.73 M^2
GLUCOSE SERPL-MCNC: 100 MG/DL (ref 70–110)
HCT VFR BLD AUTO: 38.3 % (ref 37–48.5)
HGB BLD-MCNC: 12.9 G/DL (ref 12–16)
IMM GRANULOCYTES # BLD AUTO: 0.07 K/UL (ref 0–0.04)
IMM GRANULOCYTES NFR BLD AUTO: 0.5 % (ref 0–0.5)
LYMPHOCYTES # BLD AUTO: 2.3 K/UL (ref 1–4.8)
LYMPHOCYTES NFR BLD: 14.5 % (ref 18–48)
MCH RBC QN AUTO: 31.9 PG (ref 27–31)
MCHC RBC AUTO-ENTMCNC: 33.7 G/DL (ref 32–36)
MCV RBC AUTO: 95 FL (ref 82–98)
MONOCYTES # BLD AUTO: 1.3 K/UL (ref 0.3–1)
MONOCYTES NFR BLD: 8.4 % (ref 4–15)
NEUTROPHILS # BLD AUTO: 11.5 K/UL (ref 1.8–7.7)
NEUTROPHILS NFR BLD: 74.3 % (ref 38–73)
NRBC BLD-RTO: 0 /100 WBC
OHS QRS DURATION: 102 MS
OHS QTC CALCULATION: 452 MS
PLATELET # BLD AUTO: 361 K/UL (ref 150–450)
PMV BLD AUTO: 9.9 FL (ref 9.2–12.9)
POTASSIUM SERPL-SCNC: 3.7 MMOL/L (ref 3.5–5.1)
PROT SERPL-MCNC: 6.9 G/DL (ref 6–8.4)
RBC # BLD AUTO: 4.05 M/UL (ref 4–5.4)
SARS-COV-2 RDRP RESP QL NAA+PROBE: NEGATIVE
SODIUM SERPL-SCNC: 131 MMOL/L (ref 136–145)
WBC # BLD AUTO: 15.52 K/UL (ref 3.9–12.7)

## 2024-09-22 PROCEDURE — 25000003 PHARM REV CODE 250: Performed by: NURSE PRACTITIONER

## 2024-09-22 PROCEDURE — 86593 SYPHILIS TEST NON-TREP QUANT: CPT | Mod: 91,HCNC | Performed by: INTERNAL MEDICINE

## 2024-09-22 PROCEDURE — 86593 SYPHILIS TEST NON-TREP QUANT: CPT | Performed by: INTERNAL MEDICINE

## 2024-09-22 PROCEDURE — 25000003 PHARM REV CODE 250

## 2024-09-22 PROCEDURE — 25000003 PHARM REV CODE 250: Performed by: INTERNAL MEDICINE

## 2024-09-22 PROCEDURE — 86592 SYPHILIS TEST NON-TREP QUAL: CPT | Mod: HCNC | Performed by: INTERNAL MEDICINE

## 2024-09-22 PROCEDURE — 20600001 HC STEP DOWN PRIVATE ROOM

## 2024-09-22 PROCEDURE — 36415 COLL VENOUS BLD VENIPUNCTURE: CPT

## 2024-09-22 PROCEDURE — 86617 LYME DISEASE ANTIBODY: CPT | Mod: 59 | Performed by: INTERNAL MEDICINE

## 2024-09-22 PROCEDURE — 85025 COMPLETE CBC W/AUTO DIFF WBC: CPT | Performed by: INTERNAL MEDICINE

## 2024-09-22 PROCEDURE — U0002 COVID-19 LAB TEST NON-CDC: HCPCS | Performed by: INTERNAL MEDICINE

## 2024-09-22 PROCEDURE — 11000001 HC ACUTE MED/SURG PRIVATE ROOM

## 2024-09-22 PROCEDURE — 63600175 PHARM REV CODE 636 W HCPCS: Performed by: INTERNAL MEDICINE

## 2024-09-22 PROCEDURE — 36415 COLL VENOUS BLD VENIPUNCTURE: CPT | Performed by: INTERNAL MEDICINE

## 2024-09-22 PROCEDURE — 87389 HIV-1 AG W/HIV-1&-2 AB AG IA: CPT | Performed by: INTERNAL MEDICINE

## 2024-09-22 PROCEDURE — 94761 N-INVAS EAR/PLS OXIMETRY MLT: CPT

## 2024-09-22 PROCEDURE — 87389 HIV-1 AG W/HIV-1&-2 AB AG IA: CPT | Mod: 91 | Performed by: INTERNAL MEDICINE

## 2024-09-22 PROCEDURE — 80053 COMPREHEN METABOLIC PANEL: CPT

## 2024-09-22 RX ORDER — ACETAMINOPHEN 325 MG/1
650 TABLET ORAL EVERY 6 HOURS PRN
Status: DISCONTINUED | OUTPATIENT
Start: 2024-09-22 | End: 2024-09-30 | Stop reason: HOSPADM

## 2024-09-22 RX ADMIN — ACYCLOVIR SODIUM 480 MG: 50 INJECTION, SOLUTION INTRAVENOUS at 11:09

## 2024-09-22 RX ADMIN — IBUPROFEN 600 MG: 600 TABLET ORAL at 09:09

## 2024-09-22 RX ADMIN — LEUCINE, PHENYLALANINE, LYSINE, METHIONINE, ISOLEUCINE, VALINE, HISTIDINE, THREONINE, TRYPTOPHAN, ALANINE, GLYCINE, ARGININE, PROLINE, SERINE, TYROSINE, SODIUM ACETATE, DIBASIC POTASSIUM PHOSPHATE, MAGNESIUM CHLORIDE, SODIUM CHLORIDE, CALCIUM CHLORIDE, DEXTROSE
311; 238; 247; 170; 255; 247; 204; 179; 77; 880; 438; 489; 289; 213; 17; 297; 261; 51; 77; 33; 5 INJECTION INTRAVENOUS at 04:09

## 2024-09-22 RX ADMIN — ACYCLOVIR SODIUM 480 MG: 50 INJECTION, SOLUTION INTRAVENOUS at 02:09

## 2024-09-22 RX ADMIN — CEFTRIAXONE 2 G: 2 INJECTION, POWDER, FOR SOLUTION INTRAMUSCULAR; INTRAVENOUS at 04:09

## 2024-09-22 RX ADMIN — ACETAMINOPHEN 650 MG: 325 TABLET ORAL at 09:09

## 2024-09-22 RX ADMIN — ACETAMINOPHEN 650 MG: 325 TABLET ORAL at 07:09

## 2024-09-22 RX ADMIN — DOCUSATE SODIUM AND SENNOSIDES 1 TABLET: 8.6; 5 TABLET, FILM COATED ORAL at 05:09

## 2024-09-22 RX ADMIN — DOXYCYCLINE 100 MG: 100 INJECTION, POWDER, LYOPHILIZED, FOR SOLUTION INTRAVENOUS at 09:09

## 2024-09-22 RX ADMIN — ACYCLOVIR SODIUM 480 MG: 50 INJECTION, SOLUTION INTRAVENOUS at 05:09

## 2024-09-22 RX ADMIN — FAMOTIDINE 40 MG: 20 TABLET, FILM COATED ORAL at 09:09

## 2024-09-22 RX ADMIN — CEFTRIAXONE 2 G: 2 INJECTION, POWDER, FOR SOLUTION INTRAMUSCULAR; INTRAVENOUS at 05:09

## 2024-09-22 NOTE — ASSESSMENT & PLAN NOTE
Likely secondary to bacterial meningitis.   Mental status returned to baseline   MRI shows encephalitis and ventriculitis findings   Ammonia, TSH is normal   Neurology following   EEG shows encephalopathy, no epileptiform activity noted.   Rpt LP shows improved WBC and MRI similar to prior   Opening pressures are normal

## 2024-09-22 NOTE — NURSING
Daughter and spouse at bedside with questions, would like to speak with Doctor.    [id number] Dr Chandra called phone given to family to discuss plan of care.

## 2024-09-22 NOTE — PLAN OF CARE
Problem: Adult Inpatient Plan of Care  Goal: Plan of Care Review  Outcome: Progressing  Goal: Patient-Specific Goal (Individualized)  Outcome: Progressing  Goal: Absence of Hospital-Acquired Illness or Injury  Outcome: Progressing  Goal: Optimal Comfort and Wellbeing  Outcome: Progressing  Goal: Readiness for Transition of Care  Outcome: Progressing     Problem: Pain Acute  Goal: Optimal Pain Control and Function  Outcome: Progressing     Problem: Fall Injury Risk  Goal: Absence of Fall and Fall-Related Injury  Outcome: Progressing     Problem: Meningitis/Encephalitis  Goal: Optimal Coping  Outcome: Progressing  Goal: Absence of Infection Signs and Symptoms  Outcome: Progressing

## 2024-09-22 NOTE — CARE UPDATE
09/21/24 1916   Patient Assessment/Suction   Level of Consciousness (AVPU) alert   Respiratory Effort Normal   Rhythm/Pattern, Respiratory pattern regular   Cough Frequency no cough   PRE-TX-O2   Device (Oxygen Therapy) room air   SpO2 95 %   Pulse 77   Resp (!) 24

## 2024-09-22 NOTE — PLAN OF CARE
Problem: Adult Inpatient Plan of Care  Goal: Plan of Care Review  Outcome: Progressing  Goal: Readiness for Transition of Care  Outcome: Progressing     Problem: Infection  Goal: Absence of Infection Signs and Symptoms  Outcome: Progressing     Problem: Fall Injury Risk  Goal: Absence of Fall and Fall-Related Injury  Outcome: Progressing     Problem: Meningitis/Encephalitis  Goal: Optimal Coping  Outcome: Progressing  Goal: Absence of Infection Signs and Symptoms  Outcome: Progressing     Problem: Skin Injury Risk Increased  Goal: Skin Health and Integrity  Outcome: Progressing   Pt is alert and oriented x4. No complaints of headache or pain noted. Iv antibiotic infusing as ordered. Bed alarm set. Call light within reach.

## 2024-09-22 NOTE — EICU
Intervention Initiated From:  [facility] / [facility]    Tay intervened regarding:  Rounding (Video assessment)    Comments: Video rounding complete. Pt resting in bed, no acute distress noted. VS per flowsheet.

## 2024-09-22 NOTE — EICU
Intervention Initiated From:  [facility] / EICU    Tay intervened regarding:  Rounding (Video assessment)      Comments: Remote video rounding performed. Patient found resting in bed. No signs of acute distress noted.     For questions or patient concerns, please reach out to eICU.

## 2024-09-22 NOTE — SUBJECTIVE & OBJECTIVE
Interval History: Patient's mental status back to baseline. She is sleepy today, but can hold conversation. ID has made adjustments in the abx therapy.    Review of Systems   Constitutional:  Negative for activity change, appetite change, chills and fever.   HENT:  Negative for congestion, ear pain, nosebleeds and sinus pain.    Eyes:  Negative for discharge and itching.   Respiratory:  Negative for apnea, cough, chest tightness and shortness of breath.    Cardiovascular:  Negative for chest pain, palpitations and leg swelling.   Gastrointestinal:  Negative for abdominal distention, abdominal pain and vomiting.   Genitourinary:  Negative for difficulty urinating, dysuria, flank pain and frequency.   Musculoskeletal:  Negative for arthralgias, back pain, joint swelling and myalgias.   Skin:  Negative for color change, pallor and rash.   Neurological:  Negative for dizziness, weakness, light-headedness and headaches.   Psychiatric/Behavioral:  Negative for agitation, behavioral problems, confusion and suicidal ideas.      Objective:     Vital Signs (Most Recent):  Temp: 98.2 °F (36.8 °C) (09/22/24 1130)  Pulse: 98 (09/22/24 1100)  Resp: 14 (09/22/24 1100)  BP: 127/66 (09/22/24 1100)  SpO2: (!) 94 % (09/22/24 1100) Vital Signs (24h Range):  Temp:  [97.1 °F (36.2 °C)-100.3 °F (37.9 °C)] 98.2 °F (36.8 °C)  Pulse:  [] 98  Resp:  [12-30] 14  SpO2:  [91 %-100 %] 94 %  BP: ()/(55-87) 127/66     Weight: 62.9 kg (138 lb 10.7 oz)  Body mass index is 26.2 kg/m².    Intake/Output Summary (Last 24 hours) at 9/22/2024 1146  Last data filed at 9/22/2024 0348  Gross per 24 hour   Intake 590 ml   Output 1300 ml   Net -710 ml         Physical Exam  Vitals and nursing note reviewed.   Constitutional:     Alert  Eyes:      General: No visual field deficit.     Pupils: Pupils are equal, round, and reactive to light.   Cardiovascular:      Rate and Rhythm: Normal rate and regular rhythm.      Pulses: Normal pulses.      Heart  sounds: Normal heart sounds.   Pulmonary:      Effort: Pulmonary effort is normal. No respiratory distress.      Breath sounds: Normal breath sounds. No wheezing.   Abdominal:      General: Bowel sounds are normal. There is no distension.      Palpations: Abdomen is soft.      Tenderness: There is no abdominal tenderness.   Musculoskeletal:      Right lower leg: No edema.      Left lower leg: No edema.   Skin:     General: Skin is warm and dry.      Capillary Refill: Capillary refill takes less than 2 seconds.   Neurological:      Mental Status: back to baseline, alert and oriented X 3     Significant Labs: All pertinent labs within the past 24 hours have been reviewed.  CBC:   Recent Labs   Lab 09/22/24  0546   WBC 15.52*   HGB 12.9   HCT 38.3          CMP:   Recent Labs   Lab 09/21/24  0645 09/22/24  0546   * 131*   K 3.8 3.7   CL 97 98   CO2 27 21*    100   BUN 15 14   CREATININE 0.8 0.8   CALCIUM 8.4* 8.9   PROT 6.1 6.9   ALBUMIN 2.5* 2.7*   BILITOT 0.3 0.5   ALKPHOS 51* 57   AST 19 20   ALT 16 20   ANIONGAP 9 12       Significant Imaging: I have reviewed all pertinent imaging results/findings within the past 24 hours.  Physical Exam  Constitutional:       Appearance: Normal appearance.

## 2024-09-22 NOTE — PROGRESS NOTES
[facility] Medicine  Progress Note    Patient Name: Masha Hobbs  MRN: [id number]  Patient Class: IP- Inpatient   Admission Date: 9/16/2024  Length of Stay: 5 days  Attending Physician: Berta Chandra MD  Primary Care Provider: Roney Bang MD        Subjective:     Principal Problem:Acute encephalopathy        HPI:  Masha Hobbs is a 68 year old female with a past medical history of osteoporosis and chronic lower back pain with radiculopathy who was transferred from [facility] due to confusion associated with headache, fever, and generalized weakness/body aches for several hours prior to arrival.  Per  patient was confused since 4:00 p.m. Upon assessment confusion has resolved.  She denies any complaints other than a intermittent frontal lobe headache that is improving after Toradol administration.  There are no acute neurological focal deficits.  She denies chest pain, shortness of breath, dizziness, lightheadedness, vision changes, speech changes, numbness/tingling, neck pain, abdominal pain, nausea, or vomiting.  ED workup reveals:  CT head negative for acute intracranial hemorrhage. Chest x-ray no evidence of acute cardiopulmonary findings.  Lumbar puncture performed in ED, pending results.  She was treated with vancomycin, Rocephin, valacyclovir p.o., and dexamethasone.  CBC with elevated white count 13.8 and stable H&H.  BMP with phosphorus 2.0 otherwise unremarkable.  Negative flu/COVID.  Urinalysis negative.  Lactic acid 1.1.  ECG negative for ischemia or infarct.  MRI brain pending.  Neurology consult placed.  Admitted to hospital medicine for further management and treatment.            Overview/Hospital Course:  Patient is a 68 year old female admitted with AMS, headache and fever. LP was suggestive of meningitis with 2K WBC, mainly neutrophils. HSV panel was negative. Patient was started on Vancomycin, Ampicillin and Rocephin. Acyclovir was discontinued after  D1. MRI shows encephalitis findings with ventriculitis. Patient is being followed by neurology and ID. EEG has been ordered which is consistent with encephalopathy but no epileptiform activity. Patient continue to spike fever and is vomiting. Stat MRI was repeated with rpt LP was ordered. Rpt MRI did not show any changes. LP shows reduced WBC compared to the first one. Patient's mental status improved.     Interval History: Patient's mental status back to baseline. She is sleepy today, but can hold conversation. ID has made adjustments in the abx therapy.    Review of Systems   Constitutional:  Negative for activity change, appetite change, chills and fever.   HENT:  Negative for congestion, ear pain, nosebleeds and sinus pain.    Eyes:  Negative for discharge and itching.   Respiratory:  Negative for apnea, cough, chest tightness and shortness of breath.    Cardiovascular:  Negative for chest pain, palpitations and leg swelling.   Gastrointestinal:  Negative for abdominal distention, abdominal pain and vomiting.   Genitourinary:  Negative for difficulty urinating, dysuria, flank pain and frequency.   Musculoskeletal:  Negative for arthralgias, back pain, joint swelling and myalgias.   Skin:  Negative for color change, pallor and rash.   Neurological:  Negative for dizziness, weakness, light-headedness and headaches.   Psychiatric/Behavioral:  Negative for agitation, behavioral problems, confusion and suicidal ideas.      Objective:     Vital Signs (Most Recent):  Temp: 98.2 °F (36.8 °C) (09/22/24 1130)  Pulse: 98 (09/22/24 1100)  Resp: 14 (09/22/24 1100)  BP: 127/66 (09/22/24 1100)  SpO2: (!) 94 % (09/22/24 1100) Vital Signs (24h Range):  Temp:  [97.1 °F (36.2 °C)-100.3 °F (37.9 °C)] 98.2 °F (36.8 °C)  Pulse:  [] 98  Resp:  [12-30] 14  SpO2:  [91 %-100 %] 94 %  BP: ()/(55-87) 127/66     Weight: 62.9 kg (138 lb 10.7 oz)  Body mass index is 26.2 kg/m².    Intake/Output Summary (Last 24 hours) at 9/22/2024  1146  Last data filed at 9/22/2024 0348  Gross per 24 hour   Intake 590 ml   Output 1300 ml   Net -710 ml         Physical Exam  Vitals and nursing note reviewed.   Constitutional:     Alert  Eyes:      General: No visual field deficit.     Pupils: Pupils are equal, round, and reactive to light.   Cardiovascular:      Rate and Rhythm: Normal rate and regular rhythm.      Pulses: Normal pulses.      Heart sounds: Normal heart sounds.   Pulmonary:      Effort: Pulmonary effort is normal. No respiratory distress.      Breath sounds: Normal breath sounds. No wheezing.   Abdominal:      General: Bowel sounds are normal. There is no distension.      Palpations: Abdomen is soft.      Tenderness: There is no abdominal tenderness.   Musculoskeletal:      Right lower leg: No edema.      Left lower leg: No edema.   Skin:     General: Skin is warm and dry.      Capillary Refill: Capillary refill takes less than 2 seconds.   Neurological:      Mental Status: back to baseline, alert and oriented X 3     Significant Labs: All pertinent labs within the past 24 hours have been reviewed.  CBC:   Recent Labs   Lab 09/22/24  0546   WBC 15.52*   HGB 12.9   HCT 38.3          CMP:   Recent Labs   Lab 09/21/24  0645 09/22/24  0546   * 131*   K 3.8 3.7   CL 97 98   CO2 27 21*    100   BUN 15 14   CREATININE 0.8 0.8   CALCIUM 8.4* 8.9   PROT 6.1 6.9   ALBUMIN 2.5* 2.7*   BILITOT 0.3 0.5   ALKPHOS 51* 57   AST 19 20   ALT 16 20   ANIONGAP 9 12       Significant Imaging: I have reviewed all pertinent imaging results/findings within the past 24 hours.  Physical Exam  Constitutional:       Appearance: Normal appearance.         Assessment/Plan:      * Acute encephalopathy  Likely secondary to bacterial meningitis.   Mental status returned to baseline   MRI shows encephalitis and ventriculitis findings   Ammonia, TSH is normal   Neurology following   EEG shows encephalopathy, no epileptiform activity noted.   Rpt LP shows  improved WBC and MRI similar to prior   Opening pressures are normal         HFrEF (heart failure with reduced ejection fraction)  New onset   Echo show Takotsubo pattern   Cardiology was consulted and recommend ischemic work up when completely treated for meningitis, and starting toprol at that time. They have signed off until meningitis treatment is complete.  Clinically euvolemic        Leukocytosis  Due to above       Meningitis  LP shows > 2000 WBC with neurophil predominance, protein is high normal glucose   MRI shows encephalitis and ventriculitis findings   HSV and meningitis panel was all negative   EEG shows encephalopathy and no epileptiform activity   Discussed with neurology and ID  MRI and LP was repeated on 9/19 shows WBC improved, but other wise similar findings.   - Cont abx and acyclovir as per ID recommendations          VTE Risk Mitigation (From admission, onward)           Ordered     IP VTE LOW RISK PATIENT  Once         09/17/24 0239     Place sequential compression device  Until discontinued         09/17/24 0239                    Discharge Planning   WILVER: 9/24/2024     Code Status: Full Code   Is the patient medically ready for discharge?:     Reason for patient still in hospital (select all that apply): Patient trending condition  Discharge Plan A: Home with family                  Berta Chandra MD, MD  Department of Hospital Medicine   [facility]

## 2024-09-23 LAB
ADENOVIRUS: NOT DETECTED
ALBUMIN SERPL BCP-MCNC: 2.3 G/DL (ref 3.5–5.2)
ALP SERPL-CCNC: 53 U/L (ref 55–135)
ALT SERPL W/O P-5'-P-CCNC: 17 U/L (ref 10–44)
ANION GAP SERPL CALC-SCNC: 8 MMOL/L (ref 8–16)
AST SERPL-CCNC: 22 U/L (ref 10–40)
BACTERIA CSF CULT: NO GROWTH
BASOPHILS # BLD AUTO: 0.09 K/UL (ref 0–0.2)
BASOPHILS NFR BLD: 0.6 % (ref 0–1.9)
BILIRUB SERPL-MCNC: 0.4 MG/DL (ref 0.1–1)
BORDETELLA PARAPERTUSSIS (IS1001): NOT DETECTED
BORDETELLA PERTUSSIS (PTXP): NOT DETECTED
BUN SERPL-MCNC: 15 MG/DL (ref 8–23)
CALCIUM SERPL-MCNC: 8.6 MG/DL (ref 8.7–10.5)
CHLAMYDIA PNEUMONIAE: NOT DETECTED
CHLORIDE SERPL-SCNC: 96 MMOL/L (ref 95–110)
CO2 SERPL-SCNC: 27 MMOL/L (ref 23–29)
CORONAVIRUS 229E, COMMON COLD VIRUS: NOT DETECTED
CORONAVIRUS HKU1, COMMON COLD VIRUS: NOT DETECTED
CORONAVIRUS NL63, COMMON COLD VIRUS: NOT DETECTED
CORONAVIRUS OC43, COMMON COLD VIRUS: NOT DETECTED
CREAT SERPL-MCNC: 0.8 MG/DL (ref 0.5–1.4)
CRP SERPL-MCNC: 62.1 MG/L (ref 0–8.2)
DIFFERENTIAL METHOD BLD: ABNORMAL
EOSINOPHIL # BLD AUTO: 0.3 K/UL (ref 0–0.5)
EOSINOPHIL NFR BLD: 1.9 % (ref 0–8)
ERYTHROCYTE [DISTWIDTH] IN BLOOD BY AUTOMATED COUNT: 12.1 % (ref 11.5–14.5)
ERYTHROCYTE [SEDIMENTATION RATE] IN BLOOD BY WESTERGREN METHOD: 66 MM/HR (ref 0–20)
EST. GFR  (NO RACE VARIABLE): >60 ML/MIN/1.73 M^2
FERRITIN SERPL-MCNC: 227 NG/ML (ref 20–300)
FLUBV RNA NPH QL NAA+NON-PROBE: NOT DETECTED
GLUCOSE SERPL-MCNC: 136 MG/DL (ref 70–110)
GRAM STN SPEC: NORMAL
HCT VFR BLD AUTO: 33.4 % (ref 37–48.5)
HGB BLD-MCNC: 11.5 G/DL (ref 12–16)
HPIV1 RNA NPH QL NAA+NON-PROBE: NOT DETECTED
HPIV2 RNA NPH QL NAA+NON-PROBE: NOT DETECTED
HPIV3 RNA NPH QL NAA+NON-PROBE: NOT DETECTED
HPIV4 RNA NPH QL NAA+NON-PROBE: NOT DETECTED
HUMAN METAPNEUMOVIRUS: NOT DETECTED
IMM GRANULOCYTES # BLD AUTO: 0.11 K/UL (ref 0–0.04)
IMM GRANULOCYTES NFR BLD AUTO: 0.7 % (ref 0–0.5)
INFLUENZA A (SUBTYPES H1,H1-2009,H3): NOT DETECTED
LYMPHOCYTES # BLD AUTO: 2.3 K/UL (ref 1–4.8)
LYMPHOCYTES NFR BLD: 14.8 % (ref 18–48)
MCH RBC QN AUTO: 32.4 PG (ref 27–31)
MCHC RBC AUTO-ENTMCNC: 34.4 G/DL (ref 32–36)
MCV RBC AUTO: 94 FL (ref 82–98)
MONOCYTES # BLD AUTO: 1.1 K/UL (ref 0.3–1)
MONOCYTES NFR BLD: 7.2 % (ref 4–15)
MYCOPLASMA PNEUMONIAE: NOT DETECTED
NEUTROPHILS # BLD AUTO: 11.5 K/UL (ref 1.8–7.7)
NEUTROPHILS NFR BLD: 74.8 % (ref 38–73)
NRBC BLD-RTO: 0 /100 WBC
PLATELET # BLD AUTO: 383 K/UL (ref 150–450)
PMV BLD AUTO: 9 FL (ref 9.2–12.9)
POTASSIUM SERPL-SCNC: 3.6 MMOL/L (ref 3.5–5.1)
PROCALCITONIN SERPL IA-MCNC: 0.06 NG/ML (ref 0–0.5)
PROT SERPL-MCNC: 6 G/DL (ref 6–8.4)
RBC # BLD AUTO: 3.55 M/UL (ref 4–5.4)
RESPIRATORY INFECTION PANEL SOURCE: NORMAL
RSV RNA NPH QL NAA+NON-PROBE: NOT DETECTED
RV+EV RNA NPH QL NAA+NON-PROBE: NOT DETECTED
S PNEUM AG UR QL: NOT DETECTED
SARS-COV-2 RNA RESP QL NAA+PROBE: NOT DETECTED
SODIUM SERPL-SCNC: 131 MMOL/L (ref 136–145)
TREPONEMA PALLIDUM IGG+IGM AB [PRESENCE] IN SERUM OR PLASMA BY IMMUNOASSAY: NONREACTIVE
WBC # BLD AUTO: 15.35 K/UL (ref 3.9–12.7)
WEST NILE VIRUS CSF INTERP: NORMAL
WNV IGG CSF QL: NEGATIVE
WNV IGM CSF QL IA: NEGATIVE

## 2024-09-23 PROCEDURE — 25000003 PHARM REV CODE 250: Performed by: NURSE PRACTITIONER

## 2024-09-23 PROCEDURE — 63600175 PHARM REV CODE 636 W HCPCS: Performed by: INTERNAL MEDICINE

## 2024-09-23 PROCEDURE — 25000003 PHARM REV CODE 250: Performed by: INTERNAL MEDICINE

## 2024-09-23 PROCEDURE — 80053 COMPREHEN METABOLIC PANEL: CPT

## 2024-09-23 PROCEDURE — 97116 GAIT TRAINING THERAPY: CPT | Mod: CQ

## 2024-09-23 PROCEDURE — 36415 COLL VENOUS BLD VENIPUNCTURE: CPT | Performed by: INTERNAL MEDICINE

## 2024-09-23 PROCEDURE — 20600001 HC STEP DOWN PRIVATE ROOM

## 2024-09-23 PROCEDURE — 86039 ANTINUCLEAR ANTIBODIES (ANA): CPT | Performed by: INTERNAL MEDICINE

## 2024-09-23 PROCEDURE — 87633 RESP VIRUS 12-25 TARGETS: CPT | Performed by: INTERNAL MEDICINE

## 2024-09-23 PROCEDURE — 82728 ASSAY OF FERRITIN: CPT | Performed by: INTERNAL MEDICINE

## 2024-09-23 PROCEDURE — 63600175 PHARM REV CODE 636 W HCPCS

## 2024-09-23 PROCEDURE — 84145 PROCALCITONIN (PCT): CPT | Performed by: INTERNAL MEDICINE

## 2024-09-23 PROCEDURE — 86140 C-REACTIVE PROTEIN: CPT | Performed by: INTERNAL MEDICINE

## 2024-09-23 PROCEDURE — 87581 M.PNEUMON DNA AMP PROBE: CPT | Performed by: INTERNAL MEDICINE

## 2024-09-23 PROCEDURE — 97530 THERAPEUTIC ACTIVITIES: CPT | Mod: CQ

## 2024-09-23 PROCEDURE — 94761 N-INVAS EAR/PLS OXIMETRY MLT: CPT

## 2024-09-23 PROCEDURE — 99233 SBSQ HOSP IP/OBS HIGH 50: CPT | Mod: ,,, | Performed by: NURSE PRACTITIONER

## 2024-09-23 PROCEDURE — 85651 RBC SED RATE NONAUTOMATED: CPT | Performed by: INTERNAL MEDICINE

## 2024-09-23 PROCEDURE — 97535 SELF CARE MNGMENT TRAINING: CPT

## 2024-09-23 PROCEDURE — 85025 COMPLETE CBC W/AUTO DIFF WBC: CPT | Performed by: INTERNAL MEDICINE

## 2024-09-23 RX ORDER — POLYETHYLENE GLYCOL 3350 17 G/17G
17 POWDER, FOR SOLUTION ORAL DAILY
Status: DISCONTINUED | OUTPATIENT
Start: 2024-09-23 | End: 2024-09-28

## 2024-09-23 RX ORDER — BISACODYL 5 MG
5 TABLET, DELAYED RELEASE (ENTERIC COATED) ORAL DAILY PRN
Status: DISCONTINUED | OUTPATIENT
Start: 2024-09-23 | End: 2024-09-30 | Stop reason: HOSPADM

## 2024-09-23 RX ORDER — DOXYCYCLINE HYCLATE 100 MG
100 TABLET ORAL EVERY 12 HOURS
Status: DISCONTINUED | OUTPATIENT
Start: 2024-09-23 | End: 2024-09-30 | Stop reason: HOSPADM

## 2024-09-23 RX ADMIN — LEUCINE, PHENYLALANINE, LYSINE, METHIONINE, ISOLEUCINE, VALINE, HISTIDINE, THREONINE, TRYPTOPHAN, ALANINE, GLYCINE, ARGININE, PROLINE, SERINE, TYROSINE, SODIUM ACETATE, DIBASIC POTASSIUM PHOSPHATE, MAGNESIUM CHLORIDE, SODIUM CHLORIDE, CALCIUM CHLORIDE, DEXTROSE
311; 238; 247; 170; 255; 247; 204; 179; 77; 880; 438; 489; 289; 213; 17; 297; 261; 51; 77; 33; 5 INJECTION INTRAVENOUS at 07:09

## 2024-09-23 RX ADMIN — ACYCLOVIR SODIUM 480 MG: 50 INJECTION, SOLUTION INTRAVENOUS at 01:09

## 2024-09-23 RX ADMIN — DOXYCYCLINE 100 MG: 100 INJECTION, POWDER, LYOPHILIZED, FOR SOLUTION INTRAVENOUS at 09:09

## 2024-09-23 RX ADMIN — CEFTRIAXONE 2 G: 2 INJECTION, POWDER, FOR SOLUTION INTRAMUSCULAR; INTRAVENOUS at 05:09

## 2024-09-23 RX ADMIN — LEUCINE, PHENYLALANINE, LYSINE, METHIONINE, ISOLEUCINE, VALINE, HISTIDINE, THREONINE, TRYPTOPHAN, ALANINE, GLYCINE, ARGININE, PROLINE, SERINE, TYROSINE, SODIUM ACETATE, DIBASIC POTASSIUM PHOSPHATE, MAGNESIUM CHLORIDE, SODIUM CHLORIDE, CALCIUM CHLORIDE, DEXTROSE
311; 238; 247; 170; 255; 247; 204; 179; 77; 880; 438; 489; 289; 213; 17; 297; 261; 51; 77; 33; 5 INJECTION INTRAVENOUS at 09:09

## 2024-09-23 RX ADMIN — CEFTRIAXONE 2 G: 2 INJECTION, POWDER, FOR SOLUTION INTRAMUSCULAR; INTRAVENOUS at 04:09

## 2024-09-23 RX ADMIN — FAMOTIDINE 40 MG: 20 TABLET, FILM COATED ORAL at 09:09

## 2024-09-23 RX ADMIN — BISACODYL 5 MG: 5 TABLET, COATED ORAL at 02:09

## 2024-09-23 RX ADMIN — ACETAMINOPHEN 650 MG: 325 TABLET ORAL at 11:09

## 2024-09-23 RX ADMIN — ACYCLOVIR SODIUM 480 MG: 50 INJECTION, SOLUTION INTRAVENOUS at 10:09

## 2024-09-23 RX ADMIN — DOXYCYCLINE HYCLATE 100 MG: 100 TABLET, COATED ORAL at 12:09

## 2024-09-23 RX ADMIN — POLYETHYLENE GLYCOL (3350) 17 G: 17 POWDER, FOR SOLUTION ORAL at 02:09

## 2024-09-23 RX ADMIN — ONDANSETRON 4 MG: 2 INJECTION INTRAMUSCULAR; INTRAVENOUS at 10:09

## 2024-09-23 RX ADMIN — DOXYCYCLINE HYCLATE 100 MG: 100 TABLET, COATED ORAL at 09:09
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
## 2024-09-23 NOTE — PT/OT/SLP PROGRESS
Occupational Therapy   Treatment    Name: Masha Hobbs  MRN: [id number]  Admitting Diagnosis:  Acute encephalopathy       Recommendations:     Discharge Recommendations:  (TBD)  Discharge Equipment Recommendations:  none  Barriers to discharge:  None    Assessment:     Masha Hobbs is a 68 y.o. female with a medical diagnosis of Acute encephalopathy.  She presents with increased independence with ADLs. No significant LOB with mobility. Performance deficits affecting function are weakness, impaired endurance, impaired self care skills, impaired functional mobility, gait instability, impaired balance.     Rehab Prognosis:  Good; patient would benefit from acute skilled OT services to address these deficits and reach maximum level of function.       Plan:     Patient to be seen 5 x/week to address the above listed problems via self-care/home management, therapeutic activities, therapeutic exercises  Plan of Care Expires: 10/19/24  Plan of Care Reviewed with: patient    Subjective     Chief Complaint: none  Patient/Family Comments/goals: none  Pain/Comfort:  Pain Rating 1: 0/10  Pain Rating Post-Intervention 1: 0/10    Objective:     Communicated with: nurse Corey prior to session.  Patient found up in chair with   upon OT entry to room.    General Precautions: Standard, fall    Orthopedic Precautions:N/A  Braces: N/A  Respiratory Status: Room air     Occupational Performance:     Functional Mobility/Transfers:  Patient completed Sit <> Stand Transfer with contact guard assistance  with  rolling walker   Patient completed Toilet Transfer Stand Pivot technique with stand by assistance with  rolling walker    Activities of Daily Living:  Grooming: supervision with oral and facial hygiene seated in chair  Toileting: supervision with zi-hygiene after voiding on toilet      AMPAC 6 Click ADL:      Treatment & Education:  OT ed patient on safety with walker use for functional mobility with cues for hand placement &  sequencing.       Patient left up in chair with all lines intact, call button in reach, chair alarm on, and nurse notified    GOALS:   Multidisciplinary Problems       Occupational Therapy Goals          Problem: Occupational Therapy    Goal Priority Disciplines Outcome Interventions   Occupational Therapy Goal     OT, PT/OT     Description: Goals to be met by: 10/19/24     Patient will increase functional independence with ADLs by performing:    UE Dressing with Modified [place].  LE Dressing with Modified [place].  Grooming while standing at sink with Modified [place].  Toileting from toilet with Modified [place] for hygiene and clothing management.   Bathing from  shower chair/bench with Modified [place].  Toilet transfer to toilet with Modified [place].  Increased strength and functional activity tolerance for ADL's/IADL's                         Time Tracking:     OT Date of Treatment: 09/23/24  OT Start Time: 1212  OT Stop Time: 1226  OT Total Time (min): 14 min    Billable Minutes:Self Care/Home Management 14    OT/MIGUEL: OT          9/23/2024

## 2024-09-23 NOTE — PROGRESS NOTES
[facility]  Department of Neurology  Progress Note  Date: 2024 9:43 AM          Patient Name: Masha Hobbs   MRN: [id number]   : 1955    AGE: 68 y.o.    LOS: 6 days Hospital Day: 8  Admit date: 2024  1:02 PM         HPI Masha Hobbs is a 68 y.o. female presented to the hospital with headache, generalized weakness and fever.  She started having headache about 2 days ago which was holocephalic, 8/10 intensity and the same day she had a fever of 103.  Her  told her that she acted confused around that time.  He presented to the hospital for these symptoms.  She denies any unilateral weakness or sensory changes, neck pain or stiffness, nausea or vomiting.     In the ER, she had a CT head which was negative for acute pathology, lumbar puncture and was treated with Rocephin, vancomycin, valacyclovir and dexamethasone.  She was admitted to the hospital for further workup and management.     She states the headache is not getting better and it is about 2 to 3/10 in intensity.  Denies any other complaints at this time.       2024: No acute events overnight. Patient was seen and examined by me this morning.  She does have some receptive aphasia on exam today.    :  Patient was seen and examined.  She is somnolent, only opens eyes to repeated stimulus however does not follow commands or answer to any questions.    :  Patient was seen and examined by me.  Significantly improved mental status today.  Lumbar puncture and MRI were repeated yesterday.  She is currently oriented x3 and denies any complaints.  Family is at bedside.    : Pt was seen by Dr Rico Mendez and exam was normal    : Patient was seen and examined by me this morning.  No acute overnight events, no new neurological complaints.  Vitals have been stable.  Her exam is normal this morning however apparently yesterday she was more somnolent and had a fever.      Vitals:  Patient Vitals for the past 24  hrs:   BP Temp Temp src Pulse Resp SpO2   09/23/24 0844 -- -- -- 83 (!) 21 98 %   09/23/24 0342 -- 97.7 °F (36.5 °C) Oral -- -- --   09/23/24 0300 (!) 108/56 -- -- 72 16 (!) 94 %   09/23/24 0200 (!) 96/53 -- -- 70 17 95 %   09/23/24 0000 (!) 101/56 98.2 °F (36.8 °C) Oral 82 20 96 %   09/22/24 2300 (!) 97/55 -- -- 74 16 (!) 94 %   09/22/24 2245 -- 98.6 °F (37 °C) Rectal 79 (!) 23 95 %   09/22/24 2200 (!) 90/55 -- -- 82 16 (!) 94 %   09/22/24 2118 -- (!) 101.1 °F (38.4 °C) -- -- -- --   09/22/24 2111 -- (!) 101.1 °F (38.4 °C) Rectal -- -- --   09/22/24 2100 (!) 106/58 -- -- 91 18 (!) 93 %   09/22/24 2023 -- -- -- 107 (!) 31 (!) 94 %   09/22/24 2000 (!) 109/57 -- -- (!) 116 18 96 %   09/22/24 1926 -- (!) 102.2 °F (39 °C) Rectal -- -- --   09/22/24 1800 139/81 -- -- (!) 120 18 97 %   09/22/24 1730 -- -- -- (!) 112 17 97 %   09/22/24 1700 (!) 132/90 -- -- 108 15 98 %   09/22/24 1600 (!) 145/74 -- -- (!) 111 (!) 35 100 %   09/22/24 1545 -- 98.4 °F (36.9 °C) Oral -- -- --   09/22/24 1500 (!) 141/72 -- -- 107 (!) 24 99 %   09/22/24 1400 (!) 144/78 -- -- 108 (!) 28 99 %   09/22/24 1300 138/81 -- -- (!) 111 (!) 27 99 %   09/22/24 1200 128/74 -- -- 104 17 97 %   09/22/24 1130 -- 98.2 °F (36.8 °C) Oral -- -- --   09/22/24 1100 127/66 98.2 °F (36.8 °C) Oral 98 14 (!) 94 %   09/22/24 1030 -- 98.2 °F (36.8 °C) -- -- -- --   09/22/24 1000 (!) 142/79 -- -- (!) 122 (!) 30 (!) 91 %     PHYSICAL EXAM:     GENERAL APPEARANCE: Alert, well-developed, well-nourished in no acute distress.  HEENT: Normocephalic and atraumatic. PERRL. Oropharynx unremarkable.  PULM: Normal respiratory effort. No accessory muscle use.  CV: RRR.  ABDOMEN: Soft, nontender.  EXTREMITIES: No obvious signs of vascular compromise. Pulses present. No cyanosis, clubbing or edema.  SKIN: Clear; no rashes, lesions or skin breaks in exposed areas.    NEURO:NEURO:  MENTAL STATUS: Patient awake and oriented to time, place, and person. Recent/remote memory normal. Attention  span/concentration normal. Speech fluent. Good comprehension, naming, and repetition. Fund of knowledge appropriate for patient's level of education. Affect normal.    CRANIAL NERVES:  Pupils equal round and reactive to light, extraocular movements are intact, face is grossly symmetric.    MOTOR: Normal bulk. Tone normal and symmetric throughout.  Strength 5/5 throughout.  No abnormal movements. No tremor.    REFLEXES: DTRs 2+; normal and symmetric throughout. Plantar response downgoing.    SENSATION: Sensation grossly intact to fine touch, pain/temperature, vibration and position.    COORDINATION: Finger-to-nose and heel to shin normal for age and symmetric. Finger tapping and alternating movements normal.    STATION and GAIT: not assessed       CURRENT SCHEDULED MEDICATIONS:   acyclovir  10 mg/kg (Ideal) Intravenous Q8H    cefTRIAXone (Rocephin) IV (PEDS and ADULTS)  2 g Intravenous Q12H    doxycycline IV (PEDS and ADULTS)  100 mg Intravenous Q12H    famotidine  40 mg Oral Daily     CURRENT INFUSIONS:   Amino acid 4.25% - dextrose 5% (CLINIMIX-E) solution with additives (1L provides 42.5 gm AA, 50 gm CHO (170 kcal/L dextrose), Na 35, K 30, Mg 5, Ca 4.5, Acetate 70, Cl 39, Phos 15)   Intravenous Continuous 75 mL/hr at 09/23/24 0722 New Bag at 09/23/24 0722     DATA:  Recent Labs   Lab 09/16/24  1347 09/17/24  0507 09/18/24  0536 09/18/24  1451 09/19/24  0457 09/19/24  2053 09/20/24  0356 09/21/24  0645 09/22/24  0546 09/23/24  0439   * 135* 135*  --  134*  --  135* 133* 131* 131*   K 4.1 3.9 3.7  --  3.1* 3.0* 3.3* 3.8 3.7 3.6    102 101  --  98  --  99 97 98 96   CO2 23 22* 22*  --  24  --  26 27 21* 27   BUN 18 23 16  --  14  --  17 15 14 15   CREATININE 0.9 0.9 0.9  --  0.8  --  0.8 0.8 0.8 0.8   * 167* 112*  --  126*  --  113* 107 100 136*   CALCIUM 9.5 9.1 9.0  --  8.3*  --  8.0* 8.4* 8.9 8.6*   PHOS 2.0* 2.3*  --   --   --   --   --   --   --   --    MG 1.9 1.9  --   --   --   --   --   --   " --   --    AST 16 14 14  --  24  --  26 19 20 22   ALT 11 11 12  --  11  --  13 16 20 17   AMMONIA  --   --   --  32  --   --   --   --   --   --      Recent Labs   Lab 09/16/24  1347 09/19/24  0457 09/22/24  0546 09/23/24  0439   WBC 13.86* 15.66* 15.52* 15.35*   HGB 14.0 11.6* 12.9 11.5*   HCT 42.4 35.2* 38.3 33.4*    342 361 383     Lab Results   Component Value Date    PROTEINCSF 126 (H) 09/19/2024    GLUCCSF 51 09/19/2024     No results found for: "HGBA1C"         I have personally reviewed and interpreted the pertinent imaging and lab results.  Imaging Results              MRI Brain Without Contrast (Final result)  Result time 09/19/24 16:07:10      Final result by Jcaob Armenta MD (09/19/24 16:07:10)                   Impression:      1. No significant interval detrimental change in the appearance of the brain as compared to the prior exam dated 09/17/2024.  Persistent scattered abnormal T2/FLAIR hyperintense signal within the supratentorial brain, particularly involving the right mesial temporal lobe, similar to prior exam.  No definite new edema or mass effect.  Findings again are nonspecific with similar differential considerations including inflammatory/infectious, or autoimmune/paraneoplastic encephalitis, status epilepticus, or potential neoplastic process.  Recommend continued close clinical surveillance and consider short-term follow-up imaging with contrast enhanced MRI of the brain, as clinically warranted.      Electronically signed by: Jacob Armenta  Date:    09/19/2024  Time:    16:07               Narrative:    EXAMINATION:  MRI BRAIN WITHOUT CONTRAST    CLINICAL HISTORY:  Repeat-for encephalopathy;.    TECHNIQUE:  Multiplanar multisequence MR imaging of the brain was performed without the administration of intravenous contrast.    COMPARISON:  MRI brain with contrast 09/17/2024, MRI brain without contrast 09/17/2020    FINDINGS:  No significant interval detrimental change in the " appearance of the brain as compared to the prior exam dated 09/17/2024.  Stable-appearing ill-defined T2/FLAIR hyperintense signal within the right mesial temporal lobe without associated diffusion restriction or hemorrhage, stable in size and configuration as compared to the prior exam.  Additional stable-appearing patchy T2/FLAIR hyperintense signal throughout the bilateral periventricular, deep, subcortical white matter and stable involvement of the left splenium of the corpus callosum.  No definite new parenchymal edema or mass effect.  No abnormal intracranial enhancement on prior exam.    Ventricles appear stable in comparison to the prior exam without evidence of hydrocephalus.    Scattered incidental bilateral hippocampal sulcus remnant cysts.  Diffusion-weighted images demonstrate no evidence of an acute infarct.   Susceptibility weighted images demonstrate no evidence of acute or chronic hemorrhage.    Normal vascular flow voids are present.    Bone marrow signal intensity is normal. The paranasal sinuses are normal.  The visualized portions of the mastoids are unremarkable.    Orbits are unremarkable.                                       FL Lumbar Puncture (xpd) (Final result)  Result time 09/19/24 15:45:57      Final result by Silvano Pearson MD (09/19/24 15:45:57)                   Impression:      Successful lumbar puncture under fluoroscopic guidance as detailed above.      Electronically signed by: Silvano Pearson  Date:    09/19/2024  Time:    15:45               Narrative:    EXAMINATION:  FL LUMBAR PUNCTURE DIAGNOSTIC WITH IMAGING    CLINICAL HISTORY:  Encephalitis;    TECHNIQUE:  Informed written consent was obtained from the patient.  The risks, benefits, and alternatives to the exam were discussed.    The patient was placed in the prone position on the fluoroscopy table and was prepped and draped in a sterile fashion.  Local anesthesia was achieved using 1% lidocaine solution.    The L4-5  interspace was localized and a 22-gauge spinal needle and stylet were advanced into the thecal sac under fluoroscopic guidance.    Fluoroscopy time: 0.7 minutes.    COMPARISON:  None.    FINDINGS:  Approximately 16 cc of clear cerebrospinal fluid was obtained and sent to the laboratory with orders per referring physician.  The opening pressure was 21 cm H2O.The stylet was replaced and the spinal needle was removed.  There were no immediate post procedure complications.                                        X-Ray Chest 1 View (Final result)  Result time 09/19/24 13:12:54      Final result by Easton Hollingsworth Jr., MD (09/19/24 13:12:54)                   Impression:      Mild cardiomegaly with development of bilateral pulmonary edema consistent with congestive heart failure.    This report was flagged in Epic as abnormal.      Electronically signed by: Easton Hollingsworth MD  Date:    09/19/2024  Time:    13:12               Narrative:    EXAMINATION:  XR CHEST 1 VIEW    CLINICAL HISTORY:  r/o aspiration;    TECHNIQUE:  Single frontal view of the chest was performed.    COMPARISON:  Chest x-ray of September 16, 2024    FINDINGS:  There is mild cardiomegaly.  There is prominence of the pulmonary vasculature and pulmonary edema identified bilaterally consistent with congestive heart failure.  A solid mass is not identified.  No pneumothorax is noted.                                       MRI Brain With Contrast (Final result)  Result time 09/17/24 16:15:19      Final result by Derian Machado MD (09/17/24 16:15:19)                   Impression:      1. There is no abnormal enhancement.  Signal abnormalities on unenhanced brain MRI remain nonspecific.      Electronically signed by: Derian Machado MD  Date:    09/17/2024  Time:    16:15               Narrative:    EXAMINATION:  MRI BRAIN WITH CONTRAST    CLINICAL HISTORY:  follow up on non contrast MRI finding;    TECHNIQUE:  T1-weighted post-contrast  multiplanar sequences were obtained through the brain.  6 mL Gadavist was administered intravenously without reported reaction.    COMPARISON:  Brain MRI without contrast performed earlier today    FINDINGS:  The prior diagnostic brain MRI demonstrated multiple regions of abnormal FLAIR and T2 hyperintense signal and a somewhat atypical distribution including the mesial right temporal lobe, periventricular white matter and splenium of the corpus callosum on the left, sub appended mole region.  There is no abnormal enhancement associated at any of the sites of signal abnormality.  The findings previously described remain nonspecific.  Previous differential considerations still exist.                                        MRI Brain Without Contrast (Final result)  Result time 09/17/24 13:57:29      Final result by Jacob Armenta MD (09/17/24 13:57:29)                   Impression:      1. Abnormal, ill-defined T2/FLAIR hyperintense signal within the right mesial temporal lobe with differential considerations including infectious encephalitis, particularly herpes simplex virus (HSV) encephalitis, autoimmune encephalitis, status epilepticus, or possible low-grade neoplasm.  2. Mild patchy and scattered foci of T2/FLAIR hyperintense signal within the supratentorial white matter elsewhere, nonspecific and may reflect sequelae of chronic small vessel ischemic change.  3. Mild asymmetric T2/FLAIR hyperintense signal involving the ependymal/subependymal white matter of the posterior body/atrium and left frontal horn of the left lateral ventricle, nonspecific and while it could reflect sequelae of chronic small vessel ischemic change, focal encephalitis and/or ventriculitis cannot be excluded.  No hydrocephalus.  This report was flagged in Epic as abnormal.  The significant finding above was relayed by myself  via [facility] secure chat to Beatriz Boogie MD and Joe Galeana MD on 09/17/2024 at 13:40.  Findings were  acknowledged and understood.      Electronically signed by: Jacob Armenta  Date:    09/17/2024  Time:    13:57               Narrative:    EXAMINATION:  MRI BRAIN WITHOUT CONTRAST    CLINICAL HISTORY:  Mental status change, unknown cause;.    TECHNIQUE:  Multiplanar multisequence MR imaging of the brain was performed without the administration of intravenous contrast.    COMPARISON:  CT head 09/16/2024    FINDINGS:  Ventricles and sulci are normal in size for age without evidence of hydrocephalus.    Abnormal, ill-defined T2/FLAIR hyperintense signal within the right mesial temporal lobe with associated hippocampal/gyral enlargement.  No diffusion restriction or susceptibility artifact to suggest hemorrhage.    Mild nonspecific asymmetric ependymal/subependymal white matter T2/FLAIR hyperintense signal about the posterior body/atrium of the left lateral ventricle with question asymmetric involvement of the left frontal horn.  Superimposed scattered foci and patchy T2/FLAIR hyperintense signal within the bilateral periventricular, deep and subcortical white matter, nonspecific and may reflect sequelae of chronic small vessel ischemic change.  Diffusion-weighted images demonstrate no evidence of an acute infarct elsewhere.    No evidence of intracranial acute or chronic hemorrhage elsewhere.    Normal vascular flow voids are present.    Bone marrow signal intensity is normal. The paranasal sinuses are normal.  The visualized portions of the mastoids are unremarkable.    Orbits are unremarkable.                                       X-Ray Chest 1 View (Final result)  Result time 09/16/24 14:53:24      Final result by Farrukh Ramos MD (09/16/24 14:53:24)                   Impression:      No acute cardiopulmonary abnormality.      Electronically signed by: Farrukh Ramos  Date:    09/16/2024  Time:    14:53               Narrative:    EXAMINATION:  XR CHEST 1 VIEW    CLINICAL HISTORY:  Sepsis;    FINDINGS:  Portable chest  at 1441 compared with 06/06/2024 shows unchanged cardiomediastinal silhouette.    Lungs are clear. Pulmonary vasculature is normal. No acute osseous abnormality.                                       CT Head Without Contrast (Final result)  Result time 09/16/24 14:52:17      Final result by Wang Acuña MD (09/16/24 14:52:17)                   Impression:      No CT evidence of acute intracranial pathology.      Electronically signed by: Wang Acuña  Date:    09/16/2024  Time:    14:52               Narrative:    EXAMINATION:  CT HEAD WITHOUT CONTRAST    CLINICAL HISTORY:  Mental status change, unknown cause;    TECHNIQUE:  Axial CT imaging obtained through the brain without IV contrast.    CMS Mandated Quality Data-CT Radiation Dose-436    All CT scans at this facility dose modulation, iterative reconstruction, and or weight-based dosing when appropriate to reduce radiation dose to as low as reasonably achievable.    COMPARISON:  None    FINDINGS:  Negative for acute intracranial hemorrhage, midline shift, or mass effect.  Ventricles and sulci are normal in size.  Gray-white differentiation is maintained.  Mild periventricular and deep white matter hypoattenuation, consistent with microangiopathic change.  Cerebellar hemispheres and brainstem are unremarkable.  Atherosclerotic calcification of intracranial carotid arteries.    No calvarial lesion or fracture.  Mastoid air cells are clear.  Mucosal thickening right maxillary sinus.  Paranasal sinuses are otherwise clear.                                           ASSESSMENT AND PLAN:     Encephalitis   Headache  Encephalopathy     Plan:   LP with CSF analysis showed WBC 2038 (neutrophil predominant), protein 173 and glucose 50.  Negative meningitis encephalitis panel and CSF culture so far.  Likely infectious encephalitis however workup has been unrevealing so far.  Can not rule out autoimmune encephalitis however patient has been having intermittent fevers  which makes infectious causes likely  MRI brain showed hyperintensity in the right medial temporal lobe on T2 FLAIR images concerning for encephalitis. MRI brain with contrast showed no contrast enhancement  Currently on doxycycline and, Rocephin.  Negative meningitis/encephalitis PCR panel on CSF.  Infectious diseases following-appreciate further recommendations.    Repeat lumbar puncture showed CSF , protein 126 and glucose 51.  Negative CSF culture.  P.r.n. Tylenol/Toradol for headache.    Repeat PCR meningitis/encephalitis panel was negative as well.  EEG showed no seizures or epileptiform activity.  LTM EEG overnight showed no seizures or epileptiform activity.  Speech therapy evaluate and treat  PT OT evaluate and treat.    Patient has been having intermittent fevers however infectious workup was unrevealing so far.  On antibiotics and acyclovir has been stopped.  ID following.  Will follow             Joe Galeana MD  Neurology/vascular Neurology  Date of Service: 09/23/2024  9:43 AM    Please note: This note was transcribed using voice recognition software. Because of this technology there are often uinintended grammatical, spelling, and other transcription errors. Please disregard these errors.

## 2024-09-23 NOTE — PLAN OF CARE
Problem: Adult Inpatient Plan of Care  Goal: Plan of Care Review  Outcome: Progressing  Goal: Patient-Specific Goal (Individualized)  Outcome: Progressing  Goal: Absence of Hospital-Acquired Illness or Injury  Outcome: Progressing  Goal: Optimal Comfort and Wellbeing  Outcome: Progressing  Goal: Readiness for Transition of Care  Outcome: Progressing     Problem: Infection  Goal: Absence of Infection Signs and Symptoms  Outcome: Progressing     Problem: Pain Acute  Goal: Optimal Pain Control and Function  Outcome: Progressing     Problem: Fall Injury Risk  Goal: Absence of Fall and Fall-Related Injury  Outcome: Progressing     Problem: Meningitis/Encephalitis  Goal: Optimal Coping  Outcome: Progressing  Goal: Effective Cerebral Tissue Perfusion  Outcome: Progressing  Goal: Absence of Infection Signs and Symptoms  Outcome: Progressing     Problem: Nausea and Vomiting  Goal: Nausea and Vomiting Relief  Outcome: Progressing     Problem: Social Isolation  Goal: Social Connection Supported  Outcome: Progressing     Problem: Skin Injury Risk Increased  Goal: Skin Health and Integrity  Outcome: Progressing

## 2024-09-23 NOTE — PROGRESS NOTES
Chart reviewed  She is still febrile  Nothing new in cultures  Multiple tests are pending: HIV, RPR, Lyme WB, Spotted fever group abs, crp,   Add WNV abs to csf       Antibiotics (From admission, onward)      Start     Stop Route Frequency Ordered    09/21/24 0945  doxycycline 100 mg in D5W 100 mL IVPB (MB+)         -- IV Every 12 hours (non-standard times) 09/21/24 0939    09/17/24 1645  cefTRIAXone (ROCEPHIN) 2 g in D5W 100 mL IVPB (MB+)         -- IV Every 12 hours (non-standard times) 09/17/24 1540          Antifungals (From admission, onward)      None          Antivirals (From admission, onward)          Stop Route Frequency     acyclovir (ZOVIRAX) injection         -- IV Every 8 hours (non-standard times)

## 2024-09-23 NOTE — SUBJECTIVE & OBJECTIVE
Interval History: Patient's mental status back to baseline.. ID has made adjustments in the abx therapy.    Review of Systems   Constitutional:  Negative for activity change, appetite change, chills and fever.   HENT:  Negative for congestion, ear pain, nosebleeds and sinus pain.    Eyes:  Negative for discharge and itching.   Respiratory:  Negative for apnea, cough, chest tightness and shortness of breath.    Cardiovascular:  Negative for chest pain, palpitations and leg swelling.   Gastrointestinal:  Negative for abdominal distention, abdominal pain and vomiting.   Genitourinary:  Negative for difficulty urinating, dysuria, flank pain and frequency.   Musculoskeletal:  Negative for arthralgias, back pain, joint swelling and myalgias.   Skin:  Negative for color change, pallor and rash.   Neurological:  Negative for dizziness, weakness, light-headedness and headaches.   Psychiatric/Behavioral:  Negative for agitation, behavioral problems, confusion and suicidal ideas.      Objective:     Vital Signs (Most Recent):  Temp: 97.8 °F (36.6 °C) (09/23/24 1100)  Pulse: 87 (09/23/24 1100)  Resp: 19 (09/23/24 1100)  BP: 114/60 (09/23/24 1000)  SpO2: 97 % (09/23/24 1100) Vital Signs (24h Range):  Temp:  [97.7 °F (36.5 °C)-102.2 °F (39 °C)] 97.8 °F (36.6 °C)  Pulse:  [] 87  Resp:  [15-35] 19  SpO2:  [93 %-100 %] 97 %  BP: ()/(53-90) 114/60     Weight: 62.9 kg (138 lb 10.7 oz)  Body mass index is 26.2 kg/m².    Intake/Output Summary (Last 24 hours) at 9/23/2024 1305  Last data filed at 9/23/2024 0540  Gross per 24 hour   Intake 200 ml   Output 1300 ml   Net -1100 ml         Physical Exam  Vitals and nursing note reviewed.   Constitutional:     Alert  Eyes:      General: No visual field deficit.     Pupils: Pupils are equal, round, and reactive to light.   Cardiovascular:      Rate and Rhythm: Normal rate and regular rhythm.      Pulses: Normal pulses.      Heart sounds: Normal heart sounds.   Pulmonary:       Effort: Pulmonary effort is normal. No respiratory distress.      Breath sounds: Normal breath sounds. No wheezing.   Abdominal:      General: Bowel sounds are normal. There is no distension.      Palpations: Abdomen is soft.      Tenderness: There is no abdominal tenderness.   Musculoskeletal:      Right lower leg: No edema.      Left lower leg: No edema.   Skin:     General: Skin is warm and dry.      Capillary Refill: Capillary refill takes less than 2 seconds.   Neurological:      Mental Status: back to baseline, alert and oriented X 3     Significant Labs: All pertinent labs within the past 24 hours have been reviewed.  CBC:   Recent Labs   Lab 09/22/24  0546 09/23/24  0439   WBC 15.52* 15.35*   HGB 12.9 11.5*   HCT 38.3 33.4*    383       CMP:   Recent Labs   Lab 09/22/24  0546 09/23/24  0439   * 131*   K 3.7 3.6   CL 98 96   CO2 21* 27    136*   BUN 14 15   CREATININE 0.8 0.8   CALCIUM 8.9 8.6*   PROT 6.9 6.0   ALBUMIN 2.7* 2.3*   BILITOT 0.5 0.4   ALKPHOS 57 53*   AST 20 22   ALT 20 17   ANIONGAP 12 8       Significant Imaging: I have reviewed all pertinent imaging results/findings within the past 24 hours.  Physical Exam  Constitutional:       Appearance: Normal appearance.

## 2024-09-23 NOTE — PLAN OF CARE
Rectal temp at the beginning of shift  102.2 PRN tylenol given recheck @ an hour 101.1 gave PRN ibuprofen recheck @ next hour  98.6, patient is currently afebrile.    Problem: Adult Inpatient Plan of Care  Goal: Plan of Care Review  Outcome: Progressing     Problem: Adult Inpatient Plan of Care  Goal: Optimal Comfort and Wellbeing  Outcome: Progressing     Problem: Infection  Goal: Absence of Infection Signs and Symptoms  Outcome: Progressing     Problem: Meningitis/Encephalitis  Goal: Effective Cerebral Tissue Perfusion  Outcome: Progressing     Problem: Meningitis/Encephalitis  Goal: Optimal Coping  Outcome: Progressing     Problem: Fall Injury Risk  Goal: Absence of Fall and Fall-Related Injury  Outcome: Progressing

## 2024-09-23 NOTE — PROGRESS NOTES
[facility] Medicine  Progress Note    Patient Name: Masha Hobbs  MRN: [id number]  Patient Class: IP- Inpatient   Admission Date: 9/16/2024  Length of Stay: 6 days  Attending Physician: Berta Chandra MD  Primary Care Provider: Roney Bang MD        Subjective:     Principal Problem:Acute encephalopathy        HPI:  Masha Hobbs is a 68 year old female with a past medical history of osteoporosis and chronic lower back pain with radiculopathy who was transferred from [facility] due to confusion associated with headache, fever, and generalized weakness/body aches for several hours prior to arrival.  Per  patient was confused since 4:00 p.m. Upon assessment confusion has resolved.  She denies any complaints other than a intermittent frontal lobe headache that is improving after Toradol administration.  There are no acute neurological focal deficits.  She denies chest pain, shortness of breath, dizziness, lightheadedness, vision changes, speech changes, numbness/tingling, neck pain, abdominal pain, nausea, or vomiting.  ED workup reveals:  CT head negative for acute intracranial hemorrhage. Chest x-ray no evidence of acute cardiopulmonary findings.  Lumbar puncture performed in ED, pending results.  She was treated with vancomycin, Rocephin, valacyclovir p.o., and dexamethasone.  CBC with elevated white count 13.8 and stable H&H.  BMP with phosphorus 2.0 otherwise unremarkable.  Negative flu/COVID.  Urinalysis negative.  Lactic acid 1.1.  ECG negative for ischemia or infarct.  MRI brain pending.  Neurology consult placed.  Admitted to hospital medicine for further management and treatment.            Overview/Hospital Course:  Patient is a 68 year old female admitted with AMS, headache and fever. LP was suggestive of meningitis with 2K WBC, mainly neutrophils. HSV panel was negative. Patient was started on Vancomycin, Ampicillin and Rocephin. Acyclovir was discontinued after  D1. MRI shows encephalitis findings with ventriculitis. Patient is being followed by neurology and ID. EEG has been ordered which is consistent with encephalopathy but no epileptiform activity. Patient continue to spike fever and is vomiting. Stat MRI was repeated with rpt LP was ordered. Rpt MRI did not show any changes. LP shows reduced WBC compared to the first one. Patient's mental status improved. Patient continued with daily fevers. ID ordered additional testing. Antibiotics and antiviral adjusted.    Interval History: Patient's mental status back to baseline.. ID has made adjustments in the abx therapy.    Review of Systems   Constitutional:  Negative for activity change, appetite change, chills and fever.   HENT:  Negative for congestion, ear pain, nosebleeds and sinus pain.    Eyes:  Negative for discharge and itching.   Respiratory:  Negative for apnea, cough, chest tightness and shortness of breath.    Cardiovascular:  Negative for chest pain, palpitations and leg swelling.   Gastrointestinal:  Negative for abdominal distention, abdominal pain and vomiting.   Genitourinary:  Negative for difficulty urinating, dysuria, flank pain and frequency.   Musculoskeletal:  Negative for arthralgias, back pain, joint swelling and myalgias.   Skin:  Negative for color change, pallor and rash.   Neurological:  Negative for dizziness, weakness, light-headedness and headaches.   Psychiatric/Behavioral:  Negative for agitation, behavioral problems, confusion and suicidal ideas.      Objective:     Vital Signs (Most Recent):  Temp: 97.8 °F (36.6 °C) (09/23/24 1100)  Pulse: 87 (09/23/24 1100)  Resp: 19 (09/23/24 1100)  BP: 114/60 (09/23/24 1000)  SpO2: 97 % (09/23/24 1100) Vital Signs (24h Range):  Temp:  [97.7 °F (36.5 °C)-102.2 °F (39 °C)] 97.8 °F (36.6 °C)  Pulse:  [] 87  Resp:  [15-35] 19  SpO2:  [93 %-100 %] 97 %  BP: ()/(53-90) 114/60     Weight: 62.9 kg (138 lb 10.7 oz)  Body mass index is 26.2  kg/m².    Intake/Output Summary (Last 24 hours) at 9/23/2024 1305  Last data filed at 9/23/2024 0540  Gross per 24 hour   Intake 200 ml   Output 1300 ml   Net -1100 ml         Physical Exam  Vitals and nursing note reviewed.   Constitutional:     Alert  Eyes:      General: No visual field deficit.     Pupils: Pupils are equal, round, and reactive to light.   Cardiovascular:      Rate and Rhythm: Normal rate and regular rhythm.      Pulses: Normal pulses.      Heart sounds: Normal heart sounds.   Pulmonary:      Effort: Pulmonary effort is normal. No respiratory distress.      Breath sounds: Normal breath sounds. No wheezing.   Abdominal:      General: Bowel sounds are normal. There is no distension.      Palpations: Abdomen is soft.      Tenderness: There is no abdominal tenderness.   Musculoskeletal:      Right lower leg: No edema.      Left lower leg: No edema.   Skin:     General: Skin is warm and dry.      Capillary Refill: Capillary refill takes less than 2 seconds.   Neurological:      Mental Status: back to baseline, alert and oriented X 3     Significant Labs: All pertinent labs within the past 24 hours have been reviewed.  CBC:   Recent Labs   Lab 09/22/24  0546 09/23/24  0439   WBC 15.52* 15.35*   HGB 12.9 11.5*   HCT 38.3 33.4*    383       CMP:   Recent Labs   Lab 09/22/24  0546 09/23/24  0439   * 131*   K 3.7 3.6   CL 98 96   CO2 21* 27    136*   BUN 14 15   CREATININE 0.8 0.8   CALCIUM 8.9 8.6*   PROT 6.9 6.0   ALBUMIN 2.7* 2.3*   BILITOT 0.5 0.4   ALKPHOS 57 53*   AST 20 22   ALT 20 17   ANIONGAP 12 8       Significant Imaging: I have reviewed all pertinent imaging results/findings within the past 24 hours.  Physical Exam  Constitutional:       Appearance: Normal appearance.         Assessment/Plan:      * Acute encephalopathy  Likely secondary to bacterial meningitis.   Mental status returned to baseline   MRI shows encephalitis and ventriculitis findings   Ammonia, TSH is normal    Neurology following   EEG shows encephalopathy, no epileptiform activity noted.   Rpt LP shows improved WBC and MRI similar to prior   Opening pressures are normal         HFrEF (heart failure with reduced ejection fraction)  New onset   Echo show Takotsubo pattern   Cardiology was consulted and recommend ischemic work up when completely treated for meningitis, and starting toprol at that time. They have signed off until meningitis treatment is complete.  Clinically euvolemic        Leukocytosis  Due to above       Meningitis  LP shows > 2000 WBC with neurophil predominance, protein is high normal glucose   MRI shows encephalitis and ventriculitis findings   HSV and meningitis panel was all negative   EEG shows encephalopathy and no epileptiform activity   Discussed with neurology and ID  MRI and LP was repeated on 9/19 shows WBC improved, but other wise similar findings.   - Cont abx as per ID recommendations          VTE Risk Mitigation (From admission, onward)           Ordered     IP VTE LOW RISK PATIENT  Once         09/17/24 0239     Place sequential compression device  Until discontinued         09/17/24 0239                    Discharge Planning   WILVER: 9/25/2024     Code Status: Full Code   Is the patient medically ready for discharge?:     Reason for patient still in hospital (select all that apply): Patient trending condition  Discharge Plan A: Home with family                  Berta Chandra MD, MD  Department of Hospital Medicine   [facility]/Surg

## 2024-09-23 NOTE — ASSESSMENT & PLAN NOTE
LP shows > 2000 WBC with neurophil predominance, protein is high normal glucose   MRI shows encephalitis and ventriculitis findings   HSV and meningitis panel was all negative   EEG shows encephalopathy and no epileptiform activity   Discussed with neurology and ID  MRI and LP was repeated on 9/19 shows WBC improved, but other wise similar findings.   - Cont abx as per ID recommendations

## 2024-09-23 NOTE — PLAN OF CARE
Problem: Physical Therapy  Goal: Physical Therapy Goal  Description: Goals to be met by: 24     Patient will increase functional independence with mobility by performin. Supine to sit with [place]  2. Sit to supine with [place]  3. Sit to stand transfer with Stand-by Assistance  4. Bed to chair transfer with Stand-by Assistance using Rolling Walker  5. Gait  x 250 feet with Stand-by Assistance using Rolling Walker.     Outcome: Progressing

## 2024-09-23 NOTE — PT/OT/SLP PROGRESS
"Physical Therapy Treatment    Patient Name:  Masha Hobbs   MRN:  [id number]    Recommendations:     Discharge Recommendations: No Therapy Indicated  Discharge Equipment Recommendations: none  Barriers to discharge:  fall risk, gait unsteadiness, decreased mobility from baseline    Assessment:     Masha Hobbs is a 68 y.o. female admitted with a medical diagnosis of Acute encephalopathy.  She presents with the following impairments/functional limitations: weakness, impaired endurance, impaired functional mobility, gait instability, impaired balance, decreased safety awareness.    Per pt and family, yesterday pt remained in bed with fever and increased lethargy. Today displayed increased instability with short gait trial to bathroom, so ambulated into hallway with RW support x 250', CGA throughout.     Remained up in chair. Therapist advised to forego purewick during daytime hours to increase activity with toileting. Staff to assist patient either to bathroom or onto BSC with Bell and handhold support. Nurse, patient, and spouse all verbalize understanding that call light is to be used for staff assist for all mobility.     Rehab Prognosis: Good; patient would benefit from acute skilled PT services to address these deficits and reach maximum level of function.    Recent Surgery: * No surgery found *      Plan:     During this hospitalization, patient to be seen 6 x/week to address the identified rehab impairments via gait training, therapeutic activities, therapeutic exercises and progress toward the following goals:    Plan of Care Expires:  10/21/24    Subjective     Chief Complaint: "Haven't had a bowel movement in quite a while, but I haven't really eaten anything either"  Patient/Family Comments/goals: "they don't really know what is going on with me"   Pain/Comfort:  Pain Rating 1: other (see comments) (unrated)  Location - Orientation 1: lower  Location 1: abdomen  Pain Addressed 1: Distraction, Reposition " (pt attributes to possible constipation)  Pain Rating Post-Intervention 1: 0/10      Objective:     Communicated with nurse prior to session.  Patient found HOB elevated with bed alarm, blood pressure cuff, PureWick, pulse ox (continuous), telemetry upon PT entry to room.     General Precautions: Standard, fall  Orthopedic Precautions: N/A  Braces: N/A  Respiratory Status: Room air     Functional Mobility:  Bed Mobility:     Supine to Sit: minimum assistance  Transfers:     Sit to Stand:  contact guard assistance with no AD and hand-held assist  Toilet Transfer: Bell with grab bars  Gait:       -8' to toilet with no AD, Bell and HHA, notably unsteady   -250' with RW, CGA, VC for RW management, and improving stability as gait trial progressed      AM-PAC 6 CLICK MOBILITY          Treatment & Education:  -Pt educ: benefits of participation with therapy, OOB to chair during the day and for all meals, mobility to/from bathroom or BSC for toileting with staff assist, fall prevention, call light, benefits of AD for extended mobility tolerance   -Gait training with RW  -Toilet transfer Bell    Patient left up in chair with all lines intact, call button in reach, spouse present, and chair alarm off with nurse notified of the missing call light cord attachment for the [place] alarm box..    GOALS:   Multidisciplinary Problems       Physical Therapy Goals          Problem: Physical Therapy    Goal Priority Disciplines Outcome Goal Variances Interventions   Physical Therapy Goal     PT, PT/OT Progressing     Description: Goals to be met by: 24     Patient will increase functional independence with mobility by performin. Supine to sit with [place]  2. Sit to supine with [place]  3. Sit to stand transfer with Stand-by Assistance  4. Bed to chair transfer with Stand-by Assistance using Rolling Walker  5. Gait  x 250 feet with Stand-by Assistance using Rolling Walker.                          Time Tracking:      PT Received On: 09/23/24  PT Start Time: 1013     PT Stop Time: 1045  PT Total Time (min): 32 min     Billable Minutes: Gait Training 20 and Therapeutic Activity 12    Treatment Type: Treatment  PT/PTA: PTA     Number of PTA visits since last PT visit: 2     09/23/2024

## 2024-09-24 LAB
ALBUMIN SERPL BCP-MCNC: 2.6 G/DL (ref 3.5–5.2)
ALP SERPL-CCNC: 61 U/L (ref 55–135)
ALT SERPL W/O P-5'-P-CCNC: 19 U/L (ref 10–44)
ANA PAT SER IF-IMP: NORMAL
ANA PAT SER IF-IMP: NORMAL
ANA SER QL HEP2 SUBST: NORMAL
ANA TITR SER HEP2 SUBST: NORMAL {TITER}
ANA TITR SER HEP2 SUBST: NORMAL {TITER}
ANION GAP SERPL CALC-SCNC: 11 MMOL/L (ref 8–16)
AST SERPL-CCNC: 22 U/L (ref 10–40)
BASOPHILS # BLD AUTO: 0.07 K/UL (ref 0–0.2)
BASOPHILS NFR BLD: 0.3 % (ref 0–1.9)
BILIRUB SERPL-MCNC: 0.3 MG/DL (ref 0.1–1)
BUN SERPL-MCNC: 17 MG/DL (ref 8–23)
CALCIUM SERPL-MCNC: 8.8 MG/DL (ref 8.7–10.5)
CHLORIDE SERPL-SCNC: 92 MMOL/L (ref 95–110)
CO2 SERPL-SCNC: 26 MMOL/L (ref 23–29)
CREAT SERPL-MCNC: 0.8 MG/DL (ref 0.5–1.4)
CRP SERPL-MCNC: 58.7 MG/L (ref 0–8.2)
CYTOPLASMIC AB PATTERN SER IF-IMP: NORMAL
D DIMER PPP IA.FEU-MCNC: 3.1 MG/L FEU
DIFFERENTIAL METHOD BLD: ABNORMAL
EOSINOPHIL # BLD AUTO: 0.1 K/UL (ref 0–0.5)
EOSINOPHIL NFR BLD: 0.5 % (ref 0–8)
ERYTHROCYTE [DISTWIDTH] IN BLOOD BY AUTOMATED COUNT: 12.2 % (ref 11.5–14.5)
EST. GFR  (NO RACE VARIABLE): >60 ML/MIN/1.73 M^2
GLUCOSE SERPL-MCNC: 124 MG/DL (ref 70–110)
HCT VFR BLD AUTO: 36.3 % (ref 37–48.5)
HGB BLD-MCNC: 12.5 G/DL (ref 12–16)
HIV 1+2 AB+HIV1 P24 AG SERPL QL IA: NEGATIVE
IMM GRANULOCYTES # BLD AUTO: 0.14 K/UL (ref 0–0.04)
IMM GRANULOCYTES NFR BLD AUTO: 0.7 % (ref 0–0.5)
LABORATORY COMMENT REPORT: NORMAL
LYMPHOCYTES # BLD AUTO: 2 K/UL (ref 1–4.8)
LYMPHOCYTES NFR BLD: 10 % (ref 18–48)
MCH RBC QN AUTO: 32 PG (ref 27–31)
MCHC RBC AUTO-ENTMCNC: 34.4 G/DL (ref 32–36)
MCV RBC AUTO: 93 FL (ref 82–98)
MONOCYTES # BLD AUTO: 1.2 K/UL (ref 0.3–1)
MONOCYTES NFR BLD: 6.1 % (ref 4–15)
NEUTROPHILS # BLD AUTO: 16.9 K/UL (ref 1.8–7.7)
NEUTROPHILS NFR BLD: 82.4 % (ref 38–73)
NRBC BLD-RTO: 0 /100 WBC
PLATELET # BLD AUTO: 426 K/UL (ref 150–450)
PMV BLD AUTO: 8.9 FL (ref 9.2–12.9)
POTASSIUM SERPL-SCNC: 3.7 MMOL/L (ref 3.5–5.1)
PROCALCITONIN SERPL IA-MCNC: 0.06 NG/ML (ref 0–0.5)
PROT SERPL-MCNC: 6.9 G/DL (ref 6–8.4)
RBC # BLD AUTO: 3.91 M/UL (ref 4–5.4)
RPR SER QL: NORMAL
SODIUM SERPL-SCNC: 129 MMOL/L (ref 136–145)
WBC # BLD AUTO: 20.47 K/UL (ref 3.9–12.7)

## 2024-09-24 PROCEDURE — 97116 GAIT TRAINING THERAPY: CPT | Mod: CQ

## 2024-09-24 PROCEDURE — 99233 SBSQ HOSP IP/OBS HIGH 50: CPT | Mod: ,,, | Performed by: NURSE PRACTITIONER

## 2024-09-24 PROCEDURE — 25000003 PHARM REV CODE 250: Performed by: NURSE PRACTITIONER

## 2024-09-24 PROCEDURE — 80053 COMPREHEN METABOLIC PANEL: CPT

## 2024-09-24 PROCEDURE — 63600175 PHARM REV CODE 636 W HCPCS: Performed by: NURSE PRACTITIONER

## 2024-09-24 PROCEDURE — 85025 COMPLETE CBC W/AUTO DIFF WBC: CPT | Performed by: INTERNAL MEDICINE

## 2024-09-24 PROCEDURE — 84145 PROCALCITONIN (PCT): CPT | Performed by: INTERNAL MEDICINE

## 2024-09-24 PROCEDURE — 25000003 PHARM REV CODE 250: Performed by: INTERNAL MEDICINE

## 2024-09-24 PROCEDURE — 63600175 PHARM REV CODE 636 W HCPCS: Performed by: INTERNAL MEDICINE

## 2024-09-24 PROCEDURE — 86140 C-REACTIVE PROTEIN: CPT | Performed by: INTERNAL MEDICINE

## 2024-09-24 PROCEDURE — 36415 COLL VENOUS BLD VENIPUNCTURE: CPT | Performed by: NURSE PRACTITIONER

## 2024-09-24 PROCEDURE — 27000221 HC OXYGEN, UP TO 24 HOURS

## 2024-09-24 PROCEDURE — 97530 THERAPEUTIC ACTIVITIES: CPT | Mod: CQ

## 2024-09-24 PROCEDURE — 94761 N-INVAS EAR/PLS OXIMETRY MLT: CPT

## 2024-09-24 PROCEDURE — 25500020 PHARM REV CODE 255

## 2024-09-24 PROCEDURE — 85379 FIBRIN DEGRADATION QUANT: CPT | Performed by: NURSE PRACTITIONER

## 2024-09-24 PROCEDURE — 20600001 HC STEP DOWN PRIVATE ROOM

## 2024-09-24 RX ORDER — FUROSEMIDE 10 MG/ML
40 INJECTION INTRAMUSCULAR; INTRAVENOUS ONCE
Status: COMPLETED | OUTPATIENT
Start: 2024-09-24 | End: 2024-09-24

## 2024-09-24 RX ORDER — CLOTRIMAZOLE 10 MG/1
10 LOZENGE ORAL
Status: DISCONTINUED | OUTPATIENT
Start: 2024-09-24 | End: 2024-09-28

## 2024-09-24 RX ORDER — IBUPROFEN 400 MG/1
400 TABLET ORAL EVERY 6 HOURS PRN
Status: DISCONTINUED | OUTPATIENT
Start: 2024-09-24 | End: 2024-09-28

## 2024-09-24 RX ADMIN — CEFTRIAXONE 2 G: 2 INJECTION, POWDER, FOR SOLUTION INTRAMUSCULAR; INTRAVENOUS at 07:09

## 2024-09-24 RX ADMIN — CEFTRIAXONE 2 G: 2 INJECTION, POWDER, FOR SOLUTION INTRAMUSCULAR; INTRAVENOUS at 04:09

## 2024-09-24 RX ADMIN — DOXYCYCLINE HYCLATE 100 MG: 100 TABLET, COATED ORAL at 09:09

## 2024-09-24 RX ADMIN — LEUCINE, PHENYLALANINE, LYSINE, METHIONINE, ISOLEUCINE, VALINE, HISTIDINE, THREONINE, TRYPTOPHAN, ALANINE, GLYCINE, ARGININE, PROLINE, SERINE, TYROSINE, SODIUM ACETATE, DIBASIC POTASSIUM PHOSPHATE, MAGNESIUM CHLORIDE, SODIUM CHLORIDE, CALCIUM CHLORIDE, DEXTROSE
311; 238; 247; 170; 255; 247; 204; 179; 77; 880; 438; 489; 289; 213; 17; 297; 261; 51; 77; 33; 5 INJECTION INTRAVENOUS at 12:09

## 2024-09-24 RX ADMIN — CLOTRIMAZOLE 10 MG: 10 LOZENGE ORAL at 09:09

## 2024-09-24 RX ADMIN — BISACODYL 5 MG: 5 TABLET, COATED ORAL at 09:09

## 2024-09-24 RX ADMIN — CLOTRIMAZOLE 10 MG: 10 LOZENGE ORAL at 01:09

## 2024-09-24 RX ADMIN — SODIUM CHLORIDE, POTASSIUM CHLORIDE, SODIUM LACTATE AND CALCIUM CHLORIDE 500 ML: 600; 310; 30; 20 INJECTION, SOLUTION INTRAVENOUS at 04:09

## 2024-09-24 RX ADMIN — POLYETHYLENE GLYCOL (3350) 17 G: 17 POWDER, FOR SOLUTION ORAL at 09:09

## 2024-09-24 RX ADMIN — FAMOTIDINE 40 MG: 20 TABLET, FILM COATED ORAL at 09:09

## 2024-09-24 RX ADMIN — CLOTRIMAZOLE 10 MG: 10 LOZENGE ORAL at 11:09

## 2024-09-24 RX ADMIN — IOHEXOL 75 ML: 350 INJECTION, SOLUTION INTRAVENOUS at 05:09

## 2024-09-24 RX ADMIN — FUROSEMIDE 40 MG: 10 INJECTION, SOLUTION INTRAMUSCULAR; INTRAVENOUS at 11:09

## 2024-09-24 NOTE — PLAN OF CARE
Per Mag in lab, autoimmune panel sent to [place], results take 8 to 12 days. Notified Dr. Galeana.

## 2024-09-24 NOTE — EICU
Intervention Initiated From:  [facility] / EICU    Tay intervened regarding:  Rounding (Video assessment)    Nurse Notified:  No    Doctor Notified:  No    Comments: EICU rounding done. Chart and meds reviewed.  VS stable.

## 2024-09-24 NOTE — NURSING
Patient's , recent temp 98.9. Tiarra Ashby NP notified. D-dimer and 500 mL lactated ringer's bolus ordered.

## 2024-09-24 NOTE — PT/OT/SLP PROGRESS
Physical Therapy Treatment    Patient Name:  Masha Hobbs   MRN:  [id number]    Recommendations:     Discharge Recommendations: No Therapy Indicated  Discharge Equipment Recommendations: none  Barriers to discharge: None    Assessment:     Masha Hobbs is a 68 y.o. female admitted with a medical diagnosis of Acute encephalopathy.  She presents with the following impairments/functional limitations: weakness, impaired endurance, impaired self care skills, impaired functional mobility, gait instability, decreased lower extremity function, impaired cardiopulmonary response to activity . Supine in bed with daughter present, spouse arrived.  Agreed to mobilize, reports feeling tired.  Sup > sit with Min A , sat briefly.  Sit > stand with rw and CGA.  Ambulated 3' with rw and Min A , returned to sit , unsteady, shuffling. Patient reports feeling unsteady.  Presents with  decreased endurance and heavily wet.   Dr. Galeana arrived. Ambulated 8' , returned to sit for rest period. Sit > stand with rw and CGA , diaper changed in standing.  Ambulated 30' additional with rw and Min A, IV and O2 in tow.  Returned to room in chair.     Rehab Prognosis: Good; patient would benefit from acute skilled PT services to address these deficits and reach maximum level of function.    Recent Surgery: * No surgery found *      Plan:     During this hospitalization, patient to be seen 6 x/week to address the identified rehab impairments via gait training, therapeutic activities, therapeutic exercises and progress toward the following goals:    Plan of Care Expires:  10/21/24    Subjective     Chief Complaint: feels tired, not herself today.  Patient/Family Comments/goals: to return home  Pain/Comfort:  Pain Rating 1: 0/10      Objective:     Communicated with nurse Corey prior to session.  Patient found supine with bed alarm, blood pressure cuff, peripheral IV, pulse ox (continuous), oxygen, telemetry upon PT entry to room.     General  Precautions: Standard, fall  Orthopedic Precautions: N/A  Braces: N/A  Respiratory Status: Nasal cannula, flow 2 L/min     Functional Mobility:  Bed Mobility:     Supine to Sit: minimum assistance  Transfers:     Sit to Stand:  contact guard assistance with rolling walker  Gait: 3', 8' , 30' with rw and Min A, seated rests between each .      AM-PAC 6 CLICK MOBILITY          Treatment & Education:  Ambulated with rw and Min A for safety, IV and O2 in tow.      Patient left up in chair with all lines intact, call button in reach, chair alarm on, nurse Leora notified, and daughter and spouse present..    GOALS:   Multidisciplinary Problems       Physical Therapy Goals          Problem: Physical Therapy    Goal Priority Disciplines Outcome Goal Variances Interventions   Physical Therapy Goal     PT, PT/OT Progressing     Description: Goals to be met by: 24     Patient will increase functional independence with mobility by performin. Supine to sit with [place]  2. Sit to supine with [place]  3. Sit to stand transfer with Stand-by Assistance  4. Bed to chair transfer with Stand-by Assistance using Rolling Walker  5. Gait  x 250 feet with Stand-by Assistance using Rolling Walker.                          Time Tracking:     PT Received On: 24  PT Start Time: 0950     PT Stop Time: 1013  PT Total Time (min): 23 min     Billable Minutes: Gait Training 15min and Therapeutic Activity 8min    Treatment Type: Treatment  PT/PTA: PTA     Number of PTA visits since last PT visit: 3     2024

## 2024-09-24 NOTE — NURSING
Patient's HR in the 130s, respiratory rate 30, temp 100.4. Patient able to answer orientation questions, patient not in pain. Tiarra Ashby NP notified. Prn tylenol given.

## 2024-09-24 NOTE — PLAN OF CARE
Problem: Adult Inpatient Plan of Care  Goal: Plan of Care Review  Outcome: Progressing  Goal: Absence of Hospital-Acquired Illness or Injury  Outcome: Progressing  Goal: Readiness for Transition of Care  Outcome: Progressing     Problem: Fall Injury Risk  Goal: Absence of Fall and Fall-Related Injury  Outcome: Progressing     Problem: Meningitis/Encephalitis  Goal: Optimal Coping  Outcome: Progressing  Goal: Absence of Infection Signs and Symptoms  Outcome: Progressing     Problem: Nausea and Vomiting  Goal: Nausea and Vomiting Relief  Outcome: Progressing

## 2024-09-24 NOTE — SUBJECTIVE & OBJECTIVE
Interval History: Patient's mental status back to baseline.. ID has made adjustments in the abx therapy. Had tachycardia and tachypnea earlier this morning. CTA chest negative.    Review of Systems   Constitutional:  Negative for activity change, appetite change, chills and fever.   HENT:  Negative for congestion, ear pain, nosebleeds and sinus pain.    Eyes:  Negative for discharge and itching.   Respiratory:  Negative for apnea, cough, chest tightness and shortness of breath.    Cardiovascular:  Negative for chest pain, palpitations and leg swelling.   Gastrointestinal:  Negative for abdominal distention, abdominal pain and vomiting.   Genitourinary:  Negative for difficulty urinating, dysuria, flank pain and frequency.   Musculoskeletal:  Negative for arthralgias, back pain, joint swelling and myalgias.   Skin:  Negative for color change, pallor and rash.   Neurological:  Negative for dizziness, weakness, light-headedness and headaches.   Psychiatric/Behavioral:  Negative for agitation, behavioral problems, confusion and suicidal ideas.      Objective:     Vital Signs (Most Recent):  Temp: 98.3 °F (36.8 °C) (09/24/24 1145)  Pulse: 89 (09/24/24 1230)  Resp: 20 (09/24/24 1230)  BP: 111/63 (09/24/24 1230)  SpO2: 98 % (09/24/24 1230) Vital Signs (24h Range):  Temp:  [98.2 °F (36.8 °C)-100.4 °F (38 °C)] 98.3 °F (36.8 °C)  Pulse:  [] 89  Resp:  [14-33] 20  SpO2:  [93 %-100 %] 98 %  BP: (111-157)/(63-94) 111/63     Weight: 62.9 kg (138 lb 10.7 oz)  Body mass index is 26.2 kg/m².    Intake/Output Summary (Last 24 hours) at 9/24/2024 1327  Last data filed at 9/23/2024 2152  Gross per 24 hour   Intake 25 ml   Output --   Net 25 ml         Physical Exam  Vitals and nursing note reviewed.   Constitutional:     Alert  Eyes:      General: No visual field deficit.     Pupils: Pupils are equal, round, and reactive to light.   Cardiovascular:      Rate and Rhythm: Normal rate and regular rhythm.      Pulses: Normal pulses.       Heart sounds: Normal heart sounds.   Pulmonary:      Effort: Pulmonary effort is normal. No respiratory distress.      Breath sounds: Normal breath sounds. No wheezing.   Abdominal:      General: Bowel sounds are normal. There is no distension.      Palpations: Abdomen is soft.      Tenderness: There is no abdominal tenderness.   Musculoskeletal:      Right lower leg: No edema.      Left lower leg: No edema.   Skin:     General: Skin is warm and dry.      Capillary Refill: Capillary refill takes less than 2 seconds.   Neurological:      Mental Status: back to baseline, alert and oriented X 3     Significant Labs: All pertinent labs within the past 24 hours have been reviewed.  CBC:   Recent Labs   Lab 09/23/24  0439 09/24/24 0415   WBC 15.35* 20.47*   HGB 11.5* 12.5   HCT 33.4* 36.3*    426       CMP:   Recent Labs   Lab 09/23/24  0439 09/24/24 0415   * 129*   K 3.6 3.7   CL 96 92*   CO2 27 26   * 124*   BUN 15 17   CREATININE 0.8 0.8   CALCIUM 8.6* 8.8   PROT 6.0 6.9   ALBUMIN 2.3* 2.6*   BILITOT 0.4 0.3   ALKPHOS 53* 61   AST 22 22   ALT 17 19   ANIONGAP 8 11       Significant Imaging: I have reviewed all pertinent imaging results/findings within the past 24 hours.  Physical Exam  Constitutional:       Appearance: Normal appearance.

## 2024-09-24 NOTE — PROGRESS NOTES
[facility] Medicine  Progress Note    Patient Name: Masha Hobbs  MRN: [id number]  Patient Class: IP- Inpatient   Admission Date: 9/16/2024  Length of Stay: 7 days  Attending Physician: Berta Chandra MD  Primary Care Provider: Roney Bang MD        Subjective:     Principal Problem:Acute encephalopathy        HPI:  Masha Hobbs is a 68 year old female with a past medical history of osteoporosis and chronic lower back pain with radiculopathy who was transferred from [facility] due to confusion associated with headache, fever, and generalized weakness/body aches for several hours prior to arrival.  Per  patient was confused since 4:00 p.m. Upon assessment confusion has resolved.  She denies any complaints other than a intermittent frontal lobe headache that is improving after Toradol administration.  There are no acute neurological focal deficits.  She denies chest pain, shortness of breath, dizziness, lightheadedness, vision changes, speech changes, numbness/tingling, neck pain, abdominal pain, nausea, or vomiting.  ED workup reveals:  CT head negative for acute intracranial hemorrhage. Chest x-ray no evidence of acute cardiopulmonary findings.  Lumbar puncture performed in ED, pending results.  She was treated with vancomycin, Rocephin, valacyclovir p.o., and dexamethasone.  CBC with elevated white count 13.8 and stable H&H.  BMP with phosphorus 2.0 otherwise unremarkable.  Negative flu/COVID.  Urinalysis negative.  Lactic acid 1.1.  ECG negative for ischemia or infarct.  MRI brain pending.  Neurology consult placed.  Admitted to hospital medicine for further management and treatment.            Overview/Hospital Course:  Patient is a 68 year old female admitted with AMS, headache and fever. LP was suggestive of meningitis with 2K WBC, mainly neutrophils. HSV panel was negative. Patient was started on Vancomycin, Ampicillin and Rocephin. Acyclovir was discontinued after  D1. MRI shows encephalitis findings with ventriculitis. Patient is being followed by neurology and ID. EEG has been ordered which is consistent with encephalopathy but no epileptiform activity. Patient continue to spike fever and is vomiting. Stat MRI was repeated with rpt LP was ordered. Rpt MRI did not show any changes. LP shows reduced WBC compared to the first one. Patient's mental status improved. Patient continued with daily fevers. ID ordered additional testing. Antibiotics and antiviral adjusted. Patient developed tachycardia and tachypnea on 09/24. D Dimer was elevated, and CTA chest was done. This revealed no PE, but some pleural effusions. One time dose of lasix was given.    Interval History: Patient's mental status back to baseline.. ID has made adjustments in the abx therapy. Had tachycardia and tachypnea earlier this morning. CTA chest negative.    Review of Systems   Constitutional:  Negative for activity change, appetite change, chills and fever.   HENT:  Negative for congestion, ear pain, nosebleeds and sinus pain.    Eyes:  Negative for discharge and itching.   Respiratory:  Negative for apnea, cough, chest tightness and shortness of breath.    Cardiovascular:  Negative for chest pain, palpitations and leg swelling.   Gastrointestinal:  Negative for abdominal distention, abdominal pain and vomiting.   Genitourinary:  Negative for difficulty urinating, dysuria, flank pain and frequency.   Musculoskeletal:  Negative for arthralgias, back pain, joint swelling and myalgias.   Skin:  Negative for color change, pallor and rash.   Neurological:  Negative for dizziness, weakness, light-headedness and headaches.   Psychiatric/Behavioral:  Negative for agitation, behavioral problems, confusion and suicidal ideas.      Objective:     Vital Signs (Most Recent):  Temp: 98.3 °F (36.8 °C) (09/24/24 1145)  Pulse: 89 (09/24/24 1230)  Resp: 20 (09/24/24 1230)  BP: 111/63 (09/24/24 1230)  SpO2: 98 % (09/24/24 1230)  Vital Signs (24h Range):  Temp:  [98.2 °F (36.8 °C)-100.4 °F (38 °C)] 98.3 °F (36.8 °C)  Pulse:  [] 89  Resp:  [14-33] 20  SpO2:  [93 %-100 %] 98 %  BP: (111-157)/(63-94) 111/63     Weight: 62.9 kg (138 lb 10.7 oz)  Body mass index is 26.2 kg/m².    Intake/Output Summary (Last 24 hours) at 9/24/2024 1327  Last data filed at 9/23/2024 2152  Gross per 24 hour   Intake 25 ml   Output --   Net 25 ml         Physical Exam  Vitals and nursing note reviewed.   Constitutional:     Alert  Eyes:      General: No visual field deficit.     Pupils: Pupils are equal, round, and reactive to light.   Cardiovascular:      Rate and Rhythm: Normal rate and regular rhythm.      Pulses: Normal pulses.      Heart sounds: Normal heart sounds.   Pulmonary:      Effort: Pulmonary effort is normal. No respiratory distress.      Breath sounds: Normal breath sounds. No wheezing.   Abdominal:      General: Bowel sounds are normal. There is no distension.      Palpations: Abdomen is soft.      Tenderness: There is no abdominal tenderness.   Musculoskeletal:      Right lower leg: No edema.      Left lower leg: No edema.   Skin:     General: Skin is warm and dry.      Capillary Refill: Capillary refill takes less than 2 seconds.   Neurological:      Mental Status: back to baseline, alert and oriented X 3     Significant Labs: All pertinent labs within the past 24 hours have been reviewed.  CBC:   Recent Labs   Lab 09/23/24  0439 09/24/24  0415   WBC 15.35* 20.47*   HGB 11.5* 12.5   HCT 33.4* 36.3*    426       CMP:   Recent Labs   Lab 09/23/24  0439 09/24/24  0415   * 129*   K 3.6 3.7   CL 96 92*   CO2 27 26   * 124*   BUN 15 17   CREATININE 0.8 0.8   CALCIUM 8.6* 8.8   PROT 6.0 6.9   ALBUMIN 2.3* 2.6*   BILITOT 0.4 0.3   ALKPHOS 53* 61   AST 22 22   ALT 17 19   ANIONGAP 8 11       Significant Imaging: I have reviewed all pertinent imaging results/findings within the past 24 hours.  Physical Exam  Constitutional:        Appearance: Normal appearance.         Assessment/Plan:      * Acute encephalopathy  Likely secondary to bacterial meningitis.   Mental status returned to baseline   MRI shows encephalitis and ventriculitis findings   Ammonia, TSH is normal   Neurology following   EEG shows encephalopathy, no epileptiform activity noted.   Rpt LP shows improved WBC and MRI similar to prior   Opening pressures are normal         HFrEF (heart failure with reduced ejection fraction)  New onset   Echo show Takotsubo pattern   Cardiology was consulted and recommend ischemic work up when completely treated for meningitis, and starting toprol at that time. They have signed off until meningitis treatment is complete.  Clinically euvolemic        Leukocytosis  Due to above       Meningitis  LP shows > 2000 WBC with neurophil predominance, protein is high normal glucose   MRI shows encephalitis and ventriculitis findings   HSV and meningitis panel was all negative   EEG shows encephalopathy and no epileptiform activity   Discussed with neurology and ID  MRI and LP was repeated on 9/19 shows WBC improved, but other wise similar findings.   - Cont abx as per ID recommendations          VTE Risk Mitigation (From admission, onward)           Ordered     IP VTE LOW RISK PATIENT  Once         09/17/24 0239     Place sequential compression device  Until discontinued         09/17/24 0239                    Discharge Planning   WILVER: 9/27/2024     Code Status: Full Code   Is the patient medically ready for discharge?:     Reason for patient still in hospital (select all that apply): Patient trending condition  Discharge Plan A: Home with family                  Berta Chandra MD, MD  Department of Hospital Medicine   [facility]/Surg

## 2024-09-24 NOTE — EICU
Intervention Initiated From:  [facility] / [facility]    Tay intervened regarding:  Rounding (Video assessment)    Nurse Notified:  No    Doctor Notified:  No    Comments: Video rounding completed. Asleep at present. DEBORAH, NAD.

## 2024-09-24 NOTE — PT/OT/SLP PROGRESS
Occupational Therapy      Patient Name:  Masha Hobbs   MRN:  [id number]    Patient not seen today secondary to Other (Comment) (Pt reports unable to tolerate/wants to sleepy 2:07pm). Will follow-up.    9/24/2024

## 2024-09-24 NOTE — PROGRESS NOTES
[facility]  Department of Neurology  Progress Note  Date: 2024 9:43 AM          Patient Name: Masha Hobbs   MRN: [id number]   : 1955    AGE: 68 y.o.    LOS: 7 days Hospital Day: 9  Admit date: 2024  1:02 PM         HPI Masha Hobbs is a 68 y.o. female presented to the hospital with headache, generalized weakness and fever.  She started having headache about 2 days ago which was holocephalic, 8/10 intensity and the same day she had a fever of 103.  Her  told her that she acted confused around that time.  He presented to the hospital for these symptoms.  She denies any unilateral weakness or sensory changes, neck pain or stiffness, nausea or vomiting.     In the ER, she had a CT head which was negative for acute pathology, lumbar puncture and was treated with Rocephin, vancomycin, valacyclovir and dexamethasone.  She was admitted to the hospital for further workup and management.     She states the headache is not getting better and it is about 2 to 3/10 in intensity.  Denies any other complaints at this time.       2024: No acute events overnight. Patient was seen and examined by me this morning.  She does have some receptive aphasia on exam today.    :  Patient was seen and examined.  She is somnolent, only opens eyes to repeated stimulus however does not follow commands or answer to any questions.    :  Patient was seen and examined by me.  Significantly improved mental status today.  Lumbar puncture and MRI were repeated yesterday.  She is currently oriented x3 and denies any complaints.  Family is at bedside.    : Pt was seen by Dr Rico Mendez and exam was normal    : Patient was seen and examined by me this morning.  No acute overnight events, no new neurological complaints.  Vitals have been stable.  Her exam is normal this morning however apparently yesterday she was more somnolent and had a fever.    :  Patient was seen and examined by me.   She is alert and oriented x3 however she feels more lethargic this morning.  Had a fever of 100.4 last night.  White count is trending up.      Vitals:  Patient Vitals for the past 24 hrs:   BP Temp Temp src Pulse Resp SpO2   09/24/24 0800 113/77 -- -- 110 (!) 27 98 %   09/24/24 0700 129/71 100 °F (37.8 °C) -- (!) 113 (!) 29 100 %   09/24/24 0607 133/64 -- -- (!) 115 (!) 21 --   09/24/24 0600 -- -- -- (!) 118 (!) 29 100 %   09/24/24 0500 126/72 -- -- (!) 117 (!) 28 97 %   09/24/24 0400 125/71 -- -- (!) 121 (!) 26 99 %   09/24/24 0300 122/64 98.9 °F (37.2 °C) Oral (!) 122 (!) 31 97 %   09/24/24 0200 127/74 -- -- (!) 124 (!) 33 96 %   09/24/24 0100 132/83 -- -- (!) 128 (!) 33 96 %   09/24/24 0015 -- 98.8 °F (37.1 °C) Axillary -- -- --   09/24/24 0000 (!) 134/94 -- -- (!) 134 (!) 30 (!) 94 %   09/23/24 2356 -- (!) 100.4 °F (38 °C) -- (!) 136 (!) 23 96 %   09/23/24 2345 (!) 157/83 -- -- (!) 133 (!) 26 95 %   09/23/24 2200 (!) 148/94 -- -- (!) 119 (!) 25 (!) 93 %   09/23/24 2100 (!) 143/77 -- -- 106 (!) 30 96 %   09/23/24 2020 -- -- -- 105 (!) 23 (!) 93 %   09/23/24 2014 -- 99.1 °F (37.3 °C) Oral -- -- --   09/23/24 2000 (!) 141/66 -- -- 96 (!) 27 96 %   09/23/24 1900 (!) 144/71 -- -- 97 (!) 28 99 %   09/23/24 1800 (!) 153/71 -- -- 92 17 --   09/23/24 1700 (!) 143/69 -- -- 91 20 97 %   09/23/24 1600 (!) 141/66 98.2 °F (36.8 °C) -- 88 20 97 %   09/23/24 1500 125/64 -- -- 89 20 98 %   09/23/24 1400 132/71 -- -- 83 14 (!) 93 %   09/23/24 1300 138/83 -- -- 89 20 (!) 94 %   09/23/24 1245 129/70 -- -- 90 20 98 %     PHYSICAL EXAM:     GENERAL APPEARANCE: Alert, well-developed, well-nourished in no acute distress.  HEENT: Normocephalic and atraumatic. PERRL. Oropharynx unremarkable.  PULM: Normal respiratory effort. No accessory muscle use.  CV: RRR.  ABDOMEN: Soft, nontender.  EXTREMITIES: No obvious signs of vascular compromise. Pulses present. No cyanosis, clubbing or edema.  SKIN: Clear; no rashes, lesions or skin breaks in  exposed areas.    NEURO:NEURO:  MENTAL STATUS: Patient awake and oriented to time, place, and person. Recent/remote memory normal. Attention span/concentration normal. Speech fluent. Good comprehension, naming, and repetition. Fund of knowledge appropriate for patient's level of education. Affect normal.    CRANIAL NERVES:  Pupils equal round and reactive to light, extraocular movements are intact, face is grossly symmetric.    MOTOR: Normal bulk. Tone normal and symmetric throughout.  Strength 5/5 throughout.  No abnormal movements. No tremor.    REFLEXES: DTRs 2+; normal and symmetric throughout. Plantar response downgoing.    SENSATION: Sensation grossly intact to fine touch, pain/temperature, vibration and position.    COORDINATION: Finger-to-nose and heel to shin normal for age and symmetric. Finger tapping and alternating movements normal.    STATION and GAIT: not assessed       CURRENT SCHEDULED MEDICATIONS:   cefTRIAXone (Rocephin) IV (PEDS and ADULTS)  2 g Intravenous Q12H    clotrimazole  10 mg Oral 5x Daily    doxycycline  100 mg Oral Q12H    famotidine  40 mg Oral Daily    polyethylene glycol  17 g Oral Daily     CURRENT INFUSIONS:   Amino acid 4.25% - dextrose 5% (CLINIMIX-E) solution with additives (1L provides 42.5 gm AA, 50 gm CHO (170 kcal/L dextrose), Na 35, K 30, Mg 5, Ca 4.5, Acetate 70, Cl 39, Phos 15)   Intravenous Continuous 75 mL/hr at 09/23/24 2157 New Bag at 09/23/24 2157     DATA:  Recent Labs   Lab 09/18/24  0536 09/18/24  1451 09/19/24  0457 09/19/24  2053 09/20/24  0356 09/21/24  0645 09/22/24  0546 09/23/24  0439 09/24/24  0415   *  --  134*  --  135* 133* 131* 131* 129*   K 3.7  --  3.1* 3.0* 3.3* 3.8 3.7 3.6 3.7     --  98  --  99 97 98 96 92*   CO2 22*  --  24  --  26 27 21* 27 26   BUN 16  --  14  --  17 15 14 15 17   CREATININE 0.9  --  0.8  --  0.8 0.8 0.8 0.8 0.8   *  --  126*  --  113* 107 100 136* 124*   CALCIUM 9.0  --  8.3*  --  8.0* 8.4* 8.9 8.6* 8.8  "  AST 14  --  24  --  26 19 20 22 22   ALT 12  --  11  --  13 16 20 17 19   AMMONIA  --  32  --   --   --   --   --   --   --      Recent Labs   Lab 09/19/24  0457 09/22/24  0546 09/23/24  0439 09/24/24  0415   WBC 15.66* 15.52* 15.35* 20.47*   HGB 11.6* 12.9 11.5* 12.5   HCT 35.2* 38.3 33.4* 36.3*    361 383 426     Lab Results   Component Value Date    PROTEINCSF 126 (H) 09/19/2024    GLUCCSF 51 09/19/2024     No results found for: "HGBA1C"         I have personally reviewed and interpreted the pertinent imaging and lab results.  Imaging Results              MRI Brain Without Contrast (Final result)  Result time 09/19/24 16:07:10      Final result by Jacob Armenta MD (09/19/24 16:07:10)                   Impression:      1. No significant interval detrimental change in the appearance of the brain as compared to the prior exam dated 09/17/2024.  Persistent scattered abnormal T2/FLAIR hyperintense signal within the supratentorial brain, particularly involving the right mesial temporal lobe, similar to prior exam.  No definite new edema or mass effect.  Findings again are nonspecific with similar differential considerations including inflammatory/infectious, or autoimmune/paraneoplastic encephalitis, status epilepticus, or potential neoplastic process.  Recommend continued close clinical surveillance and consider short-term follow-up imaging with contrast enhanced MRI of the brain, as clinically warranted.      Electronically signed by: Jacob Armenta  Date:    09/19/2024  Time:    16:07               Narrative:    EXAMINATION:  MRI BRAIN WITHOUT CONTRAST    CLINICAL HISTORY:  Repeat-for encephalopathy;.    TECHNIQUE:  Multiplanar multisequence MR imaging of the brain was performed without the administration of intravenous contrast.    COMPARISON:  MRI brain with contrast 09/17/2024, MRI brain without contrast 09/17/2020    FINDINGS:  No significant interval detrimental change in the appearance of the brain as " compared to the prior exam dated 09/17/2024.  Stable-appearing ill-defined T2/FLAIR hyperintense signal within the right mesial temporal lobe without associated diffusion restriction or hemorrhage, stable in size and configuration as compared to the prior exam.  Additional stable-appearing patchy T2/FLAIR hyperintense signal throughout the bilateral periventricular, deep, subcortical white matter and stable involvement of the left splenium of the corpus callosum.  No definite new parenchymal edema or mass effect.  No abnormal intracranial enhancement on prior exam.    Ventricles appear stable in comparison to the prior exam without evidence of hydrocephalus.    Scattered incidental bilateral hippocampal sulcus remnant cysts.  Diffusion-weighted images demonstrate no evidence of an acute infarct.   Susceptibility weighted images demonstrate no evidence of acute or chronic hemorrhage.    Normal vascular flow voids are present.    Bone marrow signal intensity is normal. The paranasal sinuses are normal.  The visualized portions of the mastoids are unremarkable.    Orbits are unremarkable.                                       FL Lumbar Puncture (xpd) (Final result)  Result time 09/19/24 15:45:57      Final result by Sivlano Pearson MD (09/19/24 15:45:57)                   Impression:      Successful lumbar puncture under fluoroscopic guidance as detailed above.      Electronically signed by: Silvano Pearson  Date:    09/19/2024  Time:    15:45               Narrative:    EXAMINATION:  FL LUMBAR PUNCTURE DIAGNOSTIC WITH IMAGING    CLINICAL HISTORY:  Encephalitis;    TECHNIQUE:  Informed written consent was obtained from the patient.  The risks, benefits, and alternatives to the exam were discussed.    The patient was placed in the prone position on the fluoroscopy table and was prepped and draped in a sterile fashion.  Local anesthesia was achieved using 1% lidocaine solution.    The L4-5 interspace was localized  and a 22-gauge spinal needle and stylet were advanced into the thecal sac under fluoroscopic guidance.    Fluoroscopy time: 0.7 minutes.    COMPARISON:  None.    FINDINGS:  Approximately 16 cc of clear cerebrospinal fluid was obtained and sent to the laboratory with orders per referring physician.  The opening pressure was 21 cm H2O.The stylet was replaced and the spinal needle was removed.  There were no immediate post procedure complications.                                        X-Ray Chest 1 View (Final result)  Result time 09/19/24 13:12:54      Final result by Easton Hollingsworth Jr., MD (09/19/24 13:12:54)                   Impression:      Mild cardiomegaly with development of bilateral pulmonary edema consistent with congestive heart failure.    This report was flagged in Epic as abnormal.      Electronically signed by: Easton Hollingsworth MD  Date:    09/19/2024  Time:    13:12               Narrative:    EXAMINATION:  XR CHEST 1 VIEW    CLINICAL HISTORY:  r/o aspiration;    TECHNIQUE:  Single frontal view of the chest was performed.    COMPARISON:  Chest x-ray of September 16, 2024    FINDINGS:  There is mild cardiomegaly.  There is prominence of the pulmonary vasculature and pulmonary edema identified bilaterally consistent with congestive heart failure.  A solid mass is not identified.  No pneumothorax is noted.                                       MRI Brain With Contrast (Final result)  Result time 09/17/24 16:15:19      Final result by Derian Machado MD (09/17/24 16:15:19)                   Impression:      1. There is no abnormal enhancement.  Signal abnormalities on unenhanced brain MRI remain nonspecific.      Electronically signed by: Derian Machado MD  Date:    09/17/2024  Time:    16:15               Narrative:    EXAMINATION:  MRI BRAIN WITH CONTRAST    CLINICAL HISTORY:  follow up on non contrast MRI finding;    TECHNIQUE:  T1-weighted post-contrast multiplanar sequences were obtained  through the brain.  6 mL Gadavist was administered intravenously without reported reaction.    COMPARISON:  Brain MRI without contrast performed earlier today    FINDINGS:  The prior diagnostic brain MRI demonstrated multiple regions of abnormal FLAIR and T2 hyperintense signal and a somewhat atypical distribution including the mesial right temporal lobe, periventricular white matter and splenium of the corpus callosum on the left, sub appended mole region.  There is no abnormal enhancement associated at any of the sites of signal abnormality.  The findings previously described remain nonspecific.  Previous differential considerations still exist.                                        MRI Brain Without Contrast (Final result)  Result time 09/17/24 13:57:29      Final result by Jacob Armenta MD (09/17/24 13:57:29)                   Impression:      1. Abnormal, ill-defined T2/FLAIR hyperintense signal within the right mesial temporal lobe with differential considerations including infectious encephalitis, particularly herpes simplex virus (HSV) encephalitis, autoimmune encephalitis, status epilepticus, or possible low-grade neoplasm.  2. Mild patchy and scattered foci of T2/FLAIR hyperintense signal within the supratentorial white matter elsewhere, nonspecific and may reflect sequelae of chronic small vessel ischemic change.  3. Mild asymmetric T2/FLAIR hyperintense signal involving the ependymal/subependymal white matter of the posterior body/atrium and left frontal horn of the left lateral ventricle, nonspecific and while it could reflect sequelae of chronic small vessel ischemic change, focal encephalitis and/or ventriculitis cannot be excluded.  No hydrocephalus.  This report was flagged in Epic as abnormal.  The significant finding above was relayed by myself  via [facility] secure chat to Beatriz Boogie MD and Joe Galeana MD on 09/17/2024 at 13:40.  Findings were acknowledged and  understood.      Electronically signed by: Jacob Armenta  Date:    09/17/2024  Time:    13:57               Narrative:    EXAMINATION:  MRI BRAIN WITHOUT CONTRAST    CLINICAL HISTORY:  Mental status change, unknown cause;.    TECHNIQUE:  Multiplanar multisequence MR imaging of the brain was performed without the administration of intravenous contrast.    COMPARISON:  CT head 09/16/2024    FINDINGS:  Ventricles and sulci are normal in size for age without evidence of hydrocephalus.    Abnormal, ill-defined T2/FLAIR hyperintense signal within the right mesial temporal lobe with associated hippocampal/gyral enlargement.  No diffusion restriction or susceptibility artifact to suggest hemorrhage.    Mild nonspecific asymmetric ependymal/subependymal white matter T2/FLAIR hyperintense signal about the posterior body/atrium of the left lateral ventricle with question asymmetric involvement of the left frontal horn.  Superimposed scattered foci and patchy T2/FLAIR hyperintense signal within the bilateral periventricular, deep and subcortical white matter, nonspecific and may reflect sequelae of chronic small vessel ischemic change.  Diffusion-weighted images demonstrate no evidence of an acute infarct elsewhere.    No evidence of intracranial acute or chronic hemorrhage elsewhere.    Normal vascular flow voids are present.    Bone marrow signal intensity is normal. The paranasal sinuses are normal.  The visualized portions of the mastoids are unremarkable.    Orbits are unremarkable.                                       X-Ray Chest 1 View (Final result)  Result time 09/16/24 14:53:24      Final result by Farrukh Ramos MD (09/16/24 14:53:24)                   Impression:      No acute cardiopulmonary abnormality.      Electronically signed by: Farrukh Ramos  Date:    09/16/2024  Time:    14:53               Narrative:    EXAMINATION:  XR CHEST 1 VIEW    CLINICAL HISTORY:  Sepsis;    FINDINGS:  Portable chest at 1441 compared  with 06/06/2024 shows unchanged cardiomediastinal silhouette.    Lungs are clear. Pulmonary vasculature is normal. No acute osseous abnormality.                                       CT Head Without Contrast (Final result)  Result time 09/16/24 14:52:17      Final result by Wang Acuña MD (09/16/24 14:52:17)                   Impression:      No CT evidence of acute intracranial pathology.      Electronically signed by: Wang Acuña  Date:    09/16/2024  Time:    14:52               Narrative:    EXAMINATION:  CT HEAD WITHOUT CONTRAST    CLINICAL HISTORY:  Mental status change, unknown cause;    TECHNIQUE:  Axial CT imaging obtained through the brain without IV contrast.    CMS Mandated Quality Data-CT Radiation Dose-436    All CT scans at this facility dose modulation, iterative reconstruction, and or weight-based dosing when appropriate to reduce radiation dose to as low as reasonably achievable.    COMPARISON:  None    FINDINGS:  Negative for acute intracranial hemorrhage, midline shift, or mass effect.  Ventricles and sulci are normal in size.  Gray-white differentiation is maintained.  Mild periventricular and deep white matter hypoattenuation, consistent with microangiopathic change.  Cerebellar hemispheres and brainstem are unremarkable.  Atherosclerotic calcification of intracranial carotid arteries.    No calvarial lesion or fracture.  Mastoid air cells are clear.  Mucosal thickening right maxillary sinus.  Paranasal sinuses are otherwise clear.                                           ASSESSMENT AND PLAN:     Encephalitis   Headache  Encephalopathy     Plan:   LP with CSF analysis showed WBC 2038 (neutrophil predominant), protein 173 and glucose 50.  Negative meningitis encephalitis panel and CSF culture so far.  Likely infectious encephalitis however workup has been unrevealing so far.  Can not rule out autoimmune encephalitis however patient has been having intermittent fevers which makes  infectious causes likely  MRI brain showed hyperintensity in the right medial temporal lobe on T2 FLAIR images concerning for encephalitis. MRI brain with contrast showed no contrast enhancement  Currently on doxycycline and, Rocephin.  Negative meningitis/encephalitis PCR panel on CSF.  Infectious diseases following-appreciate further recommendations.    Repeat lumbar puncture showed CSF , protein 126 and glucose 51.  Negative CSF culture.  Autoimmune encephalopathy panel has been sent  Repeat PCR meningitis/encephalitis panel was negative as well.  EEG showed no seizures or epileptiform activity.  LTM EEG was done and showed no seizures or epileptiform activity.  Speech therapy evaluate and treat  PT OT evaluate and treat.    Patient has been having intermittent fevers however infectious workup was unrevealing so far.  On antibiotics and acyclovir has been stopped.  ID following.  Management of metabolic derangements per primary team.  Correct electrolyte abnormalities.  Will follow             Joe Galeana MD  Neurology/vascular Neurology  Date of Service: 09/24/2024  9:43 AM    Please note: This note was transcribed using voice recognition software. Because of this technology there are often uinintended grammatical, spelling, and other transcription errors. Please disregard these errors.

## 2024-09-24 NOTE — PLAN OF CARE
Problem: Physical Therapy  Goal: Physical Therapy Goal  Description: Goals to be met by: 24     Patient will increase functional independence with mobility by performin. Supine to sit with [place]  2. Sit to supine with [place]  3. Sit to stand transfer with Stand-by Assistance  4. Bed to chair transfer with Stand-by Assistance using Rolling Walker  5. Gait  x 250 feet with Stand-by Assistance using Rolling Walker.     Outcome: Progressing   Ambulate with rw and assistance for safety.
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
## 2024-09-25 LAB
ALBUMIN SERPL BCP-MCNC: 2.3 G/DL (ref 3.5–5.2)
ALP SERPL-CCNC: 51 U/L (ref 55–135)
ALT SERPL W/O P-5'-P-CCNC: 23 U/L (ref 10–44)
ANION GAP SERPL CALC-SCNC: 9 MMOL/L (ref 8–16)
AST SERPL-CCNC: 26 U/L (ref 10–40)
BASOPHILS # BLD AUTO: 0.11 K/UL (ref 0–0.2)
BASOPHILS NFR BLD: 0.6 % (ref 0–1.9)
BILIRUB SERPL-MCNC: 0.3 MG/DL (ref 0.1–1)
BUN SERPL-MCNC: 21 MG/DL (ref 8–23)
CALCIUM SERPL-MCNC: 8.6 MG/DL (ref 8.7–10.5)
CHLORIDE SERPL-SCNC: 95 MMOL/L (ref 95–110)
CO2 SERPL-SCNC: 26 MMOL/L (ref 23–29)
CREAT SERPL-MCNC: 0.7 MG/DL (ref 0.5–1.4)
DIFFERENTIAL METHOD BLD: ABNORMAL
EOSINOPHIL # BLD AUTO: 0.2 K/UL (ref 0–0.5)
EOSINOPHIL NFR BLD: 1.2 % (ref 0–8)
ERYTHROCYTE [DISTWIDTH] IN BLOOD BY AUTOMATED COUNT: 12.5 % (ref 11.5–14.5)
EST. GFR  (NO RACE VARIABLE): >60 ML/MIN/1.73 M^2
GLUCOSE SERPL-MCNC: 119 MG/DL (ref 70–110)
HCT VFR BLD AUTO: 37.3 % (ref 37–48.5)
HGB BLD-MCNC: 12.5 G/DL (ref 12–16)
IMM GRANULOCYTES # BLD AUTO: 0.09 K/UL (ref 0–0.04)
IMM GRANULOCYTES NFR BLD AUTO: 0.5 % (ref 0–0.5)
LYMPHOCYTES # BLD AUTO: 2.5 K/UL (ref 1–4.8)
LYMPHOCYTES NFR BLD: 14.2 % (ref 18–48)
MCH RBC QN AUTO: 32.2 PG (ref 27–31)
MCHC RBC AUTO-ENTMCNC: 33.5 G/DL (ref 32–36)
MCV RBC AUTO: 96 FL (ref 82–98)
MONOCYTES # BLD AUTO: 1.4 K/UL (ref 0.3–1)
MONOCYTES NFR BLD: 8 % (ref 4–15)
NEUTROPHILS # BLD AUTO: 13 K/UL (ref 1.8–7.7)
NEUTROPHILS NFR BLD: 75.5 % (ref 38–73)
NRBC BLD-RTO: 0 /100 WBC
PLATELET # BLD AUTO: 290 K/UL (ref 150–450)
PMV BLD AUTO: 10.5 FL (ref 9.2–12.9)
POTASSIUM SERPL-SCNC: 3.9 MMOL/L (ref 3.5–5.1)
PROT SERPL-MCNC: 6.2 G/DL (ref 6–8.4)
RBC # BLD AUTO: 3.88 M/UL (ref 4–5.4)
RICK SF IGG TITR SER IF: NORMAL {TITER}
RICK SF IGM TITR SER IF: NORMAL {TITER}
SODIUM SERPL-SCNC: 130 MMOL/L (ref 136–145)
VDRL CSF QL: NEGATIVE
WBC # BLD AUTO: 17.23 K/UL (ref 3.9–12.7)

## 2024-09-25 PROCEDURE — 25000003 PHARM REV CODE 250: Performed by: NURSE PRACTITIONER

## 2024-09-25 PROCEDURE — 99233 SBSQ HOSP IP/OBS HIGH 50: CPT | Mod: ,,, | Performed by: NURSE PRACTITIONER

## 2024-09-25 PROCEDURE — 27000221 HC OXYGEN, UP TO 24 HOURS

## 2024-09-25 PROCEDURE — 25000003 PHARM REV CODE 250

## 2024-09-25 PROCEDURE — 85025 COMPLETE CBC W/AUTO DIFF WBC: CPT | Performed by: INTERNAL MEDICINE

## 2024-09-25 PROCEDURE — 80053 COMPREHEN METABOLIC PANEL: CPT

## 2024-09-25 PROCEDURE — 25000003 PHARM REV CODE 250: Performed by: INTERNAL MEDICINE

## 2024-09-25 PROCEDURE — 94761 N-INVAS EAR/PLS OXIMETRY MLT: CPT

## 2024-09-25 PROCEDURE — 20600001 HC STEP DOWN PRIVATE ROOM

## 2024-09-25 PROCEDURE — 36415 COLL VENOUS BLD VENIPUNCTURE: CPT

## 2024-09-25 PROCEDURE — 97530 THERAPEUTIC ACTIVITIES: CPT | Mod: CQ

## 2024-09-25 PROCEDURE — 63600175 PHARM REV CODE 636 W HCPCS: Performed by: INTERNAL MEDICINE

## 2024-09-25 PROCEDURE — 97116 GAIT TRAINING THERAPY: CPT | Mod: CQ

## 2024-09-25 RX ORDER — LACTULOSE 10 G/15ML
20 SOLUTION ORAL 3 TIMES DAILY
Status: DISPENSED | OUTPATIENT
Start: 2024-09-25 | End: 2024-09-27

## 2024-09-25 RX ADMIN — CLOTRIMAZOLE 10 MG: 10 LOZENGE ORAL at 05:09

## 2024-09-25 RX ADMIN — CLOTRIMAZOLE 10 MG: 10 LOZENGE ORAL at 10:09

## 2024-09-25 RX ADMIN — LACTULOSE 20 G: 20 SOLUTION ORAL at 11:09

## 2024-09-25 RX ADMIN — CLOTRIMAZOLE 10 MG: 10 LOZENGE ORAL at 06:09

## 2024-09-25 RX ADMIN — LEUCINE, PHENYLALANINE, LYSINE, METHIONINE, ISOLEUCINE, VALINE, HISTIDINE, THREONINE, TRYPTOPHAN, ALANINE, GLYCINE, ARGININE, PROLINE, SERINE, TYROSINE, SODIUM ACETATE, DIBASIC POTASSIUM PHOSPHATE, MAGNESIUM CHLORIDE, SODIUM CHLORIDE, CALCIUM CHLORIDE, DEXTROSE
311; 238; 247; 170; 255; 247; 204; 179; 77; 880; 438; 489; 289; 213; 17; 297; 261; 51; 77; 33; 5 INJECTION INTRAVENOUS at 05:09

## 2024-09-25 RX ADMIN — CLOTRIMAZOLE 10 MG: 10 LOZENGE ORAL at 02:09

## 2024-09-25 RX ADMIN — CEFTRIAXONE 2 G: 2 INJECTION, POWDER, FOR SOLUTION INTRAMUSCULAR; INTRAVENOUS at 04:09

## 2024-09-25 RX ADMIN — BISACODYL 5 MG: 5 TABLET, COATED ORAL at 10:09

## 2024-09-25 RX ADMIN — CLOTRIMAZOLE 10 MG: 10 LOZENGE ORAL at 11:09

## 2024-09-25 RX ADMIN — FAMOTIDINE 40 MG: 20 TABLET, FILM COATED ORAL at 10:09

## 2024-09-25 RX ADMIN — DOXYCYCLINE HYCLATE 100 MG: 100 TABLET, COATED ORAL at 10:09

## 2024-09-25 RX ADMIN — ACETAMINOPHEN 650 MG: 650 SUPPOSITORY RECTAL at 10:09

## 2024-09-25 RX ADMIN — DOXYCYCLINE HYCLATE 100 MG: 100 TABLET, COATED ORAL at 11:09

## 2024-09-25 RX ADMIN — LACTULOSE 20 G: 20 SOLUTION ORAL at 02:09

## 2024-09-25 RX ADMIN — POLYETHYLENE GLYCOL (3350) 17 G: 17 POWDER, FOR SOLUTION ORAL at 10:09

## 2024-09-25 NOTE — ASSESSMENT & PLAN NOTE
New onset   Echo show Takotsubo pattern   Cardiology was consulted and recommend ischemic work up when completely treated for meningitis, and starting toprol at that time. They have signed off until meningitis treatment is complete.       (0) Normal; no sensory loss

## 2024-09-25 NOTE — PT/OT/SLP PROGRESS
"Physical Therapy Treatment    Patient Name:  Masha Hobbs   MRN:  [id number]    Recommendations:     Discharge Recommendations: LIT vs HIT depending on progress due to decline in functional mobility.   Discharge Equipment Recommendations: none  Barriers to discharge: None    Assessment:     Masha Hobbs is a 68 y.o. female admitted with a medical diagnosis of Acute encephalopathy.  She presents with the following impairments/functional limitations: weakness, impaired endurance, impaired self care skills, impaired functional mobility, gait instability, decreased lower extremity function, impaired cardiopulmonary response to activity .  Awake, alert, supine in bed with spouse and daughter present.  Agreed to participate in therapy.  Responded , " OK" when asked , " How do you feel ?"   Sup > sit with Min A , verbal cues for technique and Min A for sitting balance initially, leaning L side.  Sit < stand with rw and Min A. Ambulated few steps, returned to sit , unsteady, leaning posterior.  Sat briefly.  Sit > stand with rw and Min A.  Ambulated 30' x 2 with rw and Min A, verbal cues to take steps and assistance to maneuver walker. Seated rest due to limited endurance.  Difficulty initiating activity and verbal instruction for hand placement each transfer sit <> stand.  arrived to assess, informed of session. Sat up in chair at bedside, all lines in place.     Rehab Prognosis: Good; patient would benefit from acute skilled PT services to address these deficits and reach maximum level of function.    Recent Surgery: * No surgery found *      Plan:     During this hospitalization, patient to be seen 6 x/week to address the identified rehab impairments via gait training, therapeutic activities, therapeutic exercises and progress toward the following goals:    Plan of Care Expires:  10/21/24    Subjective     Chief Complaint: feeling tired  Patient/Family Comments/goals: none  stated  Pain/Comfort:  Pain Rating 1: " 0/10      Objective:     Communicated with nurse Reyes prior to session.  Patient found supine with bed alarm, blood pressure cuff, oxygen, PureWick, peripheral IV, pulse ox (continuous), telemetry upon PT entry to room.     General Precautions: Standard, fall  Orthopedic Precautions: N/A  Braces: N/A  Respiratory Status: Nasal cannula, flow 2 L/min     Functional Mobility:  Bed Mobility:     Supine to Sit: minimum assistance  Transfers:     Sit to Stand:  minimum assistance with rolling walker  Gait: 30' x 2 with rw and Min A, IV and O2 in tow, seated rest required.       AM-PAC 6 CLICK MOBILITY          Treatment & Education:  Ambulated with rw and Min A for safety, verbal cues for technique, IV and O2 in tow.     Patient left up in chair with all lines intact, call button in reach, chair alarm on, nurse Reyes notified, and spouse / daughter / M.D.  present..    GOALS:   Multidisciplinary Problems       Physical Therapy Goals          Problem: Physical Therapy    Goal Priority Disciplines Outcome Goal Variances Interventions   Physical Therapy Goal     PT, PT/OT Progressing     Description: Goals to be met by: 24     Patient will increase functional independence with mobility by performin. Supine to sit with Mitchell  2. Sit to supine with Mitchell  3. Sit to stand transfer with Stand-by Assistance  4. Bed to chair transfer with Stand-by Assistance using Rolling Walker  5. Gait  x 250 feet with Stand-by Assistance using Rolling Walker.                          Time Tracking:     PT Received On: 24  PT Start Time: 910     PT Stop Time: 933  PT Total Time (min): 23 min     Billable Minutes: Gait Training 15min and Therapeutic Activity 8min    Treatment Type: Treatment  PT/PTA: PTA     Number of PTA visits since last PT visit: 4     2024

## 2024-09-25 NOTE — PLAN OF CARE
Problem: Adult Inpatient Plan of Care  Goal: Plan of Care Review  Outcome: Progressing  Goal: Absence of Hospital-Acquired Illness or Injury  Outcome: Progressing  Goal: Optimal Comfort and Wellbeing  Outcome: Progressing     Problem: Fall Injury Risk  Goal: Absence of Fall and Fall-Related Injury  Outcome: Progressing     Problem: Meningitis/Encephalitis  Goal: Effective Cerebral Tissue Perfusion  Outcome: Progressing  Goal: Absence of Infection Signs and Symptoms  Outcome: Progressing     Problem: Skin Injury Risk Increased  Goal: Skin Health and Integrity  Outcome: Progressing

## 2024-09-25 NOTE — CARE UPDATE
09/24/24 1943   Patient Assessment/Suction   Level of Consciousness (AVPU) alert   Respiratory Effort Normal;Unlabored   Expansion/Accessory Muscles/Retractions expansion symmetric;no retractions;no use of accessory muscles   All Lung Fields Breath Sounds diminished   PRE-TX-O2   Device (Oxygen Therapy) nasal cannula   Flow (L/min) (Oxygen Therapy) 2   Oxygen Concentration (%) 28   SpO2 98 %   Pulse Oximetry Type Continuous   Pulse 89   Resp (!) 28   Ready to Wean/Extubation Screen   FIO2<=50 (chart decimal) 0.28

## 2024-09-25 NOTE — EICU
Intervention Initiated From:  COR / EICU    Tay intervened regarding:  Rounding (Video assessment)    Nurse Notified:  No    Doctor Notified:  No    Comments: Video rounding completed. Sats 98% on 2 L NC, respirations even and unlabored. Asleep at present. VSS, NAD
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
## 2024-09-25 NOTE — PLAN OF CARE
Problem: Adult Inpatient Plan of Care  Goal: Patient-Specific Goal (Individualized)  Outcome: Progressing     Problem: Adult Inpatient Plan of Care  Goal: Optimal Comfort and Wellbeing  Outcome: Progressing     Problem: Fall Injury Risk  Goal: Absence of Fall and Fall-Related Injury  Outcome: Progressing     Problem: Meningitis/Encephalitis  Goal: Optimal Coping  Outcome: Progressing     Problem: Meningitis/Encephalitis  Goal: Absence of Infection Signs and Symptoms  Outcome: Progressing     Problem: Skin Injury Risk Increased  Goal: Skin Health and Integrity  Outcome: Progressing     Problem: Fall Injury Risk  Goal: Absence of Fall and Fall-Related Injury  Outcome: Progressing

## 2024-09-25 NOTE — CARE UPDATE
09/25/24 1259   PRE-TX-O2   Device (Oxygen Therapy) nasal cannula   $ Is the patient on Low Flow Oxygen? Yes   Flow (L/min) (Oxygen Therapy) 2   SpO2 96 %   Pulse Oximetry Type Continuous   $ Pulse Oximetry - Multiple Charge Pulse Oximetry - Multiple   Pulse 92   Resp (!) 28

## 2024-09-25 NOTE — PROGRESS NOTES
[facility] Medicine  Progress Note    Patient Name: Masha Hobbs  MRN: [id number]  Patient Class: IP- Inpatient   Admission Date: 9/16/2024  Length of Stay: 8 days  Attending Physician: Berta Chandra MD  Primary Care Provider: Roney Bang MD        Subjective:     Principal Problem:Acute encephalopathy        HPI:  Masha Hobbs is a 68 year old female with a past medical history of osteoporosis and chronic lower back pain with radiculopathy who was transferred from [facility] due to confusion associated with headache, fever, and generalized weakness/body aches for several hours prior to arrival.  Per  patient was confused since 4:00 p.m. Upon assessment confusion has resolved.  She denies any complaints other than a intermittent frontal lobe headache that is improving after Toradol administration.  There are no acute neurological focal deficits.  She denies chest pain, shortness of breath, dizziness, lightheadedness, vision changes, speech changes, numbness/tingling, neck pain, abdominal pain, nausea, or vomiting.  ED workup reveals:  CT head negative for acute intracranial hemorrhage. Chest x-ray no evidence of acute cardiopulmonary findings.  Lumbar puncture performed in ED, pending results.  She was treated with vancomycin, Rocephin, valacyclovir p.o., and dexamethasone.  CBC with elevated white count 13.8 and stable H&H.  BMP with phosphorus 2.0 otherwise unremarkable.  Negative flu/COVID.  Urinalysis negative.  Lactic acid 1.1.  ECG negative for ischemia or infarct.  MRI brain pending.  Neurology consult placed.  Admitted to hospital medicine for further management and treatment.            Overview/Hospital Course:  Patient is a 68 year old female admitted with AMS, headache and fever. LP was suggestive of meningitis with 2K WBC, mainly neutrophils. HSV panel was negative. Patient was started on Vancomycin, Ampicillin and Rocephin. Acyclovir was discontinued after  D1. MRI shows encephalitis findings with ventriculitis. Patient is being followed by neurology and ID. EEG has been ordered which is consistent with encephalopathy but no epileptiform activity. Patient continue to spike fever and is vomiting. Stat MRI was repeated with rpt LP was ordered. Rpt MRI did not show any changes. LP shows reduced WBC compared to the first one. Patient's mental status improved. Patient continued with daily fevers. ID ordered additional testing. Antibiotics and antiviral adjusted. Patient developed tachycardia and tachypnea on 09/24. D Dimer was elevated, and CTA chest was done. This revealed no PE, but some pleural effusions. One time dose of lasix was given. Patient's appetite was poor and clinimix was added temporarily.     Interval History: Patient's mental status back to baseline.. ID has made adjustments in the abx therapy. Did try to eat breakfast.    Review of Systems   Constitutional:  Negative for activity change, appetite change, chills and fever.   HENT:  Negative for congestion, ear pain, nosebleeds and sinus pain.    Eyes:  Negative for discharge and itching.   Respiratory:  Negative for apnea, cough, chest tightness and shortness of breath.    Cardiovascular:  Negative for chest pain, palpitations and leg swelling.   Gastrointestinal:  Negative for abdominal distention, abdominal pain and vomiting.   Genitourinary:  Negative for difficulty urinating, dysuria, flank pain and frequency.   Musculoskeletal:  Negative for arthralgias, back pain, joint swelling and myalgias.   Skin:  Negative for color change, pallor and rash.   Neurological:  Negative for dizziness, weakness, light-headedness and headaches.   Psychiatric/Behavioral:  Negative for agitation, behavioral problems, confusion and suicidal ideas.      Objective:     Vital Signs (Most Recent):  Temp: 98.4 °F (36.9 °C) (09/25/24 1115)  Pulse: 80 (09/25/24 0815)  Resp: (!) 25 (09/25/24 0815)  BP: (!) 126/58 (09/25/24  0700)  SpO2: 100 % (09/25/24 0815) Vital Signs (24h Range):  Temp:  [97.9 °F (36.6 °C)-99.7 °F (37.6 °C)] 98.4 °F (36.9 °C)  Pulse:  [] 80  Resp:  [16-28] 25  SpO2:  [97 %-100 %] 100 %  BP: (101-128)/(52-66) 126/58     Weight: 62.9 kg (138 lb 10.7 oz)  Body mass index is 26.2 kg/m².    Intake/Output Summary (Last 24 hours) at 9/25/2024 1141  Last data filed at 9/25/2024 0050  Gross per 24 hour   Intake 240 ml   Output 1700 ml   Net -1460 ml         Physical Exam  Vitals and nursing note reviewed.   Constitutional:     Alert  Eyes:      General: No visual field deficit.     Pupils: Pupils are equal, round, and reactive to light.   Cardiovascular:      Rate and Rhythm: Normal rate and regular rhythm.      Pulses: Normal pulses.      Heart sounds: Normal heart sounds.   Pulmonary:      Effort: Pulmonary effort is normal. No respiratory distress.      Breath sounds: Normal breath sounds. No wheezing.   Abdominal:      General: Bowel sounds are normal. There is no distension.      Palpations: Abdomen is soft.      Tenderness: There is no abdominal tenderness.   Musculoskeletal:      Right lower leg: No edema.      Left lower leg: No edema.   Skin:     General: Skin is warm and dry.      Capillary Refill: Capillary refill takes less than 2 seconds.   Neurological:      Mental Status: back to baseline, alert and oriented X 3     Significant Labs: All pertinent labs within the past 24 hours have been reviewed.  CBC:   Recent Labs   Lab 09/24/24 0415 09/25/24 0428   WBC 20.47* 17.23*   HGB 12.5 12.5   HCT 36.3* 37.3    290       CMP:   Recent Labs   Lab 09/24/24 0415 09/25/24 0428   * 130*   K 3.7 3.9   CL 92* 95   CO2 26 26   * 119*   BUN 17 21   CREATININE 0.8 0.7   CALCIUM 8.8 8.6*   PROT 6.9 6.2   ALBUMIN 2.6* 2.3*   BILITOT 0.3 0.3   ALKPHOS 61 51*   AST 22 26   ALT 19 23   ANIONGAP 11 9       Significant Imaging: I have reviewed all pertinent imaging results/findings within the past 24  hours.  Physical Exam  Constitutional:       Appearance: Normal appearance.         Assessment/Plan:      * Acute encephalopathy  Likely secondary to bacterial meningitis.   Mental status returned to baseline   MRI shows encephalitis and ventriculitis findings   Ammonia, TSH is normal   Neurology following   EEG shows encephalopathy, no epileptiform activity noted.   Rpt LP shows improved WBC and MRI similar to prior   Opening pressures are normal         HFrEF (heart failure with reduced ejection fraction)  New onset   Echo show Takotsubo pattern   Cardiology was consulted and recommend ischemic work up when completely treated for meningitis, and starting toprol at that time. They have signed off until meningitis treatment is complete.        Leukocytosis  Due to above       Meningitis  LP shows > 2000 WBC with neurophil predominance, protein is high normal glucose   MRI shows encephalitis and ventriculitis findings   HSV and meningitis panel was all negative   EEG shows encephalopathy and no epileptiform activity   Discussed with neurology and ID  MRI and LP was repeated on 9/19 shows WBC improved, but other wise similar findings.   - Cont abx as per ID recommendations          VTE Risk Mitigation (From admission, onward)           Ordered     IP VTE LOW RISK PATIENT  Once         09/17/24 0239     Place sequential compression device  Until discontinued         09/17/24 0239                    Discharge Planning   WILVER: 9/27/2024     Code Status: Full Code   Is the patient medically ready for discharge?:     Reason for patient still in hospital (select all that apply): Patient trending condition  Discharge Plan A: Home with family                  Berta Chandra MD, MD  Department of Hospital Medicine   [facility]/Surg

## 2024-09-25 NOTE — PT/OT/SLP PROGRESS
Occupational Therapy      Patient Name:  Masha Hobbs   MRN:  [id number]    Patient not seen today secondary to Other (Comment) (Attempted 10:09am and 1:55pm. Pt declined. Wants to sleep). Will follow-up.    9/25/2024

## 2024-09-25 NOTE — PLAN OF CARE
Problem: Physical Therapy  Goal: Physical Therapy Goal  Description: Goals to be met by: 24     Patient will increase functional independence with mobility by performin. Supine to sit with [place]  2. Sit to supine with [place]  3. Sit to stand transfer with Stand-by Assistance  4. Bed to chair transfer with Stand-by Assistance using Rolling Walker  5. Gait  x 250 feet with Stand-by Assistance using Rolling Walker.     Outcome: Progressing   Ambulate with rw and assistance for safety.

## 2024-09-25 NOTE — SUBJECTIVE & OBJECTIVE
Interval History: Patient's mental status back to baseline.. ID has made adjustments in the abx therapy. Did try to eat breakfast.    Review of Systems   Constitutional:  Negative for activity change, appetite change, chills and fever.   HENT:  Negative for congestion, ear pain, nosebleeds and sinus pain.    Eyes:  Negative for discharge and itching.   Respiratory:  Negative for apnea, cough, chest tightness and shortness of breath.    Cardiovascular:  Negative for chest pain, palpitations and leg swelling.   Gastrointestinal:  Negative for abdominal distention, abdominal pain and vomiting.   Genitourinary:  Negative for difficulty urinating, dysuria, flank pain and frequency.   Musculoskeletal:  Negative for arthralgias, back pain, joint swelling and myalgias.   Skin:  Negative for color change, pallor and rash.   Neurological:  Negative for dizziness, weakness, light-headedness and headaches.   Psychiatric/Behavioral:  Negative for agitation, behavioral problems, confusion and suicidal ideas.      Objective:     Vital Signs (Most Recent):  Temp: 98.4 °F (36.9 °C) (09/25/24 1115)  Pulse: 80 (09/25/24 0815)  Resp: (!) 25 (09/25/24 0815)  BP: (!) 126/58 (09/25/24 0700)  SpO2: 100 % (09/25/24 0815) Vital Signs (24h Range):  Temp:  [97.9 °F (36.6 °C)-99.7 °F (37.6 °C)] 98.4 °F (36.9 °C)  Pulse:  [] 80  Resp:  [16-28] 25  SpO2:  [97 %-100 %] 100 %  BP: (101-128)/(52-66) 126/58     Weight: 62.9 kg (138 lb 10.7 oz)  Body mass index is 26.2 kg/m².    Intake/Output Summary (Last 24 hours) at 9/25/2024 1141  Last data filed at 9/25/2024 0050  Gross per 24 hour   Intake 240 ml   Output 1700 ml   Net -1460 ml         Physical Exam  Vitals and nursing note reviewed.   Constitutional:     Alert  Eyes:      General: No visual field deficit.     Pupils: Pupils are equal, round, and reactive to light.   Cardiovascular:      Rate and Rhythm: Normal rate and regular rhythm.      Pulses: Normal pulses.      Heart sounds: Normal  heart sounds.   Pulmonary:      Effort: Pulmonary effort is normal. No respiratory distress.      Breath sounds: Normal breath sounds. No wheezing.   Abdominal:      General: Bowel sounds are normal. There is no distension.      Palpations: Abdomen is soft.      Tenderness: There is no abdominal tenderness.   Musculoskeletal:      Right lower leg: No edema.      Left lower leg: No edema.   Skin:     General: Skin is warm and dry.      Capillary Refill: Capillary refill takes less than 2 seconds.   Neurological:      Mental Status: back to baseline, alert and oriented X 3     Significant Labs: All pertinent labs within the past 24 hours have been reviewed.  CBC:   Recent Labs   Lab 09/24/24 0415 09/25/24 0428   WBC 20.47* 17.23*   HGB 12.5 12.5   HCT 36.3* 37.3    290       CMP:   Recent Labs   Lab 09/24/24 0415 09/25/24 0428   * 130*   K 3.7 3.9   CL 92* 95   CO2 26 26   * 119*   BUN 17 21   CREATININE 0.8 0.7   CALCIUM 8.8 8.6*   PROT 6.9 6.2   ALBUMIN 2.6* 2.3*   BILITOT 0.3 0.3   ALKPHOS 61 51*   AST 22 26   ALT 19 23   ANIONGAP 11 9       Significant Imaging: I have reviewed all pertinent imaging results/findings within the past 24 hours.  Physical Exam  Constitutional:       Appearance: Normal appearance.

## 2024-09-25 NOTE — PROGRESS NOTES
[facility]  Department of Neurology  Progress Note  Date: 2024 9:43 AM          Patient Name: Masha Hobbs   MRN: [id number]   : 1955    AGE: 68 y.o.    LOS: 8 days Hospital Day: 10  Admit date: 2024  1:02 PM         HPI Masha Hobbs is a 68 y.o. female presented to the hospital with headache, generalized weakness and fever.  She started having headache about 2 days ago which was holocephalic, 8/10 intensity and the same day she had a fever of 103.  Her  told her that she acted confused around that time.  He presented to the hospital for these symptoms.  She denies any unilateral weakness or sensory changes, neck pain or stiffness, nausea or vomiting.     In the ER, she had a CT head which was negative for acute pathology, lumbar puncture and was treated with Rocephin, vancomycin, valacyclovir and dexamethasone.  She was admitted to the hospital for further workup and management.     She states the headache is not getting better and it is about 2 to 3/10 in intensity.  Denies any other complaints at this time.       2024: No acute events overnight. Patient was seen and examined by me this morning.  She does have some receptive aphasia on exam today.    :  Patient was seen and examined.  She is somnolent, only opens eyes to repeated stimulus however does not follow commands or answer to any questions.    :  Patient was seen and examined by me.  Significantly improved mental status today.  Lumbar puncture and MRI were repeated yesterday.  She is currently oriented x3 and denies any complaints.  Family is at bedside.    : Pt was seen by Dr Rico Mendez and exam was normal    : Patient was seen and examined by me this morning.  No acute overnight events, no new neurological complaints.  Vitals have been stable.  Her exam is normal this morning however apparently yesterday she was more somnolent and had a fever.    :  Patient was seen and examined by me.   She is alert and oriented x3 however she feels more lethargic this morning.  Had a fever of 100.4 last night.  White count is trending up.    9/25:  Patient is drowsy and sleepy however wakes up to stimulus and answers questions appropriately and oriented x3.  Denies any new complaints.  Afebrile overnight.      Vitals:  Patient Vitals for the past 24 hrs:   BP Temp Temp src Pulse Resp SpO2   09/25/24 0700 (!) 126/58 -- -- 80 19 99 %   09/25/24 0600 120/62 -- -- 84 19 98 %   09/25/24 0500 (!) 119/59 -- -- 87 18 100 %   09/25/24 0401 114/66 98.4 °F (36.9 °C) Oral 83 (!) 24 99 %   09/25/24 0400 114/66 -- -- 82 16 98 %   09/25/24 0300 116/64 -- -- 80 17 99 %   09/25/24 0200 (!) 112/55 -- -- 78 18 98 %   09/25/24 0100 (!) 113/55 -- -- 81 (!) 21 99 %   09/25/24 0049 -- 98.7 °F (37.1 °C) -- -- -- --   09/25/24 0000 (!) 113/59 -- -- 85 20 99 %   09/24/24 2332 -- 99.1 °F (37.3 °C) Oral -- -- --   09/24/24 2300 (!) 121/58 -- -- 87 20 100 %   09/24/24 2200 115/61 -- -- 83 (!) 23 97 %   09/24/24 2100 (!) 116/58 -- -- 86 18 98 %   09/24/24 2000 (!) 108/52 -- -- 93 18 98 %   09/24/24 1955 -- 99.7 °F (37.6 °C) Oral -- -- --   09/24/24 1943 -- -- -- 89 (!) 28 98 %   09/24/24 1700 (!) 125/58 -- -- 99 20 100 %   09/24/24 1600 128/60 97.9 °F (36.6 °C) -- 96 20 100 %   09/24/24 1530 -- 99.5 °F (37.5 °C) -- 94 (!) 22 100 %   09/24/24 1500 (!) 110/57 -- -- 108 20 99 %   09/24/24 1400 (!) 119/59 -- -- 94 20 97 %   09/24/24 1300 (!) 101/59 -- -- 100 20 99 %   09/24/24 1230 111/63 -- -- 89 20 98 %   09/24/24 1145 -- 98.3 °F (36.8 °C) -- -- -- --     PHYSICAL EXAM:     GENERAL APPEARANCE:  Drowsy, well-developed, well-nourished in no acute distress.  HEENT: Normocephalic and atraumatic. PERRL. Oropharynx unremarkable.  PULM: Normal respiratory effort. No accessory muscle use.  CV: RRR.  ABDOMEN: Soft, nontender.  EXTREMITIES: No obvious signs of vascular compromise. Pulses present. No cyanosis, clubbing or edema.  SKIN: Clear; no rashes,  lesions or skin breaks in exposed areas.    NEURO:NEURO:  MENTAL STATUS: Patient drowsy, wakes up to stimulus and oriented to time, place, and person. Recent/remote memory normal. Attention span/concentration normal. Speech fluent. Good comprehension, naming, and repetition. Fund of knowledge appropriate for patient's level of education. Affect normal.    CRANIAL NERVES:  Pupils equal round and reactive to light, extraocular movements are intact, face is grossly symmetric.    MOTOR: Normal bulk. Tone normal and symmetric throughout.  Strength 5/5 throughout.  No abnormal movements. No tremor.    REFLEXES: DTRs 2+; normal and symmetric throughout. Plantar response downgoing.    SENSATION: Sensation grossly intact to fine touch, pain/temperature, vibration and position.    COORDINATION: Finger-to-nose and heel to shin normal for age and symmetric. Finger tapping and alternating movements normal.    STATION and GAIT: not assessed       CURRENT SCHEDULED MEDICATIONS:   cefTRIAXone (Rocephin) IV (PEDS and ADULTS)  2 g Intravenous Q12H    clotrimazole  10 mg Oral 5x Daily    doxycycline  100 mg Oral Q12H    famotidine  40 mg Oral Daily    polyethylene glycol  17 g Oral Daily     CURRENT INFUSIONS:   Amino acid 4.25% - dextrose 5% (CLINIMIX-E) solution with additives (1L provides 42.5 gm AA, 50 gm CHO (170 kcal/L dextrose), Na 35, K 30, Mg 5, Ca 4.5, Acetate 70, Cl 39, Phos 15)   Intravenous Continuous 75 mL/hr at 09/25/24 0558 New Bag at 09/25/24 0558     DATA:  Recent Labs   Lab 09/18/24  1451 09/19/24  0457 09/19/24  0457 09/19/24  2053 09/20/24  0356 09/21/24  0645 09/22/24  0546 09/23/24  0439 09/24/24  0415 09/25/24  0428   NA  --  134*  --   --  135* 133* 131* 131* 129* 130*   K  --  3.1*   < > 3.0* 3.3* 3.8 3.7 3.6 3.7 3.9   CL  --  98  --   --  99 97 98 96 92* 95   CO2  --  24  --   --  26 27 21* 27 26 26   BUN  --  14  --   --  17 15 14 15 17 21   CREATININE  --  0.8  --   --  0.8 0.8 0.8 0.8 0.8 0.7   GLU  --   "126*  --   --  113* 107 100 136* 124* 119*   CALCIUM  --  8.3*  --   --  8.0* 8.4* 8.9 8.6* 8.8 8.6*   AST  --  24  --   --  26 19 20 22 22 26   ALT  --  11  --   --  13 16 20 17 19 23   AMMONIA 32  --   --   --   --   --   --   --   --   --     < > = values in this interval not displayed.     Recent Labs   Lab 09/19/24  0457 09/22/24  0546 09/23/24  0439 09/24/24  0415 09/25/24  0428   WBC 15.66* 15.52* 15.35* 20.47* 17.23*   HGB 11.6* 12.9 11.5* 12.5 12.5   HCT 35.2* 38.3 33.4* 36.3* 37.3    361 383 426 290     Lab Results   Component Value Date    PROTEINCSF 126 (H) 09/19/2024    GLUCCSF 51 09/19/2024     No results found for: "HGBA1C"         I have personally reviewed and interpreted the pertinent imaging and lab results.  Imaging Results              MRI Brain Without Contrast (Final result)  Result time 09/19/24 16:07:10      Final result by Jacob Armenta MD (09/19/24 16:07:10)                   Impression:      1. No significant interval detrimental change in the appearance of the brain as compared to the prior exam dated 09/17/2024.  Persistent scattered abnormal T2/FLAIR hyperintense signal within the supratentorial brain, particularly involving the right mesial temporal lobe, similar to prior exam.  No definite new edema or mass effect.  Findings again are nonspecific with similar differential considerations including inflammatory/infectious, or autoimmune/paraneoplastic encephalitis, status epilepticus, or potential neoplastic process.  Recommend continued close clinical surveillance and consider short-term follow-up imaging with contrast enhanced MRI of the brain, as clinically warranted.      Electronically signed by: Jacob Armenta  Date:    09/19/2024  Time:    16:07               Narrative:    EXAMINATION:  MRI BRAIN WITHOUT CONTRAST    CLINICAL HISTORY:  Repeat-for encephalopathy;.    TECHNIQUE:  Multiplanar multisequence MR imaging of the brain was performed without the administration of " intravenous contrast.    COMPARISON:  MRI brain with contrast 09/17/2024, MRI brain without contrast 09/17/2020    FINDINGS:  No significant interval detrimental change in the appearance of the brain as compared to the prior exam dated 09/17/2024.  Stable-appearing ill-defined T2/FLAIR hyperintense signal within the right mesial temporal lobe without associated diffusion restriction or hemorrhage, stable in size and configuration as compared to the prior exam.  Additional stable-appearing patchy T2/FLAIR hyperintense signal throughout the bilateral periventricular, deep, subcortical white matter and stable involvement of the left splenium of the corpus callosum.  No definite new parenchymal edema or mass effect.  No abnormal intracranial enhancement on prior exam.    Ventricles appear stable in comparison to the prior exam without evidence of hydrocephalus.    Scattered incidental bilateral hippocampal sulcus remnant cysts.  Diffusion-weighted images demonstrate no evidence of an acute infarct.   Susceptibility weighted images demonstrate no evidence of acute or chronic hemorrhage.    Normal vascular flow voids are present.    Bone marrow signal intensity is normal. The paranasal sinuses are normal.  The visualized portions of the mastoids are unremarkable.    Orbits are unremarkable.                                       FL Lumbar Puncture (xpd) (Final result)  Result time 09/19/24 15:45:57      Final result by Silvano Pearson MD (09/19/24 15:45:57)                   Impression:      Successful lumbar puncture under fluoroscopic guidance as detailed above.      Electronically signed by: Silvano Pearson  Date:    09/19/2024  Time:    15:45               Narrative:    EXAMINATION:  FL LUMBAR PUNCTURE DIAGNOSTIC WITH IMAGING    CLINICAL HISTORY:  Encephalitis;    TECHNIQUE:  Informed written consent was obtained from the patient.  The risks, benefits, and alternatives to the exam were discussed.    The patient  was placed in the prone position on the fluoroscopy table and was prepped and draped in a sterile fashion.  Local anesthesia was achieved using 1% lidocaine solution.    The L4-5 interspace was localized and a 22-gauge spinal needle and stylet were advanced into the thecal sac under fluoroscopic guidance.    Fluoroscopy time: 0.7 minutes.    COMPARISON:  None.    FINDINGS:  Approximately 16 cc of clear cerebrospinal fluid was obtained and sent to the laboratory with orders per referring physician.  The opening pressure was 21 cm H2O.The stylet was replaced and the spinal needle was removed.  There were no immediate post procedure complications.                                        X-Ray Chest 1 View (Final result)  Result time 09/19/24 13:12:54      Final result by Easton Hollingsworth Jr., MD (09/19/24 13:12:54)                   Impression:      Mild cardiomegaly with development of bilateral pulmonary edema consistent with congestive heart failure.    This report was flagged in Epic as abnormal.      Electronically signed by: Easton Hollingsworth MD  Date:    09/19/2024  Time:    13:12               Narrative:    EXAMINATION:  XR CHEST 1 VIEW    CLINICAL HISTORY:  r/o aspiration;    TECHNIQUE:  Single frontal view of the chest was performed.    COMPARISON:  Chest x-ray of September 16, 2024    FINDINGS:  There is mild cardiomegaly.  There is prominence of the pulmonary vasculature and pulmonary edema identified bilaterally consistent with congestive heart failure.  A solid mass is not identified.  No pneumothorax is noted.                                       MRI Brain With Contrast (Final result)  Result time 09/17/24 16:15:19      Final result by Derian Machado MD (09/17/24 16:15:19)                   Impression:      1. There is no abnormal enhancement.  Signal abnormalities on unenhanced brain MRI remain nonspecific.      Electronically signed by: Derian Machado MD  Date:    09/17/2024  Time:    16:15                Narrative:    EXAMINATION:  MRI BRAIN WITH CONTRAST    CLINICAL HISTORY:  follow up on non contrast MRI finding;    TECHNIQUE:  T1-weighted post-contrast multiplanar sequences were obtained through the brain.  6 mL Gadavist was administered intravenously without reported reaction.    COMPARISON:  Brain MRI without contrast performed earlier today    FINDINGS:  The prior diagnostic brain MRI demonstrated multiple regions of abnormal FLAIR and T2 hyperintense signal and a somewhat atypical distribution including the mesial right temporal lobe, periventricular white matter and splenium of the corpus callosum on the left, sub appended mole region.  There is no abnormal enhancement associated at any of the sites of signal abnormality.  The findings previously described remain nonspecific.  Previous differential considerations still exist.                                        MRI Brain Without Contrast (Final result)  Result time 09/17/24 13:57:29      Final result by Jacob Armenta MD (09/17/24 13:57:29)                   Impression:      1. Abnormal, ill-defined T2/FLAIR hyperintense signal within the right mesial temporal lobe with differential considerations including infectious encephalitis, particularly herpes simplex virus (HSV) encephalitis, autoimmune encephalitis, status epilepticus, or possible low-grade neoplasm.  2. Mild patchy and scattered foci of T2/FLAIR hyperintense signal within the supratentorial white matter elsewhere, nonspecific and may reflect sequelae of chronic small vessel ischemic change.  3. Mild asymmetric T2/FLAIR hyperintense signal involving the ependymal/subependymal white matter of the posterior body/atrium and left frontal horn of the left lateral ventricle, nonspecific and while it could reflect sequelae of chronic small vessel ischemic change, focal encephalitis and/or ventriculitis cannot be excluded.  No hydrocephalus.  This report was flagged in Epic as abnormal.   The significant finding above was relayed by myself  via [employer] secure chat to Beatriz Boogie MD and Joe Galeana MD on 09/17/2024 at 13:40.  Findings were acknowledged and understood.      Electronically signed by: Jacob Armenta  Date:    09/17/2024  Time:    13:57               Narrative:    EXAMINATION:  MRI BRAIN WITHOUT CONTRAST    CLINICAL HISTORY:  Mental status change, unknown cause;.    TECHNIQUE:  Multiplanar multisequence MR imaging of the brain was performed without the administration of intravenous contrast.    COMPARISON:  CT head 09/16/2024    FINDINGS:  Ventricles and sulci are normal in size for age without evidence of hydrocephalus.    Abnormal, ill-defined T2/FLAIR hyperintense signal within the right mesial temporal lobe with associated hippocampal/gyral enlargement.  No diffusion restriction or susceptibility artifact to suggest hemorrhage.    Mild nonspecific asymmetric ependymal/subependymal white matter T2/FLAIR hyperintense signal about the posterior body/atrium of the left lateral ventricle with question asymmetric involvement of the left frontal horn.  Superimposed scattered foci and patchy T2/FLAIR hyperintense signal within the bilateral periventricular, deep and subcortical white matter, nonspecific and may reflect sequelae of chronic small vessel ischemic change.  Diffusion-weighted images demonstrate no evidence of an acute infarct elsewhere.    No evidence of intracranial acute or chronic hemorrhage elsewhere.    Normal vascular flow voids are present.    Bone marrow signal intensity is normal. The paranasal sinuses are normal.  The visualized portions of the mastoids are unremarkable.    Orbits are unremarkable.                                       X-Ray Chest 1 View (Final result)  Result time 09/16/24 14:53:24      Final result by Farrukh Ramos MD (09/16/24 14:53:24)                   Impression:      No acute cardiopulmonary abnormality.      Electronically signed  by: Farrukh Ramos  Date:    09/16/2024  Time:    14:53               Narrative:    EXAMINATION:  XR CHEST 1 VIEW    CLINICAL HISTORY:  Sepsis;    FINDINGS:  Portable chest at 1441 compared with 06/06/2024 shows unchanged cardiomediastinal silhouette.    Lungs are clear. Pulmonary vasculature is normal. No acute osseous abnormality.                                       CT Head Without Contrast (Final result)  Result time 09/16/24 14:52:17      Final result by Wang Acuña MD (09/16/24 14:52:17)                   Impression:      No CT evidence of acute intracranial pathology.      Electronically signed by: Wang Acuña  Date:    09/16/2024  Time:    14:52               Narrative:    EXAMINATION:  CT HEAD WITHOUT CONTRAST    CLINICAL HISTORY:  Mental status change, unknown cause;    TECHNIQUE:  Axial CT imaging obtained through the brain without IV contrast.    CMS Mandated Quality Data-CT Radiation Dose-436    All CT scans at this facility dose modulation, iterative reconstruction, and or weight-based dosing when appropriate to reduce radiation dose to as low as reasonably achievable.    COMPARISON:  None    FINDINGS:  Negative for acute intracranial hemorrhage, midline shift, or mass effect.  Ventricles and sulci are normal in size.  Gray-white differentiation is maintained.  Mild periventricular and deep white matter hypoattenuation, consistent with microangiopathic change.  Cerebellar hemispheres and brainstem are unremarkable.  Atherosclerotic calcification of intracranial carotid arteries.    No calvarial lesion or fracture.  Mastoid air cells are clear.  Mucosal thickening right maxillary sinus.  Paranasal sinuses are otherwise clear.                                       Radiology (Final result)  Result time 09/24/24 15:13:12      Final result by Unknown User (09/24/24 15:13:12)                                             ASSESSMENT AND PLAN:     Encephalitis   Headache  Encephalopathy     Plan:   LP  with CSF analysis showed WBC 2038 (neutrophil predominant), protein 173 and glucose 50.  Negative meningitis encephalitis panel and CSF culture so far.  Likely infectious encephalitis however workup has been unrevealing so far.  Can not rule out autoimmune encephalitis however patient has been having intermittent fevers which makes infectious causes likely  MRI brain showed hyperintensity in the right medial temporal lobe on T2 FLAIR images concerning for encephalitis. MRI brain with contrast showed no contrast enhancement  Currently on doxycycline and, Rocephin.  Negative meningitis/encephalitis PCR panel on CSF.  Infectious diseases following-appreciate further recommendations.    Repeat lumbar puncture showed CSF , protein 126 and glucose 51.  Negative CSF culture.  Autoimmune encephalopathy panel has been sent and pending  Repeat PCR meningitis/encephalitis panel was negative as well.  EEG showed no seizures or epileptiform activity.  LTM EEG was done and showed no seizures or epileptiform activity.  Speech therapy evaluate and treat  PT OT evaluate and treat.    Patient has been having intermittent fevers however infectious workup was unrevealing so far.  On antibiotics and acyclovir has been stopped.  ID following.  D-dimer is elevated.  Check ultrasound lower extremity to rule out DVTs  Management of metabolic derangements per primary team.  Correct electrolyte abnormalities.  Will follow             Joe Galeana MD  Neurology/vascular Neurology  Date of Service: 09/25/2024  9:43 AM    Please note: This note was transcribed using voice recognition software. Because of this technology there are often uinintended grammatical, spelling, and other transcription errors. Please disregard these errors.

## 2024-09-26 LAB
ALBUMIN SERPL BCP-MCNC: 2.5 G/DL (ref 3.5–5.2)
ALP SERPL-CCNC: 58 U/L (ref 55–135)
ALT SERPL W/O P-5'-P-CCNC: 57 U/L (ref 10–44)
ANION GAP SERPL CALC-SCNC: 10 MMOL/L (ref 8–16)
AST SERPL-CCNC: 47 U/L (ref 10–40)
B BURGDOR IGG SER QL IB: NEGATIVE
B BURGDOR IGM SER QL IB: NEGATIVE
BASOPHILS # BLD AUTO: 0.07 K/UL (ref 0–0.2)
BASOPHILS NFR BLD: 0.4 % (ref 0–1.9)
BILIRUB SERPL-MCNC: 0.3 MG/DL (ref 0.1–1)
BUN SERPL-MCNC: 22 MG/DL (ref 8–23)
CALCIUM SERPL-MCNC: 9 MG/DL (ref 8.7–10.5)
CHLORIDE SERPL-SCNC: 93 MMOL/L (ref 95–110)
CO2 SERPL-SCNC: 26 MMOL/L (ref 23–29)
CREAT SERPL-MCNC: 0.7 MG/DL (ref 0.5–1.4)
DIFFERENTIAL METHOD BLD: ABNORMAL
EOSINOPHIL # BLD AUTO: 0.2 K/UL (ref 0–0.5)
EOSINOPHIL NFR BLD: 1.3 % (ref 0–8)
ERYTHROCYTE [DISTWIDTH] IN BLOOD BY AUTOMATED COUNT: 12.3 % (ref 11.5–14.5)
EST. GFR  (NO RACE VARIABLE): >60 ML/MIN/1.73 M^2
GLUCOSE SERPL-MCNC: 114 MG/DL (ref 70–110)
HCT VFR BLD AUTO: 35.6 % (ref 37–48.5)
HGB BLD-MCNC: 11.9 G/DL (ref 12–16)
IMM GRANULOCYTES # BLD AUTO: 0.1 K/UL (ref 0–0.04)
IMM GRANULOCYTES NFR BLD AUTO: 0.6 % (ref 0–0.5)
LYMPHOCYTES # BLD AUTO: 2.5 K/UL (ref 1–4.8)
LYMPHOCYTES NFR BLD: 16.3 % (ref 18–48)
MCH RBC QN AUTO: 31.6 PG (ref 27–31)
MCHC RBC AUTO-ENTMCNC: 33.4 G/DL (ref 32–36)
MCV RBC AUTO: 94 FL (ref 82–98)
MONOCYTES # BLD AUTO: 1.1 K/UL (ref 0.3–1)
MONOCYTES NFR BLD: 6.9 % (ref 4–15)
NEUTROPHILS # BLD AUTO: 11.6 K/UL (ref 1.8–7.7)
NEUTROPHILS NFR BLD: 74.5 % (ref 38–73)
NRBC BLD-RTO: 0 /100 WBC
PLATELET # BLD AUTO: 473 K/UL (ref 150–450)
PMV BLD AUTO: 9.1 FL (ref 9.2–12.9)
POTASSIUM SERPL-SCNC: 4.1 MMOL/L (ref 3.5–5.1)
PROT SERPL-MCNC: 6.6 G/DL (ref 6–8.4)
RBC # BLD AUTO: 3.77 M/UL (ref 4–5.4)
SODIUM SERPL-SCNC: 129 MMOL/L (ref 136–145)
WBC # BLD AUTO: 15.56 K/UL (ref 3.9–12.7)

## 2024-09-26 PROCEDURE — 63600175 PHARM REV CODE 636 W HCPCS: Performed by: INTERNAL MEDICINE

## 2024-09-26 PROCEDURE — 80053 COMPREHEN METABOLIC PANEL: CPT

## 2024-09-26 PROCEDURE — 25000003 PHARM REV CODE 250: Performed by: INTERNAL MEDICINE

## 2024-09-26 PROCEDURE — 27000221 HC OXYGEN, UP TO 24 HOURS

## 2024-09-26 PROCEDURE — 36415 COLL VENOUS BLD VENIPUNCTURE: CPT

## 2024-09-26 PROCEDURE — 85025 COMPLETE CBC W/AUTO DIFF WBC: CPT | Performed by: INTERNAL MEDICINE

## 2024-09-26 PROCEDURE — S0179 MEGESTROL 20 MG: HCPCS | Performed by: INTERNAL MEDICINE

## 2024-09-26 PROCEDURE — 20600001 HC STEP DOWN PRIVATE ROOM

## 2024-09-26 PROCEDURE — 99232 SBSQ HOSP IP/OBS MODERATE 35: CPT | Mod: ,,, | Performed by: INTERNAL MEDICINE

## 2024-09-26 PROCEDURE — 94761 N-INVAS EAR/PLS OXIMETRY MLT: CPT

## 2024-09-26 PROCEDURE — 97116 GAIT TRAINING THERAPY: CPT

## 2024-09-26 PROCEDURE — 25000003 PHARM REV CODE 250: Performed by: NURSE PRACTITIONER

## 2024-09-26 RX ORDER — MEGESTROL ACETATE 40 MG/ML
200 SUSPENSION ORAL DAILY
Status: DISCONTINUED | OUTPATIENT
Start: 2024-09-26 | End: 2024-09-28

## 2024-09-26 RX ORDER — FUROSEMIDE 10 MG/ML
40 INJECTION INTRAMUSCULAR; INTRAVENOUS ONCE
Status: COMPLETED | OUTPATIENT
Start: 2024-09-26 | End: 2024-09-26

## 2024-09-26 RX ADMIN — CLOTRIMAZOLE 10 MG: 10 LOZENGE ORAL at 06:09

## 2024-09-26 RX ADMIN — CEFTRIAXONE 2 G: 2 INJECTION, POWDER, FOR SOLUTION INTRAMUSCULAR; INTRAVENOUS at 04:09

## 2024-09-26 RX ADMIN — CLOTRIMAZOLE 10 MG: 10 LOZENGE ORAL at 09:09

## 2024-09-26 RX ADMIN — CLOTRIMAZOLE 10 MG: 10 LOZENGE ORAL at 05:09

## 2024-09-26 RX ADMIN — CLOTRIMAZOLE 10 MG: 10 LOZENGE ORAL at 08:09

## 2024-09-26 RX ADMIN — POLYETHYLENE GLYCOL (3350) 17 G: 17 POWDER, FOR SOLUTION ORAL at 08:09

## 2024-09-26 RX ADMIN — DOXYCYCLINE HYCLATE 100 MG: 100 TABLET, COATED ORAL at 09:09

## 2024-09-26 RX ADMIN — DOXYCYCLINE HYCLATE 100 MG: 100 TABLET, COATED ORAL at 08:09

## 2024-09-26 RX ADMIN — LACTULOSE 20 G: 20 SOLUTION ORAL at 09:09

## 2024-09-26 RX ADMIN — MEGESTROL ACETATE 200 MG: 40 SUSPENSION ORAL at 12:09

## 2024-09-26 RX ADMIN — FUROSEMIDE 40 MG: 10 INJECTION, SOLUTION INTRAMUSCULAR; INTRAVENOUS at 11:09

## 2024-09-26 RX ADMIN — CLOTRIMAZOLE 10 MG: 10 LOZENGE ORAL at 02:09

## 2024-09-26 RX ADMIN — FAMOTIDINE 40 MG: 20 TABLET, FILM COATED ORAL at 08:09

## 2024-09-26 NOTE — CARE UPDATE
09/25/24 1913   Patient Assessment/Suction   Level of Consciousness (AVPU) alert   Respiratory Effort Normal;Unlabored   Expansion/Accessory Muscles/Retractions expansion symmetric;no retractions;no use of accessory muscles   PRE-TX-O2   Device (Oxygen Therapy) nasal cannula   Flow (L/min) (Oxygen Therapy) 2   Oxygen Concentration (%) 28   SpO2 100 %   Pulse Oximetry Type Intermittent   Pulse 104   Resp 19   Ready to Wean/Extubation Screen   FIO2<=50 (chart decimal) 0.28

## 2024-09-26 NOTE — PT/OT/SLP PROGRESS
Occupational Therapy      Patient Name:  Masha Hobbs   MRN:  [id number]    Patient not seen today secondary to Other (Comment) (Pt up in chair. Pt performed shoulder flex/ext 4x and drifted off to sleep. Would wake to therpists voice but would not participate further. Nurse Leora advised.). Will follow-up.    9/26/2024

## 2024-09-26 NOTE — PROGRESS NOTES
[facility] Medicine  Progress Note    Patient Name: Masha Hobbs  MRN: [id number]  Patient Class: IP- Inpatient   Admission Date: 9/16/2024  Length of Stay: 9 days  Attending Physician: Berta Chandra MD  Primary Care Provider: Roney Bang MD        Subjective:     Principal Problem:Acute encephalopathy        HPI:  Masha Hobbs is a 68 year old female with a past medical history of osteoporosis and chronic lower back pain with radiculopathy who was transferred from [facility] due to confusion associated with headache, fever, and generalized weakness/body aches for several hours prior to arrival.  Per  patient was confused since 4:00 p.m. Upon assessment confusion has resolved.  She denies any complaints other than a intermittent frontal lobe headache that is improving after Toradol administration.  There are no acute neurological focal deficits.  She denies chest pain, shortness of breath, dizziness, lightheadedness, vision changes, speech changes, numbness/tingling, neck pain, abdominal pain, nausea, or vomiting.  ED workup reveals:  CT head negative for acute intracranial hemorrhage. Chest x-ray no evidence of acute cardiopulmonary findings.  Lumbar puncture performed in ED, pending results.  She was treated with vancomycin, Rocephin, valacyclovir p.o., and dexamethasone.  CBC with elevated white count 13.8 and stable H&H.  BMP with phosphorus 2.0 otherwise unremarkable.  Negative flu/COVID.  Urinalysis negative.  Lactic acid 1.1.  ECG negative for ischemia or infarct.  MRI brain pending.  Neurology consult placed.  Admitted to hospital medicine for further management and treatment.            Overview/Hospital Course:  Patient is a 68 year old female admitted with AMS, headache and fever. LP was suggestive of meningitis with 2K WBC, mainly neutrophils. HSV panel was negative. Patient was started on Vancomycin, Ampicillin and Rocephin. Acyclovir was discontinued after  D1. MRI shows encephalitis findings with ventriculitis. Patient is being followed by neurology and ID. EEG has been ordered which is consistent with encephalopathy but no epileptiform activity. Patient continue to spike fever and is vomiting. Stat MRI was repeated with rpt LP was ordered. Rpt MRI did not show any changes. LP shows reduced WBC compared to the first one. Patient's mental status improved. Patient continued with daily fevers. ID ordered additional testing. Antibiotics and antiviral adjusted. Patient developed tachycardia and tachypnea on 09/24. D Dimer was elevated, and CTA chest was done. This revealed no PE, but some pleural effusions. Lasix was given. Patient's appetite was poor and clinimix was added temporarily. Continued with poor appetite. Appetite stimulant added.     Interval History: Patient's mental status back to baseline.. ID has made adjustments in the abx therapy. Not eating well. Appetite stimulant added.    Review of Systems   Constitutional:  Negative for activity change, appetite change, chills and fever.   HENT:  Negative for congestion, ear pain, nosebleeds and sinus pain.    Eyes:  Negative for discharge and itching.   Respiratory:  Negative for apnea, cough, chest tightness and shortness of breath.    Cardiovascular:  Negative for chest pain, palpitations and leg swelling.   Gastrointestinal:  Negative for abdominal distention, abdominal pain and vomiting.   Genitourinary:  Negative for difficulty urinating, dysuria, flank pain and frequency.   Musculoskeletal:  Negative for arthralgias, back pain, joint swelling and myalgias.   Skin:  Negative for color change, pallor and rash.   Neurological:  Negative for dizziness, weakness, light-headedness and headaches.   Psychiatric/Behavioral:  Negative for agitation, behavioral problems, confusion and suicidal ideas.      Objective:     Vital Signs (Most Recent):  Temp: 98.1 °F (36.7 °C) (09/26/24 0715)  Pulse: 89 (09/26/24 1015)  Resp:  20 (09/26/24 1015)  BP: 135/77 (09/26/24 1015)  SpO2: 98 % (09/26/24 1015) Vital Signs (24h Range):  Temp:  [97.8 °F (36.6 °C)-101.3 °F (38.5 °C)] 98.1 °F (36.7 °C)  Pulse:  [] 89  Resp:  [16-31] 20  SpO2:  [94 %-100 %] 98 %  BP: (104-144)/(55-77) 135/77     Weight: 62.9 kg (138 lb 10.7 oz)  Body mass index is 26.2 kg/m².    Intake/Output Summary (Last 24 hours) at 9/26/2024 1100  Last data filed at 9/25/2024 2315  Gross per 24 hour   Intake 1059.37 ml   Output 250 ml   Net 809.37 ml         Physical Exam  Vitals and nursing note reviewed.   Constitutional:     Alert  Eyes:      General: No visual field deficit.     Pupils: Pupils are equal, round, and reactive to light.   Cardiovascular:      Rate and Rhythm: Normal rate and regular rhythm.      Pulses: Normal pulses.      Heart sounds: Normal heart sounds.   Pulmonary:      Effort: Pulmonary effort is normal. No respiratory distress.      Breath sounds: Normal breath sounds. No wheezing.   Abdominal:      General: Bowel sounds are normal. There is no distension.      Palpations: Abdomen is soft.      Tenderness: There is no abdominal tenderness.   Musculoskeletal:      Right lower leg: No edema.      Left lower leg: No edema.   Skin:     General: Skin is warm and dry.      Capillary Refill: Capillary refill takes less than 2 seconds.   Neurological:      Mental Status: back to baseline, alert and oriented X 3     Significant Labs: All pertinent labs within the past 24 hours have been reviewed.  CBC:   Recent Labs   Lab 09/25/24 0428 09/26/24 0345   WBC 17.23* 15.56*   HGB 12.5 11.9*   HCT 37.3 35.6*    473*       CMP:   Recent Labs   Lab 09/25/24 0428 09/26/24 0345   * 129*   K 3.9 4.1   CL 95 93*   CO2 26 26   * 114*   BUN 21 22   CREATININE 0.7 0.7   CALCIUM 8.6* 9.0   PROT 6.2 6.6   ALBUMIN 2.3* 2.5*   BILITOT 0.3 0.3   ALKPHOS 51* 58   AST 26 47*   ALT 23 57*   ANIONGAP 9 10       Significant Imaging: I have reviewed all pertinent  imaging results/findings within the past 24 hours.  Physical Exam  Constitutional:       Appearance: Normal appearance.         Assessment/Plan:      * Acute encephalopathy  Likely secondary to bacterial meningitis.   Mental status returned to baseline   MRI shows encephalitis and ventriculitis findings   Ammonia, TSH is normal   Neurology following   EEG shows encephalopathy, no epileptiform activity noted.   Rpt LP shows improved WBC and MRI similar to prior   Opening pressures are normal         HFrEF (heart failure with reduced ejection fraction)  New onset   Echo show Takotsubo pattern   Cardiology was consulted and recommend ischemic work up when completely treated for meningitis, and starting toprol at that time. They have signed off until meningitis treatment is complete.        Leukocytosis  Due to above       Meningitis  LP shows > 2000 WBC with neurophil predominance, protein is high normal glucose   MRI shows encephalitis and ventriculitis findings   HSV and meningitis panel was all negative   EEG shows encephalopathy and no epileptiform activity   Discussed with neurology and ID  MRI and LP was repeated on 9/19 shows WBC improved, but other wise similar findings.   - Cont abx as per ID recommendations          VTE Risk Mitigation (From admission, onward)           Ordered     IP VTE LOW RISK PATIENT  Once         09/17/24 0239     Place sequential compression device  Until discontinued         09/17/24 0239                    Discharge Planning   WILVER: 9/30/2024     Code Status: Full Code   Is the patient medically ready for discharge?:     Reason for patient still in hospital (select all that apply): Patient trending condition  Discharge Plan A: Home with family                  Berta Chandra MD, MD  Department of Hospital Medicine   [facility]/Surg

## 2024-09-26 NOTE — PLAN OF CARE
Not medically ready for DC planning at this time. Still febrile and lack of appetite. CM following for DC planning when becomes medically clear.     09/26/24 7126   Discharge Reassessment   Assessment Type Discharge Planning Reassessment   Did the patient's condition or plan change since previous assessment? Yes   Discharge Plan discussed with: Patient;Spouse/sig other;Adult children   Communicated WILVER with patient/caregiver Date not available/Unable to determine   Discharge Plan A Home with family;Home Health   Discharge Plan B Home with family   DME Needed Upon Discharge  none   Transition of Care Barriers None   Why the patient remains in the hospital Requires continued medical care

## 2024-09-26 NOTE — EICU
Intervention Initiated From:  [facility] / EICU    Tay intervened regarding:  Rounding (Video assessment)    Nurse Notified:  No    Doctor Notified:  No    Comments: Video rounding completed. Sats 100% on 2 L NC, respirations even and unlabored. Asleep at present. VSS, NAD

## 2024-09-26 NOTE — SUBJECTIVE & OBJECTIVE
Interval History: Patient's mental status back to baseline.. ID has made adjustments in the abx therapy. Not eating well. Appetite stimulant added.    Review of Systems   Constitutional:  Negative for activity change, appetite change, chills and fever.   HENT:  Negative for congestion, ear pain, nosebleeds and sinus pain.    Eyes:  Negative for discharge and itching.   Respiratory:  Negative for apnea, cough, chest tightness and shortness of breath.    Cardiovascular:  Negative for chest pain, palpitations and leg swelling.   Gastrointestinal:  Negative for abdominal distention, abdominal pain and vomiting.   Genitourinary:  Negative for difficulty urinating, dysuria, flank pain and frequency.   Musculoskeletal:  Negative for arthralgias, back pain, joint swelling and myalgias.   Skin:  Negative for color change, pallor and rash.   Neurological:  Negative for dizziness, weakness, light-headedness and headaches.   Psychiatric/Behavioral:  Negative for agitation, behavioral problems, confusion and suicidal ideas.      Objective:     Vital Signs (Most Recent):  Temp: 98.1 °F (36.7 °C) (09/26/24 0715)  Pulse: 89 (09/26/24 1015)  Resp: 20 (09/26/24 1015)  BP: 135/77 (09/26/24 1015)  SpO2: 98 % (09/26/24 1015) Vital Signs (24h Range):  Temp:  [97.8 °F (36.6 °C)-101.3 °F (38.5 °C)] 98.1 °F (36.7 °C)  Pulse:  [] 89  Resp:  [16-31] 20  SpO2:  [94 %-100 %] 98 %  BP: (104-144)/(55-77) 135/77     Weight: 62.9 kg (138 lb 10.7 oz)  Body mass index is 26.2 kg/m².    Intake/Output Summary (Last 24 hours) at 9/26/2024 1100  Last data filed at 9/25/2024 2315  Gross per 24 hour   Intake 1059.37 ml   Output 250 ml   Net 809.37 ml         Physical Exam  Vitals and nursing note reviewed.   Constitutional:     Alert  Eyes:      General: No visual field deficit.     Pupils: Pupils are equal, round, and reactive to light.   Cardiovascular:      Rate and Rhythm: Normal rate and regular rhythm.      Pulses: Normal pulses.      Heart  sounds: Normal heart sounds.   Pulmonary:      Effort: Pulmonary effort is normal. No respiratory distress.      Breath sounds: Normal breath sounds. No wheezing.   Abdominal:      General: Bowel sounds are normal. There is no distension.      Palpations: Abdomen is soft.      Tenderness: There is no abdominal tenderness.   Musculoskeletal:      Right lower leg: No edema.      Left lower leg: No edema.   Skin:     General: Skin is warm and dry.      Capillary Refill: Capillary refill takes less than 2 seconds.   Neurological:      Mental Status: back to baseline, alert and oriented X 3     Significant Labs: All pertinent labs within the past 24 hours have been reviewed.  CBC:   Recent Labs   Lab 09/25/24 0428 09/26/24 0345   WBC 17.23* 15.56*   HGB 12.5 11.9*   HCT 37.3 35.6*    473*       CMP:   Recent Labs   Lab 09/25/24 0428 09/26/24 0345   * 129*   K 3.9 4.1   CL 95 93*   CO2 26 26   * 114*   BUN 21 22   CREATININE 0.7 0.7   CALCIUM 8.6* 9.0   PROT 6.2 6.6   ALBUMIN 2.3* 2.5*   BILITOT 0.3 0.3   ALKPHOS 51* 58   AST 26 47*   ALT 23 57*   ANIONGAP 9 10       Significant Imaging: I have reviewed all pertinent imaging results/findings within the past 24 hours.  Physical Exam  Constitutional:       Appearance: Normal appearance.

## 2024-09-26 NOTE — PROGRESS NOTES
.[facility]   Department of Infectious Disease  Progress Note        PATIENT NAME: Masha Hobbs  MRN: [id number]  TODAY'S DATE: 2024  ADMIT DATE: 2024  LOS: 9 days    CHIEF COMPLAINT: Altered Mental Status (Confusion that began yesterday at 4pm per pt's  along with weakness - pt presents to ER with fever and can state name and  and situation but not president.  Patient have been vomiting since 1pm yesterday), Fever, Headache, and Emesis    PRINCIPLE PROBLEM: Acute encephalopathy    REASON FOR CONSULT:    meningoencephalitis    INTERVAL HISTORY    2024:  Seen and evaluated at bedside.  No acute issues overnight.  Continues to improve clinically.  T-max overnight was 101.3.  She participated in PT/OT yesterday.  Appetite still poor.    24@0735 (Denette): Patient is lying in bed with her eyes closed and awakens easily to voice.   She states she was feeling okay.  T-max in the last 24 hours 100 at 0700 hours yesterday.  Heart rate  and respiratory rate have normalized.  She was given a dose of Lasix yesterday with good urine output.   She denies fevers or chills, body aches or joint pain, shortness of breath or cough.  She had a very small bowel movement yesterday and still has mild tenderness to palpation in her abdomen.  She denies dysuria.  Appetite is still very poor and she had a smoothie yesterday.  Mouth is still dry though looks much improved today.  WBC 17.23 trending down, platelets 290, H&H 12.5/37.3, left shift 75%, BUN/CR 21/0.7 and ALT/AST 23/26. JACINDA titer 1:80,  RPR nonreactive, HIV negative, blood cultures and CFS cultures all negative.  CTA chest from yesterday negative for PE with small pericardial effusion and moderate bilateral pleural effusions, small gallstones and multiple thoracolumbar compression fractures.  Lab still pending includes VDRL CSF,  encephalopathy autoimmune evaluation, CSF, Lyme disease Western blot, spotted fever group  antibodies.  She is on day 5 of doxycycline and day 10 of ceftriaxone.    09/24/24@7464 (Edwin): Patient is seen alone in her room.  She opens her eyes to voice and complains of being tired.   She is not as alert and keenly interactive as she was yesterday.   She still has not had a bowel movement.  She complains of mild abdominal tenderness with palpation but there is no guarding in abdomen is soft. She denies shortness or breath, cough, chest pain or palpitations, headaches or dizziness.   She does complain of a dry mouth and I helped her drink some water.  She had a CTA chest PE study this morning because of tachycardia that started around 2000 hours last night.   T-max 100.4° last night around 11, this morning at 0700 hours temperature was a 100°.  WBC elevated at 20.47, platelets 426, left shift 82%, no bands, H&H 12.5/36.3, sodium decreased at 129,  creatinine 0.8, CRP 58.7, procalcitonin negative at 0.06.  ALT/AST 19/22.  ESR 66, D-dimer 3.10.  Ferritin 227, Treponemal serology negative, cryptococcus antigen negative, HSV 1 and 2 negative,  West Nile virus from CSF serology negative. Still pending from CSF VDRL, encephalopathy autoimmune evaluation and serum tests pending including VDRL CSF, JACINDA titer, RPR titer, Lyme disease Western blot, RMSF antibodies and HIV.  She continues on ceftriaxone Day 9 and doxycycline Day 4.    09/23/24@0886 (Edwin):  patient seen and examined in her room with her daughter present.  She is eating breakfast but has not been very hungry at all.   She had a fever yesterday of 102.2  around 1926 hours.  She does not really remember having chills, headaches or body ache but was very tired and slept most of the day.  Today she was awake and alert and feeling much better.  Headache improved, no nausea vomiting, she was had no bowel movement since the 14th.   She denies dysuria and states that she was voiding well. WBC 15.35, platelets 383, creatinine 0.8 and creatinine clearance 50  6.4.  CRP 62.1, procalcitonin 0.06.  Respiratory virus panel negative.  Blood and CSF cultures negative so far.    9/22/24  Dr. Torres covering for the weekend   Chart reviewed  She is still febrile  Nothing new in cultures  Multiple tests are pending: HIV, RPR, Lyme WB, Spotted fever group abs, crp,   Add WNV abs to csf     09/21/2024.  Dr. Torres covering for the weekend  Patient had another spike of fever 102.2 yesterday, better than 102.9 the day before, associated by sweating, lethargy; she does not remember exactly, but daughter states that she was out of it at the time of fever.  No localizing signs and symptoms of infection.  WBC 13--15.6 but she received steroids on admission.  Anemia noted , hemoglobin 14--11.6.  CSF results as below  She walked with therapy around the hallway.  She is sitting up in a chair right now.  The rash on anterior chest and upper back is improved, as per .    09/20/2024:  Had fever yesterday with T-max 102.9.  Repeat MRI brain without contrast 09/19/2024 same as 09/17/2024.  Had repeat LP with improved CSF parameters.  Repeat CSF encephalitis panel in progress.  She is more awake and alert today.  Also interactive.    09/19/2024:  Had fever last night to 101.  Still lethargic and in bed.  EEG with with diffuse slowing consistent with metabolic encephalopathy.  CSF culture remain negative.    09/18/2024:  She is more somnolent today.  Not eating very well.  Afebrile.  She answers questions appropriately.        Antibiotics (From admission, onward)      Start     Stop Route Frequency Ordered    09/23/24 1230  doxycycline tablet 100 mg         -- Oral Every 12 hours 09/23/24 1126    09/17/24 1645  cefTRIAXone (ROCEPHIN) 2 g in D5W 100 mL IVPB (MB+)         -- IV Every 12 hours (non-standard times) 09/17/24 1540          Antifungals (From admission, onward)      Start     Stop Route Frequency Ordered    09/24/24 1030  clotrimazole isael 10 mg         10/04/24 0959 Oral 5 times  daily 09/24/24 0924           Antivirals (From admission, onward)      None            ASSESSMENT and PLAN     1. Meningeal encephalitis.  Etiology unclear.  Improving though remains febrile.  Persistent intermittent fever may be drug induced versus secondary to underlying etiology of the meningeal encephalitis.  Antimicrobials now down to doxycycline and ceftriaxone.  We will keep both for now and monitor.    2. Debility.  Continue PT/OT.      3. Constipation.  Management as per hospitalist.    RECOMMENDATIONS:    Continue ceftriaxone and doxycycline for now   Continue PT/OT   Anticipate discharge home or to SNF in 1-2 days on IV antibiotics with close monitoring.    Please send Epic secure chat with any questions.        HPI   Masha Hobbs is a 68 y.o. female with history of osteoporosis and chronic low back pain.  He presents to the ER 09/16/2024 with 1 day history of headache associated with nausea and vomiting and generalized body aches.  In the ER /67, pulse 110, respiratory rate 18, temperature 100.4°, oxygen 95%.  WBC 14 K, hematocrit 42, CMP unremarkable.  X-ray with no acute infiltrate.  CT head unremarkable.  LP was done.  CSF was abnormal.  It was slightly hazy, WBC 2038, RBC 14, neutrophils 89%, glucose 50, protein 173.  Accompanying serum glucose was 130.       She was admitted and commenced on antibiotics and dexamethasone for meningitis.  MRI brain without contrast today 97/24 read as showing ill-defined T2/FLAIR hyperintense signal within the right medial temporal lobe with differentials including infectious encephalitis vascularly HSV, autoimmune encephalitis.  Repeat MRI brain with contrast with no abnormal enhancement.  Id asked to assist with her care.     She recently returned from [place] on 09/13/2024 after a 1 week trip.  They had gone to several cota and and did a lot of sightseeing .  States she was washing her hands frequently.  They were around a lot of people.  No other  sick contacts.  No pets at home.  Denies eating anything unusual.  Has not received age appropriate pneumococcal vaccine.     Antibiotic history:    Valtrex: 09/16/2024 x1 dose   Vancomycin: 09/16-20  IV Acyclovir: 09/17-23  Ceftriaxone: 09/16-  Doxycycline: 09/21-    Microbiology and Serology:    9/16/24 blood cultures x2 sets negative  9/16/24 CSFculture negative; G stain with few WBC's, no organisms  9/16/24 meningitis panel negative  9/16 CSF cell count  slightly hazy, 14 RBCs, 2000 38 WBCs, 89% neutrophils, 7% lymphocytes, 4% monocytes,  glucose 50, protein 170  9/17/24 HSV 1-2 PCR negative  9/19/24 CSFculture negative; G stain with few WBC's, no organisms  9/19/24 meningitis panel negative  9/19/2024 CSF cell count  clear with 3 RBCs, 148 WBCs, 63% neutrophils and 15% lymphocytes,  glucose 51, protein 126  9/20/24 cryptococcal antigen negative  9/23/24 RVP negative  9/17/24 herpes simplex virus PCR 1 in 2 negative  09/22/2024 treponemal antibodies negative  09/19/2024 West Nile virus IgG and IgM from CSF negative  09/23/2024 ferritin 227   09/18/2024 strep pneumo urine antigen negative  09/22/2024 HIV 1 2 antibody antigen 4th negative  09/22/2024 RPR titer negative  09/23/2024 JACINDA screen 1:80      09/19/2024 VDRL CSF pending   9/19/2024 encephalopathy autoimmune evaluation, CSF pending  09/22/2024 D[place] mountain spotted fever antibodies pending   09/22/2024 Lyme disease Western blot pending       SUBJECTIVE      Review of Systems  Negative except as stated above in Interval History    Review of patient's allergies indicates:  No Known Allergies      OBJECTIVE   Temp:  [97.8 °F (36.6 °C)-101.3 °F (38.5 °C)] 97.9 °F (36.6 °C)  Pulse:  [] 101  Resp:  [16-31] 18  SpO2:  [92 %-100 %] 92 %  BP: ()/(46-77) 115/53  Temp:  [97.8 °F (36.6 °C)-101.3 °F (38.5 °C)]   Temp: 97.9 °F (36.6 °C) (09/26/24 1615)  Pulse: 101 (09/26/24 1700)  Resp: 18 (09/26/24 1700)  BP: (!) 115/53 (09/26/24 1700)  SpO2: (!) 92 %  (09/26/24 1700)    Intake/Output Summary (Last 24 hours) at 9/26/2024 1824  Last data filed at 9/26/2024 1717  Gross per 24 hour   Intake 600 ml   Output --   Net 600 ml       Physical Exam  General:  opens eyes to voice, appears tired but  in no acute distress.  Eyes: Eyes with no icterus or injection. Vision grossly normal, PERRL  Mouth: Dry mucous membranes,   Improved white exudates.  Neck: Supple, no tenderness to palpation.  Cardiovascular:   Regular rate and rhythm, + murmur, no peripheral edema.  Respiratory:   Bilateral breath sounds  clear to auscultation anteriorly, on nasal cannula O2, no tachypnea or increased work of breathing.  Gastrointestinal: Obese and soft, mild tenderness to palpation, slightly distended. + flatus  Genitourinary:    Pure wick catheter in place with clear yellow urine.  Musculoskeletal: Generally weak but moves all extremities.  Skin:  Warm and dry, very faint pink  widely scattered macular rash on chest and upper arms.  Neuro:   Weak but moves all extremities, oriented and answers questions appropriately, speech is clear and fluent.  Psych: Drowsy, flat affect.  VAD:  PIV  ISOLATION: None     Wounds:  None    9/21/24            Significant Labs: All pertinent labs within the past 24 hours have been reviewed.    CBC LAST 7 DAYS  Recent Labs   Lab 09/22/24  0546 09/23/24  0439 09/24/24  0415 09/25/24  0428 09/26/24  0345   WBC 15.52* 15.35* 20.47* 17.23* 15.56*   RBC 4.05 3.55* 3.91* 3.88* 3.77*   HGB 12.9 11.5* 12.5 12.5 11.9*   HCT 38.3 33.4* 36.3* 37.3 35.6*   MCV 95 94 93 96 94   MCH 31.9* 32.4* 32.0* 32.2* 31.6*   MCHC 33.7 34.4 34.4 33.5 33.4   RDW 12.2 12.1 12.2 12.5 12.3    383 426 290 473*   MPV 9.9 9.0* 8.9* 10.5 9.1*   GRAN 74.3*  11.5* 74.8*  11.5* 82.4*  16.9* 75.5*  13.0* 74.5*  11.6*   LYMPH 14.5*  2.3 14.8*  2.3 10.0*  2.0 14.2*  2.5 16.3*  2.5   MONO 8.4  1.3* 7.2  1.1* 6.1  1.2* 8.0  1.4* 6.9  1.1*   BASO 0.10 0.09 0.07 0.11 0.07   NRBC 0 0 0  "0 0       CHEMISTRY LAST 7 DAYS  Recent Labs   Lab 09/20/24  0356 09/21/24  0645 09/22/24  0546 09/23/24  0439 09/24/24  0415 09/25/24  0428 09/26/24  0345   * 133* 131* 131* 129* 130* 129*   K 3.3* 3.8 3.7 3.6 3.7 3.9 4.1   CL 99 97 98 96 92* 95 93*   CO2 26 27 21* 27 26 26 26   ANIONGAP 10 9 12 8 11 9 10   BUN 17 15 14 15 17 21 22   CREATININE 0.8 0.8 0.8 0.8 0.8 0.7 0.7   * 107 100 136* 124* 119* 114*   CALCIUM 8.0* 8.4* 8.9 8.6* 8.8 8.6* 9.0   ALBUMIN 2.4* 2.5* 2.7* 2.3* 2.6* 2.3* 2.5*   PROT 6.1 6.1 6.9 6.0 6.9 6.2 6.6   ALKPHOS 63 51* 57 53* 61 51* 58   ALT 13 16 20 17 19 23 57*   AST 26 19 20 22 22 26 47*   BILITOT 0.4 0.3 0.5 0.4 0.3 0.3 0.3           Volume, Cell Count CSF 0.5  4.0   Appearance, CSF Sligh...  Clear   RBC, Cell Count CSF 14  3   Wbc, Cell Count CSF 2038  148   Diff Segs % CSF 89  63   Lymphs, CSF 7  15   Monocytes/Mononuclear,CSF % 4  22   Eosinophils, CSF %   0   Basophils, CSF %   0       Estimated Creatinine Clearance: 64.4 mL/min (based on SCr of 0.7 mg/dL).    INFLAMMATORY/PROCAL  LAST 7 DAYS  Recent Labs   Lab 09/23/24  0439 09/24/24  0415   PROCAL 0.06 0.06   CRP 62.1* 58.7*     No results found for: "ESR"  CRP   Date Value Ref Range Status   09/24/2024 58.7 (H) 0.0 - 8.2 mg/L Final   09/23/2024 62.1 (H) 0.0 - 8.2 mg/L Final       PRIOR  MICROBIOLOGY:    Susceptibility data from last 90 days.  Collected Specimen Info Organism   09/16/24 Blood from Peripheral, Antecubital, Left No growth after 5 days.   09/16/24 Blood from Peripheral, Antecubital, Right No growth after 5 days.       LAST 7 DAYS MICROBIOLOGY   Microbiology Results (last 7 days)       Procedure Component Value Units Date/Time    Respiratory Infection Panel (PCR), Nasopharyngeal [[id number]] Collected: 09/23/24 0018    Order Status: Completed Specimen: Nasopharyngeal Swab Updated: 09/23/24 1024     Respiratory Infection Panel Source NP Swab     Adenovirus Not Detected     Coronavirus 229E, Common Cold Virus " Not Detected     Coronavirus HKU1, Common Cold Virus Not Detected     Coronavirus NL63, Common Cold Virus Not Detected     Coronavirus OC43, Common Cold Virus Not Detected     Comment: Coronavirus strains 229E, HKU1, NL63, and OC43 can cause the common   cold   and are not associated with the respiratory disease outbreak caused   by  the COVID-19 (SARS-CoV-2 novel Coronavirus) strain.           SARS-CoV2 (COVID-19) Qualitative PCR Not Detected     Human Metapneumovirus Not Detected     Human Rhinovirus/Enterovirus Not Detected     Influenza A (subtypes H1, H1-2009,H3) Not Detected     Influenza B Not Detected     Parainfluenza Virus 1 Not Detected     Parainfluenza Virus 2 Not Detected     Parainfluenza Virus 3 Not Detected     Parainfluenza Virus 4 Not Detected     Respiratory Syncytial Virus Not Detected     Bordetella Parapertussis (HL3772) Not Detected     Bordetella pertussis (ptxP) Not Detected     Chlamydia pneumoniae Not Detected     Mycoplasma pneumoniae Not Detected     Comment: Respiratory Infection Panel testing performed by Multiplex PCR.       CSF culture [[id number]] Collected: 09/19/24 1527    Order Status: Completed Specimen: CSF (Spinal Fluid) from CSF Tap, Tube 2 Updated: 09/23/24 1006     CSF CULTURE No Growth     Gram Stain Result No epithelial cells      No organisms seen      Many WBC's      09/19/2024  17:32 tn3    Cryptococcal antigen, blood [[id number]] Collected: 09/20/24 1106    Order Status: Completed Specimen: Blood, Venous Updated: 09/21/24 1449     Cryptococcal Ag, Blood Negative     Comment: A single negative result does not exclude the diagnosis of   cryptococcosis. Repeat testing on a new sample if   clinically indicated.    Test Performed by:  [facility]  [address] [place] [address]  : Kaci Felix Ph.D.; CLIA# [id number]         Blood culture x two cultures. Draw prior to antibiotics [[id number]] Collected:  09/16/24 1358    Order Status: Completed Specimen: Blood from Peripheral, Antecubital, Left Updated: 09/21/24 1432     Blood Culture, Routine No growth after 5 days.    Narrative:      Aerobic and anaerobic    Blood culture x two cultures. Draw prior to antibiotics [[id number]] Collected: 09/16/24 1347    Order Status: Completed Specimen: Blood from Peripheral, Antecubital, Right Updated: 09/21/24 1432     Blood Culture, Routine No growth after 5 days.    Narrative:      Aerobic and anaerobic    CSF culture and Gram Stain (Tube 2) [[id number]] Collected: 09/16/24 1740    Order Status: Completed Specimen: CSF (Spinal Fluid) from CSF Tap, Tube 2 Updated: 09/20/24 1040     CSF CULTURE No Growth     Gram Stain Result Few WBC's      No organisms seen    Narrative:      On which sequentially labeled tube should this analysis be  performed?->2          CURRENT/PREVIOUS VISIT EKG  Results for orders placed or performed during the hospital encounter of 09/16/24   EKG 12-lead    Collection Time: 09/16/24  2:04 PM   Result Value Ref Range    QRS Duration 102 ms    [facility]HS QTC Calculation 452 ms    Narrative    Test Reason : R00.0,    Vent. Rate : 108 BPM     Atrial Rate : 108 BPM     P-R Int : 204 ms          QRS Dur : 102 ms      QT Int : 338 ms       P-R-T Axes : 030 065 020 degrees     QTc Int : 452 ms    Sinus tachycardia  Incomplete right bundle branch block  Septal infarct ,age undetermined  Abnormal ECG  When compared with ECG of 06-JUN-2024 16:29,  DE interval has decreased  The axis Shifted right  Non-specific change in ST segment in Anterior leads  Confirmed by Eric Mathew MD ([id number]) on 9/22/2024 3:51:24 PM    Referred By:             Confirmed By:Eric Mathew MD       ECHO 09/16/2024   Left Ventricle: The left ventricle is normal in size. Normal wall thickness. Regional wall motion abnormalities present. There is akinesis of mid to distal segments and the apex. Normal contractility of the basal segments. Pattern  suggestive of takotsubo cardiomyopathy. There is moderately reduced systolic function with a visually estimated ejection fraction of 30 - 35%. Grade II diastolic dysfunction.   Right Ventricle: Normal right ventricular cavity size. Systolic function is normal.   Mitral Valve: There is mild regurgitation.   Tricuspid Valve: There is mild to moderate regurgitation.   Pulmonary Artery: There is pulmonary hypertension. The estimated pulmonary artery systolic pressure is 46 mmHg.   IVC/SVC: Intermediate venous pressure at 8 mmHg.     Significant Imaging: I have reviewed all relevant and available imaging results/findings within the past 24 hours.    MRI brain 09/19/2024   1. No significant interval detrimental change in the appearance of the brain as compared to the prior exam dated 09/17/2024.  Persistent scattered abnormal T2/FLAIR hyperintense signal within the supratentorial brain, particularly involving the right mesial temporal lobe, similar to prior exam.  No definite new edema or mass effect.  Findings again are nonspecific with similar differential considerations including inflammatory/infectious, or autoimmune/paraneoplastic encephalitis, status epilepticus, or potential neoplastic process.  Recommend continued close clinical surveillance and consider short-term follow-up imaging with contrast enhanced MRI of the brain, as clinically warranted.     CTA Chest Non-Coronary (PE Studies) [[id number]]Resulted: 09/24/24 1028   No evidence for pulmonary embolus.   Moderate bilateral pleural effusions and features suggesting mild congestive failure.   Small pericardial effusion.   Small gallstones.   Osseous demineralization and multiple age-indeterminate thoracolumbar compression fractures.      I spent a total of 45 minutes on the day of the visit.This includes face to face time and non-face to face time preparing to see the patient (eg, review of tests), obtaining and/or reviewing separately obtained history,  documenting clinical information in the electronic or other health record, independently interpreting results and communicating results to the patient/family/caregiver, or care coordinator.    Danis Sepulvdea MD  Date of Service: 09/26/2024      This note was created using [employer] voice recognition software that occasionally misinterpreted phrases or words.

## 2024-09-26 NOTE — PT/OT/SLP PROGRESS
Physical Therapy Treatment    Patient Name:  Masha Hobbs   MRN:  [id number]    Recommendations:     Discharge Recommendations: High Intensity Therapy (vs LIT)  Discharge Equipment Recommendations: none  Barriers to discharge: None    Assessment:     Masha Hobbs is a 68 y.o. female admitted with a medical diagnosis of Acute encephalopathy.  She presents with the following impairments/functional limitations: weakness, impaired endurance, impaired self care skills, impaired functional mobility, gait instability, decreased lower extremity function, impaired cardiopulmonary response to activity .    Pt seen seated in chair with daughter present. Pt asleep but easily awakened. Pt agreeable to PT and taken at hallways. Pt ambulated with RW 250ft with RW min/mod assist with 1 seated rest. SAT on RA 96%. Pt much more awake at end of session. Returned to chair with all needs within reach.    Rehab Prognosis: Fair; patient would benefit from acute skilled PT services to address these deficits and reach maximum level of function.    Recent Surgery: * No surgery found *      Plan:     During this hospitalization, patient to be seen 6 x/week to address the identified rehab impairments via gait training, therapeutic activities, therapeutic exercises, neuromuscular re-education and progress toward the following goals:    Plan of Care Expires:  09/30/24    Subjective   Pt sleepy/limited interaction  Daughter assisting and encouraging pt  Chief Complaint: weakness  Patient/Family Comments/goals: none  Pain/Comfort:  Pain Rating 1: 0/10      Objective:     Communicated with nurse Corey prior to session.  Patient found up in chair with oxygen, pulse ox (continuous), telemetry, blood pressure cuff upon PT entry to room.     General Precautions: Standard, fall  Orthopedic Precautions: N/A  Braces: N/A  Respiratory Status: Room air     Functional Mobility:  Bed Mobility:     Scooting: minimum assistance  Transfers:     Sit to Stand:   minimum assistance with rolling walker  Bed to Chair: minimum assistance with  rolling walker  using  Stand Pivot  Gait: 250ft with RW min/mod assist with 1 seated rest. SAT on RA 96%      AM-PAC 6 CLICK MOBILITY  Turning over in bed (including adjusting bedclothes, sheets and blankets)?: 3  Sitting down on and standing up from a chair with arms (e.g., wheelchair, bedside commode, etc.): 3  Moving from lying on back to sitting on the side of the bed?: 3  Moving to and from a bed to a chair (including a wheelchair)?: 3  Need to walk in hospital room?: 2  Climbing 3-5 steps with a railing?: 1  Basic Mobility Total Score: 15       Treatment & Education:  Patient was educated on the importance of OOB activity and functional mobility to negate negative effects of prolonged bed rest during hospitalization, safe transfers and ambulation, and D/C planning   Back to room and up in chair    Patient left up in chair with all lines intact, call button in reach, chair alarm on, and resource nurse Aide present..    GOALS:   Multidisciplinary Problems       Physical Therapy Goals          Problem: Physical Therapy    Goal Priority Disciplines Outcome Goal Variances Interventions   Physical Therapy Goal     PT, PT/OT Progressing     Description: Goals to be met by: 24     Patient will increase functional independence with mobility by performin. Supine to sit with [place]  2. Sit to supine with [place]  3. Sit to stand transfer with Stand-by Assistance  4. Bed to chair transfer with Stand-by Assistance using Rolling Walker  5. Gait  x 250 feet with Stand-by Assistance using Rolling Walker.                          Time Tracking:     PT Received On: 24  PT Start Time: 1100     PT Stop Time: 1123  PT Total Time (min): 23 min     Billable Minutes: Gait Training 23    Treatment Type: Treatment  PT/PTA: PT     Number of PTA visits since last PT visit: 0     2024

## 2024-09-26 NOTE — PROGRESS NOTES
[facility]  Department of Neurology  Progress Note  Date: 2024 9:43 AM          Patient Name: Masha Hobbs   MRN: [id number]   : 1955    AGE: 68 y.o.    LOS: 9 days Hospital Day: 11  Admit date: 2024  1:02 PM         HPI Masha Hobbs is a 68 y.o. female presented to the hospital with headache, generalized weakness and fever.  She started having headache about 2 days ago which was holocephalic, 8/10 intensity and the same day she had a fever of 103.  Her  told her that she acted confused around that time.  He presented to the hospital for these symptoms.  She denies any unilateral weakness or sensory changes, neck pain or stiffness, nausea or vomiting.     In the ER, she had a CT head which was negative for acute pathology, lumbar puncture and was treated with Rocephin, vancomycin, valacyclovir and dexamethasone.  She was admitted to the hospital for further workup and management.     She states the headache is not getting better and it is about 2 to 3/10 in intensity.  Denies any other complaints at this time.       2024: No acute events overnight. Patient was seen and examined by me this morning.  She does have some receptive aphasia on exam today.    :  Patient was seen and examined.  She is somnolent, only opens eyes to repeated stimulus however does not follow commands or answer to any questions.    :  Patient was seen and examined by me.  Significantly improved mental status today.  Lumbar puncture and MRI were repeated yesterday.  She is currently oriented x3 and denies any complaints.  Family is at bedside.    : Pt was seen by Dr Rico Mendez and exam was normal    : Patient was seen and examined by me this morning.  No acute overnight events, no new neurological complaints.  Vitals have been stable.  Her exam is normal this morning however apparently yesterday she was more somnolent and had a fever.    :  Patient was seen and examined by me.   She is alert and oriented x3 however she feels more lethargic this morning.  Had a fever of 100.4 last night.  White count is trending up.    9/25:  Patient is drowsy and sleepy however wakes up to stimulus and answers questions appropriately and oriented x3.  Denies any new complaints.  Afebrile overnight    9/26: Patient was seen and examined by me this morning.  No acute overnight events, no new neurological complaints.  Vitals have been stable.  Was febrile overnight with a T-max of 101.3°        Vitals:  Patient Vitals for the past 24 hrs:   BP Temp Temp src Pulse Resp SpO2   09/26/24 1130 -- 98.3 °F (36.8 °C) Axillary -- -- --   09/26/24 1100 134/60 -- -- 87 (!) 22 97 %   09/26/24 1015 135/77 -- -- 89 20 98 %   09/26/24 0715 136/67 98.1 °F (36.7 °C) -- 96 18 100 %   09/26/24 0700 136/67 97.8 °F (36.6 °C) Oral 93 (!) 24 99 %   09/26/24 0500 125/68 -- -- 79 (!) 22 100 %   09/26/24 0400 (!) 119/58 -- -- 79 (!) 22 99 %   09/26/24 0335 -- 98.2 °F (36.8 °C) Oral -- -- --   09/26/24 0300 (!) 112/56 -- -- 78 (!) 24 99 %   09/26/24 0200 110/65 -- -- 79 18 99 %   09/26/24 0126 -- 99 °F (37.2 °C) Rectal 81 16 99 %   09/26/24 0100 (!) 107/55 -- -- 77 (!) 23 98 %   09/26/24 0000 (!) 104/59 -- -- 80 (!) 26 99 %   09/25/24 2300 (!) 129/58 (!) 100.5 °F (38.1 °C) Rectal 95 (!) 27 98 %   09/25/24 2202 -- (!) 101.3 °F (38.5 °C) Rectal -- -- --   09/25/24 2200 136/65 -- -- 101 (!) 26 97 %   09/25/24 2100 139/65 -- -- 101 (!) 31 100 %   09/25/24 2021 -- (!) 100.7 °F (38.2 °C) Oral -- -- --   09/25/24 1913 -- -- -- 104 19 100 %   09/25/24 1900 138/67 -- -- 101 19 100 %   09/25/24 1810 -- 98.5 °F (36.9 °C) Axillary -- -- --   09/25/24 1808 -- 98.3 °F (36.8 °C) Oral -- -- --   09/25/24 1800 134/62 -- -- 102 16 (!) 94 %   09/25/24 1700 139/71 -- -- 99 20 97 %   09/25/24 1608 -- -- Oral -- -- --   09/25/24 1602 -- 100 °F (37.8 °C) Axillary -- -- --   09/25/24 1600 (!) 144/66 98.6 °F (37 °C) Oral 100 18 98 %   09/25/24 1500 132/62 -- --  97 18 96 %   09/25/24 1442 -- 99.8 °F (37.7 °C) Axillary -- -- --   09/25/24 1440 -- 98.7 °F (37.1 °C) Oral -- -- --   09/25/24 1400 138/63 -- -- 97 18 99 %   09/25/24 1300 (!) 143/72 -- -- 91 20 97 %   09/25/24 1259 -- -- -- 92 (!) 28 96 %     PHYSICAL EXAM:     GENERAL APPEARANCE:  Drowsy, well-developed, well-nourished in no acute distress.  HEENT: Normocephalic and atraumatic. PERRL. Oropharynx unremarkable.  PULM: Normal respiratory effort. No accessory muscle use.  CV: RRR.  ABDOMEN: Soft, nontender.  EXTREMITIES: No obvious signs of vascular compromise. Pulses present. No cyanosis, clubbing or edema.  SKIN: Clear; no rashes, lesions or skin breaks in exposed areas.    NEURO:NEURO:  MENTAL STATUS: Patient drowsy, wakes up to stimulus and oriented to time, place, and person. Recent/remote memory normal. Attention span/concentration normal. Speech fluent. Good comprehension, naming, and repetition. Fund of knowledge appropriate for patient's level of education. Affect normal.    CRANIAL NERVES:  Pupils equal round and reactive to light, extraocular movements are intact, face is grossly symmetric.    MOTOR: Normal bulk. Tone normal and symmetric throughout.  Strength 5/5 throughout.  No abnormal movements. No tremor.    REFLEXES: DTRs 2+; normal and symmetric throughout. Plantar response downgoing.    SENSATION: Sensation grossly intact to fine touch, pain/temperature, vibration and position.    COORDINATION: Finger-to-nose and heel to shin normal for age and symmetric. Finger tapping and alternating movements normal.    STATION and GAIT: not assessed       CURRENT SCHEDULED MEDICATIONS:   cefTRIAXone (Rocephin) IV (PEDS and ADULTS)  2 g Intravenous Q12H    clotrimazole  10 mg Oral 5x Daily    doxycycline  100 mg Oral Q12H    famotidine  40 mg Oral Daily    lactulose  20 g Oral TID    megestroL  200 mg Oral Daily    polyethylene glycol  17 g Oral Daily     CURRENT INFUSIONS:      DATA:  Recent Labs   Lab  "09/20/24  0356 09/21/24  0645 09/22/24  0546 09/23/24  0439 09/24/24  0415 09/25/24  0428 09/26/24  0345   * 133* 131* 131* 129* 130* 129*   K 3.3* 3.8 3.7 3.6 3.7 3.9 4.1   CL 99 97 98 96 92* 95 93*   CO2 26 27 21* 27 26 26 26   BUN 17 15 14 15 17 21 22   CREATININE 0.8 0.8 0.8 0.8 0.8 0.7 0.7   * 107 100 136* 124* 119* 114*   CALCIUM 8.0* 8.4* 8.9 8.6* 8.8 8.6* 9.0   AST 26 19 20 22 22 26 47*   ALT 13 16 20 17 19 23 57*     Recent Labs   Lab 09/22/24  0546 09/23/24  0439 09/24/24  0415 09/25/24  0428 09/26/24  0345   WBC 15.52* 15.35* 20.47* 17.23* 15.56*   HGB 12.9 11.5* 12.5 12.5 11.9*   HCT 38.3 33.4* 36.3* 37.3 35.6*    383 426 290 473*     Lab Results   Component Value Date    PROTEINCSF 126 (H) 09/19/2024    GLUCCSF 51 09/19/2024     No results found for: "HGBA1C"         I have personally reviewed and interpreted the pertinent imaging and lab results.  Imaging Results              MRI Brain Without Contrast (Final result)  Result time 09/19/24 16:07:10      Final result by Jacob Armenta MD (09/19/24 16:07:10)                   Impression:      1. No significant interval detrimental change in the appearance of the brain as compared to the prior exam dated 09/17/2024.  Persistent scattered abnormal T2/FLAIR hyperintense signal within the supratentorial brain, particularly involving the right mesial temporal lobe, similar to prior exam.  No definite new edema or mass effect.  Findings again are nonspecific with similar differential considerations including inflammatory/infectious, or autoimmune/paraneoplastic encephalitis, status epilepticus, or potential neoplastic process.  Recommend continued close clinical surveillance and consider short-term follow-up imaging with contrast enhanced MRI of the brain, as clinically warranted.      Electronically signed by: Jacob Armenta  Date:    09/19/2024  Time:    16:07               Narrative:    EXAMINATION:  MRI BRAIN WITHOUT CONTRAST    CLINICAL " HISTORY:  Repeat-for encephalopathy;.    TECHNIQUE:  Multiplanar multisequence MR imaging of the brain was performed without the administration of intravenous contrast.    COMPARISON:  MRI brain with contrast 09/17/2024, MRI brain without contrast 09/17/2020    FINDINGS:  No significant interval detrimental change in the appearance of the brain as compared to the prior exam dated 09/17/2024.  Stable-appearing ill-defined T2/FLAIR hyperintense signal within the right mesial temporal lobe without associated diffusion restriction or hemorrhage, stable in size and configuration as compared to the prior exam.  Additional stable-appearing patchy T2/FLAIR hyperintense signal throughout the bilateral periventricular, deep, subcortical white matter and stable involvement of the left splenium of the corpus callosum.  No definite new parenchymal edema or mass effect.  No abnormal intracranial enhancement on prior exam.    Ventricles appear stable in comparison to the prior exam without evidence of hydrocephalus.    Scattered incidental bilateral hippocampal sulcus remnant cysts.  Diffusion-weighted images demonstrate no evidence of an acute infarct.   Susceptibility weighted images demonstrate no evidence of acute or chronic hemorrhage.    Normal vascular flow voids are present.    Bone marrow signal intensity is normal. The paranasal sinuses are normal.  The visualized portions of the mastoids are unremarkable.    Orbits are unremarkable.                                       FL Lumbar Puncture (xpd) (Final result)  Result time 09/19/24 15:45:57      Final result by Silvano Pearson MD (09/19/24 15:45:57)                   Impression:      Successful lumbar puncture under fluoroscopic guidance as detailed above.      Electronically signed by: Silvano Pearson  Date:    09/19/2024  Time:    15:45               Narrative:    EXAMINATION:  FL LUMBAR PUNCTURE DIAGNOSTIC WITH IMAGING    CLINICAL  HISTORY:  Encephalitis;    TECHNIQUE:  Informed written consent was obtained from the patient.  The risks, benefits, and alternatives to the exam were discussed.    The patient was placed in the prone position on the fluoroscopy table and was prepped and draped in a sterile fashion.  Local anesthesia was achieved using 1% lidocaine solution.    The L4-5 interspace was localized and a 22-gauge spinal needle and stylet were advanced into the thecal sac under fluoroscopic guidance.    Fluoroscopy time: 0.7 minutes.    COMPARISON:  None.    FINDINGS:  Approximately 16 cc of clear cerebrospinal fluid was obtained and sent to the laboratory with orders per referring physician.  The opening pressure was 21 cm H2O.The stylet was replaced and the spinal needle was removed.  There were no immediate post procedure complications.                                        X-Ray Chest 1 View (Final result)  Result time 09/19/24 13:12:54      Final result by Easton Hollingsworth Jr., MD (09/19/24 13:12:54)                   Impression:      Mild cardiomegaly with development of bilateral pulmonary edema consistent with congestive heart failure.    This report was flagged in Epic as abnormal.      Electronically signed by: Easton Hollingsworth MD  Date:    09/19/2024  Time:    13:12               Narrative:    EXAMINATION:  XR CHEST 1 VIEW    CLINICAL HISTORY:  r/o aspiration;    TECHNIQUE:  Single frontal view of the chest was performed.    COMPARISON:  Chest x-ray of September 16, 2024    FINDINGS:  There is mild cardiomegaly.  There is prominence of the pulmonary vasculature and pulmonary edema identified bilaterally consistent with congestive heart failure.  A solid mass is not identified.  No pneumothorax is noted.                                       MRI Brain With Contrast (Final result)  Result time 09/17/24 16:15:19      Final result by Derian Machado MD (09/17/24 16:15:19)                   Impression:      1. There is no  abnormal enhancement.  Signal abnormalities on unenhanced brain MRI remain nonspecific.      Electronically signed by: Derian Machado MD  Date:    09/17/2024  Time:    16:15               Narrative:    EXAMINATION:  MRI BRAIN WITH CONTRAST    CLINICAL HISTORY:  follow up on non contrast MRI finding;    TECHNIQUE:  T1-weighted post-contrast multiplanar sequences were obtained through the brain.  6 mL Gadavist was administered intravenously without reported reaction.    COMPARISON:  Brain MRI without contrast performed earlier today    FINDINGS:  The prior diagnostic brain MRI demonstrated multiple regions of abnormal FLAIR and T2 hyperintense signal and a somewhat atypical distribution including the mesial right temporal lobe, periventricular white matter and splenium of the corpus callosum on the left, sub appended mole region.  There is no abnormal enhancement associated at any of the sites of signal abnormality.  The findings previously described remain nonspecific.  Previous differential considerations still exist.                                        MRI Brain Without Contrast (Final result)  Result time 09/17/24 13:57:29      Final result by Jacob Armenta MD (09/17/24 13:57:29)                   Impression:      1. Abnormal, ill-defined T2/FLAIR hyperintense signal within the right mesial temporal lobe with differential considerations including infectious encephalitis, particularly herpes simplex virus (HSV) encephalitis, autoimmune encephalitis, status epilepticus, or possible low-grade neoplasm.  2. Mild patchy and scattered foci of T2/FLAIR hyperintense signal within the supratentorial white matter elsewhere, nonspecific and may reflect sequelae of chronic small vessel ischemic change.  3. Mild asymmetric T2/FLAIR hyperintense signal involving the ependymal/subependymal white matter of the posterior body/atrium and left frontal horn of the left lateral ventricle, nonspecific and while it could reflect  sequelae of chronic small vessel ischemic change, focal encephalitis and/or ventriculitis cannot be excluded.  No hydrocephalus.  This report was flagged in Epic as abnormal.  The significant finding above was relayed by myself  via [facility] secure chat to Beatriz Boogie MD and Joe Galeana MD on 09/17/2024 at 13:40.  Findings were acknowledged and understood.      Electronically signed by: Jacob Geovany  Date:    09/17/2024  Time:    13:57               Narrative:    EXAMINATION:  MRI BRAIN WITHOUT CONTRAST    CLINICAL HISTORY:  Mental status change, unknown cause;.    TECHNIQUE:  Multiplanar multisequence MR imaging of the brain was performed without the administration of intravenous contrast.    COMPARISON:  CT head 09/16/2024    FINDINGS:  Ventricles and sulci are normal in size for age without evidence of hydrocephalus.    Abnormal, ill-defined T2/FLAIR hyperintense signal within the right mesial temporal lobe with associated hippocampal/gyral enlargement.  No diffusion restriction or susceptibility artifact to suggest hemorrhage.    Mild nonspecific asymmetric ependymal/subependymal white matter T2/FLAIR hyperintense signal about the posterior body/atrium of the left lateral ventricle with question asymmetric involvement of the left frontal horn.  Superimposed scattered foci and patchy T2/FLAIR hyperintense signal within the bilateral periventricular, deep and subcortical white matter, nonspecific and may reflect sequelae of chronic small vessel ischemic change.  Diffusion-weighted images demonstrate no evidence of an acute infarct elsewhere.    No evidence of intracranial acute or chronic hemorrhage elsewhere.    Normal vascular flow voids are present.    Bone marrow signal intensity is normal. The paranasal sinuses are normal.  The visualized portions of the mastoids are unremarkable.    Orbits are unremarkable.                                       X-Ray Chest 1 View (Final result)  Result time  09/16/24 14:53:24      Final result by Farruhk Ramos MD (09/16/24 14:53:24)                   Impression:      No acute cardiopulmonary abnormality.      Electronically signed by: Farrukh Ramos  Date:    09/16/2024  Time:    14:53               Narrative:    EXAMINATION:  XR CHEST 1 VIEW    CLINICAL HISTORY:  Sepsis;    FINDINGS:  Portable chest at 1441 compared with 06/06/2024 shows unchanged cardiomediastinal silhouette.    Lungs are clear. Pulmonary vasculature is normal. No acute osseous abnormality.                                       CT Head Without Contrast (Final result)  Result time 09/16/24 14:52:17      Final result by Wang Acuña MD (09/16/24 14:52:17)                   Impression:      No CT evidence of acute intracranial pathology.      Electronically signed by: Wang Acuña  Date:    09/16/2024  Time:    14:52               Narrative:    EXAMINATION:  CT HEAD WITHOUT CONTRAST    CLINICAL HISTORY:  Mental status change, unknown cause;    TECHNIQUE:  Axial CT imaging obtained through the brain without IV contrast.    CMS Mandated Quality Data-CT Radiation Dose-436    All CT scans at this facility dose modulation, iterative reconstruction, and or weight-based dosing when appropriate to reduce radiation dose to as low as reasonably achievable.    COMPARISON:  None    FINDINGS:  Negative for acute intracranial hemorrhage, midline shift, or mass effect.  Ventricles and sulci are normal in size.  Gray-white differentiation is maintained.  Mild periventricular and deep white matter hypoattenuation, consistent with microangiopathic change.  Cerebellar hemispheres and brainstem are unremarkable.  Atherosclerotic calcification of intracranial carotid arteries.    No calvarial lesion or fracture.  Mastoid air cells are clear.  Mucosal thickening right maxillary sinus.  Paranasal sinuses are otherwise clear.                                       Radiology (Final result)  Result time 09/24/24 15:13:12       Final result by Unknown User (09/24/24 15:13:12)                                             ASSESSMENT AND PLAN:     Encephalitis   Headache  Encephalopathy     Plan:   LP with CSF analysis showed WBC 2038 (neutrophil predominant), protein 173 and glucose 50.  Negative meningitis encephalitis panel and CSF culture so far.  Likely infectious encephalitis however workup has been unrevealing so far for any viral or bacterial infection.  Can not rule out autoimmune encephalitis however patient has been having intermittent fevers which makes infectious causes likely  MRI brain showed hyperintensity in the right medial temporal lobe on T2 FLAIR images concerning for encephalitis. MRI brain with contrast showed no contrast enhancement  Currently on doxycycline and, Rocephin.  Negative meningitis/encephalitis PCR panel on CSF.  Infectious diseases following-appreciate further recommendations.    Repeat lumbar puncture showed CSF , protein 126 and glucose 51.  Negative CSF culture.  Autoimmune encephalopathy panel has been sent and pending  Repeat PCR meningitis/encephalitis panel was negative as well.  EEG showed no seizures or epileptiform activity.  LTM EEG was done and showed no seizures or epileptiform activity.  Speech therapy evaluate and treat  PT OT evaluate and treat.    Patient has been having intermittent fevers however infectious workup was unrevealing so far.  On antibiotics and acyclovir has been stopped.  ID following.  D-dimer is elevated.  Check ultrasound lower extremity to rule out DVTs  Management of metabolic derangements per primary team.  Correct electrolyte abnormalities.  Patient improving clinically.  No further neuro workup needed at this time.  Will follow peripherally.  Please call with any questions            Joe Galeana MD  Neurology/vascular Neurology  Date of Service: 09/26/2024  9:43 AM    Please note: This note was transcribed using voice recognition software. Because  of this technology there are often uinintended grammatical, spelling, and other transcription errors. Please disregard these errors.

## 2024-09-26 NOTE — PLAN OF CARE
Problem: Physical Therapy  Goal: Physical Therapy Goal  Description: Goals to be met by: 24     Patient will increase functional independence with mobility by performin. Supine to sit with [place]  2. Sit to supine with [place]  3. Sit to stand transfer with Stand-by Assistance  4. Bed to chair transfer with Stand-by Assistance using Rolling Walker  5. Gait  x 250 feet with Stand-by Assistance using Rolling Walker.     Outcome: Progressing  Pt up in chair with daughter in attendance. Pt ambulated at hallways 250ft with RW min/mod assist with 1 seated rest on RA with SAT 97%

## 2024-09-27 LAB
ALBUMIN SERPL BCP-MCNC: 2.6 G/DL (ref 3.5–5.2)
ALP SERPL-CCNC: 59 U/L (ref 55–135)
ALT SERPL W/O P-5'-P-CCNC: 118 U/L (ref 10–44)
ANION GAP SERPL CALC-SCNC: 9 MMOL/L (ref 8–16)
AST SERPL-CCNC: 85 U/L (ref 10–40)
BASOPHILS # BLD AUTO: 0.1 K/UL (ref 0–0.2)
BASOPHILS NFR BLD: 0.7 % (ref 0–1.9)
BILIRUB SERPL-MCNC: 0.3 MG/DL (ref 0.1–1)
BUN SERPL-MCNC: 26 MG/DL (ref 8–23)
CALCIUM SERPL-MCNC: 9.4 MG/DL (ref 8.7–10.5)
CHLORIDE SERPL-SCNC: 94 MMOL/L (ref 95–110)
CO2 SERPL-SCNC: 26 MMOL/L (ref 23–29)
CREAT SERPL-MCNC: 0.8 MG/DL (ref 0.5–1.4)
DIFFERENTIAL METHOD BLD: ABNORMAL
EOSINOPHIL # BLD AUTO: 0.3 K/UL (ref 0–0.5)
EOSINOPHIL NFR BLD: 1.7 % (ref 0–8)
ERYTHROCYTE [DISTWIDTH] IN BLOOD BY AUTOMATED COUNT: 12.3 % (ref 11.5–14.5)
EST. GFR  (NO RACE VARIABLE): >60 ML/MIN/1.73 M^2
GLUCOSE SERPL-MCNC: 99 MG/DL (ref 70–110)
HCT VFR BLD AUTO: 34.7 % (ref 37–48.5)
HGB BLD-MCNC: 11.8 G/DL (ref 12–16)
IMM GRANULOCYTES # BLD AUTO: 0.08 K/UL (ref 0–0.04)
IMM GRANULOCYTES NFR BLD AUTO: 0.5 % (ref 0–0.5)
LYMPHOCYTES # BLD AUTO: 2.6 K/UL (ref 1–4.8)
LYMPHOCYTES NFR BLD: 17.5 % (ref 18–48)
MCH RBC QN AUTO: 31.8 PG (ref 27–31)
MCHC RBC AUTO-ENTMCNC: 34 G/DL (ref 32–36)
MCV RBC AUTO: 94 FL (ref 82–98)
MONOCYTES # BLD AUTO: 1 K/UL (ref 0.3–1)
MONOCYTES NFR BLD: 6.8 % (ref 4–15)
NEUTROPHILS # BLD AUTO: 10.7 K/UL (ref 1.8–7.7)
NEUTROPHILS NFR BLD: 72.8 % (ref 38–73)
NRBC BLD-RTO: 0 /100 WBC
PLATELET # BLD AUTO: 357 K/UL (ref 150–450)
PMV BLD AUTO: 9.9 FL (ref 9.2–12.9)
POTASSIUM SERPL-SCNC: 3.8 MMOL/L (ref 3.5–5.1)
PROT SERPL-MCNC: 6.8 G/DL (ref 6–8.4)
RBC # BLD AUTO: 3.71 M/UL (ref 4–5.4)
SODIUM SERPL-SCNC: 129 MMOL/L (ref 136–145)
WBC # BLD AUTO: 14.7 K/UL (ref 3.9–12.7)

## 2024-09-27 PROCEDURE — 99900035 HC TECH TIME PER 15 MIN (STAT)

## 2024-09-27 PROCEDURE — 94761 N-INVAS EAR/PLS OXIMETRY MLT: CPT

## 2024-09-27 PROCEDURE — 63600175 PHARM REV CODE 636 W HCPCS: Performed by: INTERNAL MEDICINE

## 2024-09-27 PROCEDURE — 97116 GAIT TRAINING THERAPY: CPT | Mod: CQ

## 2024-09-27 PROCEDURE — 25000003 PHARM REV CODE 250: Performed by: NURSE PRACTITIONER

## 2024-09-27 PROCEDURE — 25000003 PHARM REV CODE 250: Performed by: INTERNAL MEDICINE

## 2024-09-27 PROCEDURE — 97110 THERAPEUTIC EXERCISES: CPT

## 2024-09-27 PROCEDURE — 11000001 HC ACUTE MED/SURG PRIVATE ROOM

## 2024-09-27 PROCEDURE — S0179 MEGESTROL 20 MG: HCPCS | Performed by: INTERNAL MEDICINE

## 2024-09-27 PROCEDURE — 99233 SBSQ HOSP IP/OBS HIGH 50: CPT | Mod: ,,, | Performed by: INTERNAL MEDICINE

## 2024-09-27 PROCEDURE — 85025 COMPLETE CBC W/AUTO DIFF WBC: CPT | Performed by: INTERNAL MEDICINE

## 2024-09-27 PROCEDURE — 94799 UNLISTED PULMONARY SVC/PX: CPT

## 2024-09-27 PROCEDURE — 36415 COLL VENOUS BLD VENIPUNCTURE: CPT | Performed by: INTERNAL MEDICINE

## 2024-09-27 PROCEDURE — 97535 SELF CARE MNGMENT TRAINING: CPT

## 2024-09-27 PROCEDURE — 97110 THERAPEUTIC EXERCISES: CPT | Mod: CQ

## 2024-09-27 PROCEDURE — 80053 COMPREHEN METABOLIC PANEL: CPT

## 2024-09-27 RX ADMIN — IBUPROFEN 400 MG: 400 TABLET, FILM COATED ORAL at 08:09

## 2024-09-27 RX ADMIN — POLYETHYLENE GLYCOL (3350) 17 G: 17 POWDER, FOR SOLUTION ORAL at 08:09

## 2024-09-27 RX ADMIN — CLOTRIMAZOLE 10 MG: 10 LOZENGE ORAL at 08:09

## 2024-09-27 RX ADMIN — CLOTRIMAZOLE 10 MG: 10 LOZENGE ORAL at 02:09

## 2024-09-27 RX ADMIN — CLOTRIMAZOLE 10 MG: 10 LOZENGE ORAL at 10:09

## 2024-09-27 RX ADMIN — MEGESTROL ACETATE 200 MG: 40 SUSPENSION ORAL at 08:09

## 2024-09-27 RX ADMIN — CEFTRIAXONE 2 G: 2 INJECTION, POWDER, FOR SOLUTION INTRAMUSCULAR; INTRAVENOUS at 04:09

## 2024-09-27 RX ADMIN — FAMOTIDINE 40 MG: 20 TABLET, FILM COATED ORAL at 08:09

## 2024-09-27 RX ADMIN — CLOTRIMAZOLE 10 MG: 10 LOZENGE ORAL at 05:09

## 2024-09-27 RX ADMIN — DOXYCYCLINE HYCLATE 100 MG: 100 TABLET, COATED ORAL at 08:09

## 2024-09-27 NOTE — EICU
Intervention Initiated From:  [facility] / EICU    Tay intervened regarding:  Rounding (Video assessment)    Nurse Notified:  No    Doctor Notified:  No    Comments:   Video rounding completed. Patient resting in darkened room. VSS, NAD.

## 2024-09-27 NOTE — PLAN OF CARE
Problem: Physical Therapy  Goal: Physical Therapy Goal  Description: Goals to be met by: 24     Patient will increase functional independence with mobility by performin. Supine to sit with [place]  2. Sit to supine with [place]  3. Sit to stand transfer with Stand-by Assistance  4. Bed to chair transfer with Stand-by Assistance using Rolling Walker  5. Gait  x 250 feet with Stand-by Assistance using Rolling Walker.     Outcome: Progressing   Ambulate with rw and assistance for safety.

## 2024-09-27 NOTE — PLAN OF CARE
Per MD, pt not medically clear for DC at this time. ID awaiting lab results. CM following.        09/27/24 1126   Discharge Reassessment   Assessment Type Discharge Planning Reassessment   Did the patient's condition or plan change since previous assessment? No   Discharge Plan discussed with: Patient;Spouse/sig other;Adult children   Communicated WILVER with patient/caregiver Date not available/Unable to determine   Discharge Plan A Home Health;Home with family   Discharge Plan B Home with family   DME Needed Upon Discharge  none   Transition of Care Barriers None   Why the patient remains in the hospital Requires continued medical care

## 2024-09-27 NOTE — PLAN OF CARE
Recommendations  Continue Regular diet   RD added Boost Plus with all meals  Monitor intake during meal rounds  Collaborate with health care providers     Goal: intake > 75% upon RD F/U  Comments: no N/V reported; last BM on 9/25  Nutrition Goal Status: new  Communication of RD Recs: reviewed with physician     Nutrition Related Social Determinants of Health: SDOH: Adequate food in home environment  Pt will DC home with family and HH once medically ready.  Assessment and Plan  Nutrition Problem  Reduced oral intake      Related to (etiology):   AMS 2/2 meningitis     Signs and Symptoms (as evidenced by):   Intake was < 50% but starting to improve slowly d/t improved mentation      Interventions/Recommendations (treatment strategy):  Added Boost Plus with all meals to fill in gaps of oral intake at meals.  MD ordered megace appetite stimulant on 9/26     Nutrition Diagnosis Status:   New     Malnutrition Assessment   Pt does not currently meet ASPEN criteria for Malnutrition.                  Reason for Assessment:  LOS 10 days  Dx: Meningitis acute encephalopathy  PMH:  CHF

## 2024-09-27 NOTE — PT/OT/SLP PROGRESS
Occupational Therapy   Treatment    Name: Masha Hobbs  MRN: [id number]  Admitting Diagnosis:  Acute encephalopathy       Recommendations:     Discharge Recommendations:  Low intensity therapy  Discharge Equipment Recommendations:  none  Barriers to discharge:       Assessment:     Masha Hobbs is a 68 y.o. female with a medical diagnosis of Acute encephalopathy.  She continues to present with weakness, impaired endurance, impaired self care skills, impaired functional mobility and gait instability.    Rehab Prognosis:  Good; patient would benefit from acute skilled OT services to address these deficits and reach maximum level of function.       Plan:     Patient to be seen 5 x/week to address the above listed problems via self-care/home management, therapeutic activities, therapeutic exercises  Plan of Care Expires: 10/19/24  Plan of Care Reviewed with: patient    Subjective     Chief Complaint: Pt requested to use the bathroom.  Patient/Family Comments/goals: Pt agreed to participate in OT.  Pain/Comfort:  Pain Rating 1: 0/10    Objective:     Communicated with: nurse prior to session.  Patient found up in chair with blood pressure cuff, pulse ox and telemetry upon OT entry to room.    General Precautions: Standard, fall    Orthopedic Precautions:N/A  Braces: N/A  Respiratory Status: Room air     Occupational Performance:     Functional Mobility/Transfers:  Patient completed Sit <> Stand Transfer with contact guard assistance with rolling walker   Patient completed Toilet Transfer Step Transfer technique with contact guard assistance with  rolling walker  Functional Mobility: chair <> bathroom using a RW with CGA    Activities of Daily Living:  Grooming: Pt completed hand hygiene with set up A seated in chair.   Toileting: stand by assistance      Treatment & Education:  Pt was given instruction and demonstration of UE strengthening exercises to improve her independence with functional transfers. Pt performed B  UE resistive exercises using 3# bar x 3 sets of 10 reps shoulder press, chest press, horizontal ad/abduction and bicep curls with rest breaks taken between each set.  OT educated patient on toilet transfer techniques using rolling walker.      Patient left up in chair with all lines intact, call button in reach, chair alarm on and patient's daughter present.    GOALS:   Multidisciplinary Problems       Occupational Therapy Goals          Problem: Occupational Therapy    Goal Priority Disciplines Outcome Interventions   Occupational Therapy Goal     OT, PT/OT     Description: Goals to be met by: 10/19/24     Patient will increase functional independence with ADLs by performing:    UE Dressing with Modified [place].  LE Dressing with Modified Gilliam.  Grooming while standing at sink with Modified G[place].  Toileting from toilet with Modified G[place]am for hygiene and clothing management.   Bathing from  shower chair/bench with Modified Gilli[place].  Toilet transfer to toilet with Modified Gilliam.  Increased strength and functional activity tolerance for ADL's/IADL's                         Time Tracking:     OT Date of Treatment: 09/27/24  OT Start Time: 1122  OT Stop Time: 1151  OT Total Time (min): 29 min    Billable Minutes:Self Care/Home Management 14  Therapeutic Exercise 15               9/27/2024

## 2024-09-27 NOTE — CARE UPDATE
09/26/24 1912   Patient Assessment/Suction   Level of Consciousness (AVPU) alert   Respiratory Effort Normal;Unlabored   Expansion/Accessory Muscles/Retractions expansion symmetric;no retractions;no use of accessory muscles   PRE-TX-O2   Device (Oxygen Therapy) room air   SpO2 (!) 94 %   Pulse Oximetry Type Continuous   Pulse 87   Resp (!) 21

## 2024-09-27 NOTE — PROGRESS NOTES
[facility] Medicine  Progress Note    Patient Name: Masha Hobbs  MRN: [id number]  Patient Class: IP- Inpatient   Admission Date: 9/16/2024  Length of Stay: 10 days  Attending Physician: Berta Chandra MD  Primary Care Provider: Roney Bang MD        Subjective:     Principal Problem:Acute encephalopathy        HPI:  Masha Hobbs is a 68 year old female with a past medical history of osteoporosis and chronic lower back pain with radiculopathy who was transferred from [facility] due to confusion associated with headache, fever, and generalized weakness/body aches for several hours prior to arrival.  Per  patient was confused since 4:00 p.m. Upon assessment confusion has resolved.  She denies any complaints other than a intermittent frontal lobe headache that is improving after Toradol administration.  There are no acute neurological focal deficits.  She denies chest pain, shortness of breath, dizziness, lightheadedness, vision changes, speech changes, numbness/tingling, neck pain, abdominal pain, nausea, or vomiting.  ED workup reveals:  CT head negative for acute intracranial hemorrhage. Chest x-ray no evidence of acute cardiopulmonary findings.  Lumbar puncture performed in ED, pending results.  She was treated with vancomycin, Rocephin, valacyclovir p.o., and dexamethasone.  CBC with elevated white count 13.8 and stable H&H.  BMP with phosphorus 2.0 otherwise unremarkable.  Negative flu/COVID.  Urinalysis negative.  Lactic acid 1.1.  ECG negative for ischemia or infarct.  MRI brain pending.  Neurology consult placed.  Admitted to hospital medicine for further management and treatment.            Overview/Hospital Course:  Patient is a 68 year old female admitted with AMS, headache and fever. LP was suggestive of meningitis with 2K WBC, mainly neutrophils. HSV panel was negative. Patient was started on Vancomycin, Ampicillin and Rocephin. Acyclovir was discontinued  after D1. MRI shows encephalitis findings with ventriculitis. Patient is being followed by neurology and ID. EEG has been ordered which is consistent with encephalopathy but no epileptiform activity. Patient continue to spike fever and is vomiting. Stat MRI was repeated with rpt LP was ordered. Rpt MRI did not show any changes. LP shows reduced WBC compared to the first one. Patient's mental status improved. Patient continued with daily fevers. ID ordered additional testing. Antibiotics and antiviral adjusted. Patient developed tachycardia and tachypnea on 09/24. D Dimer was elevated, and CTA chest was done. This revealed no PE, but some pleural effusions. Lasix was given. Patient's appetite was poor and clinimix was added temporarily. Continued with poor appetite. Appetite stimulant added.     Interval History: Patient's mental status back to baseline.. ID has made adjustments in the abx therapy.     Review of Systems   Constitutional:  Negative for activity change, appetite change, chills and fever.   HENT:  Negative for congestion, ear pain, nosebleeds and sinus pain.    Eyes:  Negative for discharge and itching.   Respiratory:  Negative for apnea, cough, chest tightness and shortness of breath.    Cardiovascular:  Negative for chest pain, palpitations and leg swelling.   Gastrointestinal:  Negative for abdominal distention, abdominal pain and vomiting.   Genitourinary:  Negative for difficulty urinating, dysuria, flank pain and frequency.   Musculoskeletal:  Negative for arthralgias, back pain, joint swelling and myalgias.   Skin:  Negative for color change, pallor and rash.   Neurological:  Negative for dizziness, weakness, light-headedness and headaches.   Psychiatric/Behavioral:  Negative for agitation, behavioral problems, confusion and suicidal ideas.      Objective:     Vital Signs (Most Recent):  Temp: 98.2 °F (36.8 °C) (09/27/24 0800)  Pulse: 89 (09/27/24 1000)  Resp: 18 (09/27/24 1000)  BP: (!) 117/57  (09/27/24 1000)  SpO2: 97 % (09/27/24 1000) Vital Signs (24h Range):  Temp:  [97.6 °F (36.4 °C)-98.8 °F (37.1 °C)] 98.2 °F (36.8 °C)  Pulse:  [] 89  Resp:  [11-29] 18  SpO2:  [92 %-99 %] 97 %  BP: ()/(46-69) 117/57     Weight: 62.9 kg (138 lb 10.7 oz)  Body mass index is 26.2 kg/m².    Intake/Output Summary (Last 24 hours) at 9/27/2024 1110  Last data filed at 9/26/2024 1717  Gross per 24 hour   Intake 480 ml   Output --   Net 480 ml         Physical Exam  Vitals and nursing note reviewed.   Constitutional:     Alert  Eyes:      General: No visual field deficit.     Pupils: Pupils are equal, round, and reactive to light.   Cardiovascular:      Rate and Rhythm: Normal rate and regular rhythm.      Pulses: Normal pulses.      Heart sounds: Normal heart sounds.   Pulmonary:      Effort: Pulmonary effort is normal. No respiratory distress.      Breath sounds: Normal breath sounds. No wheezing.   Abdominal:      General: Bowel sounds are normal. There is no distension.      Palpations: Abdomen is soft.      Tenderness: There is no abdominal tenderness.   Musculoskeletal:      Right lower leg: No edema.      Left lower leg: No edema.   Skin:     General: Skin is warm and dry.      Capillary Refill: Capillary refill takes less than 2 seconds.   Neurological:      Mental Status: back to baseline, alert and oriented X 3     Significant Labs: All pertinent labs within the past 24 hours have been reviewed.  CBC:   Recent Labs   Lab 09/26/24  0345 09/27/24  0329   WBC 15.56* 14.70*   HGB 11.9* 11.8*   HCT 35.6* 34.7*   * 357       CMP:   Recent Labs   Lab 09/26/24  0345 09/27/24  0324   * 129*   K 4.1 3.8   CL 93* 94*   CO2 26 26   * 99   BUN 22 26*   CREATININE 0.7 0.8   CALCIUM 9.0 9.4   PROT 6.6 6.8   ALBUMIN 2.5* 2.6*   BILITOT 0.3 0.3   ALKPHOS 58 59   AST 47* 85*   ALT 57* 118*   ANIONGAP 10 9       Significant Imaging: I have reviewed all pertinent imaging results/findings within the past  24 hours.  Physical Exam  Constitutional:       Appearance: Normal appearance.         Assessment/Plan:      * Acute encephalopathy  Likely secondary to bacterial meningitis.   Mental status returned to baseline   MRI shows encephalitis and ventriculitis findings   Ammonia, TSH is normal   Neurology following   EEG shows encephalopathy, no epileptiform activity noted.   Rpt LP shows improved WBC and MRI similar to prior   Opening pressures are normal         HFrEF (heart failure with reduced ejection fraction)  New onset   Echo show Takotsubo pattern   Cardiology was consulted and recommend ischemic work up when completely treated for meningitis, and starting toprol at that time. They have signed off until meningitis treatment is complete.        Leukocytosis  Due to above       Meningitis  LP shows > 2000 WBC with neurophil predominance, protein is high normal glucose   MRI shows encephalitis and ventriculitis findings   HSV and meningitis panel was all negative   EEG shows encephalopathy and no epileptiform activity   Discussed with neurology and ID  MRI and LP was repeated on 9/19 shows WBC improved, but other wise similar findings.   - Cont abx as per ID recommendations          VTE Risk Mitigation (From admission, onward)           Ordered     IP VTE LOW RISK PATIENT  Once         09/17/24 0239     Place sequential compression device  Until discontinued         09/17/24 0239                    Discharge Planning   WILVER: 9/30/2024     Code Status: Full Code   Is the patient medically ready for discharge?:     Reason for patient still in hospital (select all that apply): Patient trending condition  Discharge Plan A: Home with family, Home Health                  Berta Chandra MD, MD  Department of Hospital Medicine   [facility]/Surg

## 2024-09-27 NOTE — CONSULTS
[facility]/Surg  Adult Nutrition  Consult Note    SUMMARY     Recommendations  Continue Regular diet   RD added Boost Plus with all meals  Monitor intake during meal rounds  Collaborate with health care providers    Goal: intake > 75% upon RD F/U  Comments: no N/V reported; last BM on 9/25  Nutrition Goal Status: new  Communication of RD Recs: reviewed with physician    Nutrition Related Social Determinants of Health: SDOH: Adequate food in home environment  Pt will DC home with family and HH once medically ready.  Assessment and Plan  Nutrition Problem  Reduced oral intake     Related to (etiology):   AMS 2/2 meningitis    Signs and Symptoms (as evidenced by):   Intake was < 50% but starting to improve slowly d/t improved mentation     Interventions/Recommendations (treatment strategy):  Added Boost Plus with all meals to fill in gaps of oral intake at meals.  MD ordered megace appetite stimulant on 9/26    Nutrition Diagnosis Status:   New     Malnutrition Assessment   Pt does not currently meet ASPEN criteria for Malnutrition.                  Reason for Assessment:  LOS 10 days  Dx: Meningitis acute encephalopathy  PMH:  CHF    Reason For Assessment: length of stay  Diagnosis: other (see comments)  Interdisciplinary Rounds: did not attend  Nutrition Discharge Planning: pending hospital course    Nutrition Risk Screen    Nutrition Risk Screen: no indicators present    Nutrition/Diet History    Patient Reported Diet/Restrictions/Preferences: general  Spiritual, Cultural Beliefs, Hindu Practices, Values that Affect Care: no  Food Allergies: NKFA  Factors Affecting Nutritional Intake: None identified at this time    Anthropometrics  Wt Readings from Last 9 Encounters:   09/27/24 62.9 kg (138 lb 10.7 oz)   07/10/24 62.6 kg (138 lb)   07/02/24 62.6 kg (138 lb)   06/13/24 62.1 kg (137 lb)   06/06/24 62.1 kg (137 lb)   10/16/21 68 kg (150 lb)   03/02/20 64 kg (141 lb 1.5 oz)   02/20/20 64 kg (141  "lb)   ]  Temp: 98.2 °F (36.8 °C)  Height Method: Stated  Height: 5' 1" (154.9 cm)  Height (inches): 61 in  Weight Method: Bed Scale  Weight: 62.9 kg (138 lb 10.7 oz)  Weight (lb): 138.67 lb  Ideal Body Weight (IBW), Female: 105 lb  % Ideal Body Weight, Female (lb): 132.07 %  BMI (Calculated): 26.2  BMI Grade: 25 - 29.9 - overweight     Lab/Procedures/Meds   Latest Reference Range & Units Most Recent   Hemoglobin 12.0 - 16.0 g/dL 11.8 (L)  9/27/24 03:29   Hematocrit 37.0 - 48.5 % 34.7 (L)  9/27/24 03:29   (L): Data is abnormally low   Latest Reference Range & Units Most Recent   CRP 0.0 - 8.2 mg/L 58.7 (H)  9/24/24 04:15   (H): Data is abnormally high   Latest Reference Range & Units Most Recent   Albumin 3.5 - 5.2 g/dL 2.6 (L)  9/27/24 03:24   (L): Data is abnormally low  Pertinent Labs Reviewed: reviewed  Pertinent Medications Reviewed: reviewed; megace; polyethlene glycol; pepcid; doxcycline; IV rocephin    Physical Findings/Assessment  No skin integrity issues documented     Estimated/Assessed Needs    Weight Used For Calorie Calculations: 62.9 kg (138 lb 10.7 oz)  Energy Calorie Requirements (kcal): 1600 calories daily;  25/kg  Energy Need Method: Kcal/kg  Protein Requirements: 75 gm protein daily;  1.2/kg  Weight Used For Protein Calculations: 62.9 kg (138 lb 10.7 oz)     Estimated Fluid Requirement Method: RDA Method  RDA Method (mL): 1600     Nutrition Prescription Ordered    Current Diet Order: Regular  Oral Nutrition Supplement: Boost Plus    Evaluation of Received Nutrient/Fluid Intake    Energy Calories Required: meeting needs  Protein Required: meeting needs  Fluid Required: meeting needs  Tolerance: tolerating  % Intake of Estimated Energy Needs: 50 - 75 %  % Meal Intake: 50 - 75 %    Nutrition Risk    Level of Risk/Frequency of Follow-up: 2 x week; high    Monitor and Evaluation    Food and Nutrient Intake: food and beverage intake  Food and Nutrient Adminstration: diet " order  Knowledge/Beliefs/Attitudes: beliefs and attitudes  Physical Activity and Function: nutrition-related ADLs and IADLs  Anthropometric Measurements: weight change  Biochemical Data, Medical Tests and Procedures: electrolyte and renal panel, gastrointestinal profile, glucose/endocrine profile, other (specify)  Nutrition-Focused Physical Findings: overall appearance     Nutrition Follow-Up  yes

## 2024-09-27 NOTE — SUBJECTIVE & OBJECTIVE
Interval History: Patient's mental status back to baseline.. ID has made adjustments in the abx therapy.     Review of Systems   Constitutional:  Negative for activity change, appetite change, chills and fever.   HENT:  Negative for congestion, ear pain, nosebleeds and sinus pain.    Eyes:  Negative for discharge and itching.   Respiratory:  Negative for apnea, cough, chest tightness and shortness of breath.    Cardiovascular:  Negative for chest pain, palpitations and leg swelling.   Gastrointestinal:  Negative for abdominal distention, abdominal pain and vomiting.   Genitourinary:  Negative for difficulty urinating, dysuria, flank pain and frequency.   Musculoskeletal:  Negative for arthralgias, back pain, joint swelling and myalgias.   Skin:  Negative for color change, pallor and rash.   Neurological:  Negative for dizziness, weakness, light-headedness and headaches.   Psychiatric/Behavioral:  Negative for agitation, behavioral problems, confusion and suicidal ideas.      Objective:     Vital Signs (Most Recent):  Temp: 98.2 °F (36.8 °C) (09/27/24 0800)  Pulse: 89 (09/27/24 1000)  Resp: 18 (09/27/24 1000)  BP: (!) 117/57 (09/27/24 1000)  SpO2: 97 % (09/27/24 1000) Vital Signs (24h Range):  Temp:  [97.6 °F (36.4 °C)-98.8 °F (37.1 °C)] 98.2 °F (36.8 °C)  Pulse:  [] 89  Resp:  [11-29] 18  SpO2:  [92 %-99 %] 97 %  BP: ()/(46-69) 117/57     Weight: 62.9 kg (138 lb 10.7 oz)  Body mass index is 26.2 kg/m².    Intake/Output Summary (Last 24 hours) at 9/27/2024 1110  Last data filed at 9/26/2024 1717  Gross per 24 hour   Intake 480 ml   Output --   Net 480 ml         Physical Exam  Vitals and nursing note reviewed.   Constitutional:     Alert  Eyes:      General: No visual field deficit.     Pupils: Pupils are equal, round, and reactive to light.   Cardiovascular:      Rate and Rhythm: Normal rate and regular rhythm.      Pulses: Normal pulses.      Heart sounds: Normal heart sounds.   Pulmonary:      Effort:  Pulmonary effort is normal. No respiratory distress.      Breath sounds: Normal breath sounds. No wheezing.   Abdominal:      General: Bowel sounds are normal. There is no distension.      Palpations: Abdomen is soft.      Tenderness: There is no abdominal tenderness.   Musculoskeletal:      Right lower leg: No edema.      Left lower leg: No edema.   Skin:     General: Skin is warm and dry.      Capillary Refill: Capillary refill takes less than 2 seconds.   Neurological:      Mental Status: back to baseline, alert and oriented X 3     Significant Labs: All pertinent labs within the past 24 hours have been reviewed.  CBC:   Recent Labs   Lab 09/26/24  0345 09/27/24  0329   WBC 15.56* 14.70*   HGB 11.9* 11.8*   HCT 35.6* 34.7*   * 357       CMP:   Recent Labs   Lab 09/26/24  0345 09/27/24  0324   * 129*   K 4.1 3.8   CL 93* 94*   CO2 26 26   * 99   BUN 22 26*   CREATININE 0.7 0.8   CALCIUM 9.0 9.4   PROT 6.6 6.8   ALBUMIN 2.5* 2.6*   BILITOT 0.3 0.3   ALKPHOS 58 59   AST 47* 85*   ALT 57* 118*   ANIONGAP 10 9       Significant Imaging: I have reviewed all pertinent imaging results/findings within the past 24 hours.  Physical Exam  Constitutional:       Appearance: Normal appearance.

## 2024-09-27 NOTE — PROGRESS NOTES
.[facility]   Department of Infectious Disease  Progress Note        PATIENT NAME: Masha Hobbs  MRN: [id number]  TODAY'S DATE: 2024  ADMIT DATE: 2024  LOS: 10 days    CHIEF COMPLAINT: Altered Mental Status (Confusion that began yesterday at 4pm per pt's  along with weakness - pt presents to ER with fever and can state name and  and situation but not president.  Patient have been vomiting since 1pm yesterday), Fever, Headache, and Emesis    PRINCIPLE PROBLEM: Acute encephalopathy    REASON FOR CONSULT:    meningoencephalitis    INTERVAL HISTORY   2024:  No acute issues overnight.  Continues to improve.  Appetite improving.  Afebrile the last 24 hours. Participating in PT/OT.  Lyme serology negative.  RMSF serology pending.    2024:  Seen and evaluated at bedside.  No acute issues overnight.  Continues to improve clinically.  T-max overnight was 101.3.  She participated in PT/OT yesterday.  Appetite still poor.    24@0735 (Denette): Patient is lying in bed with her eyes closed and awakens easily to voice.   She states she was feeling okay.  T-max in the last 24 hours 100 at 0700 hours yesterday.  Heart rate  and respiratory rate have normalized.  She was given a dose of Lasix yesterday with good urine output.   She denies fevers or chills, body aches or joint pain, shortness of breath or cough.  She had a very small bowel movement yesterday and still has mild tenderness to palpation in her abdomen.  She denies dysuria.  Appetite is still very poor and she had a smoothie yesterday.  Mouth is still dry though looks much improved today.  WBC 17.23 trending down, platelets 290, H&H 12.5/37.3, left shift 75%, BUN/CR 21/0.7 and ALT/AST 23/26. JACINDA titer 1:80,  RPR nonreactive, HIV negative, blood cultures and CFS cultures all negative.  CTA chest from yesterday negative for PE with small pericardial effusion and moderate bilateral pleural effusions, small  gallstones and multiple thoracolumbar compression fractures.  Lab still pending includes VDRL CSF,  encephalopathy autoimmune evaluation, CSF, Lyme disease Western blot, spotted fever group antibodies.  She is on day 5 of doxycycline and day 10 of ceftriaxone.    09/24/24@8109 (Edwin): Patient is seen alone in her room.  She opens her eyes to voice and complains of being tired.   She is not as alert and keenly interactive as she was yesterday.   She still has not had a bowel movement.  She complains of mild abdominal tenderness with palpation but there is no guarding in abdomen is soft. She denies shortness or breath, cough, chest pain or palpitations, headaches or dizziness.   She does complain of a dry mouth and I helped her drink some water.  She had a CTA chest PE study this morning because of tachycardia that started around 2000 hours last night.   T-max 100.4° last night around 11, this morning at 0700 hours temperature was a 100°.  WBC elevated at 20.47, platelets 426, left shift 82%, no bands, H&H 12.5/36.3, sodium decreased at 129,  creatinine 0.8, CRP 58.7, procalcitonin negative at 0.06.  ALT/AST 19/22.  ESR 66, D-dimer 3.10.  Ferritin 227, Treponemal serology negative, cryptococcus antigen negative, HSV 1 and 2 negative,  West Nile virus from CSF serology negative. Still pending from CSF VDRL, encephalopathy autoimmune evaluation and serum tests pending including VDRL CSF, JACINDA titer, RPR titer, Lyme disease Western blot, RMSF antibodies and HIV.  She continues on ceftriaxone Day 9 and doxycycline Day 4.    09/23/24@0822 (Edwin):  patient seen and examined in her room with her daughter present.  She is eating breakfast but has not been very hungry at all.   She had a fever yesterday of 102.2  around 1926 hours.  She does not really remember having chills, headaches or body ache but was very tired and slept most of the day.  Today she was awake and alert and feeling much better.  Headache improved, no  nausea vomiting, she was had no bowel movement since the 14th.   She denies dysuria and states that she was voiding well. WBC 15.35, platelets 383, creatinine 0.8 and creatinine clearance 50 6.4.  CRP 62.1, procalcitonin 0.06.  Respiratory virus panel negative.  Blood and CSF cultures negative so far.    9/22/24  Dr. Torres covering for the weekend   Chart reviewed  She is still febrile  Nothing new in cultures  Multiple tests are pending: HIV, RPR, Lyme WB, Spotted fever group abs, crp,   Add WNV abs to csf     09/21/2024.  Dr. Torres covering for the weekend  Patient had another spike of fever 102.2 yesterday, better than 102.9 the day before, associated by sweating, lethargy; she does not remember exactly, but daughter states that she was out of it at the time of fever.  No localizing signs and symptoms of infection.  WBC 13--15.6 but she received steroids on admission.  Anemia noted , hemoglobin 14--11.6.  CSF results as below  She walked with therapy around the hallway.  She is sitting up in a chair right now.  The rash on anterior chest and upper back is improved, as per .    09/20/2024:  Had fever yesterday with T-max 102.9.  Repeat MRI brain without contrast 09/19/2024 same as 09/17/2024.  Had repeat LP with improved CSF parameters.  Repeat CSF encephalitis panel in progress.  She is more awake and alert today.  Also interactive.    09/19/2024:  Had fever last night to 101.  Still lethargic and in bed.  EEG with with diffuse slowing consistent with metabolic encephalopathy.  CSF culture remain negative.    09/18/2024:  She is more somnolent today.  Not eating very well.  Afebrile.  She answers questions appropriately.        Antibiotics (From admission, onward)      Start     Stop Route Frequency Ordered    09/23/24 1230  doxycycline tablet 100 mg         -- Oral Every 12 hours 09/23/24 1126    09/17/24 1645  cefTRIAXone (ROCEPHIN) 2 g in D5W 100 mL IVPB (MB+)         -- IV Every 12 hours (non-standard  times) 09/17/24 1540          Antifungals (From admission, onward)      Start     Stop Route Frequency Ordered    09/24/24 1030  clotrimazole isael 10 mg         10/04/24 0959 Oral 5 times daily 09/24/24 0924           Antivirals (From admission, onward)      None            ASSESSMENT and PLAN     1. Meningeal encephalitis.  Etiology unclear.  Improving though remains febrile.  Persistent intermittent fever may be drug induced versus secondary to underlying etiology of the meningeal encephalitis.  Clinically resolving.  Continue ceftriaxone to complete 14 day course through 09/30/2024.  Continue doxycycline 100 mg b.i.d. p.o. to complete 10 day course through 10/2/24     2. Debility.  Continue PT/OT.      3. Constipation.  Better.  Management as per hospitalist.    RECOMMENDATIONS:    Continue ceftriaxone 2 g q.12 hours through 09/30/24  Doxycycline 100 mg b.i.d. p.o. through 10/02/2024  Check CBC and BNP on Monday 09/30/2024  Can place midline to complete IV antibiotics if discharged home  Remove midline after completing IV antibiotics  Okay from ID standpoint to discharge home with home health if cleared by neurologist  Follow up with ID service in 2 weeks post discharge     Thank you for involving ID in the care of this patient.  From ID standpoint to discharge home with home health and complete antimicrobials.  Please send Epic secure chat with any questions.        HPI   Masha Hobbs is a 68 y.o. female with history of osteoporosis and chronic low back pain.  He presents to the ER 09/16/2024 with 1 day history of headache associated with nausea and vomiting and generalized body aches.  In the ER /67, pulse 110, respiratory rate 18, temperature 100.4°, oxygen 95%.  WBC 14 K, hematocrit 42, CMP unremarkable.  X-ray with no acute infiltrate.  CT head unremarkable.  LP was done.  CSF was abnormal.  It was slightly hazy, WBC 2038, RBC 14, neutrophils 89%, glucose 50, protein 173.  Accompanying serum  glucose was 130.       She was admitted and commenced on antibiotics and dexamethasone for meningitis.  MRI brain without contrast today 97/24 read as showing ill-defined T2/FLAIR hyperintense signal within the right medial temporal lobe with differentials including infectious encephalitis vascularly HSV, autoimmune encephalitis.  Repeat MRI brain with contrast with no abnormal enhancement.  Id asked to assist with her care.     She recently returned from [place] on 09/13/2024 after a 1 week trip.  They had gone to several cota and and did a lot of sightseeing .  States she was washing her hands frequently.  They were around a lot of people.  No other sick contacts.  No pets at home.  Denies eating anything unusual.  Has not received age appropriate pneumococcal vaccine.     Antibiotic history:    Valtrex: 09/16/2024 x1 dose   Vancomycin: 09/16-20  IV Acyclovir: 09/17-23  Ceftriaxone: 09/16-  Doxycycline: 09/21-    Microbiology and Serology:    9/16/24 blood cultures x2 sets negative  9/16/24 CSFculture negative; G stain with few WBC's, no organisms  9/16/24 meningitis panel negative  9/16 CSF cell count  slightly hazy, 14 RBCs, 2000 38 WBCs, 89% neutrophils, 7% lymphocytes, 4% monocytes,  glucose 50, protein 170  9/17/24 HSV 1-2 PCR negative  9/19/24 CSFculture negative; G stain with few WBC's, no organisms  9/19/24 meningitis panel negative  9/19/2024 CSF cell count  clear with 3 RBCs, 148 WBCs, 63% neutrophils and 15% lymphocytes,  glucose 51, protein 126  9/20/24 cryptococcal antigen negative  9/23/24 RVP negative  9/17/24 herpes simplex virus PCR 1 in 2 negative  09/22/2024 treponemal antibodies negative  09/19/2024 West Nile virus IgG and IgM from CSF negative  09/23/2024 ferritin 227   09/18/2024 strep pneumo urine antigen negative  09/22/2024 HIV 1 2 antibody antigen 4th negative  09/22/2024 RPR titer negative  09/23/2024 JACINDA screen 1:80      09/19/2024 VDRL CSF: Negative   9/19/2024 encephalopathy  autoimmune evaluation, CSF pending  09/22/2024 Dane mountain spotted fever antibodies pending   09/22/2024 Lyme disease Western blot: Negative       SUBJECTIVE      Review of Systems  Negative except as stated above in Interval History    Review of patient's allergies indicates:  No Known Allergies      OBJECTIVE   Temp:  [97.6 °F (36.4 °C)-98.8 °F (37.1 °C)] 97.6 °F (36.4 °C)  Pulse:  [] 82  Resp:  [16-29] 29  SpO2:  [92 %-98 %] 97 %  BP: ()/(46-77) 107/53  Temp:  [97.6 °F (36.4 °C)-98.8 °F (37.1 °C)]   Temp: 97.6 °F (36.4 °C) (09/27/24 0423)  Pulse: 82 (09/27/24 0810)  Resp: (!) 29 (09/27/24 0810)  BP: (!) 107/53 (09/27/24 0600)  SpO2: 97 % (09/27/24 0810)    Intake/Output Summary (Last 24 hours) at 9/27/2024 0813  Last data filed at 9/26/2024 1717  Gross per 24 hour   Intake 480 ml   Output --   Net 480 ml       Physical Exam  General:  opens eyes to voice, appears tired but  in no acute distress.  Eyes: Eyes with no icterus or injection. Vision grossly normal, PERRL  Mouth: Dry mucous membranes,   Improved white exudates.  Neck: Supple, no tenderness to palpation.  Cardiovascular:   Regular rate and rhythm, + murmur, no peripheral edema.  Respiratory:   Bilateral breath sounds  clear to auscultation anteriorly, on nasal cannula O2, no tachypnea or increased work of breathing.  Gastrointestinal: Obese and soft, mild tenderness to palpation, slightly distended. + flatus  Genitourinary:    Pure wick catheter in place with clear yellow urine.  Musculoskeletal: Generally weak but moves all extremities.  Skin:  Warm and dry, very faint pink  widely scattered macular rash on chest and upper arms.  Neuro:   Weak but moves all extremities, oriented and answers questions appropriately, speech is clear and fluent.  Psych: Drowsy, flat affect.  VAD:  PIV  ISOLATION: None     Wounds:  None        Significant Labs: All pertinent labs within the past 24 hours have been reviewed.    CBC LAST 7 DAYS  Recent Labs  "  Lab 09/22/24  0546 09/23/24  0439 09/24/24 0415 09/25/24 0428 09/26/24 0345 09/27/24  0329   WBC 15.52* 15.35* 20.47* 17.23* 15.56* 14.70*   RBC 4.05 3.55* 3.91* 3.88* 3.77* 3.71*   HGB 12.9 11.5* 12.5 12.5 11.9* 11.8*   HCT 38.3 33.4* 36.3* 37.3 35.6* 34.7*   MCV 95 94 93 96 94 94   MCH 31.9* 32.4* 32.0* 32.2* 31.6* 31.8*   MCHC 33.7 34.4 34.4 33.5 33.4 34.0   RDW 12.2 12.1 12.2 12.5 12.3 12.3    383 426 290 473* 357   MPV 9.9 9.0* 8.9* 10.5 9.1* 9.9   GRAN 74.3*  11.5* 74.8*  11.5* 82.4*  16.9* 75.5*  13.0* 74.5*  11.6* 72.8  10.7*   LYMPH 14.5*  2.3 14.8*  2.3 10.0*  2.0 14.2*  2.5 16.3*  2.5 17.5*  2.6   MONO 8.4  1.3* 7.2  1.1* 6.1  1.2* 8.0  1.4* 6.9  1.1* 6.8  1.0   BASO 0.10 0.09 0.07 0.11 0.07 0.10   NRBC 0 0 0 0 0 0       CHEMISTRY LAST 7 DAYS  Recent Labs   Lab 09/21/24  0645 09/22/24  0546 09/23/24 0439 09/24/24 0415 09/25/24 0428 09/26/24 0345 09/27/24  0324   * 131* 131* 129* 130* 129* 129*   K 3.8 3.7 3.6 3.7 3.9 4.1 3.8   CL 97 98 96 92* 95 93* 94*   CO2 27 21* 27 26 26 26 26   ANIONGAP 9 12 8 11 9 10 9   BUN 15 14 15 17 21 22 26*   CREATININE 0.8 0.8 0.8 0.8 0.7 0.7 0.8    100 136* 124* 119* 114* 99   CALCIUM 8.4* 8.9 8.6* 8.8 8.6* 9.0 9.4   ALBUMIN 2.5* 2.7* 2.3* 2.6* 2.3* 2.5* 2.6*   PROT 6.1 6.9 6.0 6.9 6.2 6.6 6.8   ALKPHOS 51* 57 53* 61 51* 58 59   ALT 16 20 17 19 23 57* 118*   AST 19 20 22 22 26 47* 85*   BILITOT 0.3 0.5 0.4 0.3 0.3 0.3 0.3           Volume, Cell Count CSF 0.5  4.0   Appearance, CSF Sligh...  Clear   RBC, Cell Count CSF 14  3   Wbc, Cell Count CSF 2038  148   Diff Segs % CSF 89  63   Lymphs, CSF 7  15   Monocytes/Mononuclear,CSF % 4  22   Eosinophils, CSF %   0   Basophils, CSF %   0       Estimated Creatinine Clearance: 56.4 mL/min (based on SCr of 0.8 mg/dL).    INFLAMMATORY/PROCAL  LAST 7 DAYS  Recent Labs   Lab 09/23/24  0439 09/24/24  0415   PROCAL 0.06 0.06   CRP 62.1* 58.7*     No results found for: "ESR"  CRP   Date Value " Ref Range Status   09/24/2024 58.7 (H) 0.0 - 8.2 mg/L Final   09/23/2024 62.1 (H) 0.0 - 8.2 mg/L Final       PRIOR  MICROBIOLOGY:    Susceptibility data from last 90 days.  Collected Specimen Info Organism   09/16/24 Blood from Peripheral, Antecubital, Left No growth after 5 days.   09/16/24 Blood from Peripheral, Antecubital, Right No growth after 5 days.       LAST 7 DAYS MICROBIOLOGY   Microbiology Results (last 7 days)       Procedure Component Value Units Date/Time    Respiratory Infection Panel (PCR), Nasopharyngeal [[id number]] Collected: 09/23/24 0018    Order Status: Completed Specimen: Nasopharyngeal Swab Updated: 09/23/24 1024     Respiratory Infection Panel Source NP Swab     Adenovirus Not Detected     Coronavirus 229E, Common Cold Virus Not Detected     Coronavirus HKU1, Common Cold Virus Not Detected     Coronavirus NL63, Common Cold Virus Not Detected     Coronavirus OC43, Common Cold Virus Not Detected     Comment: Coronavirus strains 229E, HKU1, NL63, and OC43 can cause the common   cold   and are not associated with the respiratory disease outbreak caused   by  the COVID-19 (SARS-CoV-2 novel Coronavirus) strain.           SARS-CoV2 (COVID-19) Qualitative PCR Not Detected     Human Metapneumovirus Not Detected     Human Rhinovirus/Enterovirus Not Detected     Influenza A (subtypes H1, H1-2009,H3) Not Detected     Influenza B Not Detected     Parainfluenza Virus 1 Not Detected     Parainfluenza Virus 2 Not Detected     Parainfluenza Virus 3 Not Detected     Parainfluenza Virus 4 Not Detected     Respiratory Syncytial Virus Not Detected     Bordetella Parapertussis (IQ3579) Not Detected     Bordetella pertussis (ptxP) Not Detected     Chlamydia pneumoniae Not Detected     Mycoplasma pneumoniae Not Detected     Comment: Respiratory Infection Panel testing performed by Multiplex PCR.       CSF culture [[id number]] Collected: 09/19/24 1527    Order Status: Completed Specimen: CSF (Spinal Fluid) from CSF  Tap, Tube 2 Updated: 09/23/24 1006     CSF CULTURE No Growth     Gram Stain Result No epithelial cells      No organisms seen      Many WBC's      09/19/2024  17:32 tn3    Cryptococcal antigen, blood [[id number]] Collected: 09/20/24 1106    Order Status: Completed Specimen: Blood, Venous Updated: 09/21/24 1449     Cryptococcal Ag, Blood Negative     Comment: A single negative result does not exclude the diagnosis of   cryptococcosis. Repeat testing on a new sample if   clinically indicated.    Test Performed by:  [facility]  [address] [place] [address]  : Kaci Felix Ph.D.; CLIA# [id number]         Blood culture x two cultures. Draw prior to antibiotics [[id number]] Collected: 09/16/24 1358    Order Status: Completed Specimen: Blood from Peripheral, Antecubital, Left Updated: 09/21/24 1432     Blood Culture, Routine No growth after 5 days.    Narrative:      Aerobic and anaerobic    Blood culture x two cultures. Draw prior to antibiotics [[id number]] Collected: 09/16/24 1347    Order Status: Completed Specimen: Blood from Peripheral, Antecubital, Right Updated: 09/21/24 1432     Blood Culture, Routine No growth after 5 days.    Narrative:      Aerobic and anaerobic    CSF culture and Gram Stain (Tube 2) [[id number]] Collected: 09/16/24 1740    Order Status: Completed Specimen: CSF (Spinal Fluid) from CSF Tap, Tube 2 Updated: 09/20/24 1040     CSF CULTURE No Growth     Gram Stain Result Few WBC's      No organisms seen    Narrative:      On which sequentially labeled tube should this analysis be  performed?->2          CURRENT/PREVIOUS VISIT EKG  Results for orders placed or performed during the hospital encounter of 09/16/24   EKG 12-lead    Collection Time: 09/16/24  2:04 PM   Result Value Ref Range    QRS Duration 102 ms    [facility] QTC Calculation 452 ms    Narrative    Test Reason : R00.0,    Vent. Rate : 108 BPM     Atrial Rate : 108 BPM      P-R Int : 204 ms          QRS Dur : 102 ms      QT Int : 338 ms       P-R-T Axes : 030 065 020 degrees     QTc Int : 452 ms    Sinus tachycardia  Incomplete right bundle branch block  Septal infarct ,age undetermined  Abnormal ECG  When compared with ECG of 06-JUN-2024 16:29,  OR interval has decreased  The axis Shifted right  Non-specific change in ST segment in Anterior leads  Confirmed by Eric Mathew MD ([id number]) on 9/22/2024 3:51:24 PM    Referred By:             Confirmed By:Eric Mathew MD       ECHO 09/16/2024   Left Ventricle: The left ventricle is normal in size. Normal wall thickness. Regional wall motion abnormalities present. There is akinesis of mid to distal segments and the apex. Normal contractility of the basal segments. Pattern suggestive of takotsubo cardiomyopathy. There is moderately reduced systolic function with a visually estimated ejection fraction of 30 - 35%. Grade II diastolic dysfunction.   Right Ventricle: Normal right ventricular cavity size. Systolic function is normal.   Mitral Valve: There is mild regurgitation.   Tricuspid Valve: There is mild to moderate regurgitation.   Pulmonary Artery: There is pulmonary hypertension. The estimated pulmonary artery systolic pressure is 46 mmHg.   IVC/SVC: Intermediate venous pressure at 8 mmHg.     Significant Imaging: I have reviewed all relevant and available imaging results/findings within the past 24 hours.    MRI brain 09/19/2024   1. No significant interval detrimental change in the appearance of the brain as compared to the prior exam dated 09/17/2024.  Persistent scattered abnormal T2/FLAIR hyperintense signal within the supratentorial brain, particularly involving the right mesial temporal lobe, similar to prior exam.  No definite new edema or mass effect.  Findings again are nonspecific with similar differential considerations including inflammatory/infectious, or autoimmune/paraneoplastic encephalitis, status epilepticus, or  potential neoplastic process.  Recommend continued close clinical surveillance and consider short-term follow-up imaging with contrast enhanced MRI of the brain, as clinically warranted.     CTA Chest Non-Coronary (PE Studies) [[id number]]Resulted: 09/24/24 1028   No evidence for pulmonary embolus.   Moderate bilateral pleural effusions and features suggesting mild congestive failure.   Small pericardial effusion.   Small gallstones.   Osseous demineralization and multiple age-indeterminate thoracolumbar compression fractures.      I spent a total of 45 minutes on the day of the visit.This includes face to face time and non-face to face time preparing to see the patient (eg, review of tests), obtaining and/or reviewing separately obtained history, documenting clinical information in the electronic or other health record, independently interpreting results and communicating results to the patient/family/caregiver, or care coordinator.    Danis Sepulveda MD  Date of Service: 09/27/2024      This note was created using [employer] voice recognition software that occasionally misinterpreted phrases or words.

## 2024-09-27 NOTE — PT/OT/SLP PROGRESS
Physical Therapy Treatment    Patient Name:  Masha Hobbs   MRN:  [id number]    Recommendations:     Discharge Recommendations: High Intensity Therapy (vs LIT)  Discharge Equipment Recommendations: none  Barriers to discharge: None    Assessment:     Masha Hobbs is a 68 y.o. female admitted with a medical diagnosis of Acute encephalopathy.  She presents with the following impairments/functional limitations: weakness, impaired endurance, impaired functional mobility, gait instability, decreased lower extremity function . Seated in chair at bedside, finished breakfast. Spouse and daughter present. Reports feeling better.   Agreed to  participate in therapy, requested to use restroom. Sit > stand with rw and CGA.  Ambulated to restroom with rw and CGA.  Assistance required for pull down diaper. Performed hygiene task post BM with S.  Stood with rw and Min A, assistance required for diaper.  Ambulated 250' with rw and CGA for safety, no LOB noted or rest stops required.  Returned to room to chair, lines attached, performed LE exercises.     Rehab Prognosis: Fair; patient would benefit from acute skilled PT services to address these deficits and reach maximum level of function.    Recent Surgery: * No surgery found *      Plan:     During this hospitalization, patient to be seen 6 x/week to address the identified rehab impairments via gait training, therapeutic activities, therapeutic exercises, neuromuscular re-education and progress toward the following goals:    Plan of Care Expires:  09/30/24    Subjective     Chief Complaint: none stated  Patient/Family Comments/goals: none stated  Pain/Comfort:  Pain Rating 1: 0/10      Objective:     Communicated with nurse Corey prior to session.  Patient found up in chair with chair check, pulse ox (continuous), telemetry, blood pressure cuff upon PT entry to room.     General Precautions: Standard, fall  Orthopedic Precautions: N/A  Braces: N/A  Respiratory Status: Room  air     Functional Mobility:  Transfers:     Sit to Stand:  contact guard assistance with rolling walker  Gait: 250' with rw and CGA      AM-PAC 6 CLICK MOBILITY          Treatment & Education:  Sit > stand with rw and CGA.  Ambulated to restroom  then 250' in hallway with rw and CGA.  Performed LE exercises : TKE's, Hip abd/add/ flexion, AP's, SLR's, HS, QS.     Patient left up in chair with all lines intact, call button in reach, chair alarm on, nurse Leoar notified, and spouse and daughter present..    GOALS:   Multidisciplinary Problems       Physical Therapy Goals          Problem: Physical Therapy    Goal Priority Disciplines Outcome Goal Variances Interventions   Physical Therapy Goal     PT, PT/OT Progressing     Description: Goals to be met by: 24     Patient will increase functional independence with mobility by performin. Supine to sit with [place]  2. Sit to supine with [place]  3. Sit to stand transfer with Stand-by Assistance  4. Bed to chair transfer with Stand-by Assistance using Rolling Walker  5. Gait  x 250 feet with Stand-by Assistance using Rolling Walker.                          Time Tracking:     PT Received On: 24  PT Start Time: 845     PT Stop Time: 908  PT Total Time (min): 23 min     Billable Minutes: Gait Training 10min and Therapeutic Exercise 13min    Treatment Type: Treatment  PT/PTA: PTA     Number of PTA visits since last PT visit: 2024

## 2024-09-27 NOTE — PLAN OF CARE
Patient alert and oriented. Plan of care reviewed with patient. Safety maintained with bed in lowest position, wheels locked, side rails up x2 and call button in reach. Patient instructed to call for any assistance.     Problem: Adult Inpatient Plan of Care  Goal: Plan of Care Review  Outcome: Progressing  Goal: Patient-Specific Goal (Individualized)  Outcome: Progressing  Goal: Absence of Hospital-Acquired Illness or Injury  Outcome: Progressing  Goal: Optimal Comfort and Wellbeing  Outcome: Progressing  Goal: Readiness for Transition of Care  Outcome: Progressing     Problem: Infection  Goal: Absence of Infection Signs and Symptoms  Outcome: Progressing     Problem: Pain Acute  Goal: Optimal Pain Control and Function  Outcome: Progressing     Problem: Fall Injury Risk  Goal: Absence of Fall and Fall-Related Injury  Outcome: Progressing     Problem: Meningitis/Encephalitis  Goal: Optimal Coping  Outcome: Progressing     Problem: Nausea and Vomiting  Goal: Nausea and Vomiting Relief  Outcome: Progressing

## 2024-09-28 LAB
ALBUMIN SERPL BCP-MCNC: 2.4 G/DL (ref 3.5–5.2)
ALP SERPL-CCNC: 54 U/L (ref 55–135)
ALT SERPL W/O P-5'-P-CCNC: 82 U/L (ref 10–44)
ANION GAP SERPL CALC-SCNC: 10 MMOL/L (ref 8–16)
AST SERPL-CCNC: 39 U/L (ref 10–40)
BASOPHILS # BLD AUTO: 0.08 K/UL (ref 0–0.2)
BASOPHILS # BLD AUTO: 0.1 K/UL (ref 0–0.2)
BASOPHILS NFR BLD: 0.7 % (ref 0–1.9)
BASOPHILS NFR BLD: 0.9 % (ref 0–1.9)
BILIRUB SERPL-MCNC: 0.3 MG/DL (ref 0.1–1)
BUN SERPL-MCNC: 28 MG/DL (ref 8–23)
CALCIUM SERPL-MCNC: 9.1 MG/DL (ref 8.7–10.5)
CHLORIDE SERPL-SCNC: 97 MMOL/L (ref 95–110)
CO2 SERPL-SCNC: 25 MMOL/L (ref 23–29)
CREAT SERPL-MCNC: 0.8 MG/DL (ref 0.5–1.4)
DIFFERENTIAL METHOD BLD: ABNORMAL
DIFFERENTIAL METHOD BLD: ABNORMAL
EOSINOPHIL # BLD AUTO: 0.2 K/UL (ref 0–0.5)
EOSINOPHIL # BLD AUTO: 0.3 K/UL (ref 0–0.5)
EOSINOPHIL NFR BLD: 1.7 % (ref 0–8)
EOSINOPHIL NFR BLD: 2.6 % (ref 0–8)
ERYTHROCYTE [DISTWIDTH] IN BLOOD BY AUTOMATED COUNT: 12.4 % (ref 11.5–14.5)
ERYTHROCYTE [DISTWIDTH] IN BLOOD BY AUTOMATED COUNT: 12.6 % (ref 11.5–14.5)
EST. GFR  (NO RACE VARIABLE): >60 ML/MIN/1.73 M^2
GLUCOSE SERPL-MCNC: 89 MG/DL (ref 70–110)
HCT VFR BLD AUTO: 30.6 % (ref 37–48.5)
HCT VFR BLD AUTO: 31.2 % (ref 37–48.5)
HGB BLD-MCNC: 10.6 G/DL (ref 12–16)
HGB BLD-MCNC: 10.7 G/DL (ref 12–16)
IMM GRANULOCYTES # BLD AUTO: 0.05 K/UL (ref 0–0.04)
IMM GRANULOCYTES # BLD AUTO: 0.06 K/UL (ref 0–0.04)
IMM GRANULOCYTES NFR BLD AUTO: 0.4 % (ref 0–0.5)
IMM GRANULOCYTES NFR BLD AUTO: 0.5 % (ref 0–0.5)
LYMPHOCYTES # BLD AUTO: 2 K/UL (ref 1–4.8)
LYMPHOCYTES # BLD AUTO: 2.9 K/UL (ref 1–4.8)
LYMPHOCYTES NFR BLD: 17.9 % (ref 18–48)
LYMPHOCYTES NFR BLD: 25.6 % (ref 18–48)
MAGNESIUM SERPL-MCNC: 2 MG/DL (ref 1.6–2.6)
MCH RBC QN AUTO: 32.8 PG (ref 27–31)
MCH RBC QN AUTO: 32.8 PG (ref 27–31)
MCHC RBC AUTO-ENTMCNC: 34.3 G/DL (ref 32–36)
MCHC RBC AUTO-ENTMCNC: 34.6 G/DL (ref 32–36)
MCV RBC AUTO: 95 FL (ref 82–98)
MCV RBC AUTO: 96 FL (ref 82–98)
MONOCYTES # BLD AUTO: 0.7 K/UL (ref 0.3–1)
MONOCYTES # BLD AUTO: 0.8 K/UL (ref 0.3–1)
MONOCYTES NFR BLD: 6.5 % (ref 4–15)
MONOCYTES NFR BLD: 6.8 % (ref 4–15)
NEUTROPHILS # BLD AUTO: 7.1 K/UL (ref 1.8–7.7)
NEUTROPHILS # BLD AUTO: 8.2 K/UL (ref 1.8–7.7)
NEUTROPHILS NFR BLD: 63.6 % (ref 38–73)
NEUTROPHILS NFR BLD: 72.8 % (ref 38–73)
NRBC BLD-RTO: 0 /100 WBC
NRBC BLD-RTO: 0 /100 WBC
PHOSPHATE SERPL-MCNC: 3.4 MG/DL (ref 2.7–4.5)
PLATELET # BLD AUTO: 428 K/UL (ref 150–450)
PLATELET # BLD AUTO: 447 K/UL (ref 150–450)
PMV BLD AUTO: 8.5 FL (ref 9.2–12.9)
PMV BLD AUTO: 9.1 FL (ref 9.2–12.9)
POTASSIUM SERPL-SCNC: 4.2 MMOL/L (ref 3.5–5.1)
PROT SERPL-MCNC: 6 G/DL (ref 6–8.4)
RBC # BLD AUTO: 3.23 M/UL (ref 4–5.4)
RBC # BLD AUTO: 3.26 M/UL (ref 4–5.4)
SODIUM SERPL-SCNC: 132 MMOL/L (ref 136–145)
WBC # BLD AUTO: 11.18 K/UL (ref 3.9–12.7)
WBC # BLD AUTO: 11.31 K/UL (ref 3.9–12.7)

## 2024-09-28 PROCEDURE — 85025 COMPLETE CBC W/AUTO DIFF WBC: CPT | Mod: 91 | Performed by: HOSPITALIST

## 2024-09-28 PROCEDURE — 80053 COMPREHEN METABOLIC PANEL: CPT

## 2024-09-28 PROCEDURE — 86666 EHRLICHIA ANTIBODY: CPT | Mod: 91 | Performed by: HOSPITALIST

## 2024-09-28 PROCEDURE — 84100 ASSAY OF PHOSPHORUS: CPT | Performed by: HOSPITALIST

## 2024-09-28 PROCEDURE — 25000003 PHARM REV CODE 250: Performed by: INTERNAL MEDICINE

## 2024-09-28 PROCEDURE — 94761 N-INVAS EAR/PLS OXIMETRY MLT: CPT

## 2024-09-28 PROCEDURE — 85025 COMPLETE CBC W/AUTO DIFF WBC: CPT | Performed by: INTERNAL MEDICINE

## 2024-09-28 PROCEDURE — 97116 GAIT TRAINING THERAPY: CPT | Mod: CQ

## 2024-09-28 PROCEDURE — 99232 SBSQ HOSP IP/OBS MODERATE 35: CPT | Mod: ,,, | Performed by: INTERNAL MEDICINE

## 2024-09-28 PROCEDURE — 11000001 HC ACUTE MED/SURG PRIVATE ROOM

## 2024-09-28 PROCEDURE — 36415 COLL VENOUS BLD VENIPUNCTURE: CPT | Performed by: HOSPITALIST

## 2024-09-28 PROCEDURE — 36415 COLL VENOUS BLD VENIPUNCTURE: CPT

## 2024-09-28 PROCEDURE — 25000003 PHARM REV CODE 250: Performed by: NURSE PRACTITIONER

## 2024-09-28 PROCEDURE — 83735 ASSAY OF MAGNESIUM: CPT | Performed by: HOSPITALIST

## 2024-09-28 PROCEDURE — 63600175 PHARM REV CODE 636 W HCPCS: Performed by: HOSPITALIST

## 2024-09-28 PROCEDURE — S0179 MEGESTROL 20 MG: HCPCS | Performed by: INTERNAL MEDICINE

## 2024-09-28 PROCEDURE — 63600175 PHARM REV CODE 636 W HCPCS: Performed by: INTERNAL MEDICINE

## 2024-09-28 RX ORDER — ENOXAPARIN SODIUM 100 MG/ML
40 INJECTION SUBCUTANEOUS EVERY 24 HOURS
Status: DISCONTINUED | OUTPATIENT
Start: 2024-09-28 | End: 2024-09-30 | Stop reason: HOSPADM

## 2024-09-28 RX ADMIN — CEFTRIAXONE 2 G: 2 INJECTION, POWDER, FOR SOLUTION INTRAMUSCULAR; INTRAVENOUS at 03:09

## 2024-09-28 RX ADMIN — FAMOTIDINE 40 MG: 20 TABLET, FILM COATED ORAL at 08:09

## 2024-09-28 RX ADMIN — CLOTRIMAZOLE 10 MG: 10 LOZENGE ORAL at 04:09

## 2024-09-28 RX ADMIN — CEFTRIAXONE 2 G: 2 INJECTION, POWDER, FOR SOLUTION INTRAMUSCULAR; INTRAVENOUS at 05:09

## 2024-09-28 RX ADMIN — DOXYCYCLINE HYCLATE 100 MG: 100 TABLET, COATED ORAL at 08:09

## 2024-09-28 RX ADMIN — CLOTRIMAZOLE 10 MG: 10 LOZENGE ORAL at 11:09

## 2024-09-28 RX ADMIN — MEGESTROL ACETATE 200 MG: 40 SUSPENSION ORAL at 08:09

## 2024-09-28 RX ADMIN — ENOXAPARIN SODIUM 40 MG: 40 INJECTION SUBCUTANEOUS at 05:09

## 2024-09-28 NOTE — PROGRESS NOTES
[facility] Medicine  Progress Note    Patient Name: Masha Hobbs  MRN: [id number]  Patient Class: IP- Inpatient   Admission Date: 9/16/2024  Length of Stay: 11 days  Attending Physician: Dallas Mora MD  Primary Care Provider: Roney Bang MD        Subjective:     Principal Problem:Meningitis        HPI:  Masha Hobbs is a 68 year old female with a past medical history of osteoporosis and chronic lower back pain with radiculopathy who was transferred from [facility] due to confusion associated with headache, fever, and generalized weakness/body aches for several hours prior to arrival.  Per  patient was confused since 4:00 p.m. Upon assessment confusion has resolved.  She denies any complaints other than a intermittent frontal lobe headache that is improving after Toradol administration.  There are no acute neurological focal deficits.  She denies chest pain, shortness of breath, dizziness, lightheadedness, vision changes, speech changes, numbness/tingling, neck pain, abdominal pain, nausea, or vomiting.  ED workup reveals:  CT head negative for acute intracranial hemorrhage. Chest x-ray no evidence of acute cardiopulmonary findings.  Lumbar puncture performed in ED, pending results.  She was treated with vancomycin, Rocephin, valacyclovir p.o., and dexamethasone.  CBC with elevated white count 13.8 and stable H&H.  BMP with phosphorus 2.0 otherwise unremarkable.  Negative flu/COVID.  Urinalysis negative.  Lactic acid 1.1.  ECG negative for ischemia or infarct.  MRI brain pending.  Neurology consult placed.  Admitted to hospital medicine for further management and treatment.            Overview/Hospital Course:  Patient is a 68 year old female admitted with AMS, headache and fever. LP was suggestive of meningitis with 2K WBC, mainly neutrophils. HSV panel was negative. Patient was started on Vancomycin, Ampicillin and Rocephin. Acyclovir was discontinued after D1. MRI shows  encephalitis findings with ventriculitis. Patient is being followed by neurology and ID. EEG has been ordered which is consistent with encephalopathy but no epileptiform activity. Patient continue to spike fever and is vomiting. Stat MRI was repeated with rpt LP was ordered. Rpt MRI did not show any changes. LP shows reduced WBC compared to the first one. Patient's mental status improved. Patient continued with daily fevers. ID ordered additional testing. Antibiotics and antiviral adjusted. Patient developed tachycardia and tachypnea on 09/24. D Dimer was elevated, and CTA chest was done. This revealed no PE, but some pleural effusions. Lasix was given. Patient's appetite was poor and clinimix was added temporarily. Continued with poor appetite. Appetite stimulant added.     Interval History:   Patient is seen and examined at multidisciplinary rounds, patient is awake alert, AAO x3, afebrile, leukocytosis resolved.  Patient apparently went to [place] for traveling and stated there for a week around 1 week prior to admission.  So far all workup has been negative.  Patient has been on doxycycline day 5 and IV Rocephin day 10.  No rash.     Review of Systems   Constitutional:  Negative for activity change, appetite change, chills and fever.   HENT:  Negative for congestion, ear pain, nosebleeds and sinus pain.    Eyes:  Negative for discharge and itching.   Respiratory:  Negative for apnea, cough, chest tightness and shortness of breath.    Cardiovascular:  Negative for chest pain, palpitations and leg swelling.   Gastrointestinal:  Negative for abdominal distention, abdominal pain and vomiting.   Genitourinary:  Negative for difficulty urinating, dysuria, flank pain and frequency.   Musculoskeletal:  Negative for arthralgias, back pain, joint swelling and myalgias.   Skin:  Negative for color change, pallor and rash.   Neurological:  Negative for dizziness, weakness, light-headedness and headaches.    Psychiatric/Behavioral:  Negative for agitation, behavioral problems, confusion and suicidal ideas.      Objective:     Vital Signs (Most Recent):  Temp: 98.2 °F (36.8 °C) (09/28/24 1123)  Pulse: 84 (09/28/24 1123)  Resp: 18 (09/28/24 1123)  BP: (!) 126/58 (09/28/24 1123)  SpO2: 97 % (09/28/24 1123) Vital Signs (24h Range):  Temp:  [97.9 °F (36.6 °C)-98.4 °F (36.9 °C)] 98.2 °F (36.8 °C)  Pulse:  [74-89] 84  Resp:  [17-20] 18  SpO2:  [95 %-98 %] 97 %  BP: (102-126)/(52-59) 126/58     Weight: 62.9 kg (138 lb 10.7 oz)  Body mass index is 26.2 kg/m².    Intake/Output Summary (Last 24 hours) at 9/28/2024 1346  Last data filed at 9/28/2024 0417  Gross per 24 hour   Intake 375 ml   Output --   Net 375 ml         Physical Exam  Vitals and nursing note reviewed.   Constitutional:     Alert  Eyes:      General: No visual field deficit.     Pupils: Pupils are equal, round, and reactive to light.   Cardiovascular:      Rate and Rhythm: Normal rate and regular rhythm.      Pulses: Normal pulses.      Heart sounds: Normal heart sounds.   Pulmonary:      Effort: Pulmonary effort is normal. No respiratory distress.      Breath sounds: Normal breath sounds. No wheezing.   Abdominal:      General: Bowel sounds are normal. There is no distension.      Palpations: Abdomen is soft.      Tenderness: There is no abdominal tenderness.   Musculoskeletal:      Right lower leg: No edema.      Left lower leg: No edema.   Skin:     General: Skin is warm and dry.      Capillary Refill: Capillary refill takes less than 2 seconds.   Neurological:      Mental Status: back to baseline, alert and oriented X 3     Significant Labs: All pertinent labs within the past 24 hours have been reviewed.  CBC:   Recent Labs   Lab 09/27/24  0329 09/28/24  0354 09/28/24  1241   WBC 14.70* 11.18 11.31   HGB 11.8* 10.6* 10.7*   HCT 34.7* 30.6* 31.2*    447 428       CMP:   Recent Labs   Lab 09/27/24  0324 09/28/24  0354   * 132*   K 3.8 4.2   CL 94*  97   CO2 26 25   GLU 99 89   BUN 26* 28*   CREATININE 0.8 0.8   CALCIUM 9.4 9.1   PROT 6.8 6.0   ALBUMIN 2.6* 2.4*   BILITOT 0.3 0.3   ALKPHOS 59 54*   AST 85* 39   * 82*   ANIONGAP 9 10       Significant Imaging: I have reviewed all pertinent imaging results/findings within the past 24 hours.  Physical Exam  Constitutional:       Appearance: Normal appearance.         Scheduled Meds:   cefTRIAXone (Rocephin) IV (PEDS and ADULTS)  2 g Intravenous Q12H    doxycycline  100 mg Oral Q12H    famotidine  40 mg Oral Daily     Continuous Infusions:  PRN Meds:.  Current Facility-Administered Medications:     acetaminophen, 650 mg, Rectal, Q6H PRN    acetaminophen, 650 mg, Oral, Q6H PRN    aluminum-magnesium hydroxide-simethicone, 30 mL, Oral, QID PRN    bisacodyL, 5 mg, Oral, Daily PRN    dextrose 10%, 12.5 g, Intravenous, PRN    dextrose 10%, 25 g, Intravenous, PRN    glucagon (human recombinant), 1 mg, Intramuscular, PRN    glucose, 16 g, Oral, PRN    glucose, 24 g, Oral, PRN    melatonin, 9 mg, Oral, Nightly PRN    naloxone, 0.02 mg, Intravenous, PRN    ondansetron, 4 mg, Intravenous, Q6H PRN    senna-docusate 8.6-50 mg, 1 tablet, Oral, BID PRN    sodium chloride 0.9%, 10 mL, Intravenous, Q12H PRN      X-Ray Chest 1 View    Result Date: 9/26/2024  EXAMINATION: XR CHEST 1 VIEW CLINICAL HISTORY: shortness of breath; TECHNIQUE: Single frontal view of the chest was performed. COMPARISON: 09/19/2024 FINDINGS: Perihilar interstitial opacities.  Unchanged heart size.  Atherosclerosis.  Indistinct pulmonary vasculature.  No pleural effusion or pneumothorax.     Mild decrease in extent of interstitial disease. Electronically signed by: Silvano Pearson Date:    09/26/2024 Time:    08:52    US Lower Extremity Veins Bilateral    Result Date: 9/26/2024  EXAMINATION: US LOWER EXTREMITY VEINS BILATERAL CLINICAL HISTORY: R/o DVT; TECHNIQUE: Duplex and color flow Doppler and dynamic compression was performed of the bilateral lower  extremity veins was performed. COMPARISON: None FINDINGS: Right thigh veins: The common femoral, femoral, popliteal, upper greater saphenous, and deep femoral veins are patent and free of thrombus. The veins are normally compressible and have normal phasic flow and augmentation response. Right calf veins: The visualized calf veins are patent. Left thigh veins: The common femoral, femoral, popliteal, upper greater saphenous, and deep femoral veins are patent and free of thrombus. The veins are normally compressible and have normal phasic flow and augmentation response. Left calf veins: The visualized calf veins are patent. Miscellaneous: None     No evidence of deep venous thrombosis in either lower extremity. Electronically signed by: Silvano Pearson Date:    09/26/2024 Time:    08:45    CTA Chest Non-Coronary (PE Studies)    Result Date: 9/24/2024  EXAMINATION: CTA CHEST NON CORONARY (PE STUDIES) CLINICAL HISTORY: Pulmonary embolism (PE) suspected, positive D-dimer; TECHNIQUE: Low dose axial images, sagittal and coronal reformations were obtained from the thoracic inlet to the lung bases following the IV administration of 75 mL of Omnipaque 350.  Contrast timing was optimized to evaluate the pulmonary arteries.  MIP images were performed. COMPARISON: None FINDINGS: There are no pulmonary arterial filling defects to indicate the presence of pulmonary thromboemboli. The heart size is normal.  There is a small pericardial effusion. There are moderate bilateral pleural effusions.  Mild compressive bilateral lower lobe atelectasis is present.  While detailed evaluation of lungs is precluded by motion artifact, there is apparent septal thickening suggesting interstitial edema. Small gallstones layer dependently within the gallbladder.  There is a small hiatal hernia. The bones are diffusely demineralized.  There are several thoracolumbar compression fractures, which are technically age indeterminate.     No evidence for  pulmonary embolus. Moderate bilateral pleural effusions and features suggesting mild congestive failure. Small pericardial effusion. Small gallstones. Osseous demineralization and multiple age-indeterminate thoracolumbar compression fractures. Electronically signed by: Anuj Middleton MD Date:    09/24/2024 Time:    10:28    MRI Brain Without Contrast    Result Date: 9/19/2024  EXAMINATION: MRI BRAIN WITHOUT CONTRAST CLINICAL HISTORY: Repeat-for encephalopathy;. TECHNIQUE: Multiplanar multisequence MR imaging of the brain was performed without the administration of intravenous contrast. COMPARISON: MRI brain with contrast 09/17/2024, MRI brain without contrast 09/17/2020 FINDINGS: No significant interval detrimental change in the appearance of the brain as compared to the prior exam dated 09/17/2024.  Stable-appearing ill-defined T2/FLAIR hyperintense signal within the right mesial temporal lobe without associated diffusion restriction or hemorrhage, stable in size and configuration as compared to the prior exam.  Additional stable-appearing patchy T2/FLAIR hyperintense signal throughout the bilateral periventricular, deep, subcortical white matter and stable involvement of the left splenium of the corpus callosum.  No definite new parenchymal edema or mass effect.  No abnormal intracranial enhancement on prior exam. Ventricles appear stable in comparison to the prior exam without evidence of hydrocephalus. Scattered incidental bilateral hippocampal sulcus remnant cysts.  Diffusion-weighted images demonstrate no evidence of an acute infarct.   Susceptibility weighted images demonstrate no evidence of acute or chronic hemorrhage. Normal vascular flow voids are present. Bone marrow signal intensity is normal. The paranasal sinuses are normal.  The visualized portions of the mastoids are unremarkable. Orbits are unremarkable.     1. No significant interval detrimental change in the appearance of the brain as compared  to the prior exam dated 09/17/2024.  Persistent scattered abnormal T2/FLAIR hyperintense signal within the supratentorial brain, particularly involving the right mesial temporal lobe, similar to prior exam.  No definite new edema or mass effect.  Findings again are nonspecific with similar differential considerations including inflammatory/infectious, or autoimmune/paraneoplastic encephalitis, status epilepticus, or potential neoplastic process.  Recommend continued close clinical surveillance and consider short-term follow-up imaging with contrast enhanced MRI of the brain, as clinically warranted. Electronically signed by: Jacob Armenta Date:    09/19/2024 Time:    16:07    FL Lumbar Puncture (xpd)    Result Date: 9/19/2024  EXAMINATION: FL LUMBAR PUNCTURE DIAGNOSTIC WITH IMAGING CLINICAL HISTORY: Encephalitis; TECHNIQUE: Informed written consent was obtained from the patient.  The risks, benefits, and alternatives to the exam were discussed. The patient was placed in the prone position on the fluoroscopy table and was prepped and draped in a sterile fashion.  Local anesthesia was achieved using 1% lidocaine solution. The L4-5 interspace was localized and a 22-gauge spinal needle and stylet were advanced into the thecal sac under fluoroscopic guidance. Fluoroscopy time: 0.7 minutes. COMPARISON: None. FINDINGS: Approximately 16 cc of clear cerebrospinal fluid was obtained and sent to the laboratory with orders per referring physician.  The opening pressure was 21 cm H2O.The stylet was replaced and the spinal needle was removed.  There were no immediate post procedure complications.     Successful lumbar puncture under fluoroscopic guidance as detailed above. Electronically signed by: Silvano Pearson Date:    09/19/2024 Time:    15:45    X-Ray Chest 1 View    Result Date: 9/19/2024  EXAMINATION: XR CHEST 1 VIEW CLINICAL HISTORY: r/o aspiration; TECHNIQUE: Single frontal view of the chest was performed. COMPARISON:  Chest x-ray of September 16, 2024 FINDINGS: There is mild cardiomegaly.  There is prominence of the pulmonary vasculature and pulmonary edema identified bilaterally consistent with congestive heart failure.  A solid mass is not identified.  No pneumothorax is noted.     Mild cardiomegaly with development of bilateral pulmonary edema consistent with congestive heart failure. This report was flagged in Epic as abnormal. Electronically signed by: Easton Hollingsworth MD Date:    09/19/2024 Time:    13:12    Echo    Result Date: 9/19/2024    Left Ventricle: The left ventricle is normal in size. Normal wall thickness. Regional wall motion abnormalities present.  There is akinesis of mid to distal segments and the apex.  Normal contractility of the basal segments.  Pattern suggestive of takotsubo cardiomyopathy. There is moderately reduced systolic function with a visually estimated ejection fraction of 30 - 35%. Grade II diastolic dysfunction.   Right Ventricle: Normal right ventricular cavity size. Systolic function is normal.   Mitral Valve: There is mild regurgitation.   Tricuspid Valve: There is mild to moderate regurgitation.   Pulmonary Artery: There is pulmonary hypertension. The estimated pulmonary artery systolic pressure is 46 mmHg.   IVC/SVC: Intermediate venous pressure at 8 mmHg.     MRI Brain With Contrast    Result Date: 9/17/2024  EXAMINATION: MRI BRAIN WITH CONTRAST CLINICAL HISTORY: follow up on non contrast MRI finding; TECHNIQUE: T1-weighted post-contrast multiplanar sequences were obtained through the brain.  6 mL Gadavist was administered intravenously without reported reaction. COMPARISON: Brain MRI without contrast performed earlier today FINDINGS: The prior diagnostic brain MRI demonstrated multiple regions of abnormal FLAIR and T2 hyperintense signal and a somewhat atypical distribution including the mesial right temporal lobe, periventricular white matter and splenium of the corpus callosum on the  left, sub appended mole region.  There is no abnormal enhancement associated at any of the sites of signal abnormality.  The findings previously described remain nonspecific.  Previous differential considerations still exist.     1. There is no abnormal enhancement.  Signal abnormalities on unenhanced brain MRI remain nonspecific. Electronically signed by: Derian Machado MD Date:    09/17/2024 Time:    16:15    MRI Brain Without Contrast    Result Date: 9/17/2024  EXAMINATION: MRI BRAIN WITHOUT CONTRAST CLINICAL HISTORY: Mental status change, unknown cause;. TECHNIQUE: Multiplanar multisequence MR imaging of the brain was performed without the administration of intravenous contrast. COMPARISON: CT head 09/16/2024 FINDINGS: Ventricles and sulci are normal in size for age without evidence of hydrocephalus. Abnormal, ill-defined T2/FLAIR hyperintense signal within the right mesial temporal lobe with associated hippocampal/gyral enlargement.  No diffusion restriction or susceptibility artifact to suggest hemorrhage. Mild nonspecific asymmetric ependymal/subependymal white matter T2/FLAIR hyperintense signal about the posterior body/atrium of the left lateral ventricle with question asymmetric involvement of the left frontal horn.  Superimposed scattered foci and patchy T2/FLAIR hyperintense signal within the bilateral periventricular, deep and subcortical white matter, nonspecific and may reflect sequelae of chronic small vessel ischemic change.  Diffusion-weighted images demonstrate no evidence of an acute infarct elsewhere.    No evidence of intracranial acute or chronic hemorrhage elsewhere. Normal vascular flow voids are present. Bone marrow signal intensity is normal. The paranasal sinuses are normal.  The visualized portions of the mastoids are unremarkable. Orbits are unremarkable.     1. Abnormal, ill-defined T2/FLAIR hyperintense signal within the right mesial temporal lobe with differential considerations  including infectious encephalitis, particularly herpes simplex virus (HSV) encephalitis, autoimmune encephalitis, status epilepticus, or possible low-grade neoplasm. 2. Mild patchy and scattered foci of T2/FLAIR hyperintense signal within the supratentorial white matter elsewhere, nonspecific and may reflect sequelae of chronic small vessel ischemic change. 3. Mild asymmetric T2/FLAIR hyperintense signal involving the ependymal/subependymal white matter of the posterior body/atrium and left frontal horn of the left lateral ventricle, nonspecific and while it could reflect sequelae of chronic small vessel ischemic change, focal encephalitis and/or ventriculitis cannot be excluded.  No hydrocephalus. This report was flagged in Epic as abnormal.  The significant finding above was relayed by myself  via [facility] secure chat to Beatriz Boogie MD and Joe Galeana MD on 09/17/2024 at 13:40.  Findings were acknowledged and understood. Electronically signed by: Jacob Armenta Date:    09/17/2024 Time:    13:57    X-Ray Chest 1 View    Result Date: 9/16/2024  EXAMINATION: XR CHEST 1 VIEW CLINICAL HISTORY: Sepsis; FINDINGS: Portable chest at 1441 compared with 06/06/2024 shows unchanged cardiomediastinal silhouette. Lungs are clear. Pulmonary vasculature is normal. No acute osseous abnormality.     No acute cardiopulmonary abnormality. Electronically signed by: Farrukh Ramos Date:    09/16/2024 Time:    14:53    CT Head Without Contrast    Result Date: 9/16/2024  EXAMINATION: CT HEAD WITHOUT CONTRAST CLINICAL HISTORY: Mental status change, unknown cause; TECHNIQUE: Axial CT imaging obtained through the brain without IV contrast. CMS Mandated Quality Data-CT Radiation Dose-436 All CT scans at this facility dose modulation, iterative reconstruction, and or weight-based dosing when appropriate to reduce radiation dose to as low as reasonably achievable. COMPARISON: None FINDINGS: Negative for acute intracranial hemorrhage,  midline shift, or mass effect.  Ventricles and sulci are normal in size.  Gray-white differentiation is maintained.  Mild periventricular and deep white matter hypoattenuation, consistent with microangiopathic change.  Cerebellar hemispheres and brainstem are unremarkable.  Atherosclerotic calcification of intracranial carotid arteries. No calvarial lesion or fracture.  Mastoid air cells are clear.  Mucosal thickening right maxillary sinus.  Paranasal sinuses are otherwise clear.     No CT evidence of acute intracranial pathology. Electronically signed by: Wang Acuña Date:    09/16/2024 Time:    14:52  - pulls last radiology orders      Assessment/Plan:      * Meningitis  LP shows > 2000 WBC with neurophil predominance, protein is high normal glucose   MRI shows encephalitis and ventriculitis findings   HSV and meningitis panel was all negative   CSF Cryptococcus negative, HIV,  [place] virus , Lyme disease, Dane mountain spotted fever antibody negative, CSF VDRL negative.     EEG shows encephalopathy and no epileptiform activity   Discussed with neurology and ID  MRI and LP was repeated on 9/19 shows WBC improved, but other wise similar findings.   Antibiotics narrowed down to p.o. doxycycline and IV Rocephin.  Patient is seems responded to very well to this regimen, mental status much improved, fever resolved, leukocytosis resolved.   Patient has a recently trip to [place]   I highly suspect patient may have HGA or HME CNS infection.   Check ehrlichiosis antibody panel, check blood peripheral smear to look for  morale inclusions.   ID note appreciated, continue IV Rocephin and p.o. doxycycline, anticipate last dose of IV Rocephin on 9/30.         HFrEF (heart failure with reduced ejection fraction)  New onset   Echo show Takotsubo pattern   Cardiology was consulted and recommend ischemic work up when completely treated for meningitis, and starting toprol at that time. They have signed off until  meningitis treatment is complete.        Leukocytosis  Due to above       Acute encephalopathy  Likely secondary to bacterial meningitis.   Mental status returned to baseline   MRI shows encephalitis and ventriculitis findings   Ammonia, TSH is normal   Neurology following   EEG shows encephalopathy, no epileptiform activity noted.   Rpt LP shows improved WBC and MRI similar to prior   Opening pressures are normal     Resolved          VTE Risk Mitigation (From admission, onward)           Ordered     IP VTE LOW RISK PATIENT  Once         09/17/24 0239     Place sequential compression device  Until discontinued         09/17/24 0239                    Discharge Planning   WILVER: 9/30/2024     Code Status: Full Code   Is the patient medically ready for discharge?:     Reason for patient still in hospital (select all that apply): Patient new problem and Laboratory test  Discharge Plan A: Home Health, Home with family                  Dallas Mora MD  Department of Hospital Medicine   [facility]/Surg

## 2024-09-28 NOTE — SUBJECTIVE & OBJECTIVE
Interval History:   Patient is seen and examined at multidisciplinary rounds, patient is awake alert, AAO x3, afebrile, leukocytosis resolved.  Patient apparently went to [place] for traveling and stated there for a week around 1 week prior to admission.  So far all workup has been negative.  Patient has been on doxycycline day 5 and IV Rocephin day 10.  No rash.     Review of Systems   Constitutional:  Negative for activity change, appetite change, chills and fever.   HENT:  Negative for congestion, ear pain, nosebleeds and sinus pain.    Eyes:  Negative for discharge and itching.   Respiratory:  Negative for apnea, cough, chest tightness and shortness of breath.    Cardiovascular:  Negative for chest pain, palpitations and leg swelling.   Gastrointestinal:  Negative for abdominal distention, abdominal pain and vomiting.   Genitourinary:  Negative for difficulty urinating, dysuria, flank pain and frequency.   Musculoskeletal:  Negative for arthralgias, back pain, joint swelling and myalgias.   Skin:  Negative for color change, pallor and rash.   Neurological:  Negative for dizziness, weakness, light-headedness and headaches.   Psychiatric/Behavioral:  Negative for agitation, behavioral problems, confusion and suicidal ideas.      Objective:     Vital Signs (Most Recent):  Temp: 98.2 °F (36.8 °C) (09/28/24 1123)  Pulse: 84 (09/28/24 1123)  Resp: 18 (09/28/24 1123)  BP: (!) 126/58 (09/28/24 1123)  SpO2: 97 % (09/28/24 1123) Vital Signs (24h Range):  Temp:  [97.9 °F (36.6 °C)-98.4 °F (36.9 °C)] 98.2 °F (36.8 °C)  Pulse:  [74-89] 84  Resp:  [17-20] 18  SpO2:  [95 %-98 %] 97 %  BP: (102-126)/(52-59) 126/58     Weight: 62.9 kg (138 lb 10.7 oz)  Body mass index is 26.2 kg/m².    Intake/Output Summary (Last 24 hours) at 9/28/2024 1346  Last data filed at 9/28/2024 0417  Gross per 24 hour   Intake 375 ml   Output --   Net 375 ml         Physical Exam  Vitals and nursing note reviewed.   Constitutional:     Alert  Eyes:       General: No visual field deficit.     Pupils: Pupils are equal, round, and reactive to light.   Cardiovascular:      Rate and Rhythm: Normal rate and regular rhythm.      Pulses: Normal pulses.      Heart sounds: Normal heart sounds.   Pulmonary:      Effort: Pulmonary effort is normal. No respiratory distress.      Breath sounds: Normal breath sounds. No wheezing.   Abdominal:      General: Bowel sounds are normal. There is no distension.      Palpations: Abdomen is soft.      Tenderness: There is no abdominal tenderness.   Musculoskeletal:      Right lower leg: No edema.      Left lower leg: No edema.   Skin:     General: Skin is warm and dry.      Capillary Refill: Capillary refill takes less than 2 seconds.   Neurological:      Mental Status: back to baseline, alert and oriented X 3     Significant Labs: All pertinent labs within the past 24 hours have been reviewed.  CBC:   Recent Labs   Lab 09/27/24  0329 09/28/24  0354 09/28/24  1241   WBC 14.70* 11.18 11.31   HGB 11.8* 10.6* 10.7*   HCT 34.7* 30.6* 31.2*    447 428       CMP:   Recent Labs   Lab 09/27/24  0324 09/28/24  0354   * 132*   K 3.8 4.2   CL 94* 97   CO2 26 25   GLU 99 89   BUN 26* 28*   CREATININE 0.8 0.8   CALCIUM 9.4 9.1   PROT 6.8 6.0   ALBUMIN 2.6* 2.4*   BILITOT 0.3 0.3   ALKPHOS 59 54*   AST 85* 39   * 82*   ANIONGAP 9 10       Significant Imaging: I have reviewed all pertinent imaging results/findings within the past 24 hours.  Physical Exam  Constitutional:       Appearance: Normal appearance.         Scheduled Meds:   cefTRIAXone (Rocephin) IV (PEDS and ADULTS)  2 g Intravenous Q12H    doxycycline  100 mg Oral Q12H    famotidine  40 mg Oral Daily     Continuous Infusions:  PRN Meds:.  Current Facility-Administered Medications:     acetaminophen, 650 mg, Rectal, Q6H PRN    acetaminophen, 650 mg, Oral, Q6H PRN    aluminum-magnesium hydroxide-simethicone, 30 mL, Oral, QID PRN    bisacodyL, 5 mg, Oral, Daily PRN     dextrose 10%, 12.5 g, Intravenous, PRN    dextrose 10%, 25 g, Intravenous, PRN    glucagon (human recombinant), 1 mg, Intramuscular, PRN    glucose, 16 g, Oral, PRN    glucose, 24 g, Oral, PRN    melatonin, 9 mg, Oral, Nightly PRN    naloxone, 0.02 mg, Intravenous, PRN    ondansetron, 4 mg, Intravenous, Q6H PRN    senna-docusate 8.6-50 mg, 1 tablet, Oral, BID PRN    sodium chloride 0.9%, 10 mL, Intravenous, Q12H PRN      X-Ray Chest 1 View    Result Date: 9/26/2024  EXAMINATION: XR CHEST 1 VIEW CLINICAL HISTORY: shortness of breath; TECHNIQUE: Single frontal view of the chest was performed. COMPARISON: 09/19/2024 FINDINGS: Perihilar interstitial opacities.  Unchanged heart size.  Atherosclerosis.  Indistinct pulmonary vasculature.  No pleural effusion or pneumothorax.     Mild decrease in extent of interstitial disease. Electronically signed by: Silvano Pearson Date:    09/26/2024 Time:    08:52    US Lower Extremity Veins Bilateral    Result Date: 9/26/2024  EXAMINATION: US LOWER EXTREMITY VEINS BILATERAL CLINICAL HISTORY: R/o DVT; TECHNIQUE: Duplex and color flow Doppler and dynamic compression was performed of the bilateral lower extremity veins was performed. COMPARISON: None FINDINGS: Right thigh veins: The common femoral, femoral, popliteal, upper greater saphenous, and deep femoral veins are patent and free of thrombus. The veins are normally compressible and have normal phasic flow and augmentation response. Right calf veins: The visualized calf veins are patent. Left thigh veins: The common femoral, femoral, popliteal, upper greater saphenous, and deep femoral veins are patent and free of thrombus. The veins are normally compressible and have normal phasic flow and augmentation response. Left calf veins: The visualized calf veins are patent. Miscellaneous: None     No evidence of deep venous thrombosis in either lower extremity. Electronically signed by: Silvano Pearson Date:    09/26/2024  Time:    08:45    CTA Chest Non-Coronary (PE Studies)    Result Date: 9/24/2024  EXAMINATION: CTA CHEST NON CORONARY (PE STUDIES) CLINICAL HISTORY: Pulmonary embolism (PE) suspected, positive D-dimer; TECHNIQUE: Low dose axial images, sagittal and coronal reformations were obtained from the thoracic inlet to the lung bases following the IV administration of 75 mL of Omnipaque 350.  Contrast timing was optimized to evaluate the pulmonary arteries.  MIP images were performed. COMPARISON: None FINDINGS: There are no pulmonary arterial filling defects to indicate the presence of pulmonary thromboemboli. The heart size is normal.  There is a small pericardial effusion. There are moderate bilateral pleural effusions.  Mild compressive bilateral lower lobe atelectasis is present.  While detailed evaluation of lungs is precluded by motion artifact, there is apparent septal thickening suggesting interstitial edema. Small gallstones layer dependently within the gallbladder.  There is a small hiatal hernia. The bones are diffusely demineralized.  There are several thoracolumbar compression fractures, which are technically age indeterminate.     No evidence for pulmonary embolus. Moderate bilateral pleural effusions and features suggesting mild congestive failure. Small pericardial effusion. Small gallstones. Osseous demineralization and multiple age-indeterminate thoracolumbar compression fractures. Electronically signed by: Anuj Middleton MD Date:    09/24/2024 Time:    10:28    MRI Brain Without Contrast    Result Date: 9/19/2024  EXAMINATION: MRI BRAIN WITHOUT CONTRAST CLINICAL HISTORY: Repeat-for encephalopathy;. TECHNIQUE: Multiplanar multisequence MR imaging of the brain was performed without the administration of intravenous contrast. COMPARISON: MRI brain with contrast 09/17/2024, MRI brain without contrast 09/17/2020 FINDINGS: No significant interval detrimental change in the appearance of the brain as compared to  the prior exam dated 09/17/2024.  Stable-appearing ill-defined T2/FLAIR hyperintense signal within the right mesial temporal lobe without associated diffusion restriction or hemorrhage, stable in size and configuration as compared to the prior exam.  Additional stable-appearing patchy T2/FLAIR hyperintense signal throughout the bilateral periventricular, deep, subcortical white matter and stable involvement of the left splenium of the corpus callosum.  No definite new parenchymal edema or mass effect.  No abnormal intracranial enhancement on prior exam. Ventricles appear stable in comparison to the prior exam without evidence of hydrocephalus. Scattered incidental bilateral hippocampal sulcus remnant cysts.  Diffusion-weighted images demonstrate no evidence of an acute infarct.   Susceptibility weighted images demonstrate no evidence of acute or chronic hemorrhage. Normal vascular flow voids are present. Bone marrow signal intensity is normal. The paranasal sinuses are normal.  The visualized portions of the mastoids are unremarkable. Orbits are unremarkable.     1. No significant interval detrimental change in the appearance of the brain as compared to the prior exam dated 09/17/2024.  Persistent scattered abnormal T2/FLAIR hyperintense signal within the supratentorial brain, particularly involving the right mesial temporal lobe, similar to prior exam.  No definite new edema or mass effect.  Findings again are nonspecific with similar differential considerations including inflammatory/infectious, or autoimmune/paraneoplastic encephalitis, status epilepticus, or potential neoplastic process.  Recommend continued close clinical surveillance and consider short-term follow-up imaging with contrast enhanced MRI of the brain, as clinically warranted. Electronically signed by: Jacob Armenta Date:    09/19/2024 Time:    16:07    FL Lumbar Puncture (xpd)    Result Date: 9/19/2024  EXAMINATION: FL LUMBAR PUNCTURE DIAGNOSTIC  WITH IMAGING CLINICAL HISTORY: Encephalitis; TECHNIQUE: Informed written consent was obtained from the patient.  The risks, benefits, and alternatives to the exam were discussed. The patient was placed in the prone position on the fluoroscopy table and was prepped and draped in a sterile fashion.  Local anesthesia was achieved using 1% lidocaine solution. The L4-5 interspace was localized and a 22-gauge spinal needle and stylet were advanced into the thecal sac under fluoroscopic guidance. Fluoroscopy time: 0.7 minutes. COMPARISON: None. FINDINGS: Approximately 16 cc of clear cerebrospinal fluid was obtained and sent to the laboratory with orders per referring physician.  The opening pressure was 21 cm H2O.The stylet was replaced and the spinal needle was removed.  There were no immediate post procedure complications.     Successful lumbar puncture under fluoroscopic guidance as detailed above. Electronically signed by: Silvano Pearson Date:    09/19/2024 Time:    15:45    X-Ray Chest 1 View    Result Date: 9/19/2024  EXAMINATION: XR CHEST 1 VIEW CLINICAL HISTORY: r/o aspiration; TECHNIQUE: Single frontal view of the chest was performed. COMPARISON: Chest x-ray of September 16, 2024 FINDINGS: There is mild cardiomegaly.  There is prominence of the pulmonary vasculature and pulmonary edema identified bilaterally consistent with congestive heart failure.  A solid mass is not identified.  No pneumothorax is noted.     Mild cardiomegaly with development of bilateral pulmonary edema consistent with congestive heart failure. This report was flagged in Epic as abnormal. Electronically signed by: Easton Hollingsworth MD Date:    09/19/2024 Time:    13:12    Echo    Result Date: 9/19/2024    Left Ventricle: The left ventricle is normal in size. Normal wall thickness. Regional wall motion abnormalities present.  There is akinesis of mid to distal segments and the apex.  Normal contractility of the basal segments.  Pattern  suggestive of takotsubo cardiomyopathy. There is moderately reduced systolic function with a visually estimated ejection fraction of 30 - 35%. Grade II diastolic dysfunction.   Right Ventricle: Normal right ventricular cavity size. Systolic function is normal.   Mitral Valve: There is mild regurgitation.   Tricuspid Valve: There is mild to moderate regurgitation.   Pulmonary Artery: There is pulmonary hypertension. The estimated pulmonary artery systolic pressure is 46 mmHg.   IVC/SVC: Intermediate venous pressure at 8 mmHg.     MRI Brain With Contrast    Result Date: 9/17/2024  EXAMINATION: MRI BRAIN WITH CONTRAST CLINICAL HISTORY: follow up on non contrast MRI finding; TECHNIQUE: T1-weighted post-contrast multiplanar sequences were obtained through the brain.  6 mL Gadavist was administered intravenously without reported reaction. COMPARISON: Brain MRI without contrast performed earlier today FINDINGS: The prior diagnostic brain MRI demonstrated multiple regions of abnormal FLAIR and T2 hyperintense signal and a somewhat atypical distribution including the mesial right temporal lobe, periventricular white matter and splenium of the corpus callosum on the left, sub appended mole region.  There is no abnormal enhancement associated at any of the sites of signal abnormality.  The findings previously described remain nonspecific.  Previous differential considerations still exist.     1. There is no abnormal enhancement.  Signal abnormalities on unenhanced brain MRI remain nonspecific. Electronically signed by: Derian Machado MD Date:    09/17/2024 Time:    16:15    MRI Brain Without Contrast    Result Date: 9/17/2024  EXAMINATION: MRI BRAIN WITHOUT CONTRAST CLINICAL HISTORY: Mental status change, unknown cause;. TECHNIQUE: Multiplanar multisequence MR imaging of the brain was performed without the administration of intravenous contrast. COMPARISON: CT head 09/16/2024 FINDINGS: Ventricles and sulci are normal in  size for age without evidence of hydrocephalus. Abnormal, ill-defined T2/FLAIR hyperintense signal within the right mesial temporal lobe with associated hippocampal/gyral enlargement.  No diffusion restriction or susceptibility artifact to suggest hemorrhage. Mild nonspecific asymmetric ependymal/subependymal white matter T2/FLAIR hyperintense signal about the posterior body/atrium of the left lateral ventricle with question asymmetric involvement of the left frontal horn.  Superimposed scattered foci and patchy T2/FLAIR hyperintense signal within the bilateral periventricular, deep and subcortical white matter, nonspecific and may reflect sequelae of chronic small vessel ischemic change.  Diffusion-weighted images demonstrate no evidence of an acute infarct elsewhere.    No evidence of intracranial acute or chronic hemorrhage elsewhere. Normal vascular flow voids are present. Bone marrow signal intensity is normal. The paranasal sinuses are normal.  The visualized portions of the mastoids are unremarkable. Orbits are unremarkable.     1. Abnormal, ill-defined T2/FLAIR hyperintense signal within the right mesial temporal lobe with differential considerations including infectious encephalitis, particularly herpes simplex virus (HSV) encephalitis, autoimmune encephalitis, status epilepticus, or possible low-grade neoplasm. 2. Mild patchy and scattered foci of T2/FLAIR hyperintense signal within the supratentorial white matter elsewhere, nonspecific and may reflect sequelae of chronic small vessel ischemic change. 3. Mild asymmetric T2/FLAIR hyperintense signal involving the ependymal/subependymal white matter of the posterior body/atrium and left frontal horn of the left lateral ventricle, nonspecific and while it could reflect sequelae of chronic small vessel ischemic change, focal encephalitis and/or ventriculitis cannot be excluded.  No hydrocephalus. This report was flagged in Epic as abnormal.  The significant  finding above was relayed by myself  via [employer] secure chat to Beatriz Boogie MD and Joe Galeana MD on 09/17/2024 at 13:40.  Findings were acknowledged and understood. Electronically signed by: Jacob Armenta Date:    09/17/2024 Time:    13:57    X-Ray Chest 1 View    Result Date: 9/16/2024  EXAMINATION: XR CHEST 1 VIEW CLINICAL HISTORY: Sepsis; FINDINGS: Portable chest at 1441 compared with 06/06/2024 shows unchanged cardiomediastinal silhouette. Lungs are clear. Pulmonary vasculature is normal. No acute osseous abnormality.     No acute cardiopulmonary abnormality. Electronically signed by: Farrukh Ramos Date:    09/16/2024 Time:    14:53    CT Head Without Contrast    Result Date: 9/16/2024  EXAMINATION: CT HEAD WITHOUT CONTRAST CLINICAL HISTORY: Mental status change, unknown cause; TECHNIQUE: Axial CT imaging obtained through the brain without IV contrast. CMS Mandated Quality Data-CT Radiation Dose-436 All CT scans at this facility dose modulation, iterative reconstruction, and or weight-based dosing when appropriate to reduce radiation dose to as low as reasonably achievable. COMPARISON: None FINDINGS: Negative for acute intracranial hemorrhage, midline shift, or mass effect.  Ventricles and sulci are normal in size.  Gray-white differentiation is maintained.  Mild periventricular and deep white matter hypoattenuation, consistent with microangiopathic change.  Cerebellar hemispheres and brainstem are unremarkable.  Atherosclerotic calcification of intracranial carotid arteries. No calvarial lesion or fracture.  Mastoid air cells are clear.  Mucosal thickening right maxillary sinus.  Paranasal sinuses are otherwise clear.     No CT evidence of acute intracranial pathology. Electronically signed by: Wang Acuña Date:    09/16/2024 Time:    14:52  - pulls last radiology orders

## 2024-09-28 NOTE — PLAN OF CARE
Problem: Adult Inpatient Plan of Care  Goal: Plan of Care Review  Outcome: Progressing  Goal: Patient-Specific Goal (Individualized)  Outcome: Progressing  Goal: Absence of Hospital-Acquired Illness or Injury  Outcome: Progressing  Goal: Optimal Comfort and Wellbeing  Outcome: Progressing  Goal: Readiness for Transition of Care  Outcome: Progressing     Problem: Fall Injury Risk  Goal: Absence of Fall and Fall-Related Injury  Outcome: Progressing     Problem: Meningitis/Encephalitis  Goal: Optimal Coping  Outcome: Progressing  Goal: Effective Cerebral Tissue Perfusion  Outcome: Progressing  Goal: Absence of Infection Signs and Symptoms  Outcome: Progressing

## 2024-09-28 NOTE — PROGRESS NOTES
.[facility]   Department of Infectious Disease  Progress Note        PATIENT NAME: Masha Hobbs  MRN: [id number]  TODAY'S DATE: 2024  ADMIT DATE: 2024  LOS: 11 days    CHIEF COMPLAINT: Altered Mental Status (Confusion that began yesterday at 4pm per pt's  along with weakness - pt presents to ER with fever and can state name and  and situation but not president.  Patient have been vomiting since 1pm yesterday), Fever, Headache, and Emesis    PRINCIPLE PROBLEM: Acute encephalopathy    REASON FOR CONSULT:    meningoencephalitis    INTERVAL HISTORY   2024:  Continues to improve overall.  No acute issues overnight.  Remains afebrile.  Leukocytosis resolved.    2024:  No acute issues overnight.  Continues to improve.  Appetite improving.  Afebrile the last 24 hours. Participating in PT/OT.  Lyme serology negative.  RMSF serology pending.    2024:  Seen and evaluated at bedside.  No acute issues overnight.  Continues to improve clinically.  T-max overnight was 101.3.  She participated in PT/OT yesterday.  Appetite still poor.    24@0735 (Denette): Patient is lying in bed with her eyes closed and awakens easily to voice.   She states she was feeling okay.  T-max in the last 24 hours 100 at 0700 hours yesterday.  Heart rate  and respiratory rate have normalized.  She was given a dose of Lasix yesterday with good urine output.   She denies fevers or chills, body aches or joint pain, shortness of breath or cough.  She had a very small bowel movement yesterday and still has mild tenderness to palpation in her abdomen.  She denies dysuria.  Appetite is still very poor and she had a smoothie yesterday.  Mouth is still dry though looks much improved today.  WBC 17.23 trending down, platelets 290, H&H 12.5/37.3, left shift 75%, BUN/CR 21/0.7 and ALT/AST 23/26. JACINDA titer 1:80,  RPR nonreactive, HIV negative, blood cultures and CFS cultures all negative.  CTA chest  from yesterday negative for PE with small pericardial effusion and moderate bilateral pleural effusions, small gallstones and multiple thoracolumbar compression fractures.  Lab still pending includes VDRL CSF,  encephalopathy autoimmune evaluation, CSF, Lyme disease Western blot, spotted fever group antibodies.  She is on day 5 of doxycycline and day 10 of ceftriaxone.    09/24/24@9831 (Edwin): Patient is seen alone in her room.  She opens her eyes to voice and complains of being tired.   She is not as alert and keenly interactive as she was yesterday.   She still has not had a bowel movement.  She complains of mild abdominal tenderness with palpation but there is no guarding in abdomen is soft. She denies shortness or breath, cough, chest pain or palpitations, headaches or dizziness.   She does complain of a dry mouth and I helped her drink some water.  She had a CTA chest PE study this morning because of tachycardia that started around 2000 hours last night.   T-max 100.4° last night around 11, this morning at 0700 hours temperature was a 100°.  WBC elevated at 20.47, platelets 426, left shift 82%, no bands, H&H 12.5/36.3, sodium decreased at 129,  creatinine 0.8, CRP 58.7, procalcitonin negative at 0.06.  ALT/AST 19/22.  ESR 66, D-dimer 3.10.  Ferritin 227, Treponemal serology negative, cryptococcus antigen negative, HSV 1 and 2 negative,  West Nile virus from CSF serology negative. Still pending from CSF VDRL, encephalopathy autoimmune evaluation and serum tests pending including VDRL CSF, JACINDA titer, RPR titer, Lyme disease Western blot, RMSF antibodies and HIV.  She continues on ceftriaxone Day 9 and doxycycline Day 4.    09/23/24@0822 (Edwin):  patient seen and examined in her room with her daughter present.  She is eating breakfast but has not been very hungry at all.   She had a fever yesterday of 102.2  around 1926 hours.  She does not really remember having chills, headaches or body ache but was very  tired and slept most of the day.  Today she was awake and alert and feeling much better.  Headache improved, no nausea vomiting, she was had no bowel movement since the 14th.   She denies dysuria and states that she was voiding well. WBC 15.35, platelets 383, creatinine 0.8 and creatinine clearance 50 6.4.  CRP 62.1, procalcitonin 0.06.  Respiratory virus panel negative.  Blood and CSF cultures negative so far.    9/22/24  Dr. Melissa herman for the weekend   Chart reviewed  She is still febrile  Nothing new in cultures  Multiple tests are pending: HIV, RPR, Lyme WB, Spotted fever group abs, crp,   Add WNV abs to csf     09/21/2024.  Dr. Torres covering for the weekend  Patient had another spike of fever 102.2 yesterday, better than 102.9 the day before, associated by sweating, lethargy; she does not remember exactly, but daughter states that she was out of it at the time of fever.  No localizing signs and symptoms of infection.  WBC 13--15.6 but she received steroids on admission.  Anemia noted , hemoglobin 14--11.6.  CSF results as below  She walked with therapy around the hallway.  She is sitting up in a chair right now.  The rash on anterior chest and upper back is improved, as per .    09/20/2024:  Had fever yesterday with T-max 102.9.  Repeat MRI brain without contrast 09/19/2024 same as 09/17/2024.  Had repeat LP with improved CSF parameters.  Repeat CSF encephalitis panel in progress.  She is more awake and alert today.  Also interactive.    09/19/2024:  Had fever last night to 101.  Still lethargic and in bed.  EEG with with diffuse slowing consistent with metabolic encephalopathy.  CSF culture remain negative.    09/18/2024:  She is more somnolent today.  Not eating very well.  Afebrile.  She answers questions appropriately.        Antibiotics (From admission, onward)      Start     Stop Route Frequency Ordered    09/23/24 1230  doxycycline tablet 100 mg         -- Oral Every 12 hours 09/23/24 1126     09/17/24 1645  cefTRIAXone (ROCEPHIN) 2 g in D5W 100 mL IVPB (MB+)         -- IV Every 12 hours (non-standard times) 09/17/24 1540          Antifungals (From admission, onward)      Start     Stop Route Frequency Ordered    09/24/24 1030  clotrimazole isael 10 mg         10/04/24 0959 Oral 5 times daily 09/24/24 0924           Antivirals (From admission, onward)      None            ASSESSMENT and PLAN     1. Meningeal encephalitis.  Etiology unclear.  Improving though remains febrile.  Persistent intermittent fever may be drug induced versus secondary to underlying etiology of the meningeal encephalitis.  Clinically resolving.  Continue ceftriaxone to complete 14 day course through 09/30/2024.  Continue doxycycline 100 mg b.i.d. p.o. to complete 10 day course through 10/2/24     2. Debility.  Continue PT/OT.      3. Constipation.  Better.  Management as per hospitalist.    RECOMMENDATIONS:    Continue ceftriaxone 2 g q.12 hours through 09/30/24  Doxycycline 100 mg b.i.d. p.o. through 10/02/2024  Check CBC and BNP on Monday 09/30/2024  Can place midline to complete IV antibiotics if discharged home  Remove midline after completing IV antibiotics  Okay from ID standpoint to discharge home with home health if cleared by neurologist  Follow up with ID service in 2 weeks post discharge     Thank you for involving ID in the care of this patient.  From ID standpoint to discharge home with home health and complete antimicrobials.  Please send Epic secure chat with any questions.        HPI   Masha Hobbs is a 68 y.o. female with history of osteoporosis and chronic low back pain.  He presents to the ER 09/16/2024 with 1 day history of headache associated with nausea and vomiting and generalized body aches.  In the ER /67, pulse 110, respiratory rate 18, temperature 100.4°, oxygen 95%.  WBC 14 K, hematocrit 42, CMP unremarkable.  X-ray with no acute infiltrate.  CT head unremarkable.  LP was done.  CSF was  abnormal.  It was slightly hazy, WBC 2038, RBC 14, neutrophils 89%, glucose 50, protein 173.  Accompanying serum glucose was 130.       She was admitted and commenced on antibiotics and dexamethasone for meningitis.  MRI brain without contrast today 97/24 read as showing ill-defined T2/FLAIR hyperintense signal within the right medial temporal lobe with differentials including infectious encephalitis vascularly HSV, autoimmune encephalitis.  Repeat MRI brain with contrast with no abnormal enhancement.  Id asked to assist with her care.     She recently returned from [place] on 09/13/2024 after a 1 week trip.  They had gone to several cota and and did a lot of sightseeing .  States she was washing her hands frequently.  They were around a lot of people.  No other sick contacts.  No pets at home.  Denies eating anything unusual.  Has not received age appropriate pneumococcal vaccine.     Antibiotic history:    Valtrex: 09/16/2024 x1 dose   Vancomycin: 09/16-20  IV Acyclovir: 09/17-23  Ceftriaxone: 09/16-  Doxycycline: 09/21-    Microbiology and Serology:    9/16/24 blood cultures x2 sets negative  9/16/24 CSFculture negative; G stain with few WBC's, no organisms  9/16/24 meningitis panel negative  9/16 CSF cell count  slightly hazy, 14 RBCs, 2000 38 WBCs, 89% neutrophils, 7% lymphocytes, 4% monocytes,  glucose 50, protein 170  9/17/24 HSV 1-2 PCR negative  9/19/24 CSFculture negative; G stain with few WBC's, no organisms  9/19/24 meningitis panel negative  9/19/2024 CSF cell count  clear with 3 RBCs, 148 WBCs, 63% neutrophils and 15% lymphocytes,  glucose 51, protein 126  9/20/24 cryptococcal antigen negative  9/23/24 RVP negative  9/17/24 herpes simplex virus PCR 1 in 2 negative  09/22/2024 treponemal antibodies negative  09/19/2024 West Nile virus IgG and IgM from CSF negative  09/23/2024 ferritin 227   09/18/2024 strep pneumo urine antigen negative  09/22/2024 HIV 1 2 antibody antigen 4th negative  09/22/2024  RPR titer negative  09/23/2024 JACINDA screen 1:80      09/19/2024 VDRL CSF: Negative   9/19/2024 encephalopathy autoimmune evaluation, CSF pending  09/22/2024 Dane mountain spotted fever antibodies pending   09/22/2024 Lyme disease Western blot: Negative       SUBJECTIVE      Review of Systems  Negative except as stated above in Interval History    Review of patient's allergies indicates:  No Known Allergies      OBJECTIVE   Temp:  [97.9 °F (36.6 °C)-98.4 °F (36.9 °C)] 98.2 °F (36.8 °C)  Pulse:  [74-89] 84  Resp:  [17-20] 18  SpO2:  [95 %-99 %] 97 %  BP: (102-126)/(52-59) 126/58  Temp:  [97.9 °F (36.6 °C)-98.4 °F (36.9 °C)]   Temp: 98.2 °F (36.8 °C) (09/28/24 1123)  Pulse: 84 (09/28/24 1123)  Resp: 18 (09/28/24 1123)  BP: (!) 126/58 (09/28/24 1123)  SpO2: 97 % (09/28/24 1123)    Intake/Output Summary (Last 24 hours) at 9/28/2024 1224  Last data filed at 9/28/2024 0417  Gross per 24 hour   Intake 375 ml   Output --   Net 375 ml       Physical Exam  General:  opens eyes to voice, appears tired but  in no acute distress.  Eyes: Eyes with no icterus or injection. Vision grossly normal, PERRL  Mouth: Dry mucous membranes,   Improved white exudates.  Neck: Supple, no tenderness to palpation.  Cardiovascular:   Regular rate and rhythm, + murmur, no peripheral edema.  Respiratory:   Bilateral breath sounds  clear to auscultation anteriorly, on nasal cannula O2, no tachypnea or increased work of breathing.  Gastrointestinal: Obese and soft, mild tenderness to palpation, slightly distended. + flatus  Genitourinary:    Pure wick catheter in place with clear yellow urine.  Musculoskeletal: Generally weak but moves all extremities.  Skin:  Warm and dry, very faint pink  widely scattered macular rash on chest and upper arms.  Neuro:   Weak but moves all extremities, oriented and answers questions appropriately, speech is clear and fluent.  Psych: Drowsy, flat affect.  VAD:  PIV  ISOLATION: None     Wounds:  None        Significant  Labs: All pertinent labs within the past 24 hours have been reviewed.    CBC LAST 7 DAYS  Recent Labs   Lab 09/22/24  0546 09/23/24  0439 09/24/24  0415 09/25/24  0428 09/26/24  0345 09/27/24  0329 09/28/24  0354   WBC 15.52* 15.35* 20.47* 17.23* 15.56* 14.70* 11.18   RBC 4.05 3.55* 3.91* 3.88* 3.77* 3.71* 3.23*   HGB 12.9 11.5* 12.5 12.5 11.9* 11.8* 10.6*   HCT 38.3 33.4* 36.3* 37.3 35.6* 34.7* 30.6*   MCV 95 94 93 96 94 94 95   MCH 31.9* 32.4* 32.0* 32.2* 31.6* 31.8* 32.8*   MCHC 33.7 34.4 34.4 33.5 33.4 34.0 34.6   RDW 12.2 12.1 12.2 12.5 12.3 12.3 12.4    383 426 290 473* 357 447   MPV 9.9 9.0* 8.9* 10.5 9.1* 9.9 9.1*   GRAN 74.3*  11.5* 74.8*  11.5* 82.4*  16.9* 75.5*  13.0* 74.5*  11.6* 72.8  10.7* 63.6  7.1   LYMPH 14.5*  2.3 14.8*  2.3 10.0*  2.0 14.2*  2.5 16.3*  2.5 17.5*  2.6 25.6  2.9   MONO 8.4  1.3* 7.2  1.1* 6.1  1.2* 8.0  1.4* 6.9  1.1* 6.8  1.0 6.8  0.8   BASO 0.10 0.09 0.07 0.11 0.07 0.10 0.10   NRBC 0 0 0 0 0 0 0       CHEMISTRY LAST 7 DAYS  Recent Labs   Lab 09/22/24  0546 09/23/24  0439 09/24/24  0415 09/25/24  0428 09/26/24  0345 09/27/24  0324 09/28/24  0354   * 131* 129* 130* 129* 129* 132*   K 3.7 3.6 3.7 3.9 4.1 3.8 4.2   CL 98 96 92* 95 93* 94* 97   CO2 21* 27 26 26 26 26 25   ANIONGAP 12 8 11 9 10 9 10   BUN 14 15 17 21 22 26* 28*   CREATININE 0.8 0.8 0.8 0.7 0.7 0.8 0.8    136* 124* 119* 114* 99 89   CALCIUM 8.9 8.6* 8.8 8.6* 9.0 9.4 9.1   MG  --   --   --   --   --   --  2.0   ALBUMIN 2.7* 2.3* 2.6* 2.3* 2.5* 2.6* 2.4*   PROT 6.9 6.0 6.9 6.2 6.6 6.8 6.0   ALKPHOS 57 53* 61 51* 58 59 54*   ALT 20 17 19 23 57* 118* 82*   AST 20 22 22 26 47* 85* 39   BILITOT 0.5 0.4 0.3 0.3 0.3 0.3 0.3           Volume, Cell Count CSF 0.5  4.0   Appearance, CSF Sligh...  Clear   RBC, Cell Count CSF 14  3   Wbc, Cell Count CSF 2038  148   Diff Segs % CSF 89  63   Lymphs, CSF 7  15   Monocytes/Mononuclear,CSF % 4  22   Eosinophils, CSF %   0   Basophils, CSF %   0  "      Estimated Creatinine Clearance: 56.4 mL/min (based on SCr of 0.8 mg/dL).    INFLAMMATORY/PROCAL  LAST 7 DAYS  Recent Labs   Lab 09/23/24  0439 09/24/24  0415   PROCAL 0.06 0.06   CRP 62.1* 58.7*     No results found for: "ESR"  CRP   Date Value Ref Range Status   09/24/2024 58.7 (H) 0.0 - 8.2 mg/L Final   09/23/2024 62.1 (H) 0.0 - 8.2 mg/L Final       PRIOR  MICROBIOLOGY:    Susceptibility data from last 90 days.  Collected Specimen Info Organism   09/16/24 Blood from Peripheral, Antecubital, Left No growth after 5 days.   09/16/24 Blood from Peripheral, Antecubital, Right No growth after 5 days.       LAST 7 DAYS MICROBIOLOGY   Microbiology Results (last 7 days)       Procedure Component Value Units Date/Time    Respiratory Infection Panel (PCR), Nasopharyngeal [[id number]] Collected: 09/23/24 0018    Order Status: Completed Specimen: Nasopharyngeal Swab Updated: 09/23/24 1024     Respiratory Infection Panel Source NP Swab     Adenovirus Not Detected     Coronavirus 229E, Common Cold Virus Not Detected     Coronavirus HKU1, Common Cold Virus Not Detected     Coronavirus NL63, Common Cold Virus Not Detected     Coronavirus OC43, Common Cold Virus Not Detected     Comment: Coronavirus strains 229E, HKU1, NL63, and OC43 can cause the common   cold   and are not associated with the respiratory disease outbreak caused   by  the COVID-19 (SARS-CoV-2 novel Coronavirus) strain.           SARS-CoV2 (COVID-19) Qualitative PCR Not Detected     Human Metapneumovirus Not Detected     Human Rhinovirus/Enterovirus Not Detected     Influenza A (subtypes H1, H1-2009,H3) Not Detected     Influenza B Not Detected     Parainfluenza Virus 1 Not Detected     Parainfluenza Virus 2 Not Detected     Parainfluenza Virus 3 Not Detected     Parainfluenza Virus 4 Not Detected     Respiratory Syncytial Virus Not Detected     Bordetella Parapertussis (UZ3876) Not Detected     Bordetella pertussis (ptxP) Not Detected     Chlamydia " pneumoniae Not Detected     Mycoplasma pneumoniae Not Detected     Comment: Respiratory Infection Panel testing performed by Multiplex PCR.       CSF culture [[id number]] Collected: 09/19/24 1527    Order Status: Completed Specimen: CSF (Spinal Fluid) from CSF Tap, Tube 2 Updated: 09/23/24 1006     CSF CULTURE No Growth     Gram Stain Result No epithelial cells      No organisms seen      Many WBC's      09/19/2024  17:32 tn3    Cryptococcal antigen, blood [[id number]] Collected: 09/20/24 1106    Order Status: Completed Specimen: Blood, Venous Updated: 09/21/24 1449     Cryptococcal Ag, Blood Negative     Comment: A single negative result does not exclude the diagnosis of   cryptococcosis. Repeat testing on a new sample if   clinically indicated.    Test Performed by:  [facility]  [address] [place] [address]  : Kaci Felix Ph.D.; CLIA# [id number]         Blood culture x two cultures. Draw prior to antibiotics [[id number]] Collected: 09/16/24 1358    Order Status: Completed Specimen: Blood from Peripheral, Antecubital, Left Updated: 09/21/24 1432     Blood Culture, Routine No growth after 5 days.    Narrative:      Aerobic and anaerobic    Blood culture x two cultures. Draw prior to antibiotics [[id number]] Collected: 09/16/24 1347    Order Status: Completed Specimen: Blood from Peripheral, Antecubital, Right Updated: 09/21/24 1432     Blood Culture, Routine No growth after 5 days.    Narrative:      Aerobic and anaerobic          CURRENT/PREVIOUS VISIT EKG  Results for orders placed or performed during the hospital encounter of 09/16/24   EKG 12-lead    Collection Time: 09/16/24  2:04 PM   Result Value Ref Range    QRS Duration 102 ms    [facility] QTC Calculation 452 ms    Narrative    Test Reason : R00.0,    Vent. Rate : 108 BPM     Atrial Rate : 108 BPM     P-R Int : 204 ms          QRS Dur : 102 ms      QT Int : 338 ms       P-R-T Axes : 030 065  020 degrees     QTc Int : 452 ms    Sinus tachycardia  Incomplete right bundle branch block  Septal infarct ,age undetermined  Abnormal ECG  When compared with ECG of 06-JUN-2024 16:29,  [place] interval has decreased  The axis Shifted right  Non-specific change in ST segment in Anterior leads  Confirmed by Eric Mathew MD ([id number]) on 9/22/2024 3:51:24 PM    Referred By:             Confirmed By:Eric Mathew MD       ECHO 09/16/2024   Left Ventricle: The left ventricle is normal in size. Normal wall thickness. Regional wall motion abnormalities present. There is akinesis of mid to distal segments and the apex. Normal contractility of the basal segments. Pattern suggestive of takotsubo cardiomyopathy. There is moderately reduced systolic function with a visually estimated ejection fraction of 30 - 35%. Grade II diastolic dysfunction.   Right Ventricle: Normal right ventricular cavity size. Systolic function is normal.   Mitral Valve: There is mild regurgitation.   Tricuspid Valve: There is mild to moderate regurgitation.   Pulmonary Artery: There is pulmonary hypertension. The estimated pulmonary artery systolic pressure is 46 mmHg.   IVC/SVC: Intermediate venous pressure at 8 mmHg.     Significant Imaging: I have reviewed all relevant and available imaging results/findings within the past 24 hours.    MRI brain 09/19/2024   1. No significant interval detrimental change in the appearance of the brain as compared to the prior exam dated 09/17/2024.  Persistent scattered abnormal T2/FLAIR hyperintense signal within the supratentorial brain, particularly involving the right mesial temporal lobe, similar to prior exam.  No definite new edema or mass effect.  Findings again are nonspecific with similar differential considerations including inflammatory/infectious, or autoimmune/paraneoplastic encephalitis, status epilepticus, or potential neoplastic process.  Recommend continued close clinical surveillance and consider  short-term follow-up imaging with contrast enhanced MRI of the brain, as clinically warranted.     CTA Chest Non-Coronary (PE Studies) [[id number]]Resulted: 09/24/24 1028   No evidence for pulmonary embolus.   Moderate bilateral pleural effusions and features suggesting mild congestive failure.   Small pericardial effusion.   Small gallstones.   Osseous demineralization and multiple age-indeterminate thoracolumbar compression fractures.      I spent a total of 45 minutes on the day of the visit.This includes face to face time and non-face to face time preparing to see the patient (eg, review of tests), obtaining and/or reviewing separately obtained history, documenting clinical information in the electronic or other health record, independently interpreting results and communicating results to the patient/family/caregiver, or care coordinator.    Danis Sepulveda MD  Date of Service: 09/28/2024      This note was created using [employer] voice recognition software that occasionally misinterpreted phrases or words.

## 2024-09-28 NOTE — ASSESSMENT & PLAN NOTE
Likely secondary to bacterial meningitis.   Mental status returned to baseline   MRI shows encephalitis and ventriculitis findings   Ammonia, TSH is normal   Neurology following   EEG shows encephalopathy, no epileptiform activity noted.   Rpt LP shows improved WBC and MRI similar to prior   Opening pressures are normal     Resolved

## 2024-09-28 NOTE — ASSESSMENT & PLAN NOTE
New onset   Echo show Takotsubo pattern   Cardiology was consulted and recommend ischemic work up when completely treated for meningitis, and starting toprol at that time. They have signed off until meningitis treatment is complete.

## 2024-09-28 NOTE — PT/OT/SLP PROGRESS
"Physical Therapy Treatment    Patient Name:  Masha Hobbs   MRN:  [id number]    Recommendations:     Discharge Recommendations: High Intensity Therapy (vs LIT)  Discharge Equipment Recommendations: none  Barriers to discharge:  medical management    Assessment:     Masha Hobbs is a 68 y.o. female admitted with a medical diagnosis of Meningitis.  She presents with the following impairments/functional limitations: impaired endurance, impaired functional mobility, gait instability.    Ambulated 250' with RW and CGA; fatigue at end of trial and declined further gait but was agreeable to remain up in chair. Good participation.    Rehab Prognosis: Good and Fair; patient would benefit from acute skilled PT services to address these deficits and reach maximum level of function.    Recent Surgery: * No surgery found *      Plan:     During this hospitalization, patient to be seen 6 x/week to address the identified rehab impairments via gait training, therapeutic activities, therapeutic exercises, neuromuscular re-education and progress toward the following goals:    Plan of Care Expires:  09/30/24    Subjective     Chief Complaint: "nobody has been able to really figure out what's going on"  Patient/Family Comments/goals: remain up in chair   Pain/Comfort:  Pain Rating 1: 0/10  Pain Rating Post-Intervention 1: 0/10      Objective:     Communicated with nurse prior to session.  Patient found up in chair with pulse ox (continuous), telemetry, peripheral IV upon PT entry to room.     General Precautions: Standard, fall  Orthopedic Precautions: N/A  Braces: N/A  Respiratory Status: Room air     Functional Mobility:  Transfers:     Sit to Stand:  stand by assistance with rolling walker  Gait: 250' RW CGA, 2 mild LOB corrected with CGA; at end of 250' denied having enough endurance to walk further       AM-PAC 6 CLICK MOBILITY          Treatment & Education:  -Pt educ: benefits of participation with therapy, OOB to chair during " the day, call light, fall prevention, staff assist for mobility  -gait training with RW    Patient left up in chair with all lines intact, call button in reach, and nurse notified..    GOALS:   Multidisciplinary Problems       Physical Therapy Goals          Problem: Physical Therapy    Goal Priority Disciplines Outcome Interventions   Physical Therapy Goal     PT, PT/OT Progressing    Description: Goals to be met by: 24     Patient will increase functional independence with mobility by performin. Supine to sit with Harmony  2. Sit to supine with Harmony  3. Sit to stand transfer with Stand-by Assistance  4. Bed to chair transfer with Stand-by Assistance using Rolling Walker  5. Gait  x 250 feet with Stand-by Assistance using Rolling Walker.                          Time Tracking:     PT Received On: 24  PT Start Time: 1125     PT Stop Time: 1148  PT Total Time (min): 23 min     Billable Minutes: Gait Training 23    Treatment Type: Treatment  PT/PTA: PTA     Number of PTA visits since last PT visit: 2     2024

## 2024-09-28 NOTE — ASSESSMENT & PLAN NOTE
LP shows > 2000 WBC with neurophil predominance, protein is high normal glucose   MRI shows encephalitis and ventriculitis findings   HSV and meningitis panel was all negative   CSF Cryptococcus negative, HIV,  West nile virus , Lyme disease, Dane mountain spotted fever antibody negative, CSF VDRL negative.     EEG shows encephalopathy and no epileptiform activity   Discussed with neurology and ID  MRI and LP was repeated on 9/19 shows WBC improved, but other wise similar findings.   Antibiotics narrowed down to p.o. doxycycline and IV Rocephin.  Patient is seems responded to very well to this regimen, mental status much improved, fever resolved, leukocytosis resolved.   Patient has a recently trip to [place]   I highly suspect patient may have HGA or HME CNS infection.   Check ehrlichiosis antibody panel, check blood peripheral smear to look for  morale inclusions.   ID note appreciated, continue IV Rocephin and p.o. doxycycline, anticipate last dose of IV Rocephin on 9/30.

## 2024-09-28 NOTE — CARE UPDATE
09/27/24 1933   Patient Assessment/Suction   Level of Consciousness (AVPU) alert   Respiratory Effort Unlabored   Expansion/Accessory Muscles/Retractions no retractions;no use of accessory muscles   All Lung Fields Breath Sounds clear   PRE-TX-O2   Device (Oxygen Therapy) room air   SpO2 97 %   Pulse Oximetry Type Intermittent   $ Pulse Oximetry - Multiple Charge Pulse Oximetry - Multiple   Pulse 86   Resp 18   Incentive Spirometer   $ Incentive Spirometer Charges done with encouragement;proper technique demonstrated   Administration (IS) proper technique demonstrated   Number of Repetitions (IS) 10   Level Incentive Spirometer (mL) 2000   Patient Tolerance (IS) good;no adverse signs/symptoms present

## 2024-09-29 LAB
ANION GAP SERPL CALC-SCNC: 9 MMOL/L (ref 8–16)
BASOPHILS # BLD AUTO: 0.07 K/UL (ref 0–0.2)
BASOPHILS NFR BLD: 0.6 % (ref 0–1.9)
BUN SERPL-MCNC: 21 MG/DL (ref 8–23)
CALCIUM SERPL-MCNC: 9.1 MG/DL (ref 8.7–10.5)
CHLORIDE SERPL-SCNC: 101 MMOL/L (ref 95–110)
CO2 SERPL-SCNC: 23 MMOL/L (ref 23–29)
CREAT SERPL-MCNC: 0.8 MG/DL (ref 0.5–1.4)
DIFFERENTIAL METHOD BLD: ABNORMAL
EOSINOPHIL # BLD AUTO: 0.2 K/UL (ref 0–0.5)
EOSINOPHIL NFR BLD: 1.8 % (ref 0–8)
ERYTHROCYTE [DISTWIDTH] IN BLOOD BY AUTOMATED COUNT: 12.7 % (ref 11.5–14.5)
EST. GFR  (NO RACE VARIABLE): >60 ML/MIN/1.73 M^2
GLUCOSE SERPL-MCNC: 78 MG/DL (ref 70–110)
HCT VFR BLD AUTO: 32.4 % (ref 37–48.5)
HGB BLD-MCNC: 10.9 G/DL (ref 12–16)
IMM GRANULOCYTES # BLD AUTO: 0.07 K/UL (ref 0–0.04)
IMM GRANULOCYTES NFR BLD AUTO: 0.6 % (ref 0–0.5)
LYMPHOCYTES # BLD AUTO: 2.5 K/UL (ref 1–4.8)
LYMPHOCYTES NFR BLD: 21.7 % (ref 18–48)
MAGNESIUM SERPL-MCNC: 1.9 MG/DL (ref 1.6–2.6)
MCH RBC QN AUTO: 32.2 PG (ref 27–31)
MCHC RBC AUTO-ENTMCNC: 33.6 G/DL (ref 32–36)
MCV RBC AUTO: 96 FL (ref 82–98)
MONOCYTES # BLD AUTO: 0.8 K/UL (ref 0.3–1)
MONOCYTES NFR BLD: 6.7 % (ref 4–15)
NEUTROPHILS # BLD AUTO: 7.8 K/UL (ref 1.8–7.7)
NEUTROPHILS NFR BLD: 68.6 % (ref 38–73)
NRBC BLD-RTO: 0 /100 WBC
PHOSPHATE SERPL-MCNC: 2.9 MG/DL (ref 2.7–4.5)
PLATELET # BLD AUTO: 469 K/UL (ref 150–450)
PMV BLD AUTO: 8.6 FL (ref 9.2–12.9)
POTASSIUM SERPL-SCNC: 4.4 MMOL/L (ref 3.5–5.1)
RBC # BLD AUTO: 3.38 M/UL (ref 4–5.4)
SODIUM SERPL-SCNC: 133 MMOL/L (ref 136–145)
WBC # BLD AUTO: 11.43 K/UL (ref 3.9–12.7)

## 2024-09-29 PROCEDURE — 85025 COMPLETE CBC W/AUTO DIFF WBC: CPT | Performed by: INTERNAL MEDICINE

## 2024-09-29 PROCEDURE — 25000003 PHARM REV CODE 250: Performed by: INTERNAL MEDICINE

## 2024-09-29 PROCEDURE — 99232 SBSQ HOSP IP/OBS MODERATE 35: CPT | Mod: ,,, | Performed by: INTERNAL MEDICINE

## 2024-09-29 PROCEDURE — 80048 BASIC METABOLIC PNL TOTAL CA: CPT | Performed by: HOSPITALIST

## 2024-09-29 PROCEDURE — 36415 COLL VENOUS BLD VENIPUNCTURE: CPT | Performed by: HOSPITALIST

## 2024-09-29 PROCEDURE — 84100 ASSAY OF PHOSPHORUS: CPT | Performed by: HOSPITALIST

## 2024-09-29 PROCEDURE — 11000001 HC ACUTE MED/SURG PRIVATE ROOM

## 2024-09-29 PROCEDURE — 83735 ASSAY OF MAGNESIUM: CPT | Performed by: HOSPITALIST

## 2024-09-29 PROCEDURE — 94761 N-INVAS EAR/PLS OXIMETRY MLT: CPT

## 2024-09-29 PROCEDURE — 63600175 PHARM REV CODE 636 W HCPCS: Performed by: INTERNAL MEDICINE

## 2024-09-29 PROCEDURE — 63600175 PHARM REV CODE 636 W HCPCS: Performed by: HOSPITALIST

## 2024-09-29 RX ADMIN — DOXYCYCLINE HYCLATE 100 MG: 100 TABLET, COATED ORAL at 09:09

## 2024-09-29 RX ADMIN — ENOXAPARIN SODIUM 40 MG: 40 INJECTION SUBCUTANEOUS at 04:09

## 2024-09-29 RX ADMIN — ACETAMINOPHEN 650 MG: 325 TABLET ORAL at 10:09

## 2024-09-29 RX ADMIN — DOXYCYCLINE HYCLATE 100 MG: 100 TABLET, COATED ORAL at 08:09

## 2024-09-29 RX ADMIN — CEFTRIAXONE 2 G: 2 INJECTION, POWDER, FOR SOLUTION INTRAMUSCULAR; INTRAVENOUS at 04:09

## 2024-09-29 RX ADMIN — FAMOTIDINE 40 MG: 20 TABLET, FILM COATED ORAL at 09:09

## 2024-09-29 NOTE — PROGRESS NOTES
.[facility]   Department of Infectious Disease  Progress Note        PATIENT NAME: Masha Hobbs  MRN: [id number]  TODAY'S DATE: 2024  ADMIT DATE: 2024  LOS: 12 days    CHIEF COMPLAINT: Altered Mental Status (Confusion that began yesterday at 4pm per pt's  along with weakness - pt presents to ER with fever and can state name and  and situation but not president.  Patient have been vomiting since 1pm yesterday), Fever, Headache, and Emesis    PRINCIPLE PROBLEM: Meningitis    REASON FOR CONSULT:    meningoencephalitis    INTERVAL HISTORY   2024:  No acute issues overnight.  Continues to improve.  Pending disposition.  HME and HGA serology ordered hospitalist.     2024:  Continues to improve overall.  No acute issues overnight.  Remains afebrile.  Leukocytosis resolved.    2024:  No acute issues overnight.  Continues to improve.  Appetite improving.  Afebrile the last 24 hours. Participating in PT/OT.  Lyme serology negative.  RMSF serology pending.    2024:  Seen and evaluated at bedside.  No acute issues overnight.  Continues to improve clinically.  T-max overnight was 101.3.  She participated in PT/OT yesterday.  Appetite still poor.    24@0735 (Denette): Patient is lying in bed with her eyes closed and awakens easily to voice.   She states she was feeling okay.  T-max in the last 24 hours 100 at 0700 hours yesterday.  Heart rate  and respiratory rate have normalized.  She was given a dose of Lasix yesterday with good urine output.   She denies fevers or chills, body aches or joint pain, shortness of breath or cough.  She had a very small bowel movement yesterday and still has mild tenderness to palpation in her abdomen.  She denies dysuria.  Appetite is still very poor and she had a smoothie yesterday.  Mouth is still dry though looks much improved today.  WBC 17.23 trending down, platelets 290, H&H 12.5/37.3, left shift 75%, BUN/CR 21/0.7 and  ALT/AST 23/26. JACINDA titer 1:80,  RPR nonreactive, HIV negative, blood cultures and CFS cultures all negative.  CTA chest from yesterday negative for PE with small pericardial effusion and moderate bilateral pleural effusions, small gallstones and multiple thoracolumbar compression fractures.  Lab still pending includes VDRL CSF,  encephalopathy autoimmune evaluation, CSF, Lyme disease Western blot, spotted fever group antibodies.  She is on day 5 of doxycycline and day 10 of ceftriaxone.    09/24/24@0356 (Edwin): Patient is seen alone in her room.  She opens her eyes to voice and complains of being tired.   She is not as alert and keenly interactive as she was yesterday.   She still has not had a bowel movement.  She complains of mild abdominal tenderness with palpation but there is no guarding in abdomen is soft. She denies shortness or breath, cough, chest pain or palpitations, headaches or dizziness.   She does complain of a dry mouth and I helped her drink some water.  She had a CTA chest PE study this morning because of tachycardia that started around 2000 hours last night.   T-max 100.4° last night around 11, this morning at 0700 hours temperature was a 100°.  WBC elevated at 20.47, platelets 426, left shift 82%, no bands, H&H 12.5/36.3, sodium decreased at 129,  creatinine 0.8, CRP 58.7, procalcitonin negative at 0.06.  ALT/AST 19/22.  ESR 66, D-dimer 3.10.  Ferritin 227, Treponemal serology negative, cryptococcus antigen negative, HSV 1 and 2 negative,  West Nile virus from CSF serology negative. Still pending from CSF VDRL, encephalopathy autoimmune evaluation and serum tests pending including VDRL CSF, JACINDA titer, RPR titer, Lyme disease Western blot, RMSF antibodies and HIV.  She continues on ceftriaxone Day 9 and doxycycline Day 4.    09/23/24@1398 (Edwin):  patient seen and examined in her room with her daughter present.  She is eating breakfast but has not been very hungry at all.   She had a fever  yesterday of 102.2  around 1926 hours.  She does not really remember having chills, headaches or body ache but was very tired and slept most of the day.  Today she was awake and alert and feeling much better.  Headache improved, no nausea vomiting, she was had no bowel movement since the 14th.   She denies dysuria and states that she was voiding well. WBC 15.35, platelets 383, creatinine 0.8 and creatinine clearance 50 6.4.  CRP 62.1, procalcitonin 0.06.  Respiratory virus panel negative.  Blood and CSF cultures negative so far.    9/22/24  Dr. Torres covering for the weekend   Chart reviewed  She is still febrile  Nothing new in cultures  Multiple tests are pending: HIV, RPR, Lyme WB, Spotted fever group abs, crp,   Add WNV abs to csf     09/21/2024.  Dr. Torres covering for the weekend  Patient had another spike of fever 102.2 yesterday, better than 102.9 the day before, associated by sweating, lethargy; she does not remember exactly, but daughter states that she was out of it at the time of fever.  No localizing signs and symptoms of infection.  WBC 13--15.6 but she received steroids on admission.  Anemia noted , hemoglobin 14--11.6.  CSF results as below  She walked with therapy around the hallway.  She is sitting up in a chair right now.  The rash on anterior chest and upper back is improved, as per .    09/20/2024:  Had fever yesterday with T-max 102.9.  Repeat MRI brain without contrast 09/19/2024 same as 09/17/2024.  Had repeat LP with improved CSF parameters.  Repeat CSF encephalitis panel in progress.  She is more awake and alert today.  Also interactive.    09/19/2024:  Had fever last night to 101.  Still lethargic and in bed.  EEG with with diffuse slowing consistent with metabolic encephalopathy.  CSF culture remain negative.    09/18/2024:  She is more somnolent today.  Not eating very well.  Afebrile.  She answers questions appropriately.        Antibiotics (From admission, onward)      Start      Stop Route Frequency Ordered    09/23/24 1230  doxycycline tablet 100 mg         -- Oral Every 12 hours 09/23/24 1126    09/17/24 1645  cefTRIAXone (ROCEPHIN) 2 g in D5W 100 mL IVPB (MB+)         -- IV Every 12 hours (non-standard times) 09/17/24 1540          Antifungals (From admission, onward)      None           Antivirals (From admission, onward)      None            ASSESSMENT and PLAN     1. Meningoencephalitis.  Etiology unclear.  Clinically resolved.  Complete ceftriaxone 09/30/2024.  Complete oral doxycycline 10/02/2024.  Follow up pending labs.    2. Debility.  Significantly improved. Continue PT/OT.      3. Constipation.  Resolved.     RECOMMENDATIONS:    Continue ceftriaxone 2 g q.12 hours through 09/30/24  Doxycycline 100 mg b.i.d. p.o. through 10/02/2024  Okay from ID standpoint to discharge home with home health if cleared by neurologist  Follow up with ID service in 2 weeks post discharge   Follow up in the last    Thank you for involving ID in the care of this patient.  From ID standpoint to discharge home with home health and complete antimicrobials.  Please send Epic secure chat with any questions.        HPI   Masha Hobbs is a 68 y.o. female with history of osteoporosis and chronic low back pain.  He presents to the ER 09/16/2024 with 1 day history of headache associated with nausea and vomiting and generalized body aches.  In the ER /67, pulse 110, respiratory rate 18, temperature 100.4°, oxygen 95%.  WBC 14 K, hematocrit 42, CMP unremarkable.  X-ray with no acute infiltrate.  CT head unremarkable.  LP was done.  CSF was abnormal.  It was slightly hazy, WBC 2038, RBC 14, neutrophils 89%, glucose 50, protein 173.  Accompanying serum glucose was 130.       She was admitted and commenced on antibiotics and dexamethasone for meningitis.  MRI brain without contrast today 97/24 read as showing ill-defined T2/FLAIR hyperintense signal within the right medial temporal lobe with differentials  including infectious encephalitis vascularly HSV, autoimmune encephalitis.  Repeat MRI brain with contrast with no abnormal enhancement.  Id asked to assist with her care.     She recently returned from [place] on 09/13/2024 after a 1 week trip.  They had gone to several [place] and and did a lot of sightseeing .  States she was washing her hands frequently.  They were around a lot of people.  No other sick contacts.  No pets at home.  Denies eating anything unusual.  Has not received age appropriate pneumococcal vaccine.     Antibiotic history:    Valtrex: 09/16/2024 x1 dose   Vancomycin: 09/16-20  IV Acyclovir: 09/17-23  Ceftriaxone: 09/16-  Doxycycline: 09/21-    Microbiology and Serology:    9/16/24 blood cultures x2 sets negative  9/16/24 CSFculture negative; G stain with few WBC's, no organisms  9/16/24 meningitis panel negative  9/16 CSF cell count  slightly hazy, 14 RBCs, 2000 38 WBCs, 89% neutrophils, 7% lymphocytes, 4% monocytes,  glucose 50, protein 170  9/17/24 HSV 1-2 PCR negative  9/19/24 CSFculture negative; G stain with few WBC's, no organisms  9/19/24 meningitis panel negative  9/19/2024 CSF cell count  clear with 3 RBCs, 148 WBCs, 63% neutrophils and 15% lymphocytes,  glucose 51, protein 126  9/20/24 cryptococcal antigen negative  9/23/24 RVP negative  9/17/24 herpes simplex virus PCR 1 in 2 negative  09/22/2024 treponemal antibodies negative  09/19/2024 West Nile virus IgG and IgM from CSF negative  09/23/2024 ferritin 227   09/18/2024 strep pneumo urine antigen negative  09/22/2024 HIV 1 2 antibody antigen 4th negative  09/22/2024 RPR titer negative  09/23/2024 JACINDA screen 1:80      09/19/2024 VDRL CSF: Negative   9/19/2024 encephalopathy autoimmune evaluation, CSF pending  09/22/2024 Dane mountain spotted fever antibodies pending   09/22/2024 Lyme disease Western blot: Negative       SUBJECTIVE      Review of Systems  Negative except as stated above in Interval History    Review of patient's  allergies indicates:  No Known Allergies      OBJECTIVE   Temp:  [97.3 °F (36.3 °C)-98.4 °F (36.9 °C)] 98 °F (36.7 °C)  Pulse:  [77-86] 86  Resp:  [18-20] 18  SpO2:  [94 %-97 %] 95 %  BP: (107-122)/(55-63) 114/63  Temp:  [97.3 °F (36.3 °C)-98.4 °F (36.9 °C)]   Temp: 98 °F (36.7 °C) (09/29/24 0811)  Pulse: 86 (09/29/24 0811)  Resp: 18 (09/29/24 0811)  BP: 114/63 (09/29/24 0811)  SpO2: 95 % (09/29/24 0811)    Intake/Output Summary (Last 24 hours) at 9/29/2024 1155  Last data filed at 9/28/2024 2043  Gross per 24 hour   Intake 410 ml   Output --   Net 410 ml       Physical Exam  General:  Elderly woman lying quietly in bed in no acute distress   CVS: S1 and 2  Respiratory: Clear to auscultation   Abdomen: Full, soft, nontender, no palpable organomegaly   Skin: No rash appreciated   CNS: No deficits  Musculoskeletal:  No joint or bony abnormalities appreciated      VAD:  PIV  ISOLATION: None     Wounds:  None        Significant Labs: All pertinent labs within the past 24 hours have been reviewed.    CBC LAST 7 DAYS  Recent Labs   Lab 09/24/24  0415 09/25/24  0428 09/26/24  0345 09/27/24  0329 09/28/24  0354 09/28/24  1241 09/29/24  0354   WBC 20.47* 17.23* 15.56* 14.70* 11.18 11.31 11.43   RBC 3.91* 3.88* 3.77* 3.71* 3.23* 3.26* 3.38*   HGB 12.5 12.5 11.9* 11.8* 10.6* 10.7* 10.9*   HCT 36.3* 37.3 35.6* 34.7* 30.6* 31.2* 32.4*   MCV 93 96 94 94 95 96 96   MCH 32.0* 32.2* 31.6* 31.8* 32.8* 32.8* 32.2*   MCHC 34.4 33.5 33.4 34.0 34.6 34.3 33.6   RDW 12.2 12.5 12.3 12.3 12.4 12.6 12.7    290 473* 357 447 428 469*   MPV 8.9* 10.5 9.1* 9.9 9.1* 8.5* 8.6*   GRAN 82.4*  16.9* 75.5*  13.0* 74.5*  11.6* 72.8  10.7* 63.6  7.1 72.8  8.2* 68.6  7.8*   LYMPH 10.0*  2.0 14.2*  2.5 16.3*  2.5 17.5*  2.6 25.6  2.9 17.9*  2.0 21.7  2.5   MONO 6.1  1.2* 8.0  1.4* 6.9  1.1* 6.8  1.0 6.8  0.8 6.5  0.7 6.7  0.8   BASO 0.07 0.11 0.07 0.10 0.10 0.08 0.07   NRBC 0 0 0 0 0 0 0       CHEMISTRY LAST 7 DAYS  Recent Labs  "  Lab 09/23/24 0439 09/24/24 0415 09/25/24  0428 09/26/24  0345 09/27/24  0324 09/28/24  0354 09/29/24  0354   * 129* 130* 129* 129* 132* 133*   K 3.6 3.7 3.9 4.1 3.8 4.2 4.4   CL 96 92* 95 93* 94* 97 101   CO2 27 26 26 26 26 25 23   ANIONGAP 8 11 9 10 9 10 9   BUN 15 17 21 22 26* 28* 21   CREATININE 0.8 0.8 0.7 0.7 0.8 0.8 0.8   * 124* 119* 114* 99 89 78   CALCIUM 8.6* 8.8 8.6* 9.0 9.4 9.1 9.1   MG  --   --   --   --   --  2.0 1.9   ALBUMIN 2.3* 2.6* 2.3* 2.5* 2.6* 2.4*  --    PROT 6.0 6.9 6.2 6.6 6.8 6.0  --    ALKPHOS 53* 61 51* 58 59 54*  --    ALT 17 19 23 57* 118* 82*  --    AST 22 22 26 47* 85* 39  --    BILITOT 0.4 0.3 0.3 0.3 0.3 0.3  --            Volume, Cell Count CSF 0.5  4.0   Appearance, CSF Sligh...  Clear   RBC, Cell Count CSF 14  3   Wbc, Cell Count CSF 2038  148   Diff Segs % CSF 89  63   Lymphs, CSF 7  15   Monocytes/Mononuclear,CSF % 4  22   Eosinophils, CSF %   0   Basophils, CSF %   0       Estimated Creatinine Clearance: 56.4 mL/min (based on SCr of 0.8 mg/dL).    INFLAMMATORY/PROCAL  LAST 7 DAYS  Recent Labs   Lab 09/23/24 0439 09/24/24 0415   PROCAL 0.06 0.06   CRP 62.1* 58.7*     No results found for: "ESR"  CRP   Date Value Ref Range Status   09/24/2024 58.7 (H) 0.0 - 8.2 mg/L Final   09/23/2024 62.1 (H) 0.0 - 8.2 mg/L Final       PRIOR  MICROBIOLOGY:    Susceptibility data from last 90 days.  Collected Specimen Info Organism   09/16/24 Blood from Peripheral, Antecubital, Left No growth after 5 days.   09/16/24 Blood from Peripheral, Antecubital, Right No growth after 5 days.       LAST 7 DAYS MICROBIOLOGY   Microbiology Results (last 7 days)       Procedure Component Value Units Date/Time    Respiratory Infection Panel (PCR), Nasopharyngeal [[id number]] Collected: 09/23/24 0018    Order Status: Completed Specimen: Nasopharyngeal Swab Updated: 09/23/24 1024     Respiratory Infection Panel Source NP Swab     Adenovirus Not Detected     Coronavirus 229E, Common Cold Virus " Not Detected     Coronavirus HKU1, Common Cold Virus Not Detected     Coronavirus NL63, Common Cold Virus Not Detected     Coronavirus OC43, Common Cold Virus Not Detected     Comment: Coronavirus strains 229E, HKU1, NL63, and OC43 can cause the common   cold   and are not associated with the respiratory disease outbreak caused   by  the COVID-19 (SARS-CoV-2 novel Coronavirus) strain.           SARS-CoV2 (COVID-19) Qualitative PCR Not Detected     Human Metapneumovirus Not Detected     Human Rhinovirus/Enterovirus Not Detected     Influenza A (subtypes H1, H1-2009,H3) Not Detected     Influenza B Not Detected     Parainfluenza Virus 1 Not Detected     Parainfluenza Virus 2 Not Detected     Parainfluenza Virus 3 Not Detected     Parainfluenza Virus 4 Not Detected     Respiratory Syncytial Virus Not Detected     Bordetella Parapertussis ([id number]) Not Detected     Bordetella pertussis (ptxP) Not Detected     Chlamydia pneumoniae Not Detected     Mycoplasma pneumoniae Not Detected     Comment: Respiratory Infection Panel testing performed by Multiplex PCR.       CSF culture [[id number]] Collected: 09/19/24 1527    Order Status: Completed Specimen: CSF (Spinal Fluid) from CSF Tap, Tube 2 Updated: 09/23/24 1006     CSF CULTURE No Growth     Gram Stain Result No epithelial cells      No organisms seen      Many WBC's      09/19/2024  17:32 tn3          CURRENT/PREVIOUS VISIT EKG  Results for orders placed or performed during the hospital encounter of 09/16/24   EKG 12-lead    Collection Time: 09/16/24  2:04 PM   Result Value Ref Range    QRS Duration 102 ms    [facility] QTC Calculation 452 ms    Narrative    Test Reason : R00.0,    Vent. Rate : 108 BPM     Atrial Rate : 108 BPM     P-R Int : 204 ms          QRS Dur : 102 ms      QT Int : 338 ms       P-R-T Axes : 030 065 020 degrees     QTc Int : 452 ms    Sinus tachycardia  Incomplete right bundle branch block  Septal infarct ,age undetermined  Abnormal ECG  When compared with  ECG of 06-JUN-2024 16:29,  ID interval has decreased  The axis Shifted right  Non-specific change in ST segment in Anterior leads  Confirmed by Eric Mathew MD ([id number]) on 9/22/2024 3:51:24 PM    Referred By:             Confirmed By:Eric Mathew MD       ECHO 09/16/2024   Left Ventricle: The left ventricle is normal in size. Normal wall thickness. Regional wall motion abnormalities present. There is akinesis of mid to distal segments and the apex. Normal contractility of the basal segments. Pattern suggestive of takotsubo cardiomyopathy. There is moderately reduced systolic function with a visually estimated ejection fraction of 30 - 35%. Grade II diastolic dysfunction.   Right Ventricle: Normal right ventricular cavity size. Systolic function is normal.   Mitral Valve: There is mild regurgitation.   Tricuspid Valve: There is mild to moderate regurgitation.   Pulmonary Artery: There is pulmonary hypertension. The estimated pulmonary artery systolic pressure is 46 mmHg.   IVC/SVC: Intermediate venous pressure at 8 mmHg.     Significant Imaging: I have reviewed all relevant and available imaging results/findings within the past 24 hours.    MRI brain 09/19/2024   1. No significant interval detrimental change in the appearance of the brain as compared to the prior exam dated 09/17/2024.  Persistent scattered abnormal T2/FLAIR hyperintense signal within the supratentorial brain, particularly involving the right mesial temporal lobe, similar to prior exam.  No definite new edema or mass effect.  Findings again are nonspecific with similar differential considerations including inflammatory/infectious, or autoimmune/paraneoplastic encephalitis, status epilepticus, or potential neoplastic process.  Recommend continued close clinical surveillance and consider short-term follow-up imaging with contrast enhanced MRI of the brain, as clinically warranted.     CTA Chest Non-Coronary (PE Studies) [[id number]]Resulted:  09/24/24 1028   No evidence for pulmonary embolus.   Moderate bilateral pleural effusions and features suggesting mild congestive failure.   Small pericardial effusion.   Small gallstones.   Osseous demineralization and multiple age-indeterminate thoracolumbar compression fractures.      I spent a total of 45 minutes on the day of the visit.This includes face to face time and non-face to face time preparing to see the patient (eg, review of tests), obtaining and/or reviewing separately obtained history, documenting clinical information in the electronic or other health record, independently interpreting results and communicating results to the patient/family/caregiver, or care coordinator.    Danis Sepulveda MD  Date of Service: 09/29/2024      This note was created using [email or web address] voice recognition software that occasionally misinterpreted phrases or words.

## 2024-09-29 NOTE — SUBJECTIVE & OBJECTIVE
Interval History:   Patient is seen and examined at multidisciplinary rounds, patient is awake alert, AAO x3, afebrile, leukocytosis resolved.  Patient's feel much better.  PT recommended possible rehab placement.         Review of Systems   Constitutional:  Negative for activity change, appetite change, chills and fever.   HENT:  Negative for congestion, ear pain, nosebleeds and sinus pain.    Eyes:  Negative for discharge and itching.   Respiratory:  Negative for apnea, cough, chest tightness and shortness of breath.    Cardiovascular:  Negative for chest pain, palpitations and leg swelling.   Gastrointestinal:  Negative for abdominal distention, abdominal pain and vomiting.   Genitourinary:  Negative for difficulty urinating, dysuria, flank pain and frequency.   Musculoskeletal:  Negative for arthralgias, back pain, joint swelling and myalgias.   Skin:  Negative for color change, pallor and rash.   Neurological:  Negative for dizziness, weakness, light-headedness and headaches.   Psychiatric/Behavioral:  Negative for agitation, behavioral problems, confusion and suicidal ideas.      Objective:     Vital Signs (Most Recent):  Temp: 98 °F (36.7 °C) (09/29/24 0811)  Pulse: 86 (09/29/24 0811)  Resp: 18 (09/29/24 0811)  BP: 114/63 (09/29/24 0811)  SpO2: 95 % (09/29/24 0811) Vital Signs (24h Range):  Temp:  [97.3 °F (36.3 °C)-98.4 °F (36.9 °C)] 98 °F (36.7 °C)  Pulse:  [77-86] 86  Resp:  [18-20] 18  SpO2:  [94 %-97 %] 95 %  BP: (107-126)/(55-63) 114/63     Weight: 62.9 kg (138 lb 10.7 oz)  Body mass index is 26.2 kg/m².    Intake/Output Summary (Last 24 hours) at 9/29/2024 1032  Last data filed at 9/28/2024 2043  Gross per 24 hour   Intake 410 ml   Output --   Net 410 ml         Physical Exam  Vitals and nursing note reviewed.   Constitutional:     Alert  Eyes:      General: No visual field deficit.     Pupils: Pupils are equal, round, and reactive to light.   Cardiovascular:      Rate and Rhythm: Normal rate and  regular rhythm.      Pulses: Normal pulses.      Heart sounds: Normal heart sounds.   Pulmonary:      Effort: Pulmonary effort is normal. No respiratory distress.      Breath sounds: Normal breath sounds. No wheezing.   Abdominal:      General: Bowel sounds are normal. There is no distension.      Palpations: Abdomen is soft.      Tenderness: There is no abdominal tenderness.   Musculoskeletal:      Right lower leg: No edema.      Left lower leg: No edema.   Skin:     General: Skin is warm and dry.      Capillary Refill: Capillary refill takes less than 2 seconds.   Neurological:      Mental Status: back to baseline, alert and oriented X 3     Significant Labs: All pertinent labs within the past 24 hours have been reviewed.  CBC:   Recent Labs   Lab 09/28/24  0354 09/28/24  1241 09/29/24  0354   WBC 11.18 11.31 11.43   HGB 10.6* 10.7* 10.9*   HCT 30.6* 31.2* 32.4*    428 469*       CMP:   Recent Labs   Lab 09/28/24  0354 09/29/24  0354   * 133*   K 4.2 4.4   CL 97 101   CO2 25 23   GLU 89 78   BUN 28* 21   CREATININE 0.8 0.8   CALCIUM 9.1 9.1   PROT 6.0  --    ALBUMIN 2.4*  --    BILITOT 0.3  --    ALKPHOS 54*  --    AST 39  --    ALT 82*  --    ANIONGAP 10 9       Significant Imaging: I have reviewed all pertinent imaging results/findings within the past 24 hours.  Physical Exam  Constitutional:       Appearance: Normal appearance.         Scheduled Meds:   cefTRIAXone (Rocephin) IV (PEDS and ADULTS)  2 g Intravenous Q12H    doxycycline  100 mg Oral Q12H    enoxparin  40 mg Subcutaneous Q24H (prophylaxis, 1700)    famotidine  40 mg Oral Daily     Continuous Infusions:  PRN Meds:.  Current Facility-Administered Medications:     acetaminophen, 650 mg, Rectal, Q6H PRN    acetaminophen, 650 mg, Oral, Q6H PRN    aluminum-magnesium hydroxide-simethicone, 30 mL, Oral, QID PRN    bisacodyL, 5 mg, Oral, Daily PRN    dextrose 10%, 12.5 g, Intravenous, PRN    dextrose 10%, 25 g, Intravenous, PRN    glucagon  (human recombinant), 1 mg, Intramuscular, PRN    glucose, 16 g, Oral, PRN    glucose, 24 g, Oral, PRN    melatonin, 9 mg, Oral, Nightly PRN    naloxone, 0.02 mg, Intravenous, PRN    ondansetron, 4 mg, Intravenous, Q6H PRN    senna-docusate 8.6-50 mg, 1 tablet, Oral, BID PRN    sodium chloride 0.9%, 10 mL, Intravenous, Q12H PRN      X-Ray Chest 1 View    Result Date: 9/26/2024  EXAMINATION: XR CHEST 1 VIEW CLINICAL HISTORY: shortness of breath; TECHNIQUE: Single frontal view of the chest was performed. COMPARISON: 09/19/2024 FINDINGS: Perihilar interstitial opacities.  Unchanged heart size.  Atherosclerosis.  Indistinct pulmonary vasculature.  No pleural effusion or pneumothorax.     Mild decrease in extent of interstitial disease. Electronically signed by: Silvano Pearson Date:    09/26/2024 Time:    08:52    US Lower Extremity Veins Bilateral    Result Date: 9/26/2024  EXAMINATION: US LOWER EXTREMITY VEINS BILATERAL CLINICAL HISTORY: R/o DVT; TECHNIQUE: Duplex and color flow Doppler and dynamic compression was performed of the bilateral lower extremity veins was performed. COMPARISON: None FINDINGS: Right thigh veins: The common femoral, femoral, popliteal, upper greater saphenous, and deep femoral veins are patent and free of thrombus. The veins are normally compressible and have normal phasic flow and augmentation response. Right calf veins: The visualized calf veins are patent. Left thigh veins: The common femoral, femoral, popliteal, upper greater saphenous, and deep femoral veins are patent and free of thrombus. The veins are normally compressible and have normal phasic flow and augmentation response. Left calf veins: The visualized calf veins are patent. Miscellaneous: None     No evidence of deep venous thrombosis in either lower extremity. Electronically signed by: Silvano Pearson Date:    09/26/2024 Time:    08:45    CTA Chest Non-Coronary (PE Studies)    Result Date: 9/24/2024  EXAMINATION: CTA CHEST NON  CORONARY (PE STUDIES) CLINICAL HISTORY: Pulmonary embolism (PE) suspected, positive D-dimer; TECHNIQUE: Low dose axial images, sagittal and coronal reformations were obtained from the thoracic inlet to the lung bases following the IV administration of 75 mL of Omnipaque 350.  Contrast timing was optimized to evaluate the pulmonary arteries.  MIP images were performed. COMPARISON: None FINDINGS: There are no pulmonary arterial filling defects to indicate the presence of pulmonary thromboemboli. The heart size is normal.  There is a small pericardial effusion. There are moderate bilateral pleural effusions.  Mild compressive bilateral lower lobe atelectasis is present.  While detailed evaluation of lungs is precluded by motion artifact, there is apparent septal thickening suggesting interstitial edema. Small gallstones layer dependently within the gallbladder.  There is a small hiatal hernia. The bones are diffusely demineralized.  There are several thoracolumbar compression fractures, which are technically age indeterminate.     No evidence for pulmonary embolus. Moderate bilateral pleural effusions and features suggesting mild congestive failure. Small pericardial effusion. Small gallstones. Osseous demineralization and multiple age-indeterminate thoracolumbar compression fractures. Electronically signed by: Anuj Middleton MD Date:    09/24/2024 Time:    10:28    MRI Brain Without Contrast    Result Date: 9/19/2024  EXAMINATION: MRI BRAIN WITHOUT CONTRAST CLINICAL HISTORY: Repeat-for encephalopathy;. TECHNIQUE: Multiplanar multisequence MR imaging of the brain was performed without the administration of intravenous contrast. COMPARISON: MRI brain with contrast 09/17/2024, MRI brain without contrast 09/17/2020 FINDINGS: No significant interval detrimental change in the appearance of the brain as compared to the prior exam dated 09/17/2024.  Stable-appearing ill-defined T2/FLAIR hyperintense signal within the right  mesial temporal lobe without associated diffusion restriction or hemorrhage, stable in size and configuration as compared to the prior exam.  Additional stable-appearing patchy T2/FLAIR hyperintense signal throughout the bilateral periventricular, deep, subcortical white matter and stable involvement of the left splenium of the corpus callosum.  No definite new parenchymal edema or mass effect.  No abnormal intracranial enhancement on prior exam. Ventricles appear stable in comparison to the prior exam without evidence of hydrocephalus. Scattered incidental bilateral hippocampal sulcus remnant cysts.  Diffusion-weighted images demonstrate no evidence of an acute infarct.   Susceptibility weighted images demonstrate no evidence of acute or chronic hemorrhage. Normal vascular flow voids are present. Bone marrow signal intensity is normal. The paranasal sinuses are normal.  The visualized portions of the mastoids are unremarkable. Orbits are unremarkable.     1. No significant interval detrimental change in the appearance of the brain as compared to the prior exam dated 09/17/2024.  Persistent scattered abnormal T2/FLAIR hyperintense signal within the supratentorial brain, particularly involving the right mesial temporal lobe, similar to prior exam.  No definite new edema or mass effect.  Findings again are nonspecific with similar differential considerations including inflammatory/infectious, or autoimmune/paraneoplastic encephalitis, status epilepticus, or potential neoplastic process.  Recommend continued close clinical surveillance and consider short-term follow-up imaging with contrast enhanced MRI of the brain, as clinically warranted. Electronically signed by: Jacob Armenta Date:    09/19/2024 Time:    16:07    FL Lumbar Puncture (xpd)    Result Date: 9/19/2024  EXAMINATION: FL LUMBAR PUNCTURE DIAGNOSTIC WITH IMAGING CLINICAL HISTORY: Encephalitis; TECHNIQUE: Informed written consent was obtained from the patient.   The risks, benefits, and alternatives to the exam were discussed. The patient was placed in the prone position on the fluoroscopy table and was prepped and draped in a sterile fashion.  Local anesthesia was achieved using 1% lidocaine solution. The L4-5 interspace was localized and a 22-gauge spinal needle and stylet were advanced into the thecal sac under fluoroscopic guidance. Fluoroscopy time: 0.7 minutes. COMPARISON: None. FINDINGS: Approximately 16 cc of clear cerebrospinal fluid was obtained and sent to the laboratory with orders per referring physician.  The opening pressure was 21 cm H2O.The stylet was replaced and the spinal needle was removed.  There were no immediate post procedure complications.     Successful lumbar puncture under fluoroscopic guidance as detailed above. Electronically signed by: Silvano Pearson Date:    09/19/2024 Time:    15:45    X-Ray Chest 1 View    Result Date: 9/19/2024  EXAMINATION: XR CHEST 1 VIEW CLINICAL HISTORY: r/o aspiration; TECHNIQUE: Single frontal view of the chest was performed. COMPARISON: Chest x-ray of September 16, 2024 FINDINGS: There is mild cardiomegaly.  There is prominence of the pulmonary vasculature and pulmonary edema identified bilaterally consistent with congestive heart failure.  A solid mass is not identified.  No pneumothorax is noted.     Mild cardiomegaly with development of bilateral pulmonary edema consistent with congestive heart failure. This report was flagged in Epic as abnormal. Electronically signed by: Easton Hollingsworth MD Date:    09/19/2024 Time:    13:12    Echo    Result Date: 9/19/2024    Left Ventricle: The left ventricle is normal in size. Normal wall thickness. Regional wall motion abnormalities present.  There is akinesis of mid to distal segments and the apex.  Normal contractility of the basal segments.  Pattern suggestive of takotsubo cardiomyopathy. There is moderately reduced systolic function with a visually estimated  ejection fraction of 30 - 35%. Grade II diastolic dysfunction.   Right Ventricle: Normal right ventricular cavity size. Systolic function is normal.   Mitral Valve: There is mild regurgitation.   Tricuspid Valve: There is mild to moderate regurgitation.   Pulmonary Artery: There is pulmonary hypertension. The estimated pulmonary artery systolic pressure is 46 mmHg.   IVC/SVC: Intermediate venous pressure at 8 mmHg.     MRI Brain With Contrast    Result Date: 9/17/2024  EXAMINATION: MRI BRAIN WITH CONTRAST CLINICAL HISTORY: follow up on non contrast MRI finding; TECHNIQUE: T1-weighted post-contrast multiplanar sequences were obtained through the brain.  6 mL Gadavist was administered intravenously without reported reaction. COMPARISON: Brain MRI without contrast performed earlier today FINDINGS: The prior diagnostic brain MRI demonstrated multiple regions of abnormal FLAIR and T2 hyperintense signal and a somewhat atypical distribution including the mesial right temporal lobe, periventricular white matter and splenium of the corpus callosum on the left, sub appended mole region.  There is no abnormal enhancement associated at any of the sites of signal abnormality.  The findings previously described remain nonspecific.  Previous differential considerations still exist.     1. There is no abnormal enhancement.  Signal abnormalities on unenhanced brain MRI remain nonspecific. Electronically signed by: Derian Machado MD Date:    09/17/2024 Time:    16:15    MRI Brain Without Contrast    Result Date: 9/17/2024  EXAMINATION: MRI BRAIN WITHOUT CONTRAST CLINICAL HISTORY: Mental status change, unknown cause;. TECHNIQUE: Multiplanar multisequence MR imaging of the brain was performed without the administration of intravenous contrast. COMPARISON: CT head 09/16/2024 FINDINGS: Ventricles and sulci are normal in size for age without evidence of hydrocephalus. Abnormal, ill-defined T2/FLAIR hyperintense signal within the right  mesial temporal lobe with associated hippocampal/gyral enlargement.  No diffusion restriction or susceptibility artifact to suggest hemorrhage. Mild nonspecific asymmetric ependymal/subependymal white matter T2/FLAIR hyperintense signal about the posterior body/atrium of the left lateral ventricle with question asymmetric involvement of the left frontal horn.  Superimposed scattered foci and patchy T2/FLAIR hyperintense signal within the bilateral periventricular, deep and subcortical white matter, nonspecific and may reflect sequelae of chronic small vessel ischemic change.  Diffusion-weighted images demonstrate no evidence of an acute infarct elsewhere.    No evidence of intracranial acute or chronic hemorrhage elsewhere. Normal vascular flow voids are present. Bone marrow signal intensity is normal. The paranasal sinuses are normal.  The visualized portions of the mastoids are unremarkable. Orbits are unremarkable.     1. Abnormal, ill-defined T2/FLAIR hyperintense signal within the right mesial temporal lobe with differential considerations including infectious encephalitis, particularly herpes simplex virus (HSV) encephalitis, autoimmune encephalitis, status epilepticus, or possible low-grade neoplasm. 2. Mild patchy and scattered foci of T2/FLAIR hyperintense signal within the supratentorial white matter elsewhere, nonspecific and may reflect sequelae of chronic small vessel ischemic change. 3. Mild asymmetric T2/FLAIR hyperintense signal involving the ependymal/subependymal white matter of the posterior body/atrium and left frontal horn of the left lateral ventricle, nonspecific and while it could reflect sequelae of chronic small vessel ischemic change, focal encephalitis and/or ventriculitis cannot be excluded.  No hydrocephalus. This report was flagged in Epic as abnormal.  The significant finding above was relayed by myself  via [facility] secure chat to Beatriz Boogie MD and Joe Galeana MD on  09/17/2024 at 13:40.  Findings were acknowledged and understood. Electronically signed by: Jacob Armenta Date:    09/17/2024 Time:    13:57    X-Ray Chest 1 View    Result Date: 9/16/2024  EXAMINATION: XR CHEST 1 VIEW CLINICAL HISTORY: Sepsis; FINDINGS: Portable chest at 1441 compared with 06/06/2024 shows unchanged cardiomediastinal silhouette. Lungs are clear. Pulmonary vasculature is normal. No acute osseous abnormality.     No acute cardiopulmonary abnormality. Electronically signed by: Farrukh Ramos Date:    09/16/2024 Time:    14:53    CT Head Without Contrast    Result Date: 9/16/2024  EXAMINATION: CT HEAD WITHOUT CONTRAST CLINICAL HISTORY: Mental status change, unknown cause; TECHNIQUE: Axial CT imaging obtained through the brain without IV contrast. CMS Mandated Quality Data-CT Radiation Dose-436 All CT scans at this facility dose modulation, iterative reconstruction, and or weight-based dosing when appropriate to reduce radiation dose to as low as reasonably achievable. COMPARISON: None FINDINGS: Negative for acute intracranial hemorrhage, midline shift, or mass effect.  Ventricles and sulci are normal in size.  Gray-white differentiation is maintained.  Mild periventricular and deep white matter hypoattenuation, consistent with microangiopathic change.  Cerebellar hemispheres and brainstem are unremarkable.  Atherosclerotic calcification of intracranial carotid arteries. No calvarial lesion or fracture.  Mastoid air cells are clear.  Mucosal thickening right maxillary sinus.  Paranasal sinuses are otherwise clear.     No CT evidence of acute intracranial pathology. Electronically signed by: Wang Acuña Date:    09/16/2024 Time:    14:52  - pulls last radiology orders

## 2024-09-29 NOTE — PROGRESS NOTES
[facility] Medicine  Progress Note    Patient Name: Masha Hobbs  MRN: [id number]  Patient Class: IP- Inpatient   Admission Date: 9/16/2024  Length of Stay: 12 days  Attending Physician: Dallas Mora MD  Primary Care Provider: Roney Bang MD        Subjective:     Principal Problem:Meningitis        HPI:  Masha Hobbs is a 68 year old female with a past medical history of osteoporosis and chronic lower back pain with radiculopathy who was transferred from [facility] due to confusion associated with headache, fever, and generalized weakness/body aches for several hours prior to arrival.  Per  patient was confused since 4:00 p.m. Upon assessment confusion has resolved.  She denies any complaints other than a intermittent frontal lobe headache that is improving after Toradol administration.  There are no acute neurological focal deficits.  She denies chest pain, shortness of breath, dizziness, lightheadedness, vision changes, speech changes, numbness/tingling, neck pain, abdominal pain, nausea, or vomiting.  ED workup reveals:  CT head negative for acute intracranial hemorrhage. Chest x-ray no evidence of acute cardiopulmonary findings.  Lumbar puncture performed in ED, pending results.  She was treated with vancomycin, Rocephin, valacyclovir p.o., and dexamethasone.  CBC with elevated white count 13.8 and stable H&H.  BMP with phosphorus 2.0 otherwise unremarkable.  Negative flu/COVID.  Urinalysis negative.  Lactic acid 1.1.  ECG negative for ischemia or infarct.  MRI brain pending.  Neurology consult placed.  Admitted to hospital medicine for further management and treatment.            Overview/Hospital Course:  Patient is a 68 year old female admitted with AMS, headache and fever. LP was suggestive of meningitis with 2K WBC, mainly neutrophils. HSV panel was negative. Patient was started on Vancomycin, Ampicillin and Rocephin. Acyclovir was discontinued after D1. MRI shows  encephalitis findings with ventriculitis. Patient is being followed by neurology and ID. EEG has been ordered which is consistent with encephalopathy but no epileptiform activity. Patient continue to spike fever and is vomiting. Stat MRI was repeated with rpt LP was ordered. Rpt MRI did not show any changes. LP shows reduced WBC compared to the first one. Patient's mental status improved. Patient continued with daily fevers. ID ordered additional testing. Antibiotics and antiviral adjusted. Patient developed tachycardia and tachypnea on 09/24. D Dimer was elevated, and CTA chest was done. This revealed no PE, but some pleural effusions. Lasix was given. Patient's appetite was poor and clinimix was added temporarily. Continued with poor appetite. Appetite stimulant added.     Interval History:   Patient is seen and examined at multidisciplinary rounds, patient is awake alert, AAO x3, afebrile, leukocytosis resolved.  Patient's feel much better.  PT recommended possible rehab placement.         Review of Systems   Constitutional:  Negative for activity change, appetite change, chills and fever.   HENT:  Negative for congestion, ear pain, nosebleeds and sinus pain.    Eyes:  Negative for discharge and itching.   Respiratory:  Negative for apnea, cough, chest tightness and shortness of breath.    Cardiovascular:  Negative for chest pain, palpitations and leg swelling.   Gastrointestinal:  Negative for abdominal distention, abdominal pain and vomiting.   Genitourinary:  Negative for difficulty urinating, dysuria, flank pain and frequency.   Musculoskeletal:  Negative for arthralgias, back pain, joint swelling and myalgias.   Skin:  Negative for color change, pallor and rash.   Neurological:  Negative for dizziness, weakness, light-headedness and headaches.   Psychiatric/Behavioral:  Negative for agitation, behavioral problems, confusion and suicidal ideas.      Objective:     Vital Signs (Most Recent):  Temp: 98 °F (36.7  °C) (09/29/24 0811)  Pulse: 86 (09/29/24 0811)  Resp: 18 (09/29/24 0811)  BP: 114/63 (09/29/24 0811)  SpO2: 95 % (09/29/24 0811) Vital Signs (24h Range):  Temp:  [97.3 °F (36.3 °C)-98.4 °F (36.9 °C)] 98 °F (36.7 °C)  Pulse:  [77-86] 86  Resp:  [18-20] 18  SpO2:  [94 %-97 %] 95 %  BP: (107-126)/(55-63) 114/63     Weight: 62.9 kg (138 lb 10.7 oz)  Body mass index is 26.2 kg/m².    Intake/Output Summary (Last 24 hours) at 9/29/2024 1032  Last data filed at 9/28/2024 2043  Gross per 24 hour   Intake 410 ml   Output --   Net 410 ml         Physical Exam  Vitals and nursing note reviewed.   Constitutional:     Alert  Eyes:      General: No visual field deficit.     Pupils: Pupils are equal, round, and reactive to light.   Cardiovascular:      Rate and Rhythm: Normal rate and regular rhythm.      Pulses: Normal pulses.      Heart sounds: Normal heart sounds.   Pulmonary:      Effort: Pulmonary effort is normal. No respiratory distress.      Breath sounds: Normal breath sounds. No wheezing.   Abdominal:      General: Bowel sounds are normal. There is no distension.      Palpations: Abdomen is soft.      Tenderness: There is no abdominal tenderness.   Musculoskeletal:      Right lower leg: No edema.      Left lower leg: No edema.   Skin:     General: Skin is warm and dry.      Capillary Refill: Capillary refill takes less than 2 seconds.   Neurological:      Mental Status: back to baseline, alert and oriented X 3     Significant Labs: All pertinent labs within the past 24 hours have been reviewed.  CBC:   Recent Labs   Lab 09/28/24  0354 09/28/24  1241 09/29/24  0354   WBC 11.18 11.31 11.43   HGB 10.6* 10.7* 10.9*   HCT 30.6* 31.2* 32.4*    428 469*       CMP:   Recent Labs   Lab 09/28/24  0354 09/29/24  0354   * 133*   K 4.2 4.4   CL 97 101   CO2 25 23   GLU 89 78   BUN 28* 21   CREATININE 0.8 0.8   CALCIUM 9.1 9.1   PROT 6.0  --    ALBUMIN 2.4*  --    BILITOT 0.3  --    ALKPHOS 54*  --    AST 39  --    ALT 82*   --    ANIONGAP 10 9       Significant Imaging: I have reviewed all pertinent imaging results/findings within the past 24 hours.  Physical Exam  Constitutional:       Appearance: Normal appearance.         Scheduled Meds:   cefTRIAXone (Rocephin) IV (PEDS and ADULTS)  2 g Intravenous Q12H    doxycycline  100 mg Oral Q12H    enoxparin  40 mg Subcutaneous Q24H (prophylaxis, 1700)    famotidine  40 mg Oral Daily     Continuous Infusions:  PRN Meds:.  Current Facility-Administered Medications:     acetaminophen, 650 mg, Rectal, Q6H PRN    acetaminophen, 650 mg, Oral, Q6H PRN    aluminum-magnesium hydroxide-simethicone, 30 mL, Oral, QID PRN    bisacodyL, 5 mg, Oral, Daily PRN    dextrose 10%, 12.5 g, Intravenous, PRN    dextrose 10%, 25 g, Intravenous, PRN    glucagon (human recombinant), 1 mg, Intramuscular, PRN    glucose, 16 g, Oral, PRN    glucose, 24 g, Oral, PRN    melatonin, 9 mg, Oral, Nightly PRN    naloxone, 0.02 mg, Intravenous, PRN    ondansetron, 4 mg, Intravenous, Q6H PRN    senna-docusate 8.6-50 mg, 1 tablet, Oral, BID PRN    sodium chloride 0.9%, 10 mL, Intravenous, Q12H PRN      X-Ray Chest 1 View    Result Date: 9/26/2024  EXAMINATION: XR CHEST 1 VIEW CLINICAL HISTORY: shortness of breath; TECHNIQUE: Single frontal view of the chest was performed. COMPARISON: 09/19/2024 FINDINGS: Perihilar interstitial opacities.  Unchanged heart size.  Atherosclerosis.  Indistinct pulmonary vasculature.  No pleural effusion or pneumothorax.     Mild decrease in extent of interstitial disease. Electronically signed by: Silvano Pearson Date:    09/26/2024 Time:    08:52    US Lower Extremity Veins Bilateral    Result Date: 9/26/2024  EXAMINATION: US LOWER EXTREMITY VEINS BILATERAL CLINICAL HISTORY: R/o DVT; TECHNIQUE: Duplex and color flow Doppler and dynamic compression was performed of the bilateral lower extremity veins was performed. COMPARISON: None FINDINGS: Right thigh veins: The common femoral, femoral,  popliteal, upper greater saphenous, and deep femoral veins are patent and free of thrombus. The veins are normally compressible and have normal phasic flow and augmentation response. Right calf veins: The visualized calf veins are patent. Left thigh veins: The common femoral, femoral, popliteal, upper greater saphenous, and deep femoral veins are patent and free of thrombus. The veins are normally compressible and have normal phasic flow and augmentation response. Left calf veins: The visualized calf veins are patent. Miscellaneous: None     No evidence of deep venous thrombosis in either lower extremity. Electronically signed by: Silvano Pearson Date:    09/26/2024 Time:    08:45    CTA Chest Non-Coronary (PE Studies)    Result Date: 9/24/2024  EXAMINATION: CTA CHEST NON CORONARY (PE STUDIES) CLINICAL HISTORY: Pulmonary embolism (PE) suspected, positive D-dimer; TECHNIQUE: Low dose axial images, sagittal and coronal reformations were obtained from the thoracic inlet to the lung bases following the IV administration of 75 mL of Omnipaque 350.  Contrast timing was optimized to evaluate the pulmonary arteries.  MIP images were performed. COMPARISON: None FINDINGS: There are no pulmonary arterial filling defects to indicate the presence of pulmonary thromboemboli. The heart size is normal.  There is a small pericardial effusion. There are moderate bilateral pleural effusions.  Mild compressive bilateral lower lobe atelectasis is present.  While detailed evaluation of lungs is precluded by motion artifact, there is apparent septal thickening suggesting interstitial edema. Small gallstones layer dependently within the gallbladder.  There is a small hiatal hernia. The bones are diffusely demineralized.  There are several thoracolumbar compression fractures, which are technically age indeterminate.     No evidence for pulmonary embolus. Moderate bilateral pleural effusions and features suggesting mild congestive failure.  Small pericardial effusion. Small gallstones. Osseous demineralization and multiple age-indeterminate thoracolumbar compression fractures. Electronically signed by: Anuj Middleton MD Date:    09/24/2024 Time:    10:28    MRI Brain Without Contrast    Result Date: 9/19/2024  EXAMINATION: MRI BRAIN WITHOUT CONTRAST CLINICAL HISTORY: Repeat-for encephalopathy;. TECHNIQUE: Multiplanar multisequence MR imaging of the brain was performed without the administration of intravenous contrast. COMPARISON: MRI brain with contrast 09/17/2024, MRI brain without contrast 09/17/2020 FINDINGS: No significant interval detrimental change in the appearance of the brain as compared to the prior exam dated 09/17/2024.  Stable-appearing ill-defined T2/FLAIR hyperintense signal within the right mesial temporal lobe without associated diffusion restriction or hemorrhage, stable in size and configuration as compared to the prior exam.  Additional stable-appearing patchy T2/FLAIR hyperintense signal throughout the bilateral periventricular, deep, subcortical white matter and stable involvement of the left splenium of the corpus callosum.  No definite new parenchymal edema or mass effect.  No abnormal intracranial enhancement on prior exam. Ventricles appear stable in comparison to the prior exam without evidence of hydrocephalus. Scattered incidental bilateral hippocampal sulcus remnant cysts.  Diffusion-weighted images demonstrate no evidence of an acute infarct.   Susceptibility weighted images demonstrate no evidence of acute or chronic hemorrhage. Normal vascular flow voids are present. Bone marrow signal intensity is normal. The paranasal sinuses are normal.  The visualized portions of the mastoids are unremarkable. Orbits are unremarkable.     1. No significant interval detrimental change in the appearance of the brain as compared to the prior exam dated 09/17/2024.  Persistent scattered abnormal T2/FLAIR hyperintense signal within  the supratentorial brain, particularly involving the right mesial temporal lobe, similar to prior exam.  No definite new edema or mass effect.  Findings again are nonspecific with similar differential considerations including inflammatory/infectious, or autoimmune/paraneoplastic encephalitis, status epilepticus, or potential neoplastic process.  Recommend continued close clinical surveillance and consider short-term follow-up imaging with contrast enhanced MRI of the brain, as clinically warranted. Electronically signed by: Jacob Armenta Date:    09/19/2024 Time:    16:07    FL Lumbar Puncture (xpd)    Result Date: 9/19/2024  EXAMINATION: FL LUMBAR PUNCTURE DIAGNOSTIC WITH IMAGING CLINICAL HISTORY: Encephalitis; TECHNIQUE: Informed written consent was obtained from the patient.  The risks, benefits, and alternatives to the exam were discussed. The patient was placed in the prone position on the fluoroscopy table and was prepped and draped in a sterile fashion.  Local anesthesia was achieved using 1% lidocaine solution. The L4-5 interspace was localized and a 22-gauge spinal needle and stylet were advanced into the thecal sac under fluoroscopic guidance. Fluoroscopy time: 0.7 minutes. COMPARISON: None. FINDINGS: Approximately 16 cc of clear cerebrospinal fluid was obtained and sent to the laboratory with orders per referring physician.  The opening pressure was 21 cm H2O.The stylet was replaced and the spinal needle was removed.  There were no immediate post procedure complications.     Successful lumbar puncture under fluoroscopic guidance as detailed above. Electronically signed by: Silvano Pearson Date:    09/19/2024 Time:    15:45    X-Ray Chest 1 View    Result Date: 9/19/2024  EXAMINATION: XR CHEST 1 VIEW CLINICAL HISTORY: r/o aspiration; TECHNIQUE: Single frontal view of the chest was performed. COMPARISON: Chest x-ray of September 16, 2024 FINDINGS: There is mild cardiomegaly.  There is prominence of the  pulmonary vasculature and pulmonary edema identified bilaterally consistent with congestive heart failure.  A solid mass is not identified.  No pneumothorax is noted.     Mild cardiomegaly with development of bilateral pulmonary edema consistent with congestive heart failure. This report was flagged in Epic as abnormal. Electronically signed by: Easton Hollingsworth MD Date:    09/19/2024 Time:    13:12    Echo    Result Date: 9/19/2024    Left Ventricle: The left ventricle is normal in size. Normal wall thickness. Regional wall motion abnormalities present.  There is akinesis of mid to distal segments and the apex.  Normal contractility of the basal segments.  Pattern suggestive of takotsubo cardiomyopathy. There is moderately reduced systolic function with a visually estimated ejection fraction of 30 - 35%. Grade II diastolic dysfunction.   Right Ventricle: Normal right ventricular cavity size. Systolic function is normal.   Mitral Valve: There is mild regurgitation.   Tricuspid Valve: There is mild to moderate regurgitation.   Pulmonary Artery: There is pulmonary hypertension. The estimated pulmonary artery systolic pressure is 46 mmHg.   IVC/SVC: Intermediate venous pressure at 8 mmHg.     MRI Brain With Contrast    Result Date: 9/17/2024  EXAMINATION: MRI BRAIN WITH CONTRAST CLINICAL HISTORY: follow up on non contrast MRI finding; TECHNIQUE: T1-weighted post-contrast multiplanar sequences were obtained through the brain.  6 mL Gadavist was administered intravenously without reported reaction. COMPARISON: Brain MRI without contrast performed earlier today FINDINGS: The prior diagnostic brain MRI demonstrated multiple regions of abnormal FLAIR and T2 hyperintense signal and a somewhat atypical distribution including the mesial right temporal lobe, periventricular white matter and splenium of the corpus callosum on the left, sub appended mole region.  There is no abnormal enhancement associated at any of the sites of  signal abnormality.  The findings previously described remain nonspecific.  Previous differential considerations still exist.     1. There is no abnormal enhancement.  Signal abnormalities on unenhanced brain MRI remain nonspecific. Electronically signed by: Derian Machado MD Date:    09/17/2024 Time:    16:15    MRI Brain Without Contrast    Result Date: 9/17/2024  EXAMINATION: MRI BRAIN WITHOUT CONTRAST CLINICAL HISTORY: Mental status change, unknown cause;. TECHNIQUE: Multiplanar multisequence MR imaging of the brain was performed without the administration of intravenous contrast. COMPARISON: CT head 09/16/2024 FINDINGS: Ventricles and sulci are normal in size for age without evidence of hydrocephalus. Abnormal, ill-defined T2/FLAIR hyperintense signal within the right mesial temporal lobe with associated hippocampal/gyral enlargement.  No diffusion restriction or susceptibility artifact to suggest hemorrhage. Mild nonspecific asymmetric ependymal/subependymal white matter T2/FLAIR hyperintense signal about the posterior body/atrium of the left lateral ventricle with question asymmetric involvement of the left frontal horn.  Superimposed scattered foci and patchy T2/FLAIR hyperintense signal within the bilateral periventricular, deep and subcortical white matter, nonspecific and may reflect sequelae of chronic small vessel ischemic change.  Diffusion-weighted images demonstrate no evidence of an acute infarct elsewhere.    No evidence of intracranial acute or chronic hemorrhage elsewhere. Normal vascular flow voids are present. Bone marrow signal intensity is normal. The paranasal sinuses are normal.  The visualized portions of the mastoids are unremarkable. Orbits are unremarkable.     1. Abnormal, ill-defined T2/FLAIR hyperintense signal within the right mesial temporal lobe with differential considerations including infectious encephalitis, particularly herpes simplex virus (HSV) encephalitis, autoimmune  encephalitis, status epilepticus, or possible low-grade neoplasm. 2. Mild patchy and scattered foci of T2/FLAIR hyperintense signal within the supratentorial white matter elsewhere, nonspecific and may reflect sequelae of chronic small vessel ischemic change. 3. Mild asymmetric T2/FLAIR hyperintense signal involving the ependymal/subependymal white matter of the posterior body/atrium and left frontal horn of the left lateral ventricle, nonspecific and while it could reflect sequelae of chronic small vessel ischemic change, focal encephalitis and/or ventriculitis cannot be excluded.  No hydrocephalus. This report was flagged in Epic as abnormal.  The significant finding above was relayed by myself  via [facility] secure chat to Beatriz Boogie MD and Joe Galeana MD on 09/17/2024 at 13:40.  Findings were acknowledged and understood. Electronically signed by: Jacob Armenta Date:    09/17/2024 Time:    13:57    X-Ray Chest 1 View    Result Date: 9/16/2024  EXAMINATION: XR CHEST 1 VIEW CLINICAL HISTORY: Sepsis; FINDINGS: Portable chest at 1441 compared with 06/06/2024 shows unchanged cardiomediastinal silhouette. Lungs are clear. Pulmonary vasculature is normal. No acute osseous abnormality.     No acute cardiopulmonary abnormality. Electronically signed by: Farrukh Ramos Date:    09/16/2024 Time:    14:53    CT Head Without Contrast    Result Date: 9/16/2024  EXAMINATION: CT HEAD WITHOUT CONTRAST CLINICAL HISTORY: Mental status change, unknown cause; TECHNIQUE: Axial CT imaging obtained through the brain without IV contrast. CMS Mandated Quality Data-CT Radiation Dose-436 All CT scans at this facility dose modulation, iterative reconstruction, and or weight-based dosing when appropriate to reduce radiation dose to as low as reasonably achievable. COMPARISON: None FINDINGS: Negative for acute intracranial hemorrhage, midline shift, or mass effect.  Ventricles and sulci are normal in size.  Gray-white differentiation is  maintained.  Mild periventricular and deep white matter hypoattenuation, consistent with microangiopathic change.  Cerebellar hemispheres and brainstem are unremarkable.  Atherosclerotic calcification of intracranial carotid arteries. No calvarial lesion or fracture.  Mastoid air cells are clear.  Mucosal thickening right maxillary sinus.  Paranasal sinuses are otherwise clear.     No CT evidence of acute intracranial pathology. Electronically signed by: Wang Acuña Date:    09/16/2024 Time:    14:52  - pulls last radiology orders      Assessment/Plan:      * Meningitis  LP shows > 2000 WBC with neurophil predominance, protein is high normal glucose   MRI shows encephalitis and ventriculitis findings   HSV and meningitis panel was all negative   CSF Cryptococcus negative, HIV,  West nile virus , Lyme disease, Dane mountain spotted fever antibody negative, CSF VDRL negative.     EEG shows encephalopathy and no epileptiform activity   Discussed with neurology and ID  MRI and LP was repeated on 9/19 shows WBC improved, but other wise similar findings.   Antibiotics narrowed down to p.o. doxycycline and IV Rocephin.  Patient is seems responded to very well to this regimen, mental status much improved, fever resolved, leukocytosis resolved.   Patient has a recently trip to [place]   I highly suspect patient may have HGA or HME CNS infection.   Check ehrlichiosis antibody panel, check blood peripheral smear to look for  morale inclusions.   ID note appreciated, continue IV Rocephin and p.o. doxycycline, anticipate last dose of IV Rocephin on 9/30.     PT recommended rehab placement.  Likely discharge after last dose of IV Rocephin on 9/30.          HFrEF (heart failure with reduced ejection fraction)  New onset   Echo show Takotsubo pattern   Cardiology was consulted and recommend ischemic work up when completely treated for meningitis, and starting toprol at that time. They have signed off until meningitis  treatment is complete.        Leukocytosis  Due to above       Acute encephalopathy  Likely secondary to bacterial meningitis.   Mental status returned to baseline   MRI shows encephalitis and ventriculitis findings   Ammonia, TSH is normal   Neurology following   EEG shows encephalopathy, no epileptiform activity noted.   Rpt LP shows improved WBC and MRI similar to prior   Opening pressures are normal     Resolved          VTE Risk Mitigation (From admission, onward)           Ordered     enoxaparin injection 40 mg  Every 24 hours         09/28/24 1355     IP VTE LOW RISK PATIENT  Once         09/17/24 0239     Place sequential compression device  Until discontinued         09/17/24 0239                    Discharge Planning   WILVER: 9/30/2024     Code Status: Full Code   Is the patient medically ready for discharge?:     Reason for patient still in hospital (select all that apply): Patient trending condition and Treatment  Discharge Plan A: Home Health, Home with family                  Dallas Mora MD  Department of Hospital Medicine   [place]/Surg

## 2024-09-29 NOTE — ASSESSMENT & PLAN NOTE
LP shows > 2000 WBC with neurophil predominance, protein is high normal glucose   MRI shows encephalitis and ventriculitis findings   HSV and meningitis panel was all negative   CSF Cryptococcus negative, HIV,  [place] n[place] virus , Lyme disease, Dane mountain spotted fever antibody negative, CSF VDRL negative.     EEG shows encephalopathy and no epileptiform activity   Discussed with neurology and ID  MRI and LP was repeated on 9/19 shows WBC improved, but other wise similar findings.   Antibiotics narrowed down to p.o. doxycycline and IV Rocephin.  Patient is seems responded to very well to this regimen, mental status much improved, fever resolved, leukocytosis resolved.   Patient has a recently trip to [place]   I highly suspect patient may have HGA or HME CNS infection.   Check ehrlichiosis antibody panel, check blood peripheral smear to look for  morale inclusions.   ID note appreciated, continue IV Rocephin and p.o. doxycycline, anticipate last dose of IV Rocephin on 9/30.     PT recommended rehab placement.  Likely discharge after last dose of IV Rocephin on 9/30.

## 2024-09-29 NOTE — CARE UPDATE
09/28/24 2003   Patient Assessment/Suction   Level of Consciousness (AVPU) alert   Respiratory Effort Unlabored   Expansion/Accessory Muscles/Retractions no retractions;no use of accessory muscles   PRE-TX-O2   Device (Oxygen Therapy) room air   SpO2 95 %   Pulse Oximetry Type Intermittent   $ Pulse Oximetry - Multiple Charge Pulse Oximetry - Multiple   Pulse 77   Resp 20

## 2024-09-30 VITALS
RESPIRATION RATE: 18 BRPM | DIASTOLIC BLOOD PRESSURE: 58 MMHG | HEART RATE: 78 BPM | TEMPERATURE: 98 F | OXYGEN SATURATION: 98 % | HEIGHT: 61 IN | SYSTOLIC BLOOD PRESSURE: 122 MMHG | WEIGHT: 138.69 LBS | BODY MASS INDEX: 26.19 KG/M2

## 2024-09-30 LAB
AMPA-R AB CBA, CSF: NEGATIVE
AMPHIPHYSIN AB TITR CSF: NEGATIVE {TITER}
ANNOTATION COMMENT IMP: NORMAL
CASPR2-IGG CBA, CSF: NEGATIVE
CV2 IGG TITR CSF: NEGATIVE {TITER}
DPPX AB CBA, CSF: NEGATIVE
GABA-B-R AB, CBA, CSF: NEGATIVE
GAD65 AB CSF-SCNC: 0 NMOL/L
GFAP IFA, CSF: NEGATIVE
GLIAL NUC TYPE 1 AB TITR CSF: NEGATIVE {TITER}
HU1 AB TITR CSF IF: NEGATIVE {TITER}
HU2 AB TITR CSF IF: NEGATIVE {TITER}
HU3 AB TITR CSF: NEGATIVE {TITER}
IGLON5 CBA, CSF: NEGATIVE
IMMUNOLOGIST REVIEW: NORMAL
LGI1-IGG CBA,CSF: NEGATIVE
M NEUROCHONDRIN IFA, CSF: NEGATIVE
M SEPTIN-7 IFA, CSF: NEGATIVE
MGLUR1 AB IFA, CSF: NEGATIVE
NIF IFA, CSF: NEGATIVE
NMDAR1 AB, CBA, CSF: NEGATIVE
PCA-2 AB TITR CSF: NEGATIVE {TITER}
PCA-TR AB TITR CSF: NEGATIVE {TITER}
PDE10A AB IFA, CSF: NEGATIVE
PURKINJE CELLS AB TITR CSF IF: NEGATIVE {TITER}
TRIM46 AB IFA, CSF: NEGATIVE

## 2024-09-30 PROCEDURE — 97116 GAIT TRAINING THERAPY: CPT | Mod: CQ

## 2024-09-30 PROCEDURE — 25000003 PHARM REV CODE 250: Performed by: NURSE PRACTITIONER

## 2024-09-30 PROCEDURE — 97535 SELF CARE MNGMENT TRAINING: CPT

## 2024-09-30 PROCEDURE — 94760 N-INVAS EAR/PLS OXIMETRY 1: CPT

## 2024-09-30 PROCEDURE — 25000003 PHARM REV CODE 250: Performed by: INTERNAL MEDICINE

## 2024-09-30 PROCEDURE — 99233 SBSQ HOSP IP/OBS HIGH 50: CPT | Mod: ,,, | Performed by: NURSE PRACTITIONER

## 2024-09-30 PROCEDURE — 94761 N-INVAS EAR/PLS OXIMETRY MLT: CPT

## 2024-09-30 PROCEDURE — 63600175 PHARM REV CODE 636 W HCPCS: Performed by: INTERNAL MEDICINE

## 2024-09-30 RX ORDER — METOPROLOL SUCCINATE 25 MG/1
25 TABLET, EXTENDED RELEASE ORAL DAILY
Qty: 90 TABLET | Refills: 0 | Status: SHIPPED | OUTPATIENT
Start: 2024-09-30 | End: 2024-12-29

## 2024-09-30 RX ORDER — DOXYCYCLINE HYCLATE 100 MG
100 TABLET ORAL EVERY 12 HOURS
Qty: 6 TABLET | Refills: 0 | Status: SHIPPED | OUTPATIENT
Start: 2024-09-30 | End: 2024-10-03

## 2024-09-30 RX ADMIN — DOXYCYCLINE HYCLATE 100 MG: 100 TABLET, COATED ORAL at 08:09

## 2024-09-30 RX ADMIN — FAMOTIDINE 40 MG: 20 TABLET, FILM COATED ORAL at 08:09

## 2024-09-30 RX ADMIN — CEFTRIAXONE 2 G: 2 INJECTION, POWDER, FOR SOLUTION INTRAMUSCULAR; INTRAVENOUS at 04:09

## 2024-09-30 NOTE — PLAN OF CARE
Plan of care reviewed with patient. Verbalized understanding. Midline to left arm intact and patent. No redness or swelling. Tele in place and being monitored. No complaint of pain. No signs of confusion noted. Safety maintained. Call light in reach and instructed to call for assistance. Will continue to monitor.

## 2024-09-30 NOTE — PLAN OF CARE
Pt clear for DC from CM standpoint. Discharging home with HH.     09/30/24 1041   Final Note   Assessment Type Final Discharge Note   Anticipated Discharge Disposition Home-Health

## 2024-09-30 NOTE — PLAN OF CARE
Charts and orders reviewed. Pt to discharge home with [facility] home health. SOC is Wednesday, 10/2/2024.  called Pt's PCP office to schedule Pt follow-up appointment; appointment scheduled (PCP: 10/7/2024 @ 9:00 AM, Infectious Disease f/u: 10/14/2024 @ 2:30PM, Cardio f/u: 10/10/2024 @ 10:00 AM) and PCP details added to AVS. Pt has no other needs to be addressed by case management. Pt cleared to discharge from case management standpoint.        09/30/24 1045   Final Note   Assessment Type Final Discharge Note   Anticipated Discharge Disposition Home-Health   What phone number can be called within the next 1-3 days to see how you are doing after discharge? [phone]   Post-Acute Status   Post-Acute Authorization Home Health   Home Health Status Set-up Complete/Auth obtained   Coverage [employer] MANA[employer]ED MEDICARE - [employer] MEDICARE O - CAPITATED   IV Infusion Status Set-up Complete/Auth obtained   Discharge Delays None known at this time

## 2024-09-30 NOTE — PLAN OF CARE
Problem: Adult Inpatient Plan of Care  Goal: Plan of Care Review  Outcome: Met  Goal: Patient-Specific Goal (Individualized)  Outcome: Met  Goal: Absence of Hospital-Acquired Illness or Injury  Outcome: Met  Goal: Optimal Comfort and Wellbeing  Outcome: Met  Goal: Readiness for Transition of Care  Outcome: Met     Problem: Infection  Goal: Absence of Infection Signs and Symptoms  Outcome: Met     Problem: Pain Acute  Goal: Optimal Pain Control and Function  Outcome: Met     Problem: Fall Injury Risk  Goal: Absence of Fall and Fall-Related Injury  Outcome: Met     Problem: Meningitis/Encephalitis  Goal: Optimal Coping  Outcome: Met  Goal: Effective Cerebral Tissue Perfusion  Outcome: Met  Goal: Absence of Infection Signs and Symptoms  Outcome: Met     Problem: Nausea and Vomiting  Goal: Nausea and Vomiting Relief  Outcome: Met     Problem: Social Isolation  Goal: Social Connection Supported  Outcome: Met     Problem: Occupational Therapy  Goal: Occupational Therapy Goal  Description: Goals to be met by: 10/19/24     Patient will increase functional independence with ADLs by performing:    UE Dressing with Modified [place].  LE Dressing with Modified [place].  Grooming while standing at sink with Modified [place].  Toileting from toilet with Modified [place] for hygiene and clothing management.   Bathing from  shower chair/bench with Modified [place].  Toilet transfer to toilet with Modified [place].  Increased strength and functional activity tolerance for ADL's/IADL's    Outcome: Met     Problem: Skin Injury Risk Increased  Goal: Skin Health and Integrity  Outcome: Met     Problem: Physical Therapy  Goal: Physical Therapy Goal  Description: Goals to be met by: 24     Patient will increase functional independence with mobility by performin. Supine to sit with [place]  2. Sit to supine with [place]  3. Sit to stand transfer with Stand-by Assistance  4. Bed to chair  transfer with Stand-by Assistance using Rolling Walker  5. Gait  x 250 feet with Stand-by Assistance using Rolling Walker.     Outcome: Met

## 2024-09-30 NOTE — CARE UPDATE
09/30/24 0952   PRE-TX-O2   Device (Oxygen Therapy) room air   SpO2 96 %   Pulse Oximetry Type Intermittent   $ Pulse Oximetry - Multiple Charge Pulse Oximetry - Multiple

## 2024-09-30 NOTE — PROGRESS NOTES
.[facility]   Department of Infectious Disease  Progress Note        PATIENT NAME: Masha Hobbs  MRN: [id number]  TODAY'S DATE: 2024  ADMIT DATE: 2024  LOS: 13 days    CHIEF COMPLAINT: Altered Mental Status (Confusion that began yesterday at 4pm per pt's  along with weakness - pt presents to ER with fever and can state name and  and situation but not president.  Patient have been vomiting since 1pm yesterday), Fever, Headache, and Emesis    PRINCIPLE PROBLEM: Meningitis    REASON FOR CONSULT:    meningoencephalitis    INTERVAL HISTORY     24@0746 (Denette): Patient examined alone in her room, she is being followed for many go encephalitis.  She was awake and alert watching television.  She looks and feels much better.  Her only complaint is weakness and poor appetite.  She denies fevers or chills, nausea vomiting, constipation has resolved.   She has not occasional dry cough, is on room air and denies shortness of breath, palpitations, chest pain or abdominal pain.  She does not remember being bitten by a tick her insect.  She was supposed to be discharged today and feels like she was ready to go home.  Her last dose of ceftriaxone we will be today and then she will have 2 more days of doxycycline.  There was no new lab work today.  The only lab pending is Ehrlichia antibody panel. Discussed with patient antibiotic recommendations and follow-up.  We also discussed follow-up with Cardiology as an outpatient for echocardiogram findings.Afebrile since  with T-max 98.4° in the last 24 hours.    2024:  Continues to improve overall.  No acute issues overnight.  Remains afebrile.  Leukocytosis resolved.    2024:  No acute issues overnight.  Continues to improve.  Appetite improving.  Afebrile the last 24 hours. Participating in PT/OT.  Lyme serology negative.  RMSF serology pending.    2024:  Seen and evaluated at bedside.  No acute issues overnight.   Continues to improve clinically.  T-max overnight was 101.3.  She participated in PT/OT yesterday.  Appetite still poor.    09/25/24@0735 (Edwin): Patient is lying in bed with her eyes closed and awakens easily to voice.   She states she was feeling okay.  T-max in the last 24 hours 100 at 0700 hours yesterday.  Heart rate  and respiratory rate have normalized.  She was given a dose of Lasix yesterday with good urine output.   She denies fevers or chills, body aches or joint pain, shortness of breath or cough.  She had a very small bowel movement yesterday and still has mild tenderness to palpation in her abdomen.  She denies dysuria.  Appetite is still very poor and she had a smoothie yesterday.  Mouth is still dry though looks much improved today.  WBC 17.23 trending down, platelets 290, H&H 12.5/37.3, left shift 75%, BUN/CR 21/0.7 and ALT/AST 23/26. JACINDA titer 1:80,  RPR nonreactive, HIV negative, blood cultures and CFS cultures all negative.  CTA chest from yesterday negative for PE with small pericardial effusion and moderate bilateral pleural effusions, small gallstones and multiple thoracolumbar compression fractures.  Lab still pending includes VDRL CSF,  encephalopathy autoimmune evaluation, CSF, Lyme disease Western blot, spotted fever group antibodies.  She is on day 5 of doxycycline and day 10 of ceftriaxone.    09/24/24@2878 (Edwin): Patient is seen alone in her room.  She opens her eyes to voice and complains of being tired.   She is not as alert and keenly interactive as she was yesterday.   She still has not had a bowel movement.  She complains of mild abdominal tenderness with palpation but there is no guarding in abdomen is soft. She denies shortness or breath, cough, chest pain or palpitations, headaches or dizziness.   She does complain of a dry mouth and I helped her drink some water.  She had a CTA chest PE study this morning because of tachycardia that started around 2000 hours last night.    T-max 100.4° last night around 11, this morning at 0700 hours temperature was a 100°.  WBC elevated at 20.47, platelets 426, left shift 82%, no bands, H&H 12.5/36.3, sodium decreased at 129,  creatinine 0.8, CRP 58.7, procalcitonin negative at 0.06.  ALT/AST 19/22.  ESR 66, D-dimer 3.10.  Ferritin 227, Treponemal serology negative, cryptococcus antigen negative, HSV 1 and 2 negative,  West Nile virus from CSF serology negative. Still pending from CSF VDRL, encephalopathy autoimmune evaluation and serum tests pending including VDRL CSF, JACINDA titer, RPR titer, Lyme disease Western blot, RMSF antibodies and HIV.  She continues on ceftriaxone Day 9 and doxycycline Day 4.    09/23/24@0822 (Edwin):  patient seen and examined in her room with her daughter present.  She is eating breakfast but has not been very hungry at all.   She had a fever yesterday of 102.2  around 1926 hours.  She does not really remember having chills, headaches or body ache but was very tired and slept most of the day.  Today she was awake and alert and feeling much better.  Headache improved, no nausea vomiting, she was had no bowel movement since the 14th.   She denies dysuria and states that she was voiding well. WBC 15.35, platelets 383, creatinine 0.8 and creatinine clearance 50 6.4.  CRP 62.1, procalcitonin 0.06.  Respiratory virus panel negative.  Blood and CSF cultures negative so far.    9/22/24  Dr. Torres covering for the weekend   Chart reviewed  She is still febrile  Nothing new in cultures  Multiple tests are pending: HIV, RPR, Lyme WB, Spotted fever group abs, crp,   Add WNV abs to csf     09/21/2024.  Dr. Torres covering for the weekend  Patient had another spike of fever 102.2 yesterday, better than 102.9 the day before, associated by sweating, lethargy; she does not remember exactly, but daughter states that she was out of it at the time of fever.  No localizing signs and symptoms of infection.  WBC 13--15.6 but she received  steroids on admission.  Anemia noted , hemoglobin 14--11.6.  CSF results as below  She walked with therapy around the hallway.  She is sitting up in a chair right now.  The rash on anterior chest and upper back is improved, as per .    09/20/2024:  Had fever yesterday with T-max 102.9.  Repeat MRI brain without contrast 09/19/2024 same as 09/17/2024.  Had repeat LP with improved CSF parameters.  Repeat CSF encephalitis panel in progress.  She is more awake and alert today.  Also interactive.    09/19/2024:  Had fever last night to 101.  Still lethargic and in bed.  EEG with with diffuse slowing consistent with metabolic encephalopathy.  CSF culture remain negative.    09/18/2024:  She is more somnolent today.  Not eating very well.  Afebrile.  She answers questions appropriately.        Antibiotics (From admission, onward)      Start     Stop Route Frequency Ordered    09/23/24 1230  doxycycline tablet 100 mg         10/02/24 2359 Oral Every 12 hours 09/23/24 1126    09/17/24 1645  cefTRIAXone (ROCEPHIN) 2 g in D5W 100 mL IVPB (MB+)         09/30/24 2359 IV Every 12 hours (non-standard times) 09/17/24 1540          Antifungals (From admission, onward)      None           Antivirals (From admission, onward)      None            ASSESSMENT and PLAN     1. Meningeal encephalitis.  Etiology unclear.  Improving though remains febrile.  Persistent intermittent fever may be drug induced versus secondary to underlying etiology of the meningeal encephalitis.  Clinically resolving.  Continue ceftriaxone to complete 14 day course through 09/30/2024.  Continue doxycycline 100 mg b.i.d. p.o. to complete 10 day course through 10/2/24     2. Debility.  Continue PT/OT.      3. Constipation.  Better.  Management as per hospitalist.    RECOMMENDATIONS:    Continue ceftriaxone 2 g q.12 hours through 09/30/24  Doxycycline 100 mg b.i.d. p.o. through 10/02/2024  Okay from ID standpoint to discharge home with home health if cleared  by neurologist  Follow up with ID service in 2 weeks post discharge -   Message sent to office    D/W Dr Sepulveda    Thank you for involving ID in the care of this patient.  From ID standpoint to discharge home with home health and complete antimicrobials.  Please send Epic secure chat with any questions.        HPI   Masha Hobbs is a 68 y.o. female with history of osteoporosis and chronic low back pain.  He presents to the ER 09/16/2024 with 1 day history of headache associated with nausea and vomiting and generalized body aches.  In the ER /67, pulse 110, respiratory rate 18, temperature 100.4°, oxygen 95%.  WBC 14 K, hematocrit 42, CMP unremarkable.  X-ray with no acute infiltrate.  CT head unremarkable.  LP was done.  CSF was abnormal.  It was slightly hazy, WBC 2038, RBC 14, neutrophils 89%, glucose 50, protein 173.  Accompanying serum glucose was 130.       She was admitted and commenced on antibiotics and dexamethasone for meningitis.  MRI brain without contrast today 97/24 read as showing ill-defined T2/FLAIR hyperintense signal within the right medial temporal lobe with differentials including infectious encephalitis vascularly HSV, autoimmune encephalitis.  Repeat MRI brain with contrast with no abnormal enhancement.  Id asked to assist with her care.     She recently returned from [place] on 09/13/2024 after a 1 week trip.  They had gone to several cota and and did a lot of sightseeing .  States she was washing her hands frequently.  They were around a lot of people.  No other sick contacts.  No pets at home.  Denies eating anything unusual.  Has not received age appropriate pneumococcal vaccine.     Antibiotic history:    Valtrex: 09/16/2024 x1 dose   Vancomycin: 09/16-20  IV Acyclovir: 09/17-23  Ceftriaxone: 09/16-9/30  Doxycycline: 09/21-    Microbiology and Serology:    9/16/24 blood cultures x2 sets negative  9/16/24 CSFculture negative; G stain with few WBC's, no organisms  9/16/24  meningitis panel negative  9/16 CSF cell count  slightly hazy, 14 RBCs, 2000 38 WBCs, 89% neutrophils, 7% lymphocytes, 4% monocytes,  glucose 50, protein 170  9/17/24 HSV 1-2 PCR negative  9/19/24 CSFculture negative; G stain with few WBC's, no organisms  9/19/24 meningitis panel negative  9/19/2024 CSF cell count  clear with 3 RBCs, 148 WBCs, 63% neutrophils and 15% lymphocytes,  glucose 51, protein 126  9/20/24 cryptococcal antigen negative  9/23/24 RVP negative  9/17/24 herpes simplex virus PCR 1 in 2 negative  09/22/2024 treponemal antibodies negative  09/19/2024 West Nile virus IgG and IgM from CSF negative  09/23/2024 ferritin 227   09/18/2024 strep pneumo urine antigen negative  09/22/2024 HIV 1 2 antibody antigen 4th negative  09/22/2024 RPR titer negative  09/23/2024 JACINDA screen 1:80  09/19/2024 VDRL CSF: Negative   9/19/2024 encephalopathy autoimmune evaluation, CSF negative  09/22/2024 Dane mountain spotted fever antibodies negative  09/22/2024 Lyme disease Western blot: Negative    09/28/2024 Ehrlichia antibody panel pending      SUBJECTIVE      Review of Systems  Negative except as stated above in Interval History    Review of patient's allergies indicates:  No Known Allergies      OBJECTIVE   Temp:  [97.1 °F (36.2 °C)-98.4 °F (36.9 °C)] 98.4 °F (36.9 °C)  Pulse:  [79-87] 87  Resp:  [17-18] 18  SpO2:  [94 %-97 %] 96 %  BP: (115-147)/(56-67) 129/60  Temp:  [97.1 °F (36.2 °C)-98.4 °F (36.9 °C)]   Temp: 98.4 °F (36.9 °C) (09/30/24 0731)  Pulse: 87 (09/30/24 0731)  Resp: 18 (09/30/24 0731)  BP: 129/60 (09/30/24 0731)  SpO2: 96 % (09/30/24 0731)    Intake/Output Summary (Last 24 hours) at 9/30/2024 0852  Last data filed at 9/30/2024 0551  Gross per 24 hour   Intake 580 ml   Output --   Net 580 ml       Physical Exam  General:  awake and alert, sitting up in bed watching television.  Eyes: Eyes with no icterus or injection. Vision grossly normal, PERRL  Mouth:   Moist mucous membranes, no exudates or  ulcerations.  Cardiovascular:   Regular rate and rhythm, + murmur, no peripheral edema.  Respiratory:   Bilateral breath sounds  clear to auscultation anteriorly, on RA  Musculoskeletal:   Moves all extremities, much improved strength.  Skin:   Pale, warm and dry, no obvious rash present.  No wounds.  Neuro:  moves all extremities, conversant, follows commands, keenly alert.  Psych:   Good mood, normal affect  VAD:  PIV  ISOLATION: None     Wounds:  None        Significant Labs: All pertinent labs within the past 24 hours have been reviewed.    CBC LAST 7 DAYS  Recent Labs   Lab 09/24/24  0415 09/25/24  0428 09/26/24  0345 09/27/24  0329 09/28/24  0354 09/28/24  1241 09/29/24  0354   WBC 20.47* 17.23* 15.56* 14.70* 11.18 11.31 11.43   RBC 3.91* 3.88* 3.77* 3.71* 3.23* 3.26* 3.38*   HGB 12.5 12.5 11.9* 11.8* 10.6* 10.7* 10.9*   HCT 36.3* 37.3 35.6* 34.7* 30.6* 31.2* 32.4*   MCV 93 96 94 94 95 96 96   MCH 32.0* 32.2* 31.6* 31.8* 32.8* 32.8* 32.2*   MCHC 34.4 33.5 33.4 34.0 34.6 34.3 33.6   RDW 12.2 12.5 12.3 12.3 12.4 12.6 12.7    290 473* 357 447 428 469*   MPV 8.9* 10.5 9.1* 9.9 9.1* 8.5* 8.6*   GRAN 82.4*  16.9* 75.5*  13.0* 74.5*  11.6* 72.8  10.7* 63.6  7.1 72.8  8.2* 68.6  7.8*   LYMPH 10.0*  2.0 14.2*  2.5 16.3*  2.5 17.5*  2.6 25.6  2.9 17.9*  2.0 21.7  2.5   MONO 6.1  1.2* 8.0  1.4* 6.9  1.1* 6.8  1.0 6.8  0.8 6.5  0.7 6.7  0.8   BASO 0.07 0.11 0.07 0.10 0.10 0.08 0.07   NRBC 0 0 0 0 0 0 0       CHEMISTRY LAST 7 DAYS  Recent Labs   Lab 09/24/24  0415 09/25/24  0428 09/26/24  0345 09/27/24  0324 09/28/24  0354 09/29/24  0354   * 130* 129* 129* 132* 133*   K 3.7 3.9 4.1 3.8 4.2 4.4   CL 92* 95 93* 94* 97 101   CO2 26 26 26 26 25 23   ANIONGAP 11 9 10 9 10 9   BUN 17 21 22 26* 28* 21   CREATININE 0.8 0.7 0.7 0.8 0.8 0.8   * 119* 114* 99 89 78   CALCIUM 8.8 8.6* 9.0 9.4 9.1 9.1   MG  --   --   --   --  2.0 1.9   ALBUMIN 2.6* 2.3* 2.5* 2.6* 2.4*  --    PROT 6.9 6.2 6.6 6.8  "6.0  --    ALKPHOS 61 51* 58 59 54*  --    ALT 19 23 57* 118* 82*  --    AST 22 26 47* 85* 39  --    BILITOT 0.3 0.3 0.3 0.3 0.3  --            Volume, Cell Count CSF 0.5  4.0   Appearance, CSF Sligh...  Clear   RBC, Cell Count CSF 14  3   Wbc, Cell Count CSF 2038  148   Diff Segs % CSF 89  63   Lymphs, CSF 7  15   Monocytes/Mononuclear,CSF % 4  22   Eosinophils, CSF %   0   Basophils, CSF %   0       Estimated Creatinine Clearance: 56.4 mL/min (based on SCr of 0.8 mg/dL).    INFLAMMATORY/PROCAL  LAST 7 DAYS  Recent Labs   Lab 09/24/24  0415   PROCAL 0.06   CRP 58.7*     No results found for: "ESR"  CRP   Date Value Ref Range Status   09/24/2024 58.7 (H) 0.0 - 8.2 mg/L Final   09/23/2024 62.1 (H) 0.0 - 8.2 mg/L Final       PRIOR  MICROBIOLOGY:    Susceptibility data from last 90 days.  Collected Specimen Info Organism   09/16/24 Blood from Peripheral, Antecubital, Left No growth after 5 days.   09/16/24 Blood from Peripheral, Antecubital, Right No growth after 5 days.     Encephalopathy Interpretation, CSF  SEE BELOW   Comment: No informative autoantibodies were detected in this  evaluation. However, a negative result does not exclude  autoimmune encephalopathy, idiopathic or paraneoplastic.  Sensitivity and specificity of antibody testing are  enhanced by testing both serum and CSF.   AMPA-R Ab CBA, CSF Negative Negative   Comment: -------------------ADDITIONAL INFORMATION-------------------  This test was developed and its performance characteristics  determined by [facility] in a manner consistent with CLIA  requirements. This test has not been cleared or approved by  the U.S. Food and Drug Administration.   PNEOE, Amphiphysin Ab, Csf Negative Negative   Comment: -------------------ADDITIONAL INFORMATION-------------------  This test was developed and its performance characteristics  determined by [facility] in a manner consistent with CLIA  requirements. This test has not been cleared or approved by  the [place] " Food and Drug Administration.   CHRISTELLE YUSUF-1, Csf Negative Negative   Comment: -------------------ADDITIONAL INFORMATION-------------------  This test was developed and its performance characteristics  determined by [facility] in a manner consistent with CLIA  requirements. This test has not been cleared or approved by  the U.S. Food and Drug Administration.   FRANCIEEOE VICKY-1, Csf Negative Negative   Comment: -------------------ADDITIONAL INFORMATION-------------------  This test was developed and its performance characteristics  determined by [facility] in a manner consistent with CLIA  requirements. This test has not been cleared or approved by  the U.S. Food and Drug Administration.   PNEOE VICKY-2, Csf Negative Negative   Comment: -------------------ADDITIONAL INFORMATION-------------------  This test was developed and its performance characteristics  determined by [facility] in a manner consistent with CLIA  requirements. This test has not been cleared or approved by  the U.S. Food and Drug Administration.   PNEOE VICKY-3, Csf Negative Negative   Comment: -------------------ADDITIONAL INFORMATION-------------------  This test was developed and its performance characteristics  determined by [facility] in a manner consistent with CLIA  requirements. This test has not been cleared or approved by  the U.S. Food and Drug Administration.   CASPR2-IgG CBA, CSF Negative Negative   Comment: -------------------ADDITIONAL INFORMATION-------------------  This test was developed and its performance characteristics  determined by [facility] in a manner consistent with CLIA  requirements. This test has not been cleared or approved by  the U.S. Food and Drug Administration.   CHRISTELLE,  CRMP-5-IgG, Csf Negative Negative   Comment: -------------------ADDITIONAL INFORMATION-------------------  This test was developed and its performance characteristics  determined by [facility] in a manner consistent with CLIA  requirements. This  test has not been cleared or approved by  the U.S. Food and Drug Administration.   DPPX Ab CBA, CSF Negative Negative   Comment: -------------------ADDITIONAL INFORMATION-------------------  This test was developed and its performance characteristics  determined by [facility] in a manner consistent with CLIA  requirements. This test has not been cleared or approved by  the U.S. Food and Drug Administration.   BROOKLYN-B-R Ab, CBA, CSF Negative Negative   Comment: -------------------ADDITIONAL INFORMATION-------------------  This test was developed and its performance characteristics  determined by [facility] in a manner consistent with CLIA  requirements. This test has not been cleared or approved by  the U.S. Food and Drug Administration.   ROSALINE Antibody, CSF <=0.02 nmol/L 0.00   Comment: -------------------ADDITIONAL INFORMATION-------------------  This test was developed and its performance characteristics  determined by [facility] in a manner consistent with CLIA  requirements. This test has not been cleared or approved by  the U.S. Food and Drug Administration.   GFAP IFA, CSF Negative Negative   Comment: -------------------ADDITIONAL INFORMATION-------------------  This test was developed and its performance characteristics  determined by [facility] in a manner consistent with CLIA  requirements. This test has not been cleared or approved by  the U.S. Food and Drug Administration.   LGI1-IgG CBA,CSF Negative Negative   Comment: -------------------ADDITIONAL INFORMATION-------------------  This test was developed and its performance characteristics  determined by [facility] in a manner consistent with CLIA  requirements. This test has not been cleared or approved by  the U.S. Food and Drug Administration.   mGluR1 Ab CBA, CSF Negative Negative   Comment: -------------------ADDITIONAL INFORMATION-------------------  This test was developed and its performance characteristics  determined by [facility] in a  manner consistent with CLIA  requirements. This test has not been cleared or approved by  the U.S. Food and Drug Administration.   NMDA-R Ab CBA, CSF Negative Negative   Comment: -------------------ADDITIONAL INFORMATION-------------------  This test was developed and its performance characteristics  determined by [facility] in a manner consistent with CLIA  requirements. This test has not been cleared or approved by  the U.S. Food and Drug Administration.   PNEOE PCA-Tr, Csf Negative Negative   Comment: -------------------ADDITIONAL INFORMATION-------------------  This test was developed and its performance characteristics  determined by [facility] in a manner consistent with CLIA  requirements. This test has not been cleared or approved by  the U.S. Food and Drug Administration.   PNEOE PCA-1, Csf Negative Negative   Comment: -------------------ADDITIONAL INFORMATION-------------------  This test was developed and its performance characteristics  determined by [facility] in a manner consistent with CLIA  requirements. This test has not been cleared or approved by  the U.S. Food and Drug Administration.   PNEOE PCA-2, Csf Negative Negative   Comment: -------------------ADDITIONAL INFORMATION-------------------  This test was developed and its performance characteristics  determined by [facility] in a manner consistent with CLIA  requirements. This test has not been cleared or approved by  the U.S. Food and Drug Administration.   IgLON5 CBA, CSF Negative Negative   Comment: -------------------ADDITIONAL INFORMATION-------------------  This test was developed and its performance characteristics  determined by [facility] in a manner consistent with CLIA  requirements. This test has not been cleared or approved by  the U.S. Food and Drug Administration.   NIF IFA, CSF Negative Negative   Comment: -------------------ADDITIONAL INFORMATION-------------------  This test was developed and its performance  characteristics  determined by [facility] in a manner consistent with CLIA  requirements. This test has not been cleared or approved by  the U.S. Food and Drug Administration.   Septin-7 IFA, CSF Negative Negative   Comment: -------------------ADDITIONAL INFORMATION-------------------  This test was developed and its performance characteristics  determined by [facility] in a manner consistent with CLIA  requirements. This test has not been cleared or approved by  the U.S. Food and Drug Administration.   Neurochondrin IFA, CSF Negative Negative   Comment: -------------------ADDITIONAL INFORMATION-------------------  This test was developed and its performance characteristics  determined by [facility] in a manner consistent with CLIA  requirements. This test has not been cleared or approved by  the U.S. Food and Drug Administration.   TRIM46 Ab IFA, CSF Negative Negative   Comment: -------------------ADDITIONAL INFORMATION-------------------  This test was developed and its performance characteristics  determined by [facility] in a manner consistent with CLIA  requirements. This test has not been cleared or approved by  the U.S. Food and Drug Administration.    Test Performed by:  [address] [address]  : Kaci Felix Ph.D.; CLIA# [id number]   PDE 10A Ab IFA, CSF Negative Negative   Comment: -------------------ADDITIONAL INFORMATION-------------------  This test was developed and its performance characteristics  determined by [facility] in a manner consistent with CLIA  requirements. This test has not been cleared or approved by  the U.S. Food and Drug Administration.       LAST 7 DAYS MICROBIOLOGY   Microbiology Results (last 7 days)       Procedure Component Value Units Date/Time    Respiratory Infection Panel (PCR), Nasopharyngeal [[id number]] Collected: 09/23/24 0018    Order Status: Completed Specimen: Nasopharyngeal Swab  Updated: 09/23/24 1024     Respiratory Infection Panel Source NP Swab     Adenovirus Not Detected     Coronavirus 229E, Common Cold Virus Not Detected     Coronavirus HKU1, Common Cold Virus Not Detected     Coronavirus NL63, Common Cold Virus Not Detected     Coronavirus OC43, Common Cold Virus Not Detected     Comment: Coronavirus strains 229E, HKU1, NL63, and OC43 can cause the common   cold   and are not associated with the respiratory disease outbreak caused   by  the COVID-19 (SARS-CoV-2 novel Coronavirus) strain.           SARS-CoV2 (COVID-19) Qualitative PCR Not Detected     Human Metapneumovirus Not Detected     Human Rhinovirus/Enterovirus Not Detected     Influenza A (subtypes H1, H1-2009,H3) Not Detected     Influenza B Not Detected     Parainfluenza Virus 1 Not Detected     Parainfluenza Virus 2 Not Detected     Parainfluenza Virus 3 Not Detected     Parainfluenza Virus 4 Not Detected     Respiratory Syncytial Virus Not Detected     Bordetella Parapertussis ([id number]) Not Detected     Bordetella pertussis (ptxP) Not Detected     Chlamydia pneumoniae Not Detected     Mycoplasma pneumoniae Not Detected     Comment: Respiratory Infection Panel testing performed by Multiplex PCR.       CSF culture [[id number]] Collected: 09/19/24 1527    Order Status: Completed Specimen: CSF (Spinal Fluid) from CSF Tap, Tube 2 Updated: 09/23/24 1006     CSF CULTURE No Growth     Gram Stain Result No epithelial cells      No organisms seen      Many WBC's      09/19/2024  17:32 tn3          CURRENT/PREVIOUS VISIT EKG  Results for orders placed or performed during the hospital encounter of 09/16/24   EKG 12-lead    Collection Time: 09/16/24  2:04 PM   Result Value Ref Range    QRS Duration 102 ms    [facility] QTC Calculation 452 ms    Narrative    Test Reason : R00.0,    Vent. Rate : 108 BPM     Atrial Rate : 108 BPM     P-R Int : 204 ms          QRS Dur : 102 ms      QT Int : 338 ms       P-R-T Axes : 030 065 020 degrees     QTc Int  : 452 ms    Sinus tachycardia  Incomplete right bundle branch block  Septal infarct ,age undetermined  Abnormal ECG  When compared with ECG of 06-JUN-2024 16:29,  SC interval has decreased  The axis Shifted right  Non-specific change in ST segment in Anterior leads  Confirmed by Eric Mathew MD ([id number]) on 9/22/2024 3:51:24 PM    Referred By:             Confirmed By:Eric Mathew MD       ECHO 09/16/2024   Left Ventricle: The left ventricle is normal in size. Normal wall thickness. Regional wall motion abnormalities present. There is akinesis of mid to distal segments and the apex. Normal contractility of the basal segments. Pattern suggestive of takotsubo cardiomyopathy. There is moderately reduced systolic function with a visually estimated ejection fraction of 30 - 35%. Grade II diastolic dysfunction.   Right Ventricle: Normal right ventricular cavity size. Systolic function is normal.   Mitral Valve: There is mild regurgitation.   Tricuspid Valve: There is mild to moderate regurgitation.   Pulmonary Artery: There is pulmonary hypertension. The estimated pulmonary artery systolic pressure is 46 mmHg.   IVC/SVC: Intermediate venous pressure at 8 mmHg.     Significant Imaging: I have reviewed all relevant and available imaging results/findings within the past 24 hours.    MRI brain 09/19/2024   1. No significant interval detrimental change in the appearance of the brain as compared to the prior exam dated 09/17/2024.  Persistent scattered abnormal T2/FLAIR hyperintense signal within the supratentorial brain, particularly involving the right mesial temporal lobe, similar to prior exam.  No definite new edema or mass effect.  Findings again are nonspecific with similar differential considerations including inflammatory/infectious, or autoimmune/paraneoplastic encephalitis, status epilepticus, or potential neoplastic process.  Recommend continued close clinical surveillance and consider short-term follow-up  imaging with contrast enhanced MRI of the brain, as clinically warranted.     CTA Chest Non-Coronary (PE Studies) 09/24/24 1028   No evidence for pulmonary embolus.   Moderate bilateral pleural effusions and features suggesting mild congestive failure.   Small pericardial effusion.   Small gallstones.   Osseous demineralization and multiple age-indeterminate thoracolumbar compression fractures.      I spent a total of 55 minutes on the day of the visit.This includes face to face time and non-face to face time preparing to see the patient (eg, review of tests), obtaining and/or reviewing separately obtained history, documenting clinical information in the electronic or other health record, independently interpreting results and communicating results to the patient/family/caregiver, or care coordinator.    Arielle Pineda NP  Date of Service: 09/30/2024      This note was created using [employer] voice recognition software that occasionally misinterpreted phrases or words.

## 2024-09-30 NOTE — PLAN OF CARE
Pt accepted by [facility], SOC will be 10/2.     Hospital follow ups scheduled:  -PCP 10/7  -Cardiology 10/10  -ID 10/14    All added to AVS.       09/30/24 1039   Post-Acute Status   Post-Acute Authorization Home Health;IV Infusion   Home Health Status Set-up Complete/Auth obtained   IV Infusion Status   (pt being discharged with oral abx)   Hospital Resources/Appts/Education Provided Appointments scheduled and added to AVS

## 2024-09-30 NOTE — CARE UPDATE
09/29/24 1959   Patient Assessment/Suction   Level of Consciousness (AVPU) alert   Respiratory Effort Unlabored   Expansion/Accessory Muscles/Retractions no retractions;no use of accessory muscles   PRE-TX-O2   Device (Oxygen Therapy) room air   SpO2 96 %   Pulse Oximetry Type Intermittent   $ Pulse Oximetry - Multiple Charge Pulse Oximetry - Multiple   Pulse 84   Resp 17

## 2024-09-30 NOTE — PLAN OF CARE
SW spoke to pt and family at bedside about HH.  SW received pt choice as [facility] and pt choice form signed.  MONIQUE Lozoya CM sent referral via [facility] and are waiting for acceptance.        09/30/24 1025   Post-Acute Status   Post-Acute Authorization Home Health   Home Health Status Referrals Sent   Coverage [employer] MANAGED MEDICARE - [employer] MED[employer] HMO   Discharge Delays None known at this time   Discharge Plan   Discharge Plan A Home Health

## 2024-09-30 NOTE — DISCHARGE SUMMARY
[facility]  Discharge Summary      Patient Name: Masha Hobbs  MRN: [id number]  ANGELITO: [id number]  Patient Class: IP- Inpatient  Admission Date: 9/16/2024  Hospital Length of Stay: 13 days  Discharge Date and Time: 9/30/2024 12:50 PM  Attending Physician: No att. providers found   Discharging Provider: Dallas Mora MD  Primary Care Provider: Roney Bang MD    Primary Care Team: Networked reference to record PCT     HPI:   Masha Hobbs is a 68 year old female with a past medical history of osteoporosis and chronic lower back pain with radiculopathy who was transferred from [facility] due to confusion associated with headache, fever, and generalized weakness/body aches for several hours prior to arrival.  Per  patient was confused since 4:00 p.m. Upon assessment confusion has resolved.  She denies any complaints other than a intermittent frontal lobe headache that is improving after Toradol administration.  There are no acute neurological focal deficits.  She denies chest pain, shortness of breath, dizziness, lightheadedness, vision changes, speech changes, numbness/tingling, neck pain, abdominal pain, nausea, or vomiting.  ED workup reveals:  CT head negative for acute intracranial hemorrhage. Chest x-ray no evidence of acute cardiopulmonary findings.  Lumbar puncture performed in ED, pending results.  She was treated with vancomycin, Rocephin, valacyclovir p.o., and dexamethasone.  CBC with elevated white count 13.8 and stable H&H.  BMP with phosphorus 2.0 otherwise unremarkable.  Negative flu/COVID.  Urinalysis negative.  Lactic acid 1.1.  ECG negative for ischemia or infarct.  MRI brain pending.  Neurology consult placed.  Admitted to hospital medicine for further management and treatment.            * No surgery found *      Hospital Course:   Patient is a 68 year old female admitted with AMS, headache and fever. LP was suggestive of meningitis with 2K WBC, mainly  neutrophils. HSV panel was negative. Patient was started on Vancomycin, Ampicillin and Rocephin. Acyclovir was discontinued after D1. MRI shows encephalitis findings with ventriculitis. Patient is being followed by neurology and ID. EEG has been ordered which is consistent with encephalopathy but no epileptiform activity. Patient continue to spike fever and is vomiting. Stat MRI was repeated with rpt LP was ordered. Rpt MRI did not show any changes. LP shows reduced WBC compared to the first one. Patient's mental status improved. Patient continued with daily fevers.LP shows > 2000 WBC with neurophil predominance, protein is high normal glucose   MRI shows encephalitis and ventriculitis findings   HSV and meningitis panel was all negative   CSF Cryptococcus negative, HIV,  West nile virus , Lyme disease, Dnae mountain spotted fever antibody negative, CSF VDRL negative.     EEG shows encephalopathy and no epileptiform activity   Discussed with neurology and ID  MRI and LP was repeated on 9/19 shows WBC improved, but other wise similar findings.   Antibiotics narrowed down to p.o. doxycycline and IV Rocephin.  Patient is seems responded to very well to this regimen, mental status much improved, fever resolved, leukocytosis resolved.     D Dimer was elevated, and CTA chest was done. This revealed no PE, but some pleural effusions. Lasix was given. Patient's appetite was poor and clinimix was added temporarily. Continued with poor appetite. Appetite stimulant added.     Patient has a recently trip to [place]   I highly suspect patient may have HGA or HME CNS infection.   Check ehrlichiosis antibody panel, check blood peripheral smear to look for  morale inclusions.   ID note appreciated, continue IV Rocephin and p.o. doxycycline, anticipate last dose of IV Rocephin on 9/30.       Patient is cleared for discharge home with home health, follow up ID in 2 weeks.  Patient also has new diagnosed cardiomyopathy with LVEF  "35%, evaluated by cardiologist, recommended outpatient follow up, started on low-dose metoprolol.      Goals of Care Treatment Preferences:  Code Status: Full Code      SDOH Screening:  The patient was screened for utility difficulties, food insecurity, transport difficulties, housing insecurity, and interpersonal safety and there were no concerns identified this admission.     Consults:   Consults (From admission, onward)          Status Ordering Provider     Inpatient consult to Social Work/Case Management  Once        Provider:  (Not yet assigned)    Acknowledged DANIS RABAGO     Inpatient consult to Midline team  Once        Provider:  (Not yet assigned)    Completed NITA DURAN     Inpatient consult to Infectious Diseases  Once        Provider:  Danis Rabago MD    Completed JUSTIN GALEANA     Pharmacy to dose Vancomycin consult  Once        Provider:  (Not yet assigned)   Placed in "And" Linked Group    Completed MICHELLE DE LA O     Inpatient consult to Neurology  Once        Provider:  Jusitn Galeana MD    Completed MICHELLE DE LA O            Neuro  Acute encephalopathy  Likely secondary to bacterial meningitis.   Mental status returned to baseline   MRI shows encephalitis and ventriculitis findings   Ammonia, TSH is normal   Neurology following   EEG shows encephalopathy, no epileptiform activity noted.   Rpt LP shows improved WBC and MRI similar to prior   Opening pressures are normal     Resolved        Cardiac/Vascular  HFrEF (heart failure with reduced ejection fraction)  New onset   Echo show Takotsubo pattern   Cardiology was consulted and recommend ischemic work up when completely treated for meningitis, and starting toprol at that time. They have signed off until meningitis treatment is complete.        ID  * Meningitis  LP shows > 2000 WBC with neurophil predominance, protein is high normal glucose   MRI shows encephalitis and ventriculitis findings   HSV and meningitis " panel was all negative   CSF Cryptococcus negative, HIV,  West nile virus , Lyme disease, Dane mountain spotted fever antibody negative, CSF VDRL negative.     EEG shows encephalopathy and no epileptiform activity   Discussed with neurology and ID  MRI and LP was repeated on 9/19 shows WBC improved, but other wise similar findings.   Antibiotics narrowed down to p.o. doxycycline and IV Rocephin.  Patient is seems responded to very well to this regimen, mental status much improved, fever resolved, leukocytosis resolved.   Patient has a recently trip to [place]   I highly suspect patient may have HGA or HME CNS infection.   Check ehrlichiosis antibody panel, check blood peripheral smear to look for  morale inclusions.   ID note appreciated, continue IV Rocephin and p.o. doxycycline, anticipate last dose of IV Rocephin on 9/30.     PT recommended rehab placement.  Likely discharge after last dose of IV Rocephin on 9/30.          Oncology  Leukocytosis  Due to above         Final Active Diagnoses:    Diagnosis Date Noted POA    PRINCIPAL PROBLEM:  Meningitis [G03.9] 09/17/2024 Yes    HFrEF (heart failure with reduced ejection fraction) [I50.20] 09/19/2024 Yes    Acute encephalopathy [G93.40] 09/17/2024 Yes    Leukocytosis [D72.829] 09/17/2024 Yes      Problems Resolved During this Admission:       Discharged Condition: stable    [place] [facility] [place]:   Follow-up Information       Roney Bang MD. Go on 10/7/2024.    Specialty: Family Medicine  Why: hospital follow up at 9:00  Contact information:  [address]  [address]  [place] [address]  [phone]               Danis Sepulveda MD. Go on 10/14/2024.    Specialty: Infectious Diseases  Why: hospital follow up at 2:30    this appt is with TRISTAN Guzmán  Contact information:  [address]  [place] [address]  [phone]               Nima, Christelle G, NP. Go on 10/10/2024.    Specialty: Cardiology  Why: hospital follow up-  cardiology @ 10:00  Contact information:  [address]  [place] [address]  [phone]               [facility] Follow up.    Specialties: Home Health Services, Home Therapy Services, Home Living Aide Services  Contact information:  [address] [place] [address]  [phone]                         Patient Instructions:      Ambulatory referral/consult to Home Health   Standing Status: Future   Referral Priority: Routine Referral Type: Home Health   Referral Reason: Specialty Services Required   Requested Specialty: Home Health Services   Number of Visits Requested: 1       Significant Diagnostic Studies: Labs: CMP   Recent Labs   Lab 09/29/24  0354   *   K 4.4      CO2 23   GLU 78   BUN 21   CREATININE 0.8   CALCIUM 9.1   ANIONGAP 9    and CBC   Recent Labs   Lab 09/29/24 0354   WBC 11.43   HGB 10.9*   HCT 32.4*   *       Pending Diagnostic Studies:       Procedure Component Value Units Date/Time    Ehrlichia Antibody Panel [[id number]] Collected: 09/28/24 1241    Order Status: Sent Lab Status: In process Updated: 09/28/24 1251    Specimen: Blood     HIV 1/2 Ag/Ab (4th Gen) [[id number]] Collected: 09/22/24 0547    Order Status: Sent Lab Status: In process Updated: 09/22/24 0547    Specimen: Blood     Pathologist Interpretation Differential [[id number]] Collected: 09/28/24 1241    Order Status: Sent Lab Status: No result     Specimen: Blood     RPR Titer [[id number]] Collected: 09/22/24 0547    Order Status: Sent Lab Status: In process Updated: 09/22/24 0547    Specimen: Blood            Medications:  Reconciled Home Medications:      Medication List        START taking these medications      doxycycline 100 MG tablet  Commonly known as: VIBRA-TABS  Take 1 tablet (100 mg total) by mouth every 12 (twelve) hours. for 3 days     metoprolol succinate 25 MG 24 hr tablet  Commonly known as: TOPROL-XL  Take 1 tablet (25 mg total) by mouth once daily.             CHANGE how you take these medications      calcium carbonate-vit D3-min 600 mg calcium- 400 unit Tab  Take 1 tablet by mouth 2 (two) times a day. for 365 doses  What changed: when to take this     ibuprofen 400 MG tablet  Commonly known as: ADVILMOTRIN  Take 1 tablet (400 mg total) by mouth every 6 (six) hours as needed.  What changed: reasons to take this            CONTINUE taking these medications      LIPOTRIAD (WITH LUTEIN) 5,000 unit- 120 mg-60 unit Tbsr  Generic drug: vits A-C-E-B complx-min-lutein  Take 1 tablet by mouth once daily.     omeprazole 20 MG capsule  Commonly known as: PRILOSEC  Take 20 mg by mouth once daily.            STOP taking these medications      ibandronate 150 mg tablet  Commonly known as: BONIVA              Microbiology Results (last 7 days)       ** No results found for the last 168 hours. **            X-Ray Chest 1 View    Result Date: 9/26/2024  EXAMINATION: XR CHEST 1 VIEW CLINICAL HISTORY: shortness of breath; TECHNIQUE: Single frontal view of the chest was performed. COMPARISON: 09/19/2024 FINDINGS: Perihilar interstitial opacities.  Unchanged heart size.  Atherosclerosis.  Indistinct pulmonary vasculature.  No pleural effusion or pneumothorax.     Mild decrease in extent of interstitial disease. Electronically signed by: Silvano Pearson Date:    09/26/2024 Time:    08:52    US Lower Extremity Veins Bilateral    Result Date: 9/26/2024  EXAMINATION: US LOWER EXTREMITY VEINS BILATERAL CLINICAL HISTORY: R/o DVT; TECHNIQUE: Duplex and color flow Doppler and dynamic compression was performed of the bilateral lower extremity veins was performed. COMPARISON: None FINDINGS: Right thigh veins: The common femoral, femoral, popliteal, upper greater saphenous, and deep femoral veins are patent and free of thrombus. The veins are normally compressible and have normal phasic flow and augmentation response. Right calf veins: The visualized calf veins are patent. Left thigh veins:  The common femoral, femoral, popliteal, upper greater saphenous, and deep femoral veins are patent and free of thrombus. The veins are normally compressible and have normal phasic flow and augmentation response. Left calf veins: The visualized calf veins are patent. Miscellaneous: None     No evidence of deep venous thrombosis in either lower extremity. Electronically signed by: Silvano Pearson Date:    09/26/2024 Time:    08:45    CTA Chest Non-Coronary (PE Studies)    Result Date: 9/24/2024  EXAMINATION: CTA CHEST NON CORONARY (PE STUDIES) CLINICAL HISTORY: Pulmonary embolism (PE) suspected, positive D-dimer; TECHNIQUE: Low dose axial images, sagittal and coronal reformations were obtained from the thoracic inlet to the lung bases following the IV administration of 75 mL of Omnipaque 350.  Contrast timing was optimized to evaluate the pulmonary arteries.  MIP images were performed. COMPARISON: None FINDINGS: There are no pulmonary arterial filling defects to indicate the presence of pulmonary thromboemboli. The heart size is normal.  There is a small pericardial effusion. There are moderate bilateral pleural effusions.  Mild compressive bilateral lower lobe atelectasis is present.  While detailed evaluation of lungs is precluded by motion artifact, there is apparent septal thickening suggesting interstitial edema. Small gallstones layer dependently within the gallbladder.  There is a small hiatal hernia. The bones are diffusely demineralized.  There are several thoracolumbar compression fractures, which are technically age indeterminate.     No evidence for pulmonary embolus. Moderate bilateral pleural effusions and features suggesting mild congestive failure. Small pericardial effusion. Small gallstones. Osseous demineralization and multiple age-indeterminate thoracolumbar compression fractures. Electronically signed by: Anuj Middleton MD Date:    09/24/2024 Time:    10:28    MRI Brain Without  Contrast    Result Date: 9/19/2024  EXAMINATION: MRI BRAIN WITHOUT CONTRAST CLINICAL HISTORY: Repeat-for encephalopathy;. TECHNIQUE: Multiplanar multisequence MR imaging of the brain was performed without the administration of intravenous contrast. COMPARISON: MRI brain with contrast 09/17/2024, MRI brain without contrast 09/17/2020 FINDINGS: No significant interval detrimental change in the appearance of the brain as compared to the prior exam dated 09/17/2024.  Stable-appearing ill-defined T2/FLAIR hyperintense signal within the right mesial temporal lobe without associated diffusion restriction or hemorrhage, stable in size and configuration as compared to the prior exam.  Additional stable-appearing patchy T2/FLAIR hyperintense signal throughout the bilateral periventricular, deep, subcortical white matter and stable involvement of the left splenium of the corpus callosum.  No definite new parenchymal edema or mass effect.  No abnormal intracranial enhancement on prior exam. Ventricles appear stable in comparison to the prior exam without evidence of hydrocephalus. Scattered incidental bilateral hippocampal sulcus remnant cysts.  Diffusion-weighted images demonstrate no evidence of an acute infarct.   Susceptibility weighted images demonstrate no evidence of acute or chronic hemorrhage. Normal vascular flow voids are present. Bone marrow signal intensity is normal. The paranasal sinuses are normal.  The visualized portions of the mastoids are unremarkable. Orbits are unremarkable.     1. No significant interval detrimental change in the appearance of the brain as compared to the prior exam dated 09/17/2024.  Persistent scattered abnormal T2/FLAIR hyperintense signal within the supratentorial brain, particularly involving the right mesial temporal lobe, similar to prior exam.  No definite new edema or mass effect.  Findings again are nonspecific with similar differential considerations including  inflammatory/infectious, or autoimmune/paraneoplastic encephalitis, status epilepticus, or potential neoplastic process.  Recommend continued close clinical surveillance and consider short-term follow-up imaging with contrast enhanced MRI of the brain, as clinically warranted. Electronically signed by: Jacob Armenta Date:    09/19/2024 Time:    16:07    FL Lumbar Puncture (xpd)    Result Date: 9/19/2024  EXAMINATION: FL LUMBAR PUNCTURE DIAGNOSTIC WITH IMAGING CLINICAL HISTORY: Encephalitis; TECHNIQUE: Informed written consent was obtained from the patient.  The risks, benefits, and alternatives to the exam were discussed. The patient was placed in the prone position on the fluoroscopy table and was prepped and draped in a sterile fashion.  Local anesthesia was achieved using 1% lidocaine solution. The L4-5 interspace was localized and a 22-gauge spinal needle and stylet were advanced into the thecal sac under fluoroscopic guidance. Fluoroscopy time: 0.7 minutes. COMPARISON: None. FINDINGS: Approximately 16 cc of clear cerebrospinal fluid was obtained and sent to the laboratory with orders per referring physician.  The opening pressure was 21 cm H2O.The stylet was replaced and the spinal needle was removed.  There were no immediate post procedure complications.     Successful lumbar puncture under fluoroscopic guidance as detailed above. Electronically signed by: Silvano Pearson Date:    09/19/2024 Time:    15:45    X-Ray Chest 1 View    Result Date: 9/19/2024  EXAMINATION: XR CHEST 1 VIEW CLINICAL HISTORY: r/o aspiration; TECHNIQUE: Single frontal view of the chest was performed. COMPARISON: Chest x-ray of September 16, 2024 FINDINGS: There is mild cardiomegaly.  There is prominence of the pulmonary vasculature and pulmonary edema identified bilaterally consistent with congestive heart failure.  A solid mass is not identified.  No pneumothorax is noted.     Mild cardiomegaly with development of bilateral pulmonary  edema consistent with congestive heart failure. This report was flagged in Epic as abnormal. Electronically signed by: Easton Hollingsworth MD Date:    09/19/2024 Time:    13:12    Echo    Result Date: 9/19/2024    Left Ventricle: The left ventricle is normal in size. Normal wall thickness. Regional wall motion abnormalities present.  There is akinesis of mid to distal segments and the apex.  Normal contractility of the basal segments.  Pattern suggestive of takotsubo cardiomyopathy. There is moderately reduced systolic function with a visually estimated ejection fraction of 30 - 35%. Grade II diastolic dysfunction.   Right Ventricle: Normal right ventricular cavity size. Systolic function is normal.   Mitral Valve: There is mild regurgitation.   Tricuspid Valve: There is mild to moderate regurgitation.   Pulmonary Artery: There is pulmonary hypertension. The estimated pulmonary artery systolic pressure is 46 mmHg.   IVC/SVC: Intermediate venous pressure at 8 mmHg.     MRI Brain With Contrast    Result Date: 9/17/2024  EXAMINATION: MRI BRAIN WITH CONTRAST CLINICAL HISTORY: follow up on non contrast MRI finding; TECHNIQUE: T1-weighted post-contrast multiplanar sequences were obtained through the brain.  6 mL Gadavist was administered intravenously without reported reaction. COMPARISON: Brain MRI without contrast performed earlier today FINDINGS: The prior diagnostic brain MRI demonstrated multiple regions of abnormal FLAIR and T2 hyperintense signal and a somewhat atypical distribution including the mesial right temporal lobe, periventricular white matter and splenium of the corpus callosum on the left, sub appended mole region.  There is no abnormal enhancement associated at any of the sites of signal abnormality.  The findings previously described remain nonspecific.  Previous differential considerations still exist.     1. There is no abnormal enhancement.  Signal abnormalities on unenhanced brain MRI remain  nonspecific. Electronically signed by: Derian Machado MD Date:    09/17/2024 Time:    16:15    MRI Brain Without Contrast    Result Date: 9/17/2024  EXAMINATION: MRI BRAIN WITHOUT CONTRAST CLINICAL HISTORY: Mental status change, unknown cause;. TECHNIQUE: Multiplanar multisequence MR imaging of the brain was performed without the administration of intravenous contrast. COMPARISON: CT head 09/16/2024 FINDINGS: Ventricles and sulci are normal in size for age without evidence of hydrocephalus. Abnormal, ill-defined T2/FLAIR hyperintense signal within the right mesial temporal lobe with associated hippocampal/gyral enlargement.  No diffusion restriction or susceptibility artifact to suggest hemorrhage. Mild nonspecific asymmetric ependymal/subependymal white matter T2/FLAIR hyperintense signal about the posterior body/atrium of the left lateral ventricle with question asymmetric involvement of the left frontal horn.  Superimposed scattered foci and patchy T2/FLAIR hyperintense signal within the bilateral periventricular, deep and subcortical white matter, nonspecific and may reflect sequelae of chronic small vessel ischemic change.  Diffusion-weighted images demonstrate no evidence of an acute infarct elsewhere.    No evidence of intracranial acute or chronic hemorrhage elsewhere. Normal vascular flow voids are present. Bone marrow signal intensity is normal. The paranasal sinuses are normal.  The visualized portions of the mastoids are unremarkable. Orbits are unremarkable.     1. Abnormal, ill-defined T2/FLAIR hyperintense signal within the right mesial temporal lobe with differential considerations including infectious encephalitis, particularly herpes simplex virus (HSV) encephalitis, autoimmune encephalitis, status epilepticus, or possible low-grade neoplasm. 2. Mild patchy and scattered foci of T2/FLAIR hyperintense signal within the supratentorial white matter elsewhere, nonspecific and may reflect sequelae of  chronic small vessel ischemic change. 3. Mild asymmetric T2/FLAIR hyperintense signal involving the ependymal/subependymal white matter of the posterior body/atrium and left frontal horn of the left lateral ventricle, nonspecific and while it could reflect sequelae of chronic small vessel ischemic change, focal encephalitis and/or ventriculitis cannot be excluded.  No hydrocephalus. This report was flagged in Epic as abnormal.  The significant finding above was relayed by myself  via [facility] secure chat to Beatriz Boogie MD and Joe Galeana MD on 09/17/2024 at 13:40.  Findings were acknowledged and understood. Electronically signed by: Jacob Armenta Date:    09/17/2024 Time:    13:57    X-Ray Chest 1 View    Result Date: 9/16/2024  EXAMINATION: XR CHEST 1 VIEW CLINICAL HISTORY: Sepsis; FINDINGS: Portable chest at 1441 compared with 06/06/2024 shows unchanged cardiomediastinal silhouette. Lungs are clear. Pulmonary vasculature is normal. No acute osseous abnormality.     No acute cardiopulmonary abnormality. Electronically signed by: Farrukh Ramos Date:    09/16/2024 Time:    14:53    CT Head Without Contrast    Result Date: 9/16/2024  EXAMINATION: CT HEAD WITHOUT CONTRAST CLINICAL HISTORY: Mental status change, unknown cause; TECHNIQUE: Axial CT imaging obtained through the brain without IV contrast. CMS Mandated Quality Data-CT Radiation Dose-436 All CT scans at this facility dose modulation, iterative reconstruction, and or weight-based dosing when appropriate to reduce radiation dose to as low as reasonably achievable. COMPARISON: None FINDINGS: Negative for acute intracranial hemorrhage, midline shift, or mass effect.  Ventricles and sulci are normal in size.  Gray-white differentiation is maintained.  Mild periventricular and deep white matter hypoattenuation, consistent with microangiopathic change.  Cerebellar hemispheres and brainstem are unremarkable.  Atherosclerotic calcification of intracranial  carotid arteries. No calvarial lesion or fracture.  Mastoid air cells are clear.  Mucosal thickening right maxillary sinus.  Paranasal sinuses are otherwise clear.     No CT evidence of acute intracranial pathology. Electronically signed by: Wang Acuña Date:    09/16/2024 Time:    14:52  - pulls last radiology orders    Indwelling Lines/Drains at time of discharge:   Lines/Drains/Airways       None                   Time spent on the discharge of patient: 35 minutes         Dallas Mora MD  Department of Hospital Medicine  [facility]/Surg

## 2024-09-30 NOTE — PT/OT/SLP PROGRESS
Physical Therapy Treatment    Patient Name:  Masha Hobbs   MRN:  [id number]    Recommendations:     Discharge Recommendations: High Intensity Therapy (vs LIT)  Discharge Equipment Recommendations: none  Barriers to discharge: None    Assessment:     Masha Hobbs is a 68 y.o. female admitted with a medical diagnosis of Meningitis.  She presents with the following impairments/functional limitations: impaired endurance, impaired functional mobility, gait instability . Seated in chair at bedside with spouse present.  Eager to ambulate.  Requested to use restroom.  Sit >stand with rw and CGA.  Ambulated to restroom. On /Off commode with SBA. Performed hygiene post void without difficulty .   Ambulated 250' with rw and CGA in hallway. Returned to chair.     Rehab Prognosis: Good; patient would benefit from acute skilled PT services to address these deficits and reach maximum level of function.    Recent Surgery: * No surgery found *      Plan:     During this hospitalization, patient to be seen 6 x/week to address the identified rehab impairments via therapeutic activities, therapeutic exercises and progress toward the following goals:    Plan of Care Expires:  09/30/24    Subjective     Chief Complaint: none stated  Patient/Family Comments/goals: to return home  Pain/Comfort:  Pain Rating 1: 0/10      Objective:     Communicated with nurse Brizuela prior to session.  Patient found up in chair with chair check, telemetry upon PT entry to room.     General Precautions: Standard, fall  Orthopedic Precautions: N/A  Braces: N/A  Respiratory Status: Room air     Functional Mobility:  Transfers:     Sit to Stand:  contact guard assistance with rolling walker  Gait: 250' with rw and CGA.       AM-PAC 6 CLICK MOBILITY          Treatment & Education:  Ambulated to restroom. ON / off commode with SBA, used handrails.   Ambulated 250' with rw in hallway .     Patient left up in chair with all lines intact, call button in reach,  chair alarm on, nurse Chata notified, and spouse present..    GOALS:   Multidisciplinary Problems       Physical Therapy Goals       Not on file              Multidisciplinary Problems (Resolved)          Problem: Physical Therapy    Goal Priority Disciplines Outcome Interventions   Physical Therapy Goal   (Resolved)     PT, PT/OT Met    Description: Goals to be met by: 24     Patient will increase functional independence with mobility by performin. Supine to sit with Graham  2. Sit to supine with Graham  3. Sit to stand transfer with Stand-by Assistance  4. Bed to chair transfer with Stand-by Assistance using Rolling Walker  5. Gait  x 250 feet with Stand-by Assistance using Rolling Walker.                          Time Tracking:     PT Received On: 24  PT Start Time: 1048     PT Stop Time: 1101  PT Total Time (min): 13 min     Billable Minutes: Gait Training 13min    Treatment Type: Treatment  PT/PTA: PTA     Number of PTA visits since last PT visit: 3     2024

## 2024-09-30 NOTE — PT/OT/SLP PROGRESS
Occupational Therapy   Treatment    Name: Masha Hobbs  MRN: [id number]  Admitting Diagnosis:  Meningitis      Recommendations:     Discharge Recommendations:  Low intensity therapy  Discharge Equipment Recommendations:  none  Barriers to discharge:  None    Assessment:     Masha Hobbs is a 68 y.o. female with a medical diagnosis of Meningitis.  Pt agreed to participate in OT. Pt demonstrates progress and is able to complete toileting with Mod I using a grab bar from SBA on 9/27/2024. Pt is able to complete toilet transfer with SBA from CGA on 9/27/2024. She is also able to complete grooming tasks standing at sink with SBA from seated on 9/27/2024. She presents weakness, impaired endurance, impaired self care skills, impaired functional mobility, gait instability and impaired balance.     Rehab Prognosis:  Good; patient would benefit from acute skilled OT services to address these deficits and reach maximum level of function.       Plan:     Patient to be seen 5 x/week to address the above listed problems via self-care/home management, therapeutic activities, therapeutic exercises  Plan of Care Expires: 10/19/24  Plan of Care Reviewed with: patient    Subjective     Chief Complaint: None stated  Patient/Family Comments/goals: Pt agreed to participate in OT.  Pain/Comfort:  Pain Rating 1: 0/10    Objective:     Communicated with: nurse prior to session.  Patient found HOB elevated upon OT entry to room.    General Precautions: Standard, fall    Orthopedic Precautions:N/A  Braces: N/A  Respiratory Status: Room air     Occupational Performance:     Bed Mobility:    Patient completed Supine to Sit with supervision  Patient completed Sit to Supine with supervision     Functional Mobility/Transfers:  Patient completed Sit <> Stand Transfer with stand by assistance with rolling walker   Patient completed Toilet Transfer Step Transfer technique with stand by assistance with rolling walker  Functional Mobility: bed >  bathroom > sink > chair using a RW with SBA    Activities of Daily Living:  Grooming: Pt washed her hands and brushed her teeth standing at the sink with stand by assistance.  Toileting: modified independence    Treatment & Education:  Therapist provided facilitation and instruction of proper body mechanics, energy conservation and fall prevention strategies during tasks listed above.      Patient left up in chair with all lines intact, call button in reach and patient's  and daughter present.     GOALS:   Multidisciplinary Problems       Occupational Therapy Goals       Not on file              Multidisciplinary Problems (Resolved)          Problem: Occupational Therapy    Goal Priority Disciplines Outcome Interventions   Occupational Therapy Goal   (Resolved)     OT, PT/OT Met    Description: Goals to be met by: 10/19/24     Patient will increase functional independence with ADLs by performing:    UE Dressing with Modified Grant.  LE Dressing with Modified Grant.  Grooming while standing at sink with Modified Grant.  Toileting from toilet with Modified Grant for hygiene and clothing management.   Bathing from  shower chair/bench with Modified Grant.  Toilet transfer to toilet with Modified Grant.  Increased strength and functional activity tolerance for ADL's/IADL's                         Time Tracking:     OT Date of Treatment: 09/30/24  OT Start Time: 0859  OT Stop Time: 0913  OT Total Time (min): 14 min    Billable Minutes:Self Care/Home Management 14               9/30/2024

## 2024-10-01 ENCOUNTER — PATIENT OUTREACH (OUTPATIENT)
Dept: ADMINISTRATIVE | Facility: CLINIC | Age: 69
End: 2024-10-01
Payer: MEDICARE

## 2024-10-01 PROCEDURE — G0180 MD CERTIFICATION HHA PATIENT: HCPCS | Mod: ,,, | Performed by: FAMILY MEDICINE

## 2024-10-01 NOTE — PROGRESS NOTES
C3 nurse spoke with Masha Hobbs for a TCC post hospital discharge follow up call. The patient has a scheduled HOSPFU Rnoey Bang MD (Family Medicine) on 10/7/2024; at 9:00

## 2024-10-02 LAB
A PHAGOCYTOPH IGG TITR SER IF: NORMAL TITER
E CHAFFEENSIS IGG TITR SER IF: NORMAL TITER

## 2024-10-03 ENCOUNTER — TELEPHONE (OUTPATIENT)
Dept: FAMILY MEDICINE | Facility: CLINIC | Age: 69
End: 2024-10-03
Payer: MEDICARE

## 2024-10-03 NOTE — TELEPHONE ENCOUNTER
Pt has increased weight rapidly. Please advise. Pt has appt on 10/7/24. Pt has canceled last 6 appts

## 2024-10-03 NOTE — PHYSICIAN QUERY
Please further specify the acute encephalopathy diagnosis:  Metabolic Encephalopathy likely secondary to bacterial meningitis

## 2024-10-03 NOTE — TELEPHONE ENCOUNTER
----- Message from Neris sent at 10/3/2024 12:07 PM CDT -----  Tosha with [facility] called. October 1st patient weighted 132.6  Today   she weighs 136.6. please call # [phone] GH

## 2024-10-03 NOTE — TELEPHONE ENCOUNTER
This is probably fluid retention we need to hold the ibuprofen come to return to clinic for evaluation as she has no history of fluid retention or heart failure in the past.

## 2024-10-04 ENCOUNTER — DOCUMENT SCAN (OUTPATIENT)
Dept: HOME HEALTH SERVICES | Facility: HOSPITAL | Age: 69
End: 2024-10-04
Payer: MEDICARE

## 2024-10-07 ENCOUNTER — OFFICE VISIT (OUTPATIENT)
Dept: FAMILY MEDICINE | Facility: CLINIC | Age: 69
End: 2024-10-07
Payer: MEDICARE

## 2024-10-07 VITALS
WEIGHT: 131 LBS | SYSTOLIC BLOOD PRESSURE: 120 MMHG | HEIGHT: 61 IN | OXYGEN SATURATION: 98 % | HEART RATE: 73 BPM | DIASTOLIC BLOOD PRESSURE: 64 MMHG | TEMPERATURE: 99 F | RESPIRATION RATE: 18 BRPM | BODY MASS INDEX: 24.73 KG/M2

## 2024-10-07 DIAGNOSIS — G04.90 ENCEPHALITIS: Primary | ICD-10-CM

## 2024-10-07 DIAGNOSIS — G03.9 MENINGITIS: ICD-10-CM

## 2024-10-07 DIAGNOSIS — G93.40 ACUTE ENCEPHALOPATHY: ICD-10-CM

## 2024-10-07 PROCEDURE — 1111F DSCHRG MED/CURRENT MED MERGE: CPT | Mod: CPTII,S$GLB,, | Performed by: FAMILY MEDICINE

## 2024-10-07 PROCEDURE — 99496 TRANSJ CARE MGMT HIGH F2F 7D: CPT | Mod: S$GLB,,, | Performed by: FAMILY MEDICINE

## 2024-10-07 PROCEDURE — 3288F FALL RISK ASSESSMENT DOCD: CPT | Mod: CPTII,S$GLB,, | Performed by: FAMILY MEDICINE

## 2024-10-07 PROCEDURE — 1126F AMNT PAIN NOTED NONE PRSNT: CPT | Mod: CPTII,S$GLB,, | Performed by: FAMILY MEDICINE

## 2024-10-07 PROCEDURE — 99999 PR PBB SHADOW E&M-EST. PATIENT-LVL III: CPT | Mod: PBBFAC,,, | Performed by: FAMILY MEDICINE

## 2024-10-07 PROCEDURE — 1101F PT FALLS ASSESS-DOCD LE1/YR: CPT | Mod: CPTII,S$GLB,, | Performed by: FAMILY MEDICINE

## 2024-10-07 PROCEDURE — 3074F SYST BP LT 130 MM HG: CPT | Mod: CPTII,S$GLB,, | Performed by: FAMILY MEDICINE

## 2024-10-07 PROCEDURE — 3078F DIAST BP <80 MM HG: CPT | Mod: CPTII,S$GLB,, | Performed by: FAMILY MEDICINE

## 2024-10-07 PROCEDURE — 1159F MED LIST DOCD IN RCRD: CPT | Mod: CPTII,S$GLB,, | Performed by: FAMILY MEDICINE

## 2024-10-07 NOTE — PROGRESS NOTES
Subjective:       Patient ID: Masha Hobbs is a 68 y.o. female.    Chief Complaint: HFU      Patient is here for follow-up from the hospital.    SUBJECTIVE:  Patient ID: Masha Hobbs is a 68 y.o. female.    Chief Complaint: HFU    [unfilled]    Admit Date: 9/16/24  Discharge Date: 9/30/24  Discharge Facility: Hospital      Family and/or Caretaker present at visit? No  Medication Reconciliation:  No medication changes.  Completed discharge antibiotics  New Prescriptions filled after discharge: yes  Discharge summary reviewed:  yes  Diagnostic tests reviewed/disposition: I have reviewed all completed as well as pending diagnostic tests at the time of discharge  Disease/illness education:  Yes  Follow up appointments scheduled:  yes              with Cardiology   Follow up labs/tests ordered:   not applicable  Home Health ordered on discharge: Patient has home health established at [facility] company name: [employer]    Establishment or re-establishment of referral orders for community resources: No other necessary community resources  DME ordered at discharge:   yes  How patient is feeling since discharge from the hospital?  better     Discussion with other health care providers: No discussion with other health care providers necessary  Patient follow up phone call documented on separate encounter.    Admission on 09/16/2024, Discharged on 09/30/2024  No results displayed because visit has over 200 results.    ------------  Admission on 06/06/2024, Discharged on 06/06/2024  QRS Duration                                  Date: 06/06/2024  Value: 88          Ref range: ms                 Status: Final  [facility] QTC Calculation                           Date: 06/06/2024  Value: 623         Ref range: ms                 Status: Final  WBC                                           Date: 06/06/2024  Value: 11.60       Ref range: 3.90 - 12.70 K/uL  Status: Final  RBC                                           Date:  06/06/2024  Value: 4.31        Ref range: 4.00 - 5.40 M/uL   Status: Final  Hemoglobin                                    Date: 06/06/2024  Value: 13.5        Ref range: 12.0 - 16.0 g/dL   Status: Final  Hematocrit                                    Date: 06/06/2024  Value: 40.9        Ref range: 37.0 - 48.5 %      Status: Final  MCV                                           Date: 06/06/2024  Value: 95          Ref range: 82 - 98 fL         Status: Final  MCH                                           Date: 06/06/2024  Value: 31.3 (H)    Ref range: 27.0 - 31.0 pg     Status: Final  MCHC                                          Date: 06/06/2024  Value: 33.0        Ref range: 32.0 - 36.0 g/dL   Status: Final  RDW                                           Date: 06/06/2024  Value: 13.2        Ref range: 11.5 - 14.5 %      Status: Final  Platelets                                     Date: 06/06/2024  Value: 317         Ref range: 150 - 450 K/uL     Status: Final  MPV                                           Date: 06/06/2024  Value: 9.5         Ref range: 9.2 - 12.9 fL      Status: Final  Immature Granulocytes                         Date: 06/06/2024  Value: 0.3         Ref range: 0.0 - 0.5 %        Status: Final  Gran # (ANC)                                  Date: 06/06/2024  Value: 8.1 (H)     Ref range: 1.8 - 7.7 K/uL     Status: Final  Immature Grans (Abs)                          Date: 06/06/2024  Value: 0.03        Ref range: 0.00 - 0.04 K/uL   Status: Final  Lymph #                                       Date: 06/06/2024  Value: 2.5         Ref range: 1.0 - 4.8 K/uL     Status: Final  Mono #                                        Date: 06/06/2024  Value: 0.9         Ref range: 0.3 - 1.0 K/uL     Status: Final  Eos #                                         Date: 06/06/2024  Value: 0.0         Ref range: 0.0 - 0.5 K/uL     Status: Final  Baso #                                        Date: 06/06/2024  Value: 0.08         Ref range: 0.00 - 0.20 K/uL   Status: Final  nRBC                                          Date: 06/06/2024  Value: 0           Ref range: 0 /100 WBC         Status: Final  Gran %                                        Date: 06/06/2024  Value: 69.9        Ref range: 38.0 - 73.0 %      Status: Final  Lymph %                                       Date: 06/06/2024  Value: 21.4        Ref range: 18.0 - 48.0 %      Status: Final  Mono %                                        Date: 06/06/2024  Value: 7.4         Ref range: 4.0 - 15.0 %       Status: Final  Eosinophil %                                  Date: 06/06/2024  Value: 0.3         Ref range: 0.0 - 8.0 %        Status: Final  Basophil %                                    Date: 06/06/2024  Value: 0.7         Ref range: 0.0 - 1.9 %        Status: Final  Differential Method                           Date: 06/06/2024  Value: Automated     Status: Final  Sodium                                        Date: 06/06/2024  Value: 138         Ref range: 136 - 145 mmol/L   Status: Final  Potassium                                     Date: 06/06/2024  Value: 4.2         Ref range: 3.5 - 5.1 mmol/L   Status: Final  Chloride                                      Date: 06/06/2024  Value: 104         Ref range: 95 - 110 mmol/L    Status: Final  CO2                                           Date: 06/06/2024  Value: 26          Ref range: 23 - 29 mmol/L     Status: Final  Glucose                                       Date: 06/06/2024  Value: 82          Ref range: 70 - 110 mg/dL     Status: Final  BUN                                           Date: 06/06/2024  Value: 24 (H)      Ref range: 8 - 23 mg/dL       Status: Final  Creatinine                                    Date: 06/06/2024  Value: 0.9         Ref range: 0.5 - 1.4 mg/dL    Status: Final  Calcium                                       Date: 06/06/2024  Value: 10.0        Ref range: 8.7 - 10.5 mg/dL   Status: Final  Total  Protein                                 Date: 06/06/2024  Value: 8.1         Ref range: 6.0 - 8.4 g/dL     Status: Final  Albumin                                       Date: 06/06/2024  Value: 4.4         Ref range: 3.5 - 5.2 g/dL     Status: Final  Total Bilirubin                               Date: 06/06/2024  Value: 0.4         Ref range: 0.1 - 1.0 mg/dL    Status: Final  Alkaline Phosphatase                          Date: 06/06/2024  Value: 64          Ref range: 55 - 135 U/L       Status: Final  AST                                           Date: 06/06/2024  Value: 21          Ref range: 10 - 40 U/L        Status: Final  ALT                                           Date: 06/06/2024  Value: 19          Ref range: 10 - 44 U/L        Status: Final  eGFR                                          Date: 06/06/2024  Value: >60.0       Ref range: >60 mL/min/1.73 *  Status: Final  Anion Gap                                     Date: 06/06/2024  Value: 8           Ref range: 8 - 16 mmol/L      Status: Final  Lipase                                        Date: 06/06/2024  Value: 8           Ref range: 4 - 60 U/L         Status: Final  Prothrombin Time                              Date: 06/06/2024  Value: 11.0        Ref range: 9.0 - 12.5 sec     Status: Final  INR                                           Date: 06/06/2024  Value: 1.0         Ref range: 0.8 - 1.2          Status: Final  POC Creatinine                                Date: 06/06/2024  Value: 0.9         Ref range: 0.5 - 1.4 mg/dL    Status: Final  Sample                                        Date: 06/06/2024  Value: VENOUS        Status: Final  ------------    Past Medical History:  3/4/2020: Age-related osteoporosis without current pathological   fracture      Comment:  Based upon bone density showing osteoporosis both at                hips and lumbar spine.  Patient notified.  May consider                bisphosphonates.  Past Surgical History:  No date:  TONSILLECTOMY  2021: WRIST SURGERY; Right  Review of patient's family history indicates:  Problem: Diabetes      Relation: Mother          Name:               Age of Onset: (Not Specified)  Problem: Heart disease      Relation: Mother          Name:               Age of Onset: (Not Specified)  Problem: Cancer      Relation: Mother          Name:               Age of Onset: (Not Specified)  Problem: Breast cancer      Relation: Mother          Name:               Age of Onset: (Not Specified)  Problem: Cancer      Relation: Father          Name:               Age of Onset: (Not Specified)  Problem: Hyperlipidemia      Relation: Father          Name:               Age of Onset: (Not Specified)  Problem: Heart disease      Relation: Father          Name:               Age of Onset: (Not Specified)  Problem: Diabetes      Relation: Father          Name:               Age of Onset: (Not Specified)  Problem: Stroke      Relation: Father          Name:               Age of Onset: (Not Specified)      Marital Status:   Alcohol History:  reports current alcohol use of about 1.0 - 2.0 standard drink of alcohol per week.  Tobacco History:  reports that she quit smoking about 35 years ago. Her smoking use included cigarettes. She has never used smokeless tobacco.  Drug History:  reports that she does not currently use drugs.    Review of patient's allergies indicates:  No Known Allergies  Current Outpatient Medications: ·  metoprolol succinate (TOPROL-XL) 25 MG 24 hr tablet, Take 1 tablet (25 mg total) by mouth once daily., Disp: 90 tablet, Rfl: 0·  calcium carbonate-vit D3-min 600 mg calcium- 400 unit Tab, Take 1 tablet by mouth 2 (two) times a day. for 365 doses (Patient not taking: Reported on 10/7/2024), Disp: 180 tablet, Rfl: 2·  omeprazole (PRILOSEC) 20 MG capsule, Take 20 mg by mouth once daily. (Patient not taking: Reported on 10/7/2024), Disp: , Rfl: ·  vits A-C-E-B complx-min-lutein (LIPOTRIAD, WITH LUTEIN,)  "5,000 unit- 120 mg-60 unit TbSR, Take 1 tablet by mouth once daily. (Patient not taking: Reported on 10/1/2024), Disp: , Rfl:     [unfilled]    Objective:    --------------------------               10/07/24                     0846        --------------------------   BP:          120/64        Pulse:         73          Resp:          18          Temp:   98.6 °F (37 °C)    TempSrc:      Oral         SpO2:         98%          Weight: 59.4 kg (131 lb)   Height: 5' 1" (1.549 m)   --------------------------  [unfilled]   Assessment:     No diagnosis found.    Plan:     There are no diagnoses linked to this encounter.  No follow-ups on file.                 Allergies and Medications:   Review of patient's allergies indicates:  No Known Allergies  Current Outpatient Medications   Medication Sig Dispense Refill    metoprolol succinate (TOPROL-XL) 25 MG 24 hr tablet Take 1 tablet (25 mg total) by mouth once daily. 90 tablet 0    calcium carbonate-vit D3-min 600 mg calcium- 400 unit Tab Take 1 tablet by mouth 2 (two) times a day. for 365 doses (Patient not taking: Reported on 10/7/2024) 180 tablet 2    omeprazole (PRILOSEC) 20 MG capsule Take 20 mg by mouth once daily. (Patient not taking: Reported on 10/7/2024)      vits A-C-E-B complx-min-lutein (LIPOTRIAD, WITH LUTEIN,) 5,000 unit- 120 mg-60 unit TbSR Take 1 tablet by mouth once daily. (Patient not taking: Reported on 10/1/2024)       No current facility-administered medications for this visit.       Family History:   Family History   Problem Relation Name Age of Onset    Diabetes Mother      Heart disease Mother      Cancer Mother      Breast cancer Mother      Cancer Father      Hyperlipidemia Father      Heart disease Father      Diabetes Father      Stroke Father         Social History:   Social History     Socioeconomic History    Marital status:      Spouse name: Elias Hobbs    Number of children: 2   Occupational History " "   Occupation: Part Time realtor/   Tobacco Use    Smoking status: Former     Current packs/day: 0.00     Types: Cigarettes     Quit date: 1989     Years since quittin.7    Smokeless tobacco: Never   Substance and Sexual Activity    Alcohol use: Yes     Alcohol/week: 1.0 - 2.0 standard drink of alcohol     Types: 1 - 2 Shots of liquor per week     Comment: "Occasional"    Drug use: Not Currently    Sexual activity: Yes     Partners: Male   Social History Narrative    G 39, B35     Social Drivers of Health     Financial Resource Strain: Low Risk  (2024)    Overall Financial Resource Strain (CARDIA)     Difficulty of Paying Living Expenses: Not hard at all   Food Insecurity: No Food Insecurity (2024)    Hunger Vital Sign     Worried About Running Out of Food in the Last Year: Never true     Ran Out of Food in the Last Year: Never true   Transportation Needs: No Transportation Needs (2024)    TRANSPORTATION NEEDS     Transportation : No   Physical Activity: Sufficiently Active (2024)    Exercise Vital Sign     Days of Exercise per Week: 4 days     Minutes of Exercise per Session: 120 min   Stress: No Stress Concern Present (2024)    [employer] [place] [employer] - Occupational Stress Questionnaire     Feeling of Stress : Not at all   Housing Stability: Low Risk  (2024)    Housing Stability Vital Sign     Unable to Pay for Housing in the Last Year: No     Homeless in the Last Year: No       Review of Systems    Objective:     Vitals:    10/07/24 0846   BP: 120/64   Pulse: 73   Resp: 18   Temp: 98.6 °F (37 °C)        Physical Exam  Vitals and nursing note reviewed.   Constitutional:       General: She is not in acute distress.     Appearance: Normal appearance. She is well-developed and normal weight. She is not ill-appearing, toxic-appearing or diaphoretic.   HENT:      Head: Normocephalic and atraumatic.   Eyes:      Pupils: Pupils are equal, round, and reactive " to light.   Neck:      Comments: Negative KUB are  Cardiovascular:      Rate and Rhythm: Normal rate and regular rhythm.      Heart sounds: Normal heart sounds. No murmur heard.     No friction rub. No gallop.   Pulmonary:      Effort: Pulmonary effort is normal. No respiratory distress.      Breath sounds: Normal breath sounds. No stridor. No wheezing, rhonchi or rales.   Chest:      Chest wall: No tenderness.   Musculoskeletal:      Right lower leg: No edema.      Left lower leg: No edema.   Neurological:      Mental Status: She is alert.   Psychiatric:         Behavior: Behavior normal.         Thought Content: Thought content normal.         Judgment: Judgment normal.         Assessment:       1. Encephalitis    2. Acute encephalopathy    3. Meningitis        Plan:       Masha was seen today for hfu.    Diagnoses and all orders for this visit:    Encephalitis  -     Comprehensive Metabolic Panel; Future  -     CBC Auto Differential; Future  -     C-Reactive Protein; Future  -     Spotted Fever Group Antibodies; Future  -     WEST NILE ANTIBODIES, IGG AND IGM; Future    Acute encephalopathy    Meningitis         Follow up in about 2 months (around 12/7/2024), or As scheduled.

## 2024-10-10 ENCOUNTER — OFFICE VISIT (OUTPATIENT)
Dept: CARDIOLOGY | Facility: CLINIC | Age: 69
End: 2024-10-10
Payer: MEDICARE

## 2024-10-10 ENCOUNTER — HOSPITAL ENCOUNTER (OUTPATIENT)
Facility: HOSPITAL | Age: 69
Discharge: HOME | End: 2024-10-11
Attending: EMERGENCY MEDICINE | Admitting: HOSPITALIST
Payer: MEDICARE

## 2024-10-10 VITALS
DIASTOLIC BLOOD PRESSURE: 88 MMHG | HEART RATE: 103 BPM | SYSTOLIC BLOOD PRESSURE: 148 MMHG | HEIGHT: 61 IN | WEIGHT: 131 LBS | OXYGEN SATURATION: 97 % | BODY MASS INDEX: 24.73 KG/M2

## 2024-10-10 DIAGNOSIS — I50.20 HFREF (HEART FAILURE WITH REDUCED EJECTION FRACTION): ICD-10-CM

## 2024-10-10 DIAGNOSIS — I48.0 PAROXYSMAL ATRIAL FIBRILLATION: Primary | ICD-10-CM

## 2024-10-10 DIAGNOSIS — R07.9 CHEST PAIN: ICD-10-CM

## 2024-10-10 DIAGNOSIS — R06.02 SHORTNESS OF BREATH: ICD-10-CM

## 2024-10-10 DIAGNOSIS — R06.09 EXERTIONAL DYSPNEA: Primary | ICD-10-CM

## 2024-10-10 DIAGNOSIS — I50.42 CHRONIC COMBINED SYSTOLIC AND DIASTOLIC HEART FAILURE: ICD-10-CM

## 2024-10-10 DIAGNOSIS — G03.9 MENINGITIS: ICD-10-CM

## 2024-10-10 DIAGNOSIS — R94.31 ABNORMAL EKG: ICD-10-CM

## 2024-10-10 DIAGNOSIS — R94.31 NONSPECIFIC ABNORMAL ELECTROCARDIOGRAM (ECG) (EKG): ICD-10-CM

## 2024-10-10 LAB
ANION GAP SERPL CALC-SCNC: 11 MMOL/L (ref 8–16)
BASOPHILS # BLD AUTO: 0.06 K/UL (ref 0–0.2)
BASOPHILS NFR BLD: 0.7 % (ref 0–1.9)
BUN SERPL-MCNC: 20 MG/DL (ref 8–23)
CALCIUM SERPL-MCNC: 9.9 MG/DL (ref 8.7–10.5)
CHLORIDE SERPL-SCNC: 103 MMOL/L (ref 95–110)
CO2 SERPL-SCNC: 23 MMOL/L (ref 23–29)
CREAT SERPL-MCNC: 0.8 MG/DL (ref 0.5–1.4)
DIFFERENTIAL METHOD BLD: ABNORMAL
EOSINOPHIL # BLD AUTO: 0.1 K/UL (ref 0–0.5)
EOSINOPHIL NFR BLD: 0.9 % (ref 0–8)
ERYTHROCYTE [DISTWIDTH] IN BLOOD BY AUTOMATED COUNT: 13.1 % (ref 11.5–14.5)
EST. GFR  (NO RACE VARIABLE): >60 ML/MIN/1.73 M^2
GLUCOSE SERPL-MCNC: 98 MG/DL (ref 70–110)
HCT VFR BLD AUTO: 39.1 % (ref 37–48.5)
HGB BLD-MCNC: 13.4 G/DL (ref 12–16)
IMM GRANULOCYTES # BLD AUTO: 0.01 K/UL (ref 0–0.04)
IMM GRANULOCYTES NFR BLD AUTO: 0.1 % (ref 0–0.5)
LYMPHOCYTES # BLD AUTO: 1.9 K/UL (ref 1–4.8)
LYMPHOCYTES NFR BLD: 23.5 % (ref 18–48)
MCH RBC QN AUTO: 32.7 PG (ref 27–31)
MCHC RBC AUTO-ENTMCNC: 34.3 G/DL (ref 32–36)
MCV RBC AUTO: 95 FL (ref 82–98)
MONOCYTES # BLD AUTO: 0.5 K/UL (ref 0.3–1)
MONOCYTES NFR BLD: 6.2 % (ref 4–15)
NEUTROPHILS # BLD AUTO: 5.5 K/UL (ref 1.8–7.7)
NEUTROPHILS NFR BLD: 68.6 % (ref 38–73)
NRBC BLD-RTO: 0 /100 WBC
OHS QRS DURATION: 88 MS
OHS QTC CALCULATION: 422 MS
PLATELET # BLD AUTO: 375 K/UL (ref 150–450)
PMV BLD AUTO: 9 FL (ref 9.2–12.9)
POTASSIUM SERPL-SCNC: 3.9 MMOL/L (ref 3.5–5.1)
RBC # BLD AUTO: 4.1 M/UL (ref 4–5.4)
SODIUM SERPL-SCNC: 137 MMOL/L (ref 136–145)
TROPONIN I SERPL HS-MCNC: 11 PG/ML (ref 0–14.9)
TROPONIN I SERPL HS-MCNC: 15.3 PG/ML (ref 0–14.9)
WBC # BLD AUTO: 8.01 K/UL (ref 3.9–12.7)

## 2024-10-10 PROCEDURE — 85025 COMPLETE CBC W/AUTO DIFF WBC: CPT | Performed by: EMERGENCY MEDICINE

## 2024-10-10 PROCEDURE — 25000003 PHARM REV CODE 250: Performed by: HOSPITALIST

## 2024-10-10 PROCEDURE — 84484 ASSAY OF TROPONIN QUANT: CPT | Performed by: EMERGENCY MEDICINE

## 2024-10-10 PROCEDURE — 80048 BASIC METABOLIC PNL TOTAL CA: CPT | Performed by: EMERGENCY MEDICINE

## 2024-10-10 PROCEDURE — 99285 EMERGENCY DEPT VISIT HI MDM: CPT | Mod: 25

## 2024-10-10 PROCEDURE — 99999 PR PBB SHADOW E&M-EST. PATIENT-LVL III: CPT | Mod: PBBFAC,,,

## 2024-10-10 PROCEDURE — G0378 HOSPITAL OBSERVATION PER HR: HCPCS

## 2024-10-10 PROCEDURE — 94761 N-INVAS EAR/PLS OXIMETRY MLT: CPT

## 2024-10-10 PROCEDURE — 84484 ASSAY OF TROPONIN QUANT: CPT | Mod: 91 | Performed by: HOSPITALIST

## 2024-10-10 RX ORDER — ACETAMINOPHEN 325 MG/1
650 TABLET ORAL EVERY 8 HOURS PRN
Status: DISCONTINUED | OUTPATIENT
Start: 2024-10-10 | End: 2024-10-11 | Stop reason: HOSPADM

## 2024-10-10 RX ORDER — LOSARTAN POTASSIUM 25 MG/1
12.5 TABLET ORAL DAILY
Qty: 45 TABLET | Refills: 3 | Status: SHIPPED | OUTPATIENT
Start: 2024-10-10 | End: 2025-10-10

## 2024-10-10 RX ORDER — SODIUM,POTASSIUM PHOSPHATES 280-250MG
2 POWDER IN PACKET (EA) ORAL
Status: DISCONTINUED | OUTPATIENT
Start: 2024-10-10 | End: 2024-10-11 | Stop reason: HOSPADM

## 2024-10-10 RX ORDER — IBUPROFEN 200 MG
16 TABLET ORAL
Status: DISCONTINUED | OUTPATIENT
Start: 2024-10-10 | End: 2024-10-11 | Stop reason: HOSPADM

## 2024-10-10 RX ORDER — ALUMINUM HYDROXIDE, MAGNESIUM HYDROXIDE, AND SIMETHICONE 1200; 120; 1200 MG/30ML; MG/30ML; MG/30ML
30 SUSPENSION ORAL 4 TIMES DAILY PRN
Status: DISCONTINUED | OUTPATIENT
Start: 2024-10-10 | End: 2024-10-11 | Stop reason: HOSPADM

## 2024-10-10 RX ORDER — ATORVASTATIN CALCIUM 40 MG/1
40 TABLET, FILM COATED ORAL NIGHTLY
Status: DISCONTINUED | OUTPATIENT
Start: 2024-10-10 | End: 2024-10-11

## 2024-10-10 RX ORDER — ACETAMINOPHEN 325 MG/1
650 TABLET ORAL EVERY 4 HOURS PRN
Status: DISCONTINUED | OUTPATIENT
Start: 2024-10-10 | End: 2024-10-11 | Stop reason: HOSPADM

## 2024-10-10 RX ORDER — IBUPROFEN 200 MG
24 TABLET ORAL
Status: DISCONTINUED | OUTPATIENT
Start: 2024-10-10 | End: 2024-10-11 | Stop reason: HOSPADM

## 2024-10-10 RX ORDER — GLUCAGON 1 MG
1 KIT INJECTION
Status: DISCONTINUED | OUTPATIENT
Start: 2024-10-10 | End: 2024-10-11 | Stop reason: HOSPADM

## 2024-10-10 RX ORDER — SODIUM CHLORIDE 0.9 % (FLUSH) 0.9 %
2 SYRINGE (ML) INJECTION EVERY 12 HOURS PRN
Status: DISCONTINUED | OUTPATIENT
Start: 2024-10-10 | End: 2024-10-11 | Stop reason: HOSPADM

## 2024-10-10 RX ORDER — METOPROLOL SUCCINATE 25 MG/1
25 TABLET, EXTENDED RELEASE ORAL DAILY
Status: DISCONTINUED | OUTPATIENT
Start: 2024-10-10 | End: 2024-10-11

## 2024-10-10 RX ORDER — AMOXICILLIN 250 MG
1 CAPSULE ORAL 2 TIMES DAILY PRN
Status: DISCONTINUED | OUTPATIENT
Start: 2024-10-10 | End: 2024-10-11 | Stop reason: HOSPADM

## 2024-10-10 RX ORDER — LANOLIN ALCOHOL/MO/W.PET/CERES
800 CREAM (GRAM) TOPICAL
Status: DISCONTINUED | OUTPATIENT
Start: 2024-10-10 | End: 2024-10-11 | Stop reason: HOSPADM

## 2024-10-10 RX ORDER — TALC
6 POWDER (GRAM) TOPICAL NIGHTLY PRN
Status: DISCONTINUED | OUTPATIENT
Start: 2024-10-10 | End: 2024-10-11 | Stop reason: HOSPADM

## 2024-10-10 RX ORDER — NALOXONE HCL 0.4 MG/ML
0.02 VIAL (ML) INJECTION
Status: DISCONTINUED | OUTPATIENT
Start: 2024-10-10 | End: 2024-10-11 | Stop reason: HOSPADM

## 2024-10-10 RX ORDER — PANTOPRAZOLE SODIUM 40 MG/1
40 TABLET, DELAYED RELEASE ORAL DAILY
Status: DISCONTINUED | OUTPATIENT
Start: 2024-10-10 | End: 2024-10-11 | Stop reason: HOSPADM

## 2024-10-10 RX ORDER — HYDROCODONE BITARTRATE AND ACETAMINOPHEN 5; 325 MG/1; MG/1
1 TABLET ORAL EVERY 6 HOURS PRN
Status: DISCONTINUED | OUTPATIENT
Start: 2024-10-10 | End: 2024-10-11 | Stop reason: HOSPADM

## 2024-10-10 RX ORDER — ONDANSETRON HYDROCHLORIDE 2 MG/ML
4 INJECTION, SOLUTION INTRAVENOUS EVERY 6 HOURS PRN
Status: DISCONTINUED | OUTPATIENT
Start: 2024-10-10 | End: 2024-10-11 | Stop reason: HOSPADM

## 2024-10-10 RX ADMIN — PANTOPRAZOLE SODIUM 40 MG: 40 TABLET, DELAYED RELEASE ORAL at 05:10

## 2024-10-10 RX ADMIN — ATORVASTATIN CALCIUM 40 MG: 40 TABLET, FILM COATED ORAL at 09:10

## 2024-10-10 RX ADMIN — METOPROLOL SUCCINATE 25 MG: 25 TABLET, FILM COATED, EXTENDED RELEASE ORAL at 04:10

## 2024-10-10 NOTE — ASSESSMENT & PLAN NOTE
Improving.  She denies fever, chills, ha, or confusion. Managed by infectious disease.  Patient was completed antibiotics.  Due to follow up with ID outpatient.

## 2024-10-10 NOTE — ASSESSMENT & PLAN NOTE
Echocardiogram during recent admission showed EF of 30-35% and grade 2 diastolic dysfunction.  Pattern suggestive of takotsubo cardiomyopathy.  Euvolemic today in office.  Started on metoprolol succinate 25 mg daily upon discharge.  Recommend starting low-dose losartan 12.5 mg daily and Jardiance 10 mg daily.  1.5 L fluid restriction.  2 g salt restriction.  Daily weights.

## 2024-10-10 NOTE — ED PROVIDER NOTES
Chief complaint:  Abnormal ECG (Patient was at Cardiac Clinic. EKG was done and sent over to ER for abnormal EKG/)      HPI:  Masha Hobbs is a 68 y.o. female with hx PAF, recent admission for encephalitis with resolution last month, CHF with EF 30-35% 9/2024 presenting with transfer to the ED from clinic due to abnormal EKG with only recent new symptoms of shortness of breath on exertion.  Patient attributes this to deconditioning from recent hospital stay.  Symptoms from hospitalization including headache have resolved.  No recent fever.  She denies chest pain, diaphoresis, emesis.  She denies cardiac history.  She is feeling well.    ROS: As per HPI and below:  No neck stiffness, rash, swelling, blood in the stools, dark stools, oliguria, hemoptysis, cough.    Review of patient's allergies indicates:  No Known Allergies    Patient's Medications   New Prescriptions    No medications on file   Previous Medications    CALCIUM CARBONATE/VITAMIN D3 (CALCIUM 600 + D,3, ORAL)    Take 1 tablet by mouth Daily.    EMPAGLIFLOZIN (JARDIANCE) 10 MG TABLET    Take 1 tablet (10 mg total) by mouth once daily.    LACTOBACILLUS ACIDOPHILUS (PROBIOTIC ACIDOPHILUS ORAL)    Take 1 capsule by mouth Daily.    LOSARTAN (COZAAR) 25 MG TABLET    Take 0.5 tablets (12.5 mg total) by mouth once daily.    METOPROLOL SUCCINATE (TOPROL-XL) 25 MG 24 HR TABLET    Take 1 tablet (25 mg total) by mouth once daily.    OMEPRAZOLE (PRILOSEC) 20 MG CAPSULE    Take 20 mg by mouth once daily.    VITS A-C-E-B COMPLX-MIN-LUTEIN (LIPOTRIAD, WITH LUTEIN,) 5,000 UNIT- 120 MG-60 UNIT TBSR    Take 1 tablet by mouth once daily.   Modified Medications    No medications on file   Discontinued Medications    No medications on file       PMH:  As per HPI and below:  Past Medical History:   Diagnosis Date    Age-related osteoporosis without current pathological fracture 3/4/2020    Based upon bone density showing osteoporosis both at hips and lumbar spine.  Patient  "notified.  May consider bisphosphonates.     Past Surgical History:   Procedure Laterality Date    TONSILLECTOMY      WRIST SURGERY Right        Social History     Socioeconomic History    Marital status:      Spouse name: Elias Hobbs    Number of children: 2   Occupational History    Occupation: Part Time realtor/   Tobacco Use    Smoking status: Former     Current packs/day: 0.00     Types: Cigarettes     Quit date: 1989     Years since quittin.7    Smokeless tobacco: Never   Substance and Sexual Activity    Alcohol use: Yes     Alcohol/week: 1.0 - 2.0 standard drink of alcohol     Types: 1 - 2 Shots of liquor per week     Comment: "Occasional"    Drug use: Not Currently    Sexual activity: Yes     Partners: Male   Social History Narrative    G 39, B35     Social Drivers of Health     Financial Resource Strain: Low Risk  (2024)    Overall Financial Resource Strain (CARDIA)     Difficulty of Paying Living Expenses: Not hard at all   Food Insecurity: No Food Insecurity (2024)    Hunger Vital Sign     Worried About Running Out of Food in the Last Year: Never true     Ran Out of Food in the Last Year: Never true   Transportation Needs: No Transportation Needs (2024)    TRANSPORTATION NEEDS     Transportation : No   Physical Activity: Sufficiently Active (2024)    Exercise Vital Sign     Days of Exercise per Week: 4 days     Minutes of Exercise per Session: 120 min   Stress: No Stress Concern Present (2024)    [employer] - Occupational Stress Questionnaire     Feeling of Stress : Not at all   Housing Stability: Low Risk  (2024)    Housing Stability Vital Sign     Unable to Pay for Housing in the Last Year: No     Homeless in the Last Year: No       Family History   Problem Relation Name Age of Onset    Diabetes Mother      Heart disease Mother      Cancer Mother      Breast cancer Mother      Cancer Father      Hyperlipidemia Father   "    Heart disease Father      Diabetes Father      Stroke Father         Physical Exam:    Vitals:    10/10/24 1500   BP: 127/60   Pulse: 106   Resp: 20   Temp:      GENERAL:  No apparent distress.  Alert.    HEENT:  Moist mucous membranes.  Normocephalic and atraumatic.    NECK:  No swelling.  Midline trachea.   CARDIOVASCULAR:  Regular rate and rhythm.  2+ radial pulses.  No murmur.  PULMONARY:  Lungs clear to auscultation bilaterally.  No wheezes, rales, or rhonci.  No tachypnea with unlabored respirations.  ABDOMEN:  Non-tender and non-distended.    EXTREMITIES:  Warm and well perfused.  Brisk capillary refill.  No peripheral edema.  Legs are symmetric and nontender to palpation.  2+ DP and PT pulses.  NEUROLOGICAL:  Normal mental status.  Appropriate and conversant.    SKIN:  No rashes or ecchymoses.      Labs Reviewed   CBC W/ AUTO DIFFERENTIAL - Abnormal       Result Value    WBC 8.01      RBC 4.10      Hemoglobin 13.4      Hematocrit 39.1      MCV 95      MCH 32.7 (*)     MCHC 34.3      RDW 13.1      Platelets 375      MPV 9.0 (*)     Immature Granulocytes 0.1      Gran # (ANC) 5.5      Immature Grans (Abs) 0.01      Lymph # 1.9      Mono # 0.5      Eos # 0.1      Baso # 0.06      nRBC 0      Gran % 68.6      Lymph % 23.5      Mono % 6.2      Eosinophil % 0.9      Basophil % 0.7      Differential Method Automated     BASIC METABOLIC PANEL    Sodium 137      Potassium 3.9      Chloride 103      CO2 23      Glucose 98      BUN 20      Creatinine 0.8      Calcium 9.9      Anion Gap 11      eGFR >60.0     TROPONIN I HIGH SENSITIVITY    Troponin I High Sensitivity 11.0     TROPONIN I HIGH SENSITIVITY       Current Discharge Medication List        CONTINUE these medications which have NOT CHANGED    Details   calcium carbonate/vitamin D3 (CALCIUM 600 + D,3, ORAL) Take 1 tablet by mouth Daily.      Lactobacillus acidophilus (PROBIOTIC ACIDOPHILUS ORAL) Take 1 capsule by mouth Daily.      metoprolol succinate  (TOPROL-XL) 25 MG 24 hr tablet Take 1 tablet (25 mg total) by mouth once daily.  Qty: 90 tablet, Refills: 0    Comments: .      omeprazole (PRILOSEC) 20 MG capsule Take 20 mg by mouth once daily.      vits A-C-E-B complx-min-lutein (LIPOTRIAD, WITH LUTEIN,) 5,000 unit- 120 mg-60 unit TbSR Take 1 tablet by mouth once daily.      empagliflozin (JARDIANCE) 10 mg tablet Take 1 tablet (10 mg total) by mouth once daily.  Qty: 90 tablet, Refills: 3      losartan (COZAAR) 25 MG tablet Take 0.5 tablets (12.5 mg total) by mouth once daily.  Qty: 45 tablet, Refills: 3    Comments: .             Orders Placed This Encounter   Procedures    X-Ray Chest 1 View    CBC auto differential    Basic metabolic panel (BMP)    Troponin I High Sensitivity    Comprehensive Metabolic Panel (CMP)    Magnesium    CBC with Automated Differential    Troponin I High Sensitivity    Lipid Panel    Diet NPO    Diet Cardiac Standard Tray    Vital signs    Bladder scan    Notify Physician    Place sequential compression device    Recheck Blood Glucose:    Cardiac Monitoring - Adult    Full code    Inpatient consult to Cardiology    Pulse Oximetry Continuous    EKG 12-lead    EKG 12-lead    Insert Saline lock IV    Saline lock IV    Possible Hospitalization    Place in Observation    Fall precautions       Imaging Results              X-Ray Chest 1 View (Final result)  Result time 10/10/24 12:33:30      Final result by Jacob Alcantara MD (10/10/24 12:33:30)                   Impression:      No acute cardiac or pulmonary process.      Electronically signed by: Jacob Alcantara  Date:    10/10/2024  Time:    12:33               Narrative:    CLINICAL HISTORY:  ([id number])67 y/o  (1955) F    CP;    TECHNIQUE:  (A#[id number], exam time 10/10/2024 12:32)    XR CHEST 1 VIEW [id number]    COMPARISON:  Radiographs from 09/25/2024.    FINDINGS:  The lungs are clear. Costophrenic angles are seen without effusion. No pneumothorax is identified. The heart is  normal in size. The aorta appears tortuous  a finding usually associated with either atherosclerosis or systemic hypertension. Osseous structures appear within normal limits. The visualized upper abdomen is unremarkable.                                  (Rad read)    ED Course as of 10/10/24 1535   Thu Oct 10, 2024   1116 EKG:  Sinus tachycardia with rate of 102, CO interval prolonged at 260 ms with other intervals normal, R axis. Old T-wave inversions in III and V2 with new inversions in V3-V6 without significant ST elevation or depression.  (Independently interpreted by me) [MR]      ED Course User Index  [MR] Silvano Vasquez MD       MDM:    68 y.o. female with mild exertional dyspnea with transfer from cardiology clinic for abnormal EKG with plan to perform cardiac catheterization as an inpatient.  I do not think emergent cardiac catheterization from ED is indicated.  Laboratory sent with consideration of recent symptoms and abnormal EKG including cardiac biomarker for initial risk stratification and I will plan on admission to hospital medicine for further planned workup.  Low suspicion for aortic dissection or PE.  I do not think CTA of the chest is indicated.  Lungs are clear and I doubt CHF exacerbation.  Initial workup not indicative of ACS.  Patient remains asymptomatic.  I have discussed with hospital medicine who will admit with Cardiology consultation.    Diagnoses:    1. Abnormal EKG  2. Exertional dyspnea       Silvano Vasquez MD  10/10/24 1535

## 2024-10-10 NOTE — SUBJECTIVE & OBJECTIVE
Past Medical History:   Diagnosis Date    Age-related osteoporosis without current pathological fracture 3/4/2020    Based upon bone density showing osteoporosis both at hips and lumbar spine.  Patient notified.  May consider bisphosphonates.       Past Surgical History:   Procedure Laterality Date    TONSILLECTOMY      WRIST SURGERY Right        Review of patient's allergies indicates:  No Known Allergies    No current facility-administered medications on file prior to encounter.     Current Outpatient Medications on File Prior to Encounter   Medication Sig    calcium carbonate/vitamin D3 (CALCIUM 600 + D,3, ORAL) Take 1 tablet by mouth Daily.    Lactobacillus acidophilus (PROBIOTIC ACIDOPHILUS ORAL) Take 1 capsule by mouth Daily.    metoprolol succinate (TOPROL-XL) 25 MG 24 hr tablet Take 1 tablet (25 mg total) by mouth once daily.    omeprazole (PRILOSEC) 20 MG capsule Take 20 mg by mouth once daily.    vits A-C-E-B complx-min-lutein (LIPOTRIAD, WITH LUTEIN,) 5,000 unit- 120 mg-60 unit TbSR Take 1 tablet by mouth once daily.    empagliflozin (JARDIANCE) 10 mg tablet Take 1 tablet (10 mg total) by mouth once daily.    losartan (COZAAR) 25 MG tablet Take 0.5 tablets (12.5 mg total) by mouth once daily.    [DISCONTINUED] calcium carbonate-vit D3-min 600 mg calcium- 400 unit Tab Take 1 tablet by mouth 2 (two) times a day. for 365 doses (Patient not taking: Reported on 10/7/2024)     Family History       Problem Relation (Age of Onset)    Breast cancer Mother    Cancer Mother, Father    Diabetes Mother, Father    Heart disease Mother, Father    Hyperlipidemia Father    Stroke Father          Tobacco Use    Smoking status: Former     Current packs/day: 0.00     Types: Cigarettes     Quit date: 1989     Years since quittin.7    Smokeless tobacco: Never   Substance and Sexual Activity    Alcohol use: Yes     Alcohol/week: 1.0 - 2.0 standard drink of alcohol     Types: 1 - 2 Shots of liquor per week      "Comment: "Occasional"    Drug use: Not Currently    Sexual activity: Yes     Partners: Male     Review of Systems   Constitutional:  Negative for chills and fever.   HENT:  Negative for sore throat.    Eyes:  Negative for visual disturbance.   Respiratory:  Negative for cough.         Shortness of breath on exertion.    Cardiovascular:  Negative for chest pain and palpitations.   Gastrointestinal:  Negative for abdominal pain, nausea and vomiting.   Endocrine: Negative for polyuria.   Genitourinary:  Negative for dysuria, frequency and urgency.   Musculoskeletal:  Negative for back pain.   Skin:  Negative for rash.   Neurological:  Negative for syncope.   Psychiatric/Behavioral:  Negative for confusion.      Objective:     Vital Signs (Most Recent):  Temp: 98.2 °F (36.8 °C) (10/10/24 1055)  Pulse: 104 (10/10/24 1059)  Resp: 12 (10/10/24 1059)  BP: (!) 171/74 (10/10/24 1059)  SpO2: 100 % (10/10/24 1059) Vital Signs (24h Range):  Temp:  [98.2 °F (36.8 °C)] 98.2 °F (36.8 °C)  Pulse:  [103-108] 104  Resp:  [12-15] 12  SpO2:  [97 %-100 %] 100 %  BP: (148-180)/(74-88) 171/74     Weight: 59.4 kg (131 lb)  Body mass index is 24.75 kg/m².     Physical Exam  Vitals reviewed.   Constitutional:       Appearance: Normal appearance.   HENT:      Head: Normocephalic and atraumatic.      Mouth/Throat:      Mouth: Mucous membranes are moist.   Eyes:      Extraocular Movements: Extraocular movements intact.      Conjunctiva/sclera: Conjunctivae normal.      Pupils: Pupils are equal, round, and reactive to light.   Cardiovascular:      Rate and Rhythm: Normal rate and regular rhythm.   Pulmonary:      Effort: Pulmonary effort is normal. No respiratory distress.      Breath sounds: Normal breath sounds. No wheezing.   Abdominal:      General: Abdomen is flat. Bowel sounds are normal. There is no distension.      Palpations: Abdomen is soft.      Tenderness: There is no abdominal tenderness.   Musculoskeletal:         General: No " swelling or tenderness. Normal range of motion.      Cervical back: Normal range of motion and neck supple.   Skin:     General: Skin is warm.      Coloration: Skin is not jaundiced.   Neurological:      General: No focal deficit present.      Mental Status: She is alert and oriented to person, place, and time.   Psychiatric:         Mood and Affect: Mood normal.         Behavior: Behavior normal.              CRANIAL NERVES     CN III, IV, VI   Pupils are equal, round, and reactive to light.       Significant Labs: All pertinent labs within the past 24 hours have been reviewed.  Recent Lab Results         10/10/24  1152        Anion Gap 11       Baso # 0.06       Basophil % 0.7       BUN 20       Calcium 9.9       Chloride 103       CO2 23       Creatinine 0.8       Differential Method Automated       eGFR >60.0       Eos # 0.1       Eos % 0.9       Glucose 98       Gran # (ANC) 5.5       Gran % 68.6       Hematocrit 39.1       Hemoglobin 13.4       Immature Grans (Abs) 0.01  Comment: Mild elevation in immature granulocytes is non specific and   can be seen in a variety of conditions including stress response,   acute inflammation, trauma and pregnancy. Correlation with other   laboratory and clinical findings is essential.         Immature Granulocytes 0.1       Lymph # 1.9       Lymph % 23.5       MCH 32.7       MCHC 34.3       MCV 95       Mono # 0.5       Mono % 6.2       MPV 9.0       nRBC 0       Platelet Count 375       Potassium 3.9       RBC 4.10       RDW 13.1       Sodium 137       Troponin I High Sensitivity 11.0  Comment: Troponin results differ between methods. Do not use   results between Troponin methods interchangeably.    Alkaline Phospatase levels above 400 U/L may   cause false positive results.    Access hsTnI should not be used for patients taking   Asfotase venessa (Strensiq).         WBC 8.01               Significant Imaging: I have reviewed all pertinent imaging results/findings within the  past 24 hours.

## 2024-10-10 NOTE — PHARMACY MED REC
"Admission Medication History     The home medication history was taken by Jose Jung.    You may go to "Admission" then "Reconcile Home Medications" tabs to review and/or act upon these items.     The home medication list has been updated by the Pharmacy department.   Please read ALL comments highlighted in yellow.   Please address this information as you see fit.    Feel free to contact us if you have any questions or require assistance.        Medications listed below were obtained from: Patient/family and Analytic software- [employer]  No current facility-administered medications on file prior to encounter.     Current Outpatient Medications on File Prior to Encounter   Medication Sig Dispense Refill    calcium carbonate/vitamin D3 (CALCIUM 600 + D,3, ORAL) Take 1 tablet by mouth Daily.      Lactobacillus acidophilus (PROBIOTIC ACIDOPHILUS ORAL) Take 1 capsule by mouth Daily.      metoprolol succinate (TOPROL-XL) 25 MG 24 hr tablet Take 1 tablet (25 mg total) by mouth once daily. 90 tablet 0    omeprazole (PRILOSEC) 20 MG capsule Take 20 mg by mouth once daily.      vits A-C-E-B complx-min-lutein (LIPOTRIAD, WITH LUTEIN,) 5,000 unit- 120 mg-60 unit TbSR Take 1 tablet by mouth once daily.      empagliflozin (JARDIANCE) 10 mg tablet Take 1 tablet (10 mg total) by mouth once daily. 90 tablet 3    losartan (COZAAR) 25 MG tablet Take 0.5 tablets (12.5 mg total) by mouth once daily. 45 tablet 3    [DISCONTINUED] calcium carbonate-vit D3-min 600 mg calcium- 400 unit Tab Take 1 tablet by mouth 2 (two) times a day. for 365 doses (Patient not taking: Reported on 10/7/2024) 180 tablet 2             Jose Jung  EXT 1924                .          "

## 2024-10-10 NOTE — ASSESSMENT & PLAN NOTE
EKG today in office shows sinus rhythm, sinus tach with first-degree AV block and acute T-wave inversions in inferior and anterior lateral leads.  Mild ST depression present. + HODGE. Acute HFrEF during recent admission EF 30-35%.  Continue metoprolol succinate 25 mg daily, start losartan and Jardiance.  Discussed with Dr. Valentino Mathew. Recommend further evaluation in ED for acute EKG changes.  Recommend ischemic evaluation with cardiac cath to rule out coronary blockages.

## 2024-10-10 NOTE — H&P
[facility] - Emergency Dept  Hospital Medicine  History & Physical    Patient Name: Masha Hobbs  MRN: [id number]  Patient Class: OP- Observation  Admission Date: 10/10/2024  Attending Physician: Lary Larsen MD  Primary Care Provider: Roney Bang MD         Patient information was obtained from patient and ER records.     Subjective:     Principal Problem:Shortness of breath    Chief Complaint:   Chief Complaint   Patient presents with    Abnormal ECG     Patient was at Cardiac Clinic. EKG was done and sent over to ER for abnormal EKG          HPI: 68-year-old female with PMH of combined heart failure, recent encephalitis and ventriculitis who was sent from the Cardiology office to the ER to be admitted for cardiac catheterization.  Patient went for her follow up appointment with Cardiology today.  EKG was done at the cardiology office and it looked abnormal and different compared to her EKG last admission.  As a result, Cardiology sent the patient to the ER to be admitted for cardiac catheterization tomorrow.    In the ER, vitals showed a blood pressure of 180/76, heart rate of 108, respiratory of 15, afebrile, satting 100% on room air.  CBC, CMP and troponin are within normal limits.  EKG shows sinus tachycardia with first-degree AV block, and diffuse T-wave inversion.  Patient will be admitted to Medicine for cardiac catheterization tomorrow.      Past Medical History:   Diagnosis Date    Age-related osteoporosis without current pathological fracture 3/4/2020    Based upon bone density showing osteoporosis both at hips and lumbar spine.  Patient notified.  May consider bisphosphonates.       Past Surgical History:   Procedure Laterality Date    TONSILLECTOMY      WRIST SURGERY Right 2021       Review of patient's allergies indicates:  No Known Allergies    No current facility-administered medications on file prior to encounter.     Current Outpatient Medications on File Prior to Encounter  "  Medication Sig    calcium carbonate/vitamin D3 (CALCIUM 600 + D,3, ORAL) Take 1 tablet by mouth Daily.    Lactobacillus acidophilus (PROBIOTIC ACIDOPHILUS ORAL) Take 1 capsule by mouth Daily.    metoprolol succinate (TOPROL-XL) 25 MG 24 hr tablet Take 1 tablet (25 mg total) by mouth once daily.    omeprazole (PRILOSEC) 20 MG capsule Take 20 mg by mouth once daily.    vits A-C-E-B complx-min-lutein (LIPOTRIAD, WITH LUTEIN,) 5,000 unit- 120 mg-60 unit TbSR Take 1 tablet by mouth once daily.    empagliflozin (JARDIANCE) 10 mg tablet Take 1 tablet (10 mg total) by mouth once daily.    losartan (COZAAR) 25 MG tablet Take 0.5 tablets (12.5 mg total) by mouth once daily.    [DISCONTINUED] calcium carbonate-vit D3-min 600 mg calcium- 400 unit Tab Take 1 tablet by mouth 2 (two) times a day. for 365 doses (Patient not taking: Reported on 10/7/2024)     Family History       Problem Relation (Age of Onset)    Breast cancer Mother    Cancer Mother, Father    Diabetes Mother, Father    Heart disease Mother, Father    Hyperlipidemia Father    Stroke Father          Tobacco Use    Smoking status: Former     Current packs/day: 0.00     Types: Cigarettes     Quit date: 1989     Years since quittin.7    Smokeless tobacco: Never   Substance and Sexual Activity    Alcohol use: Yes     Alcohol/week: 1.0 - 2.0 standard drink of alcohol     Types: 1 - 2 Shots of liquor per week     Comment: "Occasional"    Drug use: Not Currently    Sexual activity: Yes     Partners: Male     Review of Systems   Constitutional:  Negative for chills and fever.   HENT:  Negative for sore throat.    Eyes:  Negative for visual disturbance.   Respiratory:  Negative for cough.         Shortness of breath on exertion.    Cardiovascular:  Negative for chest pain and palpitations.   Gastrointestinal:  Negative for abdominal pain, nausea and vomiting.   Endocrine: Negative for polyuria.   Genitourinary:  Negative for dysuria, frequency and urgency. "   Musculoskeletal:  Negative for back pain.   Skin:  Negative for rash.   Neurological:  Negative for syncope.   Psychiatric/Behavioral:  Negative for confusion.      Objective:     Vital Signs (Most Recent):  Temp: 98.2 °F (36.8 °C) (10/10/24 1055)  Pulse: 104 (10/10/24 1059)  Resp: 12 (10/10/24 1059)  BP: (!) 171/74 (10/10/24 1059)  SpO2: 100 % (10/10/24 1059) Vital Signs (24h Range):  Temp:  [98.2 °F (36.8 °C)] 98.2 °F (36.8 °C)  Pulse:  [103-108] 104  Resp:  [12-15] 12  SpO2:  [97 %-100 %] 100 %  BP: (148-180)/(74-88) 171/74     Weight: 59.4 kg (131 lb)  Body mass index is 24.75 kg/m².     Physical Exam  Vitals reviewed.   Constitutional:       Appearance: Normal appearance.   HENT:      Head: Normocephalic and atraumatic.      Mouth/Throat:      Mouth: Mucous membranes are moist.   Eyes:      Extraocular Movements: Extraocular movements intact.      Conjunctiva/sclera: Conjunctivae normal.      Pupils: Pupils are equal, round, and reactive to light.   Cardiovascular:      Rate and Rhythm: Normal rate and regular rhythm.   Pulmonary:      Effort: Pulmonary effort is normal. No respiratory distress.      Breath sounds: Normal breath sounds. No wheezing.   Abdominal:      General: Abdomen is flat. Bowel sounds are normal. There is no distension.      Palpations: Abdomen is soft.      Tenderness: There is no abdominal tenderness.   Musculoskeletal:         General: No swelling or tenderness. Normal range of motion.      Cervical back: Normal range of motion and neck supple.   Skin:     General: Skin is warm.      Coloration: Skin is not jaundiced.   Neurological:      General: No focal deficit present.      Mental Status: She is alert and oriented to person, place, and time.   Psychiatric:         Mood and Affect: Mood normal.         Behavior: Behavior normal.              CRANIAL NERVES     CN III, IV, VI   Pupils are equal, round, and reactive to light.       Significant Labs: All pertinent labs within the past  24 hours have been reviewed.  Recent Lab Results         10/10/24  1152        Anion Gap 11       Baso # 0.06       Basophil % 0.7       BUN 20       Calcium 9.9       Chloride 103       CO2 23       Creatinine 0.8       Differential Method Automated       eGFR >60.0       Eos # 0.1       Eos % 0.9       Glucose 98       Gran # (ANC) 5.5       Gran % 68.6       Hematocrit 39.1       Hemoglobin 13.4       Immature Grans (Abs) 0.01  Comment: Mild elevation in immature granulocytes is non specific and   can be seen in a variety of conditions including stress response,   acute inflammation, trauma and pregnancy. Correlation with other   laboratory and clinical findings is essential.         Immature Granulocytes 0.1       Lymph # 1.9       Lymph % 23.5       MCH 32.7       MCHC 34.3       MCV 95       Mono # 0.5       Mono % 6.2       MPV 9.0       nRBC 0       Platelet Count 375       Potassium 3.9       RBC 4.10       RDW 13.1       Sodium 137       Troponin I High Sensitivity 11.0  Comment: Troponin results differ between methods. Do not use   results between Troponin methods interchangeably.    Alkaline Phospatase levels above 400 U/L may   cause false positive results.    Access hsTnI should not be used for patients taking   Asfotase venessa (Strensiq).         WBC 8.01               Significant Imaging: I have reviewed all pertinent imaging results/findings within the past 24 hours.  Assessment/Plan:     * Shortness of breath  Shortness of breath on exertion.  EKG with diffuse T wave inversion.  First troponin is negative. Will order second troponin.   Cardiology planning for cardiac cath tomorrow. Cardiology consult.  NPO after midnight.  Admit to tele.        Chronic combined systolic and diastolic heart failure  Pt is euvolemic. No diuretic needed.   Will resume metoprolol.   Pt was started on losartan today by cardiology.   Will order lipid panel and start lipitor.           VTE Risk Mitigation (From admission,  onward)           Ordered     IP VTE LOW RISK PATIENT  Once         10/10/24 1340     Place sequential compression device  Until discontinued         10/10/24 1340                       On 10/10/2024, patient should be placed in hospital observation services under my care.             Lary Larsen MD  Department of Hospital Medicine  Count includes the [facility] - Emergency Dept

## 2024-10-10 NOTE — PROGRESS NOTES
Subjective:    Patient ID:  Masha Hobbs is a 68 y.o. female patient here for evaluation No chief complaint on file.      History of Present Illness:       Patient was here for hospital follow up.  Presented to the ED with complaints of fevers confusion and headache.  Admitted for encephalitis and ventriculitis.  Treated with IV antibiotics.  Elevated D-dimer but negative for PE.  Pleural effusions noted on CTA of the chest.  Patient was given Lasix.  Patient was also found to have newly diagnosed cardiomyopathy with EF of 35%.  Patient was discharged with instructions to follow up with Cardiology and Infectious Disease outpatient.      Today in office patient's EKG shows sinus tachycardia with first-degree AV block and acute T-wave inversion and depression in inferior and anterior lateral leads.  This is new when compared to previous EKG during hospital admission 9/16.  Hypertensive as well today in office 148/88.      No CP.  HODGE present but improving.       ECHO 9/19/24    Left Ventricle: The left ventricle is normal in size. Normal wall thickness. Regional wall motion abnormalities present.  There is akinesis of mid to distal segments and the apex.  Normal contractility of the basal segments.  Pattern suggestive of takotsubo cardiomyopathy. There is moderately reduced systolic function with a visually estimated ejection fraction of 30 - 35%. Grade II diastolic dysfunction.    Right Ventricle: Normal right ventricular cavity size. Systolic function is normal.    Mitral Valve: There is mild regurgitation.    Tricuspid Valve: There is mild to moderate regurgitation.    Pulmonary Artery: There is pulmonary hypertension. The estimated pulmonary artery systolic pressure is 46 mmHg.    IVC/SVC: Intermediate venous pressure at 8 mmHg.      Discharge summary 9/30/24  HPI:   Masha Hobbs is a 68 year old female with a past medical history of osteoporosis and chronic lower back pain with radiculopathy who was  transferred from [facility] due to confusion associated with headache, fever, and generalized weakness/body aches for several hours prior to arrival.  Per  patient was confused since 4:00 p.m. Upon assessment confusion has resolved.  She denies any complaints other than a intermittent frontal lobe headache that is improving after Toradol administration.  There are no acute neurological focal deficits.  She denies chest pain, shortness of breath, dizziness, lightheadedness, vision changes, speech changes, numbness/tingling, neck pain, abdominal pain, nausea, or vomiting.  ED workup reveals:  CT head negative for acute intracranial hemorrhage. Chest x-ray no evidence of acute cardiopulmonary findings.  Lumbar puncture performed in ED, pending results.  She was treated with vancomycin, Rocephin, valacyclovir p.o., and dexamethasone.  CBC with elevated white count 13.8 and stable H&H.  BMP with phosphorus 2.0 otherwise unremarkable.  Negative flu/COVID.  Urinalysis negative.  Lactic acid 1.1.  ECG negative for ischemia or infarct.  MRI brain pending.  Neurology consult placed.  Admitted to hospital medicine for further management and treatment.                 * No surgery found *       Hospital Course:   Patient is a 68 year old female admitted with AMS, headache and fever. LP was suggestive of meningitis with 2K WBC, mainly neutrophils. HSV panel was negative. Patient was started on Vancomycin, Ampicillin and Rocephin. Acyclovir was discontinued after D1. MRI shows encephalitis findings with ventriculitis. Patient is being followed by neurology and ID. EEG has been ordered which is consistent with encephalopathy but no epileptiform activity. Patient continue to spike fever and is vomiting. Stat MRI was repeated with rpt LP was ordered. Rpt MRI did not show any changes. LP shows reduced WBC compared to the first one. Patient's mental status improved. Patient continued with daily fevers.LP shows > 2000 WBC with  neurophil predominance, protein is high normal glucose   MRI shows encephalitis and ventriculitis findings   HSV and meningitis panel was all negative   CSF Cryptococcus negative, HIV,  [place] n[place] virus , Lyme disease, Dane mountain spotted fever antibody negative, CSF VDRL negative.      EEG shows encephalopathy and no epileptiform activity   Discussed with neurology and ID  MRI and LP was repeated on 9/19 shows WBC improved, but other wise similar findings.   Antibiotics narrowed down to p.o. doxycycline and IV Rocephin.  Patient is seems responded to very well to this regimen, mental status much improved, fever resolved, leukocytosis resolved.      D Dimer was elevated, and CTA chest was done. This revealed no PE, but some pleural effusions. Lasix was given. Patient's appetite was poor and clinimix was added temporarily. Continued with poor appetite. Appetite stimulant added.      Patient has a recently trip to [place]   I highly suspect patient may have HGA or HME CNS infection.   Check ehrlichiosis antibody panel, check blood peripheral smear to look for  morale inclusions.   ID note appreciated, continue IV Rocephin and p.o. doxycycline, anticipate last dose of IV Rocephin on 9/30.         Patient is cleared for discharge home with home health, follow up ID in 2 weeks.  Patient also has new diagnosed cardiomyopathy with LVEF 35%, evaluated by cardiologist, recommended outpatient follow up, started on low-dose metoprolol.        Review of patient's allergies indicates:  No Known Allergies    Past Medical History:   Diagnosis Date    Age-related osteoporosis without current pathological fracture 3/4/2020    Based upon bone density showing osteoporosis both at hips and lumbar spine.  Patient notified.  May consider bisphosphonates.     Past Surgical History:   Procedure Laterality Date    TONSILLECTOMY      WRIST SURGERY Right 2021     Social History     Tobacco Use    Smoking status: Former     Current  "packs/day: 0.00     Types: Cigarettes     Quit date: 1989     Years since quittin.7    Smokeless tobacco: Never   Substance Use Topics    Alcohol use: Yes     Alcohol/week: 1.0 - 2.0 standard drink of alcohol     Types: 1 - 2 Shots of liquor per week     Comment: "Occasional"    Drug use: Not Currently        Review of Systems:    As noted in HPI in addition      REVIEW OF SYSTEMS  CARDIOVASCULAR: No recent chest pain, palpitations, arm, neck, or jaw pain  RESPIRATORY: No recent fever, cough chills, SOB or congestion  : No blood in the urine  GI: No Nausea, vomiting, constipation, diarrhea, blood, or reflux.  MUSCULOSKELETAL: No myalgias  NEURO: No lightheadedness or dizziness  EYES: No Double vision, blurry, vision or headache              Objective        Vitals:    10/10/24 0943   BP: (!) 148/88   Pulse: 103       LIPIDS - LAST 2   Lab Results   Component Value Date    CHOL 215 (H) 2020    HDL 70 2020    LDLCALC 130.8 2020    TRIG 71 2020    CHOLHDL 32.6 2020       CBC - LAST 2  Lab Results   Component Value Date    WBC 11.43 2024    WBC 11.31 2024    RBC 3.38 (L) 2024    RBC 3.26 (L) 2024    HGB 10.9 (L) 2024    HGB 10.7 (L) 2024    HCT 32.4 (L) 2024    HCT 31.2 (L) 2024    MCV 96 2024    MCV 96 2024    MCH 32.2 (H) 2024    MCH 32.8 (H) 2024    MCHC 33.6 2024    MCHC 34.3 2024    RDW 12.7 2024    RDW 12.6 2024     (H) 2024     2024    MPV 8.6 (L) 2024    MPV 8.5 (L) 2024    GRAN 7.8 (H) 2024    GRAN 68.6 2024    LYMPH 2.5 2024    LYMPH 21.7 2024    MONO 0.8 2024    MONO 6.7 2024    BASO 0.07 2024    BASO 0.08 2024    NRBC 0 2024    NRBC 0 2024       CHEMISTRY & LIVER FUNCTION - LAST 2  Lab Results   Component Value Date     (L) 2024     (L) 2024    K 4.4 " "09/29/2024    K 4.2 09/28/2024     09/29/2024    CL 97 09/28/2024    CO2 23 09/29/2024    CO2 25 09/28/2024    ANIONGAP 9 09/29/2024    ANIONGAP 10 09/28/2024    BUN 21 09/29/2024    BUN 28 (H) 09/28/2024    CREATININE 0.8 09/29/2024    CREATININE 0.8 09/28/2024    GLU 78 09/29/2024    GLU 89 09/28/2024    CALCIUM 9.1 09/29/2024    CALCIUM 9.1 09/28/2024    MG 1.9 09/29/2024    MG 2.0 09/28/2024    ALBUMIN 2.4 (L) 09/28/2024    ALBUMIN 2.6 (L) 09/27/2024    PROT 6.0 09/28/2024    PROT 6.8 09/27/2024    ALKPHOS 54 (L) 09/28/2024    ALKPHOS 59 09/27/2024    ALT 82 (H) 09/28/2024     (H) 09/27/2024    AST 39 09/28/2024    AST 85 (H) 09/27/2024    BILITOT 0.3 09/28/2024    BILITOT 0.3 09/27/2024        CARDIAC PROFILE - LAST 2  No results found for: "BNP", "CPK", "CPKMB", "LDH", "TROPONINI", "TROPONINIHS"     COAGULATION - LAST 2  Lab Results   Component Value Date    INR 1.0 06/06/2024       ENDOCRINE & PSA - LAST 2  Lab Results   Component Value Date    TSH 0.622 09/18/2024    PROCAL 0.06 09/24/2024    PROCAL 0.06 09/23/2024        ECHOCARDIOGRAM RESULTS  Results for orders placed during the hospital encounter of 09/16/24    Echo    Interpretation Summary    Left Ventricle: The left ventricle is normal in size. Normal wall thickness. Regional wall motion abnormalities present.  There is akinesis of mid to distal segments and the apex.  Normal contractility of the basal segments.  Pattern suggestive of takotsubo cardiomyopathy. There is moderately reduced systolic function with a visually estimated ejection fraction of 30 - 35%. Grade II diastolic dysfunction.    Right Ventricle: Normal right ventricular cavity size. Systolic function is normal.    Mitral Valve: There is mild regurgitation.    Tricuspid Valve: There is mild to moderate regurgitation.    Pulmonary Artery: There is pulmonary hypertension. The estimated pulmonary artery systolic pressure is 46 mmHg.    IVC/SVC: Intermediate venous pressure at " 8 mmHg.      CURRENT/PREVIOUS VISIT EKG  Results for orders placed or performed during the hospital encounter of 09/16/24   EKG 12-lead    Collection Time: 09/16/24  2:04 PM   Result Value Ref Range    QRS Duration 102 ms    OHS QTC Calculation 452 ms    Narrative    Test Reason : R00.0,    Vent. Rate : 108 BPM     Atrial Rate : 108 BPM     P-R Int : 204 ms          QRS Dur : 102 ms      QT Int : 338 ms       P-R-T Axes : 030 065 020 degrees     QTc Int : 452 ms    Sinus tachycardia  Incomplete right bundle branch block  Septal infarct ,age undetermined  Abnormal ECG  When compared with ECG of 06-JUN-2024 16:29,  NC interval has decreased  The axis Shifted right  Non-specific change in ST segment in Anterior leads  Confirmed by Eric Mathew MD ([id number]) on 9/22/2024 3:51:24 PM    Referred By:             Confirmed By:Eric Mathew MD     No valid procedures specified.   No results found for this or any previous visit.    No valid procedures specified.    PHYSICAL EXAM  CONSTITUTIONAL: Well built, well nourished in no apparent distress  NECK: no carotid bruit, no JVD  LUNGS: CTA  CHEST WALL: no tenderness  HEART: regular rate and rhythm, S1, S2 normal, no murmur, click, rub or gallop   ABDOMEN: soft, non-tender; bowel sounds normal; no masses,  no organomegaly  EXTREMITIES: Extremities normal, no edema, no calf tenderness noted  NEURO: AAO X 3    I HAVE REVIEWED :    The vital signs, nurses notes, and all the pertinent radiology and labs.        Current Outpatient Medications   Medication Instructions    empagliflozin (JARDIANCE) 10 mg, Oral, Daily    losartan (COZAAR) 12.5 mg, Oral, Daily    metoprolol succinate (TOPROL-XL) 25 mg, Oral, Daily    omeprazole (PRILOSEC) 20 mg, Daily    vits A-C-E-B complx-min-lutein (LIPOTRIAD, WITH LUTEIN,) 5,000 unit- 120 mg-60 unit TbSR 1 tablet, Daily          Assessment & Plan     HFrEF (heart failure with reduced ejection fraction)  Echocardiogram during recent admission  showed EF of 30-35% and grade 2 diastolic dysfunction.  Pattern suggestive of takotsubo cardiomyopathy.  Euvolemic today in office.  Started on metoprolol succinate 25 mg daily upon discharge.  Recommend starting low-dose losartan 12.5 mg daily and Jardiance 10 mg daily.  1.5 L fluid restriction.  2 g salt restriction.  Daily weights.    Meningitis  Improving.  She denies fever, chills, ha, or confusion. Managed by infectious disease.  Patient was completed antibiotics.  Due to follow up with ID outpatient.    Paroxysmal atrial fibrillation  Sinus tachycardia today in office.  Not on anticoagulation for PAF.    Nonspecific abnormal electrocardiogram (ECG) (EKG)  EKG today in office shows sinus rhythm, sinus tach with first-degree AV block and acute T-wave inversions in inferior and anterior lateral leads.  Mild ST depression present. + HODGE. Acute HFrEF during recent admission EF 30-35%.  Continue metoprolol succinate 25 mg daily, start losartan and Jardiance.  Discussed with Dr. Valentino Mathew. Recommend further evaluation in ED for acute EKG changes.  Recommend ischemic evaluation with cardiac cath to rule out coronary blockages.    Report called To MONIQUE Royal, in ED.       No follow-ups on file.

## 2024-10-10 NOTE — ASSESSMENT & PLAN NOTE
Shortness of breath on exertion.  EKG with diffuse T wave inversion.  First troponin is negative. Will order second troponin.   Cardiology planning for cardiac cath tomorrow. Cardiology consult.  NPO after midnight.  Admit to tele.

## 2024-10-10 NOTE — Clinical Note
ostium   left main. An angiography was performed of the left coronary arteries. Multiple views were taken. The angiography was performed via power injection. The injected amount was 8 mL contrast at 4 mL/s.

## 2024-10-10 NOTE — ASSESSMENT & PLAN NOTE
Pt is euvolemic. No diuretic needed.   Will resume metoprolol.   Pt was started on losartan today by cardiology.   Will order lipid panel and start lipitor.

## 2024-10-11 ENCOUNTER — CLINICAL SUPPORT (OUTPATIENT)
Dept: CARDIOLOGY | Facility: HOSPITAL | Age: 69
End: 2024-10-11
Attending: EMERGENCY MEDICINE
Payer: MEDICARE

## 2024-10-11 VITALS
HEIGHT: 61 IN | RESPIRATION RATE: 10 BRPM | DIASTOLIC BLOOD PRESSURE: 68 MMHG | SYSTOLIC BLOOD PRESSURE: 144 MMHG | TEMPERATURE: 97 F | WEIGHT: 130.88 LBS | BODY MASS INDEX: 24.71 KG/M2 | HEART RATE: 86 BPM | OXYGEN SATURATION: 98 %

## 2024-10-11 LAB
ALBUMIN SERPL BCP-MCNC: 3.7 G/DL (ref 3.5–5.2)
ALP SERPL-CCNC: 65 U/L (ref 55–135)
ALT SERPL W/O P-5'-P-CCNC: 23 U/L (ref 10–44)
ANION GAP SERPL CALC-SCNC: 6 MMOL/L (ref 8–16)
APICAL FOUR CHAMBER EJECTION FRACTION: 57 %
APICAL TWO CHAMBER EJECTION FRACTION: 60 %
APTT PPP: 27.6 SEC (ref 21–32)
AST SERPL-CCNC: 14 U/L (ref 10–40)
BASOPHILS # BLD AUTO: 0.08 K/UL (ref 0–0.2)
BASOPHILS NFR BLD: 1 % (ref 0–1.9)
BILIRUB SERPL-MCNC: 0.6 MG/DL (ref 0.1–1)
BSA FOR ECHO PROCEDURE: 1.6 M2
BUN SERPL-MCNC: 17 MG/DL (ref 8–23)
CALCIUM SERPL-MCNC: 9.7 MG/DL (ref 8.7–10.5)
CHLORIDE SERPL-SCNC: 106 MMOL/L (ref 95–110)
CHOLEST SERPL-MCNC: 178 MG/DL (ref 120–199)
CHOLEST/HDLC SERPL: 4.2 {RATIO} (ref 2–5)
CO2 SERPL-SCNC: 26 MMOL/L (ref 23–29)
CREAT SERPL-MCNC: 0.9 MG/DL (ref 0.5–1.4)
CV ECHO LV RWT: 0.51 CM
DIFFERENTIAL METHOD BLD: ABNORMAL
ECHO LV POSTERIOR WALL: 1.1 CM (ref 0.6–1.1)
EJECTION FRACTION: 61 %
EOSINOPHIL # BLD AUTO: 0.2 K/UL (ref 0–0.5)
EOSINOPHIL NFR BLD: 2.5 % (ref 0–8)
ERYTHROCYTE [DISTWIDTH] IN BLOOD BY AUTOMATED COUNT: 13.2 % (ref 11.5–14.5)
EST. GFR  (NO RACE VARIABLE): >60 ML/MIN/1.73 M^2
FRACTIONAL SHORTENING: 27.9 % (ref 28–44)
GLUCOSE SERPL-MCNC: 92 MG/DL (ref 70–110)
HCT VFR BLD AUTO: 35.8 % (ref 37–48.5)
HDLC SERPL-MCNC: 42 MG/DL (ref 40–75)
HDLC SERPL: 23.6 % (ref 20–50)
HGB BLD-MCNC: 12.3 G/DL (ref 12–16)
IMM GRANULOCYTES # BLD AUTO: 0.03 K/UL (ref 0–0.04)
IMM GRANULOCYTES NFR BLD AUTO: 0.4 % (ref 0–0.5)
INR PPP: 1.1 (ref 0.8–1.2)
INTERVENTRICULAR SEPTUM: 1.2 CM (ref 0.6–1.1)
LDLC SERPL CALC-MCNC: 123.8 MG/DL (ref 63–159)
LEFT INTERNAL DIMENSION IN SYSTOLE: 3.1 CM (ref 2.1–4)
LEFT VENTRICLE DIASTOLIC VOLUME INDEX: 52.59 ML/M2
LEFT VENTRICLE DIASTOLIC VOLUME: 83.1 ML
LEFT VENTRICLE END DIASTOLIC VOLUME APICAL 2 CHAMBER: 53.1 ML
LEFT VENTRICLE END DIASTOLIC VOLUME APICAL 4 CHAMBER: 57.9 ML
LEFT VENTRICLE MASS INDEX: 109.9 G/M2
LEFT VENTRICLE SYSTOLIC VOLUME INDEX: 24 ML/M2
LEFT VENTRICLE SYSTOLIC VOLUME: 37.9 ML
LEFT VENTRICULAR INTERNAL DIMENSION IN DIASTOLE: 4.3 CM (ref 3.5–6)
LEFT VENTRICULAR MASS: 173.6 G
LVED V (TEICH): 83.1 ML
LVES V (TEICH): 37.9 ML
LYMPHOCYTES # BLD AUTO: 2.5 K/UL (ref 1–4.8)
LYMPHOCYTES NFR BLD: 31.3 % (ref 18–48)
MAGNESIUM SERPL-MCNC: 1.9 MG/DL (ref 1.6–2.6)
MCH RBC QN AUTO: 32.6 PG (ref 27–31)
MCHC RBC AUTO-ENTMCNC: 34.4 G/DL (ref 32–36)
MCV RBC AUTO: 95 FL (ref 82–98)
MONOCYTES # BLD AUTO: 0.6 K/UL (ref 0.3–1)
MONOCYTES NFR BLD: 7 % (ref 4–15)
NEUTROPHILS # BLD AUTO: 4.7 K/UL (ref 1.8–7.7)
NEUTROPHILS NFR BLD: 57.8 % (ref 38–73)
NONHDLC SERPL-MCNC: 136 MG/DL
NRBC BLD-RTO: 0 /100 WBC
OHS LV EJECTION FRACTION SIMPSONS BIPLANE MOD: 61 %
PLATELET # BLD AUTO: 359 K/UL (ref 150–450)
PMV BLD AUTO: 8.8 FL (ref 9.2–12.9)
POTASSIUM SERPL-SCNC: 4.4 MMOL/L (ref 3.5–5.1)
PROT SERPL-MCNC: 7.5 G/DL (ref 6–8.4)
PROTHROMBIN TIME: 11.9 SEC (ref 9–12.5)
RA PRESSURE ESTIMATED: 3 MMHG
RBC # BLD AUTO: 3.77 M/UL (ref 4–5.4)
SODIUM SERPL-SCNC: 138 MMOL/L (ref 136–145)
TRIGL SERPL-MCNC: 61 MG/DL (ref 30–150)
WBC # BLD AUTO: 8.11 K/UL (ref 3.9–12.7)
Z-SCORE OF LEFT VENTRICULAR DIMENSION IN END DIASTOLE: -0.51
Z-SCORE OF LEFT VENTRICULAR DIMENSION IN END SYSTOLE: 0.78

## 2024-10-11 PROCEDURE — 93458 L HRT ARTERY/VENTRICLE ANGIO: CPT | Performed by: INTERNAL MEDICINE

## 2024-10-11 PROCEDURE — C1887 CATHETER, GUIDING: HCPCS | Performed by: INTERNAL MEDICINE

## 2024-10-11 PROCEDURE — 25500020 PHARM REV CODE 255: Performed by: INTERNAL MEDICINE

## 2024-10-11 PROCEDURE — 93308 TTE F-UP OR LMTD: CPT | Mod: 26,,, | Performed by: INTERNAL MEDICINE

## 2024-10-11 PROCEDURE — G0378 HOSPITAL OBSERVATION PER HR: HCPCS

## 2024-10-11 PROCEDURE — 25000003 PHARM REV CODE 250: Performed by: NURSE PRACTITIONER

## 2024-10-11 PROCEDURE — 99152 MOD SED SAME PHYS/QHP 5/>YRS: CPT | Mod: ,,, | Performed by: INTERNAL MEDICINE

## 2024-10-11 PROCEDURE — 80061 LIPID PANEL: CPT | Performed by: HOSPITALIST

## 2024-10-11 PROCEDURE — 83735 ASSAY OF MAGNESIUM: CPT | Performed by: HOSPITALIST

## 2024-10-11 PROCEDURE — 99214 OFFICE O/P EST MOD 30 MIN: CPT | Mod: 25,,, | Performed by: INTERNAL MEDICINE

## 2024-10-11 PROCEDURE — 63600175 PHARM REV CODE 636 W HCPCS: Performed by: INTERNAL MEDICINE

## 2024-10-11 PROCEDURE — 36415 COLL VENOUS BLD VENIPUNCTURE: CPT | Performed by: HOSPITALIST

## 2024-10-11 PROCEDURE — 36415 COLL VENOUS BLD VENIPUNCTURE: CPT | Performed by: NURSE PRACTITIONER

## 2024-10-11 PROCEDURE — 99153 MOD SED SAME PHYS/QHP EA: CPT | Performed by: INTERNAL MEDICINE

## 2024-10-11 PROCEDURE — 85025 COMPLETE CBC W/AUTO DIFF WBC: CPT | Performed by: HOSPITALIST

## 2024-10-11 PROCEDURE — 99152 MOD SED SAME PHYS/QHP 5/>YRS: CPT | Performed by: INTERNAL MEDICINE

## 2024-10-11 PROCEDURE — 25000003 PHARM REV CODE 250: Performed by: HOSPITALIST

## 2024-10-11 PROCEDURE — 93458 L HRT ARTERY/VENTRICLE ANGIO: CPT | Mod: 26,,, | Performed by: INTERNAL MEDICINE

## 2024-10-11 PROCEDURE — 93308 TTE F-UP OR LMTD: CPT

## 2024-10-11 PROCEDURE — 80053 COMPREHEN METABOLIC PANEL: CPT | Performed by: HOSPITALIST

## 2024-10-11 PROCEDURE — 85610 PROTHROMBIN TIME: CPT | Performed by: NURSE PRACTITIONER

## 2024-10-11 PROCEDURE — 85730 THROMBOPLASTIN TIME PARTIAL: CPT | Performed by: NURSE PRACTITIONER

## 2024-10-11 PROCEDURE — C1894 INTRO/SHEATH, NON-LASER: HCPCS | Performed by: INTERNAL MEDICINE

## 2024-10-11 PROCEDURE — C1769 GUIDE WIRE: HCPCS | Performed by: INTERNAL MEDICINE

## 2024-10-11 RX ORDER — HEPARIN SODIUM 1000 [USP'U]/ML
INJECTION, SOLUTION INTRAVENOUS; SUBCUTANEOUS
Status: DISCONTINUED | OUTPATIENT
Start: 2024-10-11 | End: 2024-10-11 | Stop reason: HOSPADM

## 2024-10-11 RX ORDER — NITROGLYCERIN 5 MG/ML
INJECTION, SOLUTION INTRAVENOUS
Status: DISCONTINUED | OUTPATIENT
Start: 2024-10-11 | End: 2024-10-11 | Stop reason: HOSPADM

## 2024-10-11 RX ORDER — MIDAZOLAM HYDROCHLORIDE 1 MG/ML
INJECTION INTRAMUSCULAR; INTRAVENOUS
Status: DISCONTINUED | OUTPATIENT
Start: 2024-10-11 | End: 2024-10-11 | Stop reason: HOSPADM

## 2024-10-11 RX ORDER — ATORVASTATIN CALCIUM 10 MG/1
40 TABLET, FILM COATED ORAL NIGHTLY
Qty: 120 TABLET | Refills: 0 | Status: SHIPPED | OUTPATIENT
Start: 2024-10-11 | End: 2024-11-10

## 2024-10-11 RX ORDER — ENOXAPARIN SODIUM 100 MG/ML
40 INJECTION SUBCUTANEOUS EVERY 24 HOURS
Status: DISCONTINUED | OUTPATIENT
Start: 2024-10-11 | End: 2024-10-11 | Stop reason: HOSPADM

## 2024-10-11 RX ORDER — SODIUM CHLORIDE 0.9 % (FLUSH) 0.9 %
2 SYRINGE (ML) INJECTION
Status: DISCONTINUED | OUTPATIENT
Start: 2024-10-11 | End: 2024-10-11 | Stop reason: HOSPADM

## 2024-10-11 RX ORDER — METOPROLOL SUCCINATE 50 MG/1
50 TABLET, EXTENDED RELEASE ORAL DAILY
Qty: 30 TABLET | Refills: 0 | Status: SHIPPED | OUTPATIENT
Start: 2024-10-12 | End: 2024-11-11

## 2024-10-11 RX ORDER — METOPROLOL SUCCINATE 25 MG/1
25 TABLET, EXTENDED RELEASE ORAL ONCE
Status: DISCONTINUED | OUTPATIENT
Start: 2024-10-11 | End: 2024-10-11

## 2024-10-11 RX ORDER — METOPROLOL SUCCINATE 50 MG/1
50 TABLET, EXTENDED RELEASE ORAL DAILY
Status: DISCONTINUED | OUTPATIENT
Start: 2024-10-12 | End: 2024-10-11

## 2024-10-11 RX ORDER — FENTANYL CITRATE 50 UG/ML
INJECTION, SOLUTION INTRAMUSCULAR; INTRAVENOUS
Status: DISCONTINUED | OUTPATIENT
Start: 2024-10-11 | End: 2024-10-11 | Stop reason: HOSPADM

## 2024-10-11 RX ORDER — ATORVASTATIN CALCIUM 10 MG/1
10 TABLET, FILM COATED ORAL NIGHTLY
Status: DISCONTINUED | OUTPATIENT
Start: 2024-10-11 | End: 2024-10-11 | Stop reason: HOSPADM

## 2024-10-11 RX ORDER — METOPROLOL SUCCINATE 50 MG/1
50 TABLET, EXTENDED RELEASE ORAL DAILY
Status: DISCONTINUED | OUTPATIENT
Start: 2024-10-11 | End: 2024-10-11 | Stop reason: HOSPADM

## 2024-10-11 RX ORDER — SODIUM CHLORIDE 9 MG/ML
INJECTION, SOLUTION INTRAVENOUS CONTINUOUS
Status: ACTIVE | OUTPATIENT
Start: 2024-10-11 | End: 2024-10-11

## 2024-10-11 RX ORDER — DIPHENHYDRAMINE HCL 25 MG
50 CAPSULE ORAL
Status: DISCONTINUED | OUTPATIENT
Start: 2024-10-11 | End: 2024-10-11 | Stop reason: HOSPADM

## 2024-10-11 RX ADMIN — PANTOPRAZOLE SODIUM 40 MG: 40 TABLET, DELAYED RELEASE ORAL at 12:10

## 2024-10-11 RX ADMIN — ACETAMINOPHEN 650 MG: 325 TABLET ORAL at 02:10

## 2024-10-11 RX ADMIN — LOSARTAN POTASSIUM 12.5 MG: 25 TABLET, FILM COATED ORAL at 12:10

## 2024-10-11 RX ADMIN — METOPROLOL SUCCINATE 50 MG: 50 TABLET, FILM COATED, EXTENDED RELEASE ORAL at 12:10

## 2024-10-11 NOTE — ASSESSMENT & PLAN NOTE
Likely due to cardiomyopathy. Cardiology has increased beta blocker dose. F/U with cardiology outpatient.

## 2024-10-11 NOTE — PLAN OF CARE
Hospital F/U scheduled with PCP for 10/21/24 at 9:00 AM and added to AVS.       10/11/24 0632   Post-Acute Status   Hospital Resources/Appts/Education Provided Post-Acute resouces added to AVS   Discharge Plan   Discharge Plan A Home

## 2024-10-11 NOTE — HOSPITAL COURSE
The patient was admitted and seen by cardiology. She underwent cardiac catheterization. There were no blockages found. She was noted to have cardiomyopathy with an EF of 30-35%. She will follow up with cardiology outpatient.

## 2024-10-11 NOTE — PLAN OF CARE
Discharge orders and chart reviewed with no further post-acute discharge needs identified at this time.     Pt is discharging home and resuming home health with [facility] home health start of care is 10/14/2024. Spouse will transport home.     At this time, patient is cleared for discharge from Case Management standpoint.       10/11/24 1522   Final Note   Assessment Type Final Discharge Note   Anticipated Discharge Disposition Home-Health   What phone number can be called within the next 1-3 days to see how you are doing after discharge? [phone]   Post-Acute Status   Post-Acute Authorization Home Health   Home Health Status Set-up Complete/Auth obtained   Coverage [facility] MANAGED MEDICARE - [employer] MED[employer]ARE HMO   Discharge Delays None known at this time

## 2024-10-11 NOTE — INTERVAL H&P NOTE
The patient has been examined and the H&P has been reviewed:    I concur with the findings and no changes have occurred since H&P was written.    Procedure risks, benefits and alternative options discussed and understood by patient/family.          Active Hospital Problems    Diagnosis  POA    *Shortness of breath [R06.02]  Yes    Chronic combined systolic and diastolic heart failure [I50.42]  Yes      Resolved Hospital Problems   No resolved problems to display.

## 2024-10-11 NOTE — H&P (VIEW-ONLY)
"[facility]  Department of Cardiology  Consult Note      PATIENT NAME: Masha Hobbs  MRN: [id number]  TODAY'S DATE: 10/11/2024  ADMIT DATE: 10/10/2024                          CONSULT REQUESTED BY: Berta Chandra MD    SUBJECTIVE     PRINCIPAL PROBLEM: Shortness of breath      REASON FOR CONSULT:  "SOB on exertion"      HPI:  Pt is 69 yo with PMH osteoporosis, cardiomyopathy, PAF, HFrEF, meningitis    Masha Hobbs is a 68 y.o. female with hx PAF, recent admission for encephalitis with resolution last month, CHF with EF 30-35% 2024 presenting with transfer to the ED from clinic due to abnormal EKG with only recent new symptoms of shortness of breath on exertion.  Patient attributes this to deconditioning from recent hospital stay.  Symptoms from hospitalization including headache have resolved.  No recent fever.  She denies chest pain, diaphoresis, emesis.  She denies cardiac history.  She is feeling well     Review of patient's allergies indicates:  No Known Allergies    Past Medical History:   Diagnosis Date    Age-related osteoporosis without current pathological fracture 3/4/2020    Based upon bone density showing osteoporosis both at hips and lumbar spine.  Patient notified.  May consider bisphosphonates.     Past Surgical History:   Procedure Laterality Date    TONSILLECTOMY      WRIST SURGERY Right      Social History     Tobacco Use    Smoking status: Former     Current packs/day: 0.00     Types: Cigarettes     Quit date: 1989     Years since quittin.8    Smokeless tobacco: Never   Substance Use Topics    Alcohol use: Yes     Alcohol/week: 1.0 - 2.0 standard drink of alcohol     Types: 1 - 2 Shots of liquor per week     Comment: "Occasional"    Drug use: Not Currently        REVIEW OF SYSTEMS  Negative except as mentioned in HPI    OBJECTIVE     VITAL SIGNS (Most Recent)  Temp: 97.2 °F (36.2 °C) (10/11/24 0745)  Pulse: 83 (10/11/24 0745)  Resp: 16 (10/11/24 0745)  BP: (!) " 147/66 (10/11/24 0745)  SpO2: 98 % (10/11/24 0745)    VENTILATION STATUS  Resp: 16 (10/11/24 0745)  SpO2: 98 % (10/11/24 0745)           I & O (Last 24H):  Intake/Output Summary (Last 24 hours) at 10/11/2024 0813  Last data filed at 10/11/2024 0808  Gross per 24 hour   Intake 0 ml   Output --   Net 0 ml       WEIGHTS  Wt Readings from Last 3 Encounters:   10/10/24 1558 59.4 kg (130 lb 14.4 oz)   10/10/24 1055 59.4 kg (131 lb)   10/10/24 0943 59.4 kg (131 lb)   10/07/24 0846 59.4 kg (131 lb)       PHYSICAL EXAM    GENERAL: well built, well nourished, well-developed in no apparent distress.  HEENT: Normocephalic.    NECK: No JVD.   CARDIAC: Regular rate and rhythm. S1 is normal.S2 is normal.No gallops, clicks or murmurs noted at this time.  CHEST ANATOMY: normal.   LUNGS: Clear to auscultation. No wheezing or rhonchi..   ABDOMEN: Soft .  Normal bowel sounds.    EXTREMITIES: No edema  CENTRAL NERVOUS SYSTEM: AAO x 3  SKIN: No rash     HOME MEDICATIONS:  No current facility-administered medications on file prior to encounter.     Current Outpatient Medications on File Prior to Encounter   Medication Sig Dispense Refill    calcium carbonate/vitamin D3 (CALCIUM 600 + D,3, ORAL) Take 1 tablet by mouth Daily.      Lactobacillus acidophilus (PROBIOTIC ACIDOPHILUS ORAL) Take 1 capsule by mouth Daily.      metoprolol succinate (TOPROL-XL) 25 MG 24 hr tablet Take 1 tablet (25 mg total) by mouth once daily. 90 tablet 0    omeprazole (PRILOSEC) 20 MG capsule Take 20 mg by mouth once daily.      vits A-C-E-B complx-min-lutein (LIPOTRIAD, WITH LUTEIN,) 5,000 unit- 120 mg-60 unit TbSR Take 1 tablet by mouth once daily.      empagliflozin (JARDIANCE) 10 mg tablet Take 1 tablet (10 mg total) by mouth once daily. 90 tablet 3    losartan (COZAAR) 25 MG tablet Take 0.5 tablets (12.5 mg total) by mouth once daily. 45 tablet 3       SCHEDULED MEDS:   atorvastatin  40 mg Oral QHS    enoxparin  40 mg Subcutaneous Q24H (prophylaxis, 1700)     "losartan  12.5 mg Oral Daily    metoprolol succinate  25 mg Oral Daily    pantoprazole  40 mg Oral Daily       CONTINUOUS INFUSIONS:    PRN MEDS:  Current Facility-Administered Medications:     acetaminophen, 650 mg, Oral, Q8H PRN    acetaminophen, 650 mg, Oral, Q4H PRN    aluminum-magnesium hydroxide-simethicone, 30 mL, Oral, QID PRN    dextrose 50%, 12.5 g, Intravenous, PRN    dextrose 50%, 25 g, Intravenous, PRN    glucagon (human recombinant), 1 mg, Intramuscular, PRN    glucose, 16 g, Oral, PRN    glucose, 24 g, Oral, PRN    HYDROcodone-acetaminophen, 1 tablet, Oral, Q6H PRN    magnesium oxide, 800 mg, Oral, PRN    magnesium oxide, 800 mg, Oral, PRN    melatonin, 6 mg, Oral, Nightly PRN    naloxone, 0.02 mg, Intravenous, PRN    ondansetron, 4 mg, Intravenous, Q6H PRN    potassium bicarbonate, 35 mEq, Oral, PRN    potassium bicarbonate, 50 mEq, Oral, PRN    potassium bicarbonate, 60 mEq, Oral, PRN    potassium, sodium phosphates, 2 packet, Oral, PRN    potassium, sodium phosphates, 2 packet, Oral, PRN    potassium, sodium phosphates, 2 packet, Oral, PRN    senna-docusate 8.6-50 mg, 1 tablet, Oral, BID PRN    sodium chloride 0.9%, 2 mL, Intravenous, Q12H PRN    LABS AND DIAGNOSTICS     CBC LAST 3 DAYS  Recent Labs   Lab 10/10/24  1152 10/11/24  0436   WBC 8.01 8.11   RBC 4.10 3.77*   HGB 13.4 12.3   HCT 39.1 35.8*   MCV 95 95   MCH 32.7* 32.6*   MCHC 34.3 34.4   RDW 13.1 13.2    359   MPV 9.0* 8.8*   GRAN 68.6  5.5 57.8  4.7   LYMPH 23.5  1.9 31.3  2.5   MONO 6.2  0.5 7.0  0.6   BASO 0.06 0.08   NRBC 0 0       COAGULATION LAST 3 DAYS  No results for input(s): "LABPT", "INR", "APTT" in the last 168 hours.    CHEMISTRY LAST 3 DAYS  Recent Labs   Lab 10/10/24  1152 10/11/24  0436    138   K 3.9 4.4    106   CO2 23 26   ANIONGAP 11 6*   BUN 20 17   CREATININE 0.8 0.9   GLU 98 92   CALCIUM 9.9 9.7   MG  --  1.9   ALBUMIN  --  3.7   PROT  --  7.5   ALKPHOS  --  65   ALT  --  23   AST  --  14 " "  BILITOT  --  0.6       CARDIAC PROFILE LAST 3 DAYS  No results for input(s): "BNP", "CPK", "CPKMB", "LDH", "TROPONINI" in the last 168 hours.    ENDOCRINE LAST 3 DAYS  No results for input(s): "TSH", "PROCAL" in the last 168 hours.    LAST ARTERIAL BLOOD GAS  ABG  No results for input(s): "PH", "PO2", "PCO2", "HCO3", "BE" in the last 168 hours.    LAST 7 DAYS MICROBIOLOGY   Microbiology Results (last 7 days)       ** No results found for the last 168 hours. **            MOST RECENT IMAGING  X-Ray Chest 1 View  Narrative: CLINICAL HISTORY:  ([id number])69 y/o  (1955) F    CP;    TECHNIQUE:  (A#[id number], exam time 10/10/2024 12:32)    XR CHEST 1 VIEW [id number]    COMPARISON:  Radiographs from 09/25/2024.    FINDINGS:  The lungs are clear. Costophrenic angles are seen without effusion. No pneumothorax is identified. The heart is normal in size. The aorta appears tortuous  a finding usually associated with either atherosclerosis or systemic hypertension. Osseous structures appear within normal limits. The visualized upper abdomen is unremarkable.  Impression: No acute cardiac or pulmonary process.    Electronically signed by: Jacob Alcantara  Date:    10/10/2024  Time:    12:33      ECHOCARDIOGRAM RESULTS (last 5)  Results for orders placed during the hospital encounter of 09/16/24    Echo    Interpretation Summary    Left Ventricle: The left ventricle is normal in size. Normal wall thickness. Regional wall motion abnormalities present.  There is akinesis of mid to distal segments and the apex.  Normal contractility of the basal segments.  Pattern suggestive of takotsubo cardiomyopathy. There is moderately reduced systolic function with a visually estimated ejection fraction of 30 - 35%. Grade II diastolic dysfunction.    Right Ventricle: Normal right ventricular cavity size. Systolic function is normal.    Mitral Valve: There is mild regurgitation.    Tricuspid Valve: There is mild to moderate regurgitation.    " Pulmonary Artery: There is pulmonary hypertension. The estimated pulmonary artery systolic pressure is 46 mmHg.    IVC/SVC: Intermediate venous pressure at 8 mmHg.      CURRENT/PREVIOUS VISIT EKG  Results for orders placed or performed in visit on 10/10/24   IN OFFICE EKG 12-LEAD (to [employer])    Collection Time: 10/10/24 10:59 AM   Result Value Ref Range    QRS Duration 88 ms    OHS QTC Calculation 422 ms    Narrative    Test Reason : I50.20,I48.0,    Vent. Rate : 102 BPM     Atrial Rate : 102 BPM     P-R Int : 260 ms          QRS Dur : 088 ms      QT Int : 324 ms       P-R-T Axes : 055 204 221 degrees     QTc Int : 422 ms    Sinus tachycardia with 1st degree A-V block  Right atrial enlargement  Right superior axis deviation  Pulmonary disease pattern  T wave abnormality, consider inferior ischemia  T wave abnormality, consider anterolateral ischemia  Abnormal ECG  When compared with ECG of 10-OCT-2024 09:56,  QT has shortened    Referred By:             Confirmed By:            ASSESSMENT/PLAN:     Active Hospital Problems    Diagnosis    *Shortness of breath    Chronic combined systolic and diastolic heart failure       ASSESSMENT & PLAN:   Cardiomyopathy, EF 30-35  Shortness of breath    RECOMMENDATIONS:  After discussion of risks versus benefits, will proceed with invasive angiography plus minus PCI.

## 2024-10-11 NOTE — PLAN OF CARE
[facility]  Initial Discharge Assessment     Spoke to pt to complete assessment. Facesheet verified. Lives with spouse. Reports independence, drives self. PCP [facility]. No DME. Denies hh/hd/coumadin. CM to follow. Readmsission on 9/16. Current with [facility] home [facility] verified by Ainsley Schmitz. Will resume home health on 10/14/24.     Primary Care Provider: Roney Bang MD    Admission Diagnosis: Exertional dyspnea [R06.09]    Admission Date: 10/10/2024  Expected Discharge Date: 10/11/2024    Transition of Care Barriers: None    Payor: HUMANA MANAGED MEDICARE / Plan: HU[employer]NA MEDICARE HMO / Product Type: Capitation /     Extended Emergency Contact Information  Primary Emergency Contact: Elias Hobbs  Address: [facility] [place], [place] [address] [place] [address]  Home Phone: [phone]  Mobile Phone: [phone]  Relation: Spouse  Preferred language: English   needed? No    Discharge Plan A: Home Health  Discharge Plan B: Home with family      [id number] [address] & [address] [address]  Phone: [phone] Fax: [phone]      Initial Assessment (most recent)       Adult Discharge Assessment - 10/11/24 1516          Discharge Assessment    Assessment Type Discharge Planning Assessment     Confirmed/corrected address, phone number and insurance Yes     Confirmed Demographics Correct on Facesheet     Source of Information health record     Does patient/caregiver understand observation status Yes     Reason For Admission Shortness of breath     People in Home spouse     Do you expect to return to your current living situation? Yes     Do you have help at home or someone to help you manage your care at home? Yes     Who are your caregiver(s) and their phone number(s)? Elias Hobbs (Spouse)  [phone] (Mobile)     Prior to hospitilization cognitive status: Alert/Oriented     Current  cognitive status: Alert/Oriented     Walking or Climbing Stairs Difficulty no     Dressing/Bathing Difficulty no     Equipment Currently Used at Home none     Readmission within 30 days? Yes     Patient currently being followed by outpatient case management? No     Do you currently have service(s) that help you manage your care at home? Yes     Name and Contact number of agency [facility]     Is the pt/caregiver preference to resume services with current agency Yes     Do you take prescription medications? Yes     Do you have prescription coverage? Yes     Coverage HU[employer] MANAGED MEDICARE - HU[employer]NA MEDICARE HMO     Do you have any problems affording any of your prescribed medications? No     Is the patient taking medications as prescribed? yes     Who is going to help you get home at discharge? Elias Hobbs (Spouse)  [phone] (Mobile)     How do you get to doctors appointments? car, drives self     Are you on dialysis? No     Do you take coumadin? No     Discharge Plan A Home Health     Discharge Plan B Home with family     DME Needed Upon Discharge  none     Discharge Plan discussed with: Patient     Transition of Care Barriers None        Physical Activity    On average, how many days per week do you engage in moderate to strenuous exercise (like a brisk walk)? 0 days     On average, how many minutes do you engage in exercise at this level? 0 min        Financial Resource Strain    How hard is it for you to pay for the very basics like food, housing, medical care, and heating? Not hard at all        Housing Stability    In the last 12 months, was there a time when you were not able to pay the mortgage or rent on time? No     At any time in the past 12 months, were you homeless or living in a shelter (including now)? No        Transportation Needs    Has the lack of transportation kept you from medical appointments, meetings, work or from getting things needed for daily living? No        Food Insecurity     Within the past 12 months, you worried that your food would run out before you got the money to buy more. Never true     Within the past 12 months, the food you bought just didn't last and you didn't have money to get more. Never true        Stress    Do you feel stress - tense, restless, nervous, or anxious, or unable to sleep at night because your mind is troubled all the time - these days? Not at all        Social Isolation    How often do you feel lonely or isolated from those around you?  Never        Alcohol Use    Q1: How often do you have a drink containing alcohol? Never     Q2: How many drinks containing alcohol do you have on a typical day when you are drinking? Patient does not drink     Q3: How often do you have six or more drinks on one occasion? Never        Utilities    In the past 12 months has the electric, gas, oil, or water company threatened to shut off services in your home? No        Health Literacy    How often do you need to have someone help you when you read instructions, pamphlets, or other written material from your doctor or pharmacy? Never

## 2024-10-11 NOTE — CARE UPDATE
10/10/24 2012   Patient Assessment/Suction   Level of Consciousness (AVPU) alert   Respiratory Effort Normal;Unlabored   Expansion/Accessory Muscles/Retractions no use of accessory muscles;no retractions   All Lung Fields Breath Sounds clear;diminished   Rhythm/Pattern, Respiratory shallow   PRE-TX-O2   Device (Oxygen Therapy) room air   SpO2 97 %   Pulse Oximetry Type Continuous   $ Pulse Oximetry - Multiple Charge Pulse Oximetry - Multiple   Pulse 90   Resp 16

## 2024-10-11 NOTE — CONSULTS
"[facility]  Department of Cardiology  Consult Note      PATIENT NAME: Masha Hobbs  MRN: [id number]  TODAY'S DATE: 10/11/2024  ADMIT DATE: 10/10/2024                          CONSULT REQUESTED BY: Berta Chandra MD    SUBJECTIVE     PRINCIPAL PROBLEM: Shortness of breath      REASON FOR CONSULT:  "SOB on exertion"      HPI:  Pt is 69 yo with PMH osteoporosis, cardiomyopathy, PAF, HFrEF, meningitis    Masha Hobbs is a 68 y.o. female with hx PAF, recent admission for encephalitis with resolution last month, CHF with EF 30-35% 2024 presenting with transfer to the ED from clinic due to abnormal EKG with only recent new symptoms of shortness of breath on exertion.  Patient attributes this to deconditioning from recent hospital stay.  Symptoms from hospitalization including headache have resolved.  No recent fever.  She denies chest pain, diaphoresis, emesis.  She denies cardiac history.  She is feeling well     Review of patient's allergies indicates:  No Known Allergies    Past Medical History:   Diagnosis Date    Age-related osteoporosis without current pathological fracture 3/4/2020    Based upon bone density showing osteoporosis both at hips and lumbar spine.  Patient notified.  May consider bisphosphonates.     Past Surgical History:   Procedure Laterality Date    TONSILLECTOMY      WRIST SURGERY Right      Social History     Tobacco Use    Smoking status: Former     Current packs/day: 0.00     Types: Cigarettes     Quit date: 1989     Years since quittin.8    Smokeless tobacco: Never   Substance Use Topics    Alcohol use: Yes     Alcohol/week: 1.0 - 2.0 standard drink of alcohol     Types: 1 - 2 Shots of liquor per week     Comment: "Occasional"    Drug use: Not Currently        REVIEW OF SYSTEMS  Negative except as mentioned in HPI    OBJECTIVE     VITAL SIGNS (Most Recent)  Temp: 97.2 °F (36.2 °C) (10/11/24 0745)  Pulse: 83 (10/11/24 0745)  Resp: 16 (10/11/24 0745)  BP: (!) " 147/66 (10/11/24 0745)  SpO2: 98 % (10/11/24 0745)    VENTILATION STATUS  Resp: 16 (10/11/24 0745)  SpO2: 98 % (10/11/24 0745)           I & O (Last 24H):  Intake/Output Summary (Last 24 hours) at 10/11/2024 0813  Last data filed at 10/11/2024 0808  Gross per 24 hour   Intake 0 ml   Output --   Net 0 ml       WEIGHTS  Wt Readings from Last 3 Encounters:   10/10/24 1558 59.4 kg (130 lb 14.4 oz)   10/10/24 1055 59.4 kg (131 lb)   10/10/24 0943 59.4 kg (131 lb)   10/07/24 0846 59.4 kg (131 lb)       PHYSICAL EXAM    GENERAL: well built, well nourished, well-developed in no apparent distress.  HEENT: Normocephalic.    NECK: No JVD.   CARDIAC: Regular rate and rhythm. S1 is normal.S2 is normal.No gallops, clicks or murmurs noted at this time.  CHEST ANATOMY: normal.   LUNGS: Clear to auscultation. No wheezing or rhonchi..   ABDOMEN: Soft .  Normal bowel sounds.    EXTREMITIES: No edema  CENTRAL NERVOUS SYSTEM: AAO x 3  SKIN: No rash     HOME MEDICATIONS:  No current facility-administered medications on file prior to encounter.     Current Outpatient Medications on File Prior to Encounter   Medication Sig Dispense Refill    calcium carbonate/vitamin D3 (CALCIUM 600 + D,3, ORAL) Take 1 tablet by mouth Daily.      Lactobacillus acidophilus (PROBIOTIC ACIDOPHILUS ORAL) Take 1 capsule by mouth Daily.      metoprolol succinate (TOPROL-XL) 25 MG 24 hr tablet Take 1 tablet (25 mg total) by mouth once daily. 90 tablet 0    omeprazole (PRILOSEC) 20 MG capsule Take 20 mg by mouth once daily.      vits A-C-E-B complx-min-lutein (LIPOTRIAD, WITH LUTEIN,) 5,000 unit- 120 mg-60 unit TbSR Take 1 tablet by mouth once daily.      empagliflozin (JARDIANCE) 10 mg tablet Take 1 tablet (10 mg total) by mouth once daily. 90 tablet 3    losartan (COZAAR) 25 MG tablet Take 0.5 tablets (12.5 mg total) by mouth once daily. 45 tablet 3       SCHEDULED MEDS:   atorvastatin  40 mg Oral QHS    enoxparin  40 mg Subcutaneous Q24H (prophylaxis, 1700)     "losartan  12.5 mg Oral Daily    metoprolol succinate  25 mg Oral Daily    pantoprazole  40 mg Oral Daily       CONTINUOUS INFUSIONS:    PRN MEDS:  Current Facility-Administered Medications:     acetaminophen, 650 mg, Oral, Q8H PRN    acetaminophen, 650 mg, Oral, Q4H PRN    aluminum-magnesium hydroxide-simethicone, 30 mL, Oral, QID PRN    dextrose 50%, 12.5 g, Intravenous, PRN    dextrose 50%, 25 g, Intravenous, PRN    glucagon (human recombinant), 1 mg, Intramuscular, PRN    glucose, 16 g, Oral, PRN    glucose, 24 g, Oral, PRN    HYDROcodone-acetaminophen, 1 tablet, Oral, Q6H PRN    magnesium oxide, 800 mg, Oral, PRN    magnesium oxide, 800 mg, Oral, PRN    melatonin, 6 mg, Oral, Nightly PRN    naloxone, 0.02 mg, Intravenous, PRN    ondansetron, 4 mg, Intravenous, Q6H PRN    potassium bicarbonate, 35 mEq, Oral, PRN    potassium bicarbonate, 50 mEq, Oral, PRN    potassium bicarbonate, 60 mEq, Oral, PRN    potassium, sodium phosphates, 2 packet, Oral, PRN    potassium, sodium phosphates, 2 packet, Oral, PRN    potassium, sodium phosphates, 2 packet, Oral, PRN    senna-docusate 8.6-50 mg, 1 tablet, Oral, BID PRN    sodium chloride 0.9%, 2 mL, Intravenous, Q12H PRN    LABS AND DIAGNOSTICS     CBC LAST 3 DAYS  Recent Labs   Lab 10/10/24  1152 10/11/24  0436   WBC 8.01 8.11   RBC 4.10 3.77*   HGB 13.4 12.3   HCT 39.1 35.8*   MCV 95 95   MCH 32.7* 32.6*   MCHC 34.3 34.4   RDW 13.1 13.2    359   MPV 9.0* 8.8*   GRAN 68.6  5.5 57.8  4.7   LYMPH 23.5  1.9 31.3  2.5   MONO 6.2  0.5 7.0  0.6   BASO 0.06 0.08   NRBC 0 0       COAGULATION LAST 3 DAYS  No results for input(s): "LABPT", "INR", "APTT" in the last 168 hours.    CHEMISTRY LAST 3 DAYS  Recent Labs   Lab 10/10/24  1152 10/11/24  0436    138   K 3.9 4.4    106   CO2 23 26   ANIONGAP 11 6*   BUN 20 17   CREATININE 0.8 0.9   GLU 98 92   CALCIUM 9.9 9.7   MG  --  1.9   ALBUMIN  --  3.7   PROT  --  7.5   ALKPHOS  --  65   ALT  --  23   AST  --  14 " "  BILITOT  --  0.6       CARDIAC PROFILE LAST 3 DAYS  No results for input(s): "BNP", "CPK", "CPKMB", "LDH", "TROPONINI" in the last 168 hours.    ENDOCRINE LAST 3 DAYS  No results for input(s): "TSH", "PROCAL" in the last 168 hours.    LAST ARTERIAL BLOOD GAS  ABG  No results for input(s): "PH", "PO2", "PCO2", "HCO3", "BE" in the last 168 hours.    LAST 7 DAYS MICROBIOLOGY   Microbiology Results (last 7 days)       ** No results found for the last 168 hours. **            MOST RECENT IMAGING  X-Ray Chest 1 View  Narrative: CLINICAL HISTORY:  ([id number])69 y/o  (1955) F    CP;    TECHNIQUE:  (A#[id number], exam time 10/10/2024 12:32)    XR CHEST 1 VIEW IMG34    COMPARISON:  Radiographs from 09/25/2024.    FINDINGS:  The lungs are clear. Costophrenic angles are seen without effusion. No pneumothorax is identified. The heart is normal in size. The aorta appears tortuous  a finding usually associated with either atherosclerosis or systemic hypertension. Osseous structures appear within normal limits. The visualized upper abdomen is unremarkable.  Impression: No acute cardiac or pulmonary process.    Electronically signed by: Jacob Alcantara  Date:    10/10/2024  Time:    12:33      ECHOCARDIOGRAM RESULTS (last 5)  Results for orders placed during the hospital encounter of 09/16/24    Echo    Interpretation Summary    Left Ventricle: The left ventricle is normal in size. Normal wall thickness. Regional wall motion abnormalities present.  There is akinesis of mid to distal segments and the apex.  Normal contractility of the basal segments.  Pattern suggestive of takotsubo cardiomyopathy. There is moderately reduced systolic function with a visually estimated ejection fraction of 30 - 35%. Grade II diastolic dysfunction.    Right Ventricle: Normal right ventricular cavity size. Systolic function is normal.    Mitral Valve: There is mild regurgitation.    Tricuspid Valve: There is mild to moderate regurgitation.    " Pulmonary Artery: There is pulmonary hypertension. The estimated pulmonary artery systolic pressure is 46 mmHg.    IVC/SVC: Intermediate venous pressure at 8 mmHg.      CURRENT/PREVIOUS VISIT EKG  Results for orders placed or performed in visit on 10/10/24   IN OFFICE EKG 12-LEAD (to [employer])    Collection Time: 10/10/24 10:59 AM   Result Value Ref Range    QRS Duration 88 ms    OHS QTC Calculation 422 ms    Narrative    Test Reason : I50.20,I48.0,    Vent. Rate : 102 BPM     Atrial Rate : 102 BPM     P-R Int : 260 ms          QRS Dur : 088 ms      QT Int : 324 ms       P-R-T Axes : 055 204 221 degrees     QTc Int : 422 ms    Sinus tachycardia with 1st degree A-V block  Right atrial enlargement  Right superior axis deviation  Pulmonary disease pattern  T wave abnormality, consider inferior ischemia  T wave abnormality, consider anterolateral ischemia  Abnormal ECG  When compared with ECG of 10-OCT-2024 09:56,  QT has shortened    Referred By:             Confirmed By:            ASSESSMENT/PLAN:     Active Hospital Problems    Diagnosis    *Shortness of breath    Chronic combined systolic and diastolic heart failure       ASSESSMENT & PLAN:   Cardiomyopathy, EF 30-35  Shortness of breath    RECOMMENDATIONS:  After discussion of risks versus benefits, will proceed with invasive angiography plus minus PCI.

## 2024-10-11 NOTE — PLAN OF CARE
reviewed the chart and spoke with the attending MD. The patient will discharge home with [facility]. F/U appointment scheduled and added to the AVS. There are no further CM needs.    Dispo: Home with [facility]    The patient is now cleared for discharge by case management.       10/11/24 1510   Final Note   Assessment Type Final Discharge Note   Anticipated Discharge Disposition Home   Hospital Resources/Appts/Education Provided Appointments scheduled and added to AVS

## 2024-10-11 NOTE — ASSESSMENT & PLAN NOTE
Pt is euvolemic. No diuretic needed.   Cardiology has increased metoprolol dose.  Pt was started on losartan today by cardiology.   Continue lipitor

## 2024-10-14 ENCOUNTER — TELEPHONE (OUTPATIENT)
Dept: CARDIOLOGY | Facility: CLINIC | Age: 69
End: 2024-10-14
Payer: MEDICARE

## 2024-10-14 ENCOUNTER — OFFICE VISIT (OUTPATIENT)
Dept: INFECTIOUS DISEASES | Facility: CLINIC | Age: 69
End: 2024-10-14
Payer: MEDICARE

## 2024-10-14 VITALS
WEIGHT: 130.5 LBS | BODY MASS INDEX: 24.64 KG/M2 | HEIGHT: 61 IN | HEART RATE: 99 BPM | SYSTOLIC BLOOD PRESSURE: 136 MMHG | OXYGEN SATURATION: 97 % | DIASTOLIC BLOOD PRESSURE: 78 MMHG | TEMPERATURE: 98 F

## 2024-10-14 DIAGNOSIS — I50.20 HFREF (HEART FAILURE WITH REDUCED EJECTION FRACTION): ICD-10-CM

## 2024-10-14 DIAGNOSIS — R06.02 SHORTNESS OF BREATH: Primary | ICD-10-CM

## 2024-10-14 DIAGNOSIS — I48.0 PAROXYSMAL ATRIAL FIBRILLATION: Primary | ICD-10-CM

## 2024-10-14 DIAGNOSIS — G03.9 MENINGITIS: Primary | ICD-10-CM

## 2024-10-14 DIAGNOSIS — I51.81 TAKOTSUBO CARDIOMYOPATHY: ICD-10-CM

## 2024-10-14 PROCEDURE — 1159F MED LIST DOCD IN RCRD: CPT | Mod: CPTII,S$GLB,, | Performed by: NURSE PRACTITIONER

## 2024-10-14 PROCEDURE — 1160F RVW MEDS BY RX/DR IN RCRD: CPT | Mod: CPTII,S$GLB,, | Performed by: NURSE PRACTITIONER

## 2024-10-14 PROCEDURE — 3078F DIAST BP <80 MM HG: CPT | Mod: CPTII,S$GLB,, | Performed by: NURSE PRACTITIONER

## 2024-10-14 PROCEDURE — 3075F SYST BP GE 130 - 139MM HG: CPT | Mod: CPTII,S$GLB,, | Performed by: NURSE PRACTITIONER

## 2024-10-14 PROCEDURE — 3288F FALL RISK ASSESSMENT DOCD: CPT | Mod: CPTII,S$GLB,, | Performed by: NURSE PRACTITIONER

## 2024-10-14 PROCEDURE — 3008F BODY MASS INDEX DOCD: CPT | Mod: CPTII,S$GLB,, | Performed by: NURSE PRACTITIONER

## 2024-10-14 PROCEDURE — 1101F PT FALLS ASSESS-DOCD LE1/YR: CPT | Mod: CPTII,S$GLB,, | Performed by: NURSE PRACTITIONER

## 2024-10-14 PROCEDURE — 99214 OFFICE O/P EST MOD 30 MIN: CPT | Mod: S$GLB,,, | Performed by: NURSE PRACTITIONER

## 2024-10-14 PROCEDURE — 1111F DSCHRG MED/CURRENT MED MERGE: CPT | Mod: CPTII,S$GLB,, | Performed by: NURSE PRACTITIONER

## 2024-10-14 PROCEDURE — 1126F AMNT PAIN NOTED NONE PRSNT: CPT | Mod: CPTII,S$GLB,, | Performed by: NURSE PRACTITIONER

## 2024-10-14 PROCEDURE — 4010F ACE/ARB THERAPY RXD/TAKEN: CPT | Mod: CPTII,S$GLB,, | Performed by: NURSE PRACTITIONER

## 2024-10-14 NOTE — TELEPHONE ENCOUNTER
----- Message from Rehana sent at 10/14/2024  2:33 PM CDT -----  Contact: [facility] PHYSICAL [facility]ERAP[facility]  Type:  Needs Medical Advice    Who Called: [facility] PHYSICAL THERAPY   Symptoms (please be specific): PT WENT TO THE ER LAST WEEK. PT STATED NEW MEDICATION AND IS UNSURE IF SHE CAN  CONTINUE PT. PLEASE CALL TO DISCUSS IF PT CAN CONTINUE AND IF THERE ARE LIMITATIONS.    How long has patient had these symptoms:  N/A  Pharmacy name and phone #:  N/A  Would the patient rather a call back or a response via [facility]? CALL   Best Call Back Number:  [phone]   Additional Information: THANK YO U

## 2024-10-14 NOTE — TELEPHONE ENCOUNTER
----- Message from MONIQUE [place] sent at 10/13/2024  1:31 AM CDT -----  Regarding: 10/10 EKG Order  The EKG performed on 10/10/24 is missing an order.  Please place an order so that the EKG can be read in [place].    Thank you,  Cat Hernandez RN  [facility]   [facility]/ Stress Lab  3rd Floor Cardiology Clinic  [phone]/ [id number]

## 2024-10-14 NOTE — TELEPHONE ENCOUNTER
----- Message from Rissa sent at 10/14/2024  9:45 AM CDT -----  Contact: self  Type: Needs Medical Advice  Who Called:  Patient  Best Call Back Number: [phone]    Additional Information: Pt states she would like to speak with office has question regarding medication.Please call back

## 2024-10-14 NOTE — TELEPHONE ENCOUNTER
----- Message from Christelle Herring NP sent at 10/14/2024  3:53 PM CDT -----  Take atorvastatin 40 mg daily and metoprolol 50 mg daily.  Hold metoprolol if SBP <100 and HR <60.     Monitor BP closely.  Keep log  ----- Message -----  From: Adrian Atkinson MA  Sent: 10/14/2024  11:34 AM CDT  To: Christelle Herring NP    Hey patient had ER visit, she is asking if she should be taking the doses that is scripted on the bottle for atorvastatin 10 mg daily or 40 mg every evening also the metoprolol 50 mg is she going to be taking this dosage for now on

## 2024-10-14 NOTE — TELEPHONE ENCOUNTER
10/24/24 Sw the pt about her question if she will be taking the dosage directed for discharge with new medicine I informed her that the question will be sent to her provider for next directive

## 2024-10-14 NOTE — PROGRESS NOTES
[facility] - Infectious Diseases   Department of Infectious Disease  Office Visit Note        PATIENT NAME: Masha Hobbs  YOB: 1955   MRN: [id number]  DATE OF VISIT: 10/14/2024    REASON FOR VISIT: Follow-up  Follow-up for meningo-encephalitis      HISTORY OF PRESENT ILLNESS     Masha Hobbs is a 68 y.o. female With chronic medical problems including  osteoporosis and chronic back pain he was hospitalized from 9/16 through 9/302024  after presenting to the emergency room with fever, headaches and  confusion.  She had a lumbar puncture with 2 K WBCs neutrophil predominant.  HSV panel was negative.  She was initially started on vancomycin, ampicillin, ceftriaxone in acyclovir.  An MRI showed encephalitis findings with ventriculitis.    An echocardiogram showed takotsubo cardiomyopathy. She was seen by Neurology and had an EEG that was consistent with encephalopathy.  Hospital course was complicated by continued spiking fevers and vomiting.  A repeat LP showed few were WBCs.  Testing included HSV, meningitis panel, cryptococcus antigen, HIV, West Nile, Lyme disease, Dane mountain spotted fever all negative.   Ehrlichia testing was also negative.  Peripheral smear  normal.  She was seen by infectious disease and completed a 2 week course of ceftriaxone and a 10 day course of doxycycline.  She followed up with Cardiology on 10/10 and had EKGs changes and was sent to the hospital for heart catheterization.  There was no obstructive disease but did have an EF of 30-35%.    Antibiotic history:    Valtrex: 09/16/2024 x1 dose   Vancomycin: 09/16-20  IV Acyclovir: 09/17-23  Ceftriaxone: 09/16-9/30 14 days  Doxycycline: 09/21-10/2 10 days     Microbiology and Serology:    9/16/24 blood cultures x2 sets negative  9/16/24 CSFculture negative; G stain with few WBC's, no organisms  9/16/24 meningitis panel negative  9/16 CSF cell count  slightly hazy, 14 RBCs, 2000 38 WBCs, 89% neutrophils, 7% lymph, 4% mono,   glucose 50, protein 170  9/17/24 HSV 1-2 PCR negative  9/19/24 CSFculture negative; G stain with few WBC's, no organisms  9/19/24 meningitis panel negative  9/19/2024 CSF cell count  clear with 3 RBCs, 148 WBCs, 63% neutrophils and 15% lymphocytes,  glucose 51, protein 126  9/20/24 cryptococcal antigen negative  9/23/24 RVP negative  9/17/24 herpes simplex virus PCR 1 in 2 negative  09/22/2024 treponemal antibodies negative  09/19/2024 West Nile virus IgG and IgM from CSF negative  09/23/2024 ferritin 227   09/18/2024 strep pneumo urine antigen negative  09/22/2024 HIV 1 2 antibody antigen 4th negative  09/22/2024 RPR titer negative  09/23/2024 JACINDA screen 1:80  09/19/2024 VDRL CSF: Negative   9/19/2024 encephalopathy autoimmune evaluation, CSF negative  09/22/2024 Dane mountain spotted fever antibodies negative  09/22/2024 Lyme disease Western blot: Negative   09/28/2024 Ehrlichia antibody panel negative    Social History  Marital Status:   Alcohol History:  reports current alcohol use of about 1.0 - 2.0 standard drink of alcohol per week.  Tobacco History:  reports that she quit smoking about 35 years ago. Her smoking use included cigarettes. She has never used smokeless tobacco.  Drug History:  reports that she does not currently use drugs.    INTERVAL HISTORY     10/14/24:  patient is being seen for follow-up of meningo-encephalitis  in his accompanied by her .  She states she has been doing fairly well at home  since discharge from the hospital.  She  denies fevers, chills or sweats. She has occasional headaches when she wakes up but states that she was started on so many medicines  and sometimes feels a little dehydrated.  She has been eating and drinking well with no nausea or vomiting,   No constipation or diarrhea. She states her appetite is better and she eats 3 meals a day but she was only able to eat small amounts at a time.  She denies any chest pain or shortness and breath, no coughing.   She feels weak and has PT at home and uses a walker sometimes.  She said especially at night when she gets up she uses a walker because she does not want to fall.  She has been started on many new medications including atorvastatin, empagliflozin, losartan and metoprolol for cardiomyopathy. . She has been trying to drink enough fluids to keep up with the increase voiding from the empagliflozin.   She said that when she was in the hospital she had some hearing issues.  She said everybody sounded like they were in a cartoon for several days which resolved.  She said she was also having some vision problems which resolved.  In addition she was having problems with taste and that is still has not resolved and some things taste very bad still.  She and her  are disappointed that nothing came back positive.  Her primary care physician ordered repeat Dane mountain spotted fever and West Nile antibodies and she was not sure if she was going to have them or not.  She would like to know what caused her illness but  has been stuck so much that she does does not have the wherewithal to get anymore lab work.   Vital signs today with blood pressure 136/78, heart rate 99, temperature 98.2° and O2 sat 97% on room air.  Most recent lab work from 10/11 with WBC 8.11, platelets 359, INR 1.1, chemistry with no abnormalities except for a low anion gap at 6, creatinine is 0.6.   Overall she looks great and feels much better.  She was lost 10-15 lb in the course of her illness   But feels like she was gaining it back.    ALLERGIES AND CURRENT MEDICATIONS     Review of patient's allergies indicates:  No Known Allergies    Current Outpatient Medications   Medication Sig Dispense Refill    atorvastatin (LIPITOR) 10 MG tablet Take 4 tablets (40 mg total) by mouth every evening. 120 tablet 0    calcium carbonate/vitamin D3 (CALCIUM 600 + D,3, ORAL) Take 1 tablet by mouth Daily.      empagliflozin (JARDIANCE) 10 mg tablet Take 1 tablet  (10 mg total) by mouth once daily. 90 tablet 3    Lactobacillus acidophilus (PROBIOTIC ACIDOPHILUS ORAL) Take 1 capsule by mouth Daily.      losartan (COZAAR) 25 MG tablet Take 0.5 tablets (12.5 mg total) by mouth once daily. 45 tablet 3    metoprolol succinate (TOPROL-XL) 50 MG 24 hr tablet Take 1 tablet (50 mg total) by mouth once daily. 30 tablet 0    omeprazole (PRILOSEC) 20 MG capsule Take 20 mg by mouth once daily.      vits A-C-E-B complx-min-lutein (LIPOTRIAD, WITH LUTEIN,) 5,000 unit- 120 mg-60 unit TbSR Take 1 tablet by mouth once daily.       No current facility-administered medications for this visit.       MEDICAL/SURGICAL/FAMILY HISTORY     Past Surgical History:   Procedure Laterality Date    TONSILLECTOMY      WRIST SURGERY Right 2021     Past Medical History:   Diagnosis Date    Age-related osteoporosis without current pathological fracture 3/4/2020    Based upon bone density showing osteoporosis both at hips and lumbar spine.  Patient notified.  May consider bisphosphonates.     Family History   Problem Relation Name Age of Onset    Diabetes Mother      Heart disease Mother      Cancer Mother      Breast cancer Mother      Cancer Father      Hyperlipidemia Father      Heart disease Father      Diabetes Father      Stroke Father          REVIEW OF SYSTEMS     Review of Systems  Constitutional:  Denies fevers, chills, night sweats, loss of appetite.  HEENT: Denies ear pain, sinus congestion, mouth pain or trouble swallowing, sore throat or dental pain. + resolved visual and hearing changes, + dysgeusia  Neck: Denies neck pain or lumps.  Respiratory: Denies shortness of breath, coughing, wheezing or hemoptysis.  Cardiovascular:  Denies chest pain, palpitations or edema.  Gastrointestinal: Denies abdominal pain, nausea, vomiting, constipation or diarrhea.  Genitourinary:  Denies dysuria, urgency or hematuria; + frequency  Musculoskeletal:  Denies joint pain or swelling, difficulty walking, + muscle  weakness, uses walker.  Skin:  Denies rash or itching.  Neurologic: Denies lateralized weakness, dizziness, Occ mild headaches  Psychiatric: Denies changes in mood or behavior.  Endocrine: Denies polyphagia, polydipsia, + increased urination  Heme/Lymph:  No bleeding    PHYSICAL EXAM     Vital Signs  Wt Readings from Last 3 Encounters:   10/14/24 59.2 kg (130 lb 8 oz)   10/10/24 59.4 kg (130 lb 14.4 oz)   10/10/24 59.4 kg (131 lb)     Temp Readings from Last 3 Encounters:   10/14/24 98.2 °F (36.8 °C) (Temporal)   10/11/24 97.2 °F (36.2 °C) (Oral)   10/07/24 98.6 °F (37 °C) (Oral)     BP Readings from Last 3 Encounters:   10/14/24 136/78   10/11/24 (!) 144/68   10/10/24 (!) 148/88     Pulse Readings from Last 3 Encounters:   10/14/24 99   10/11/24 86   10/10/24 103       Physical Exam  General:  sitting up in chair awake and alert, she was pleasant and conversant and in no distress.  Eyes: Eyes with no icterus or injection. Vision grossly normal  Ears: Hearing grossly normal.  Nose: Nares patent  Mouth: Moist mucous membranes, dentition is . No ulcerations, erythema or exudates.  Neck: Supple, no tenderness to palpation.  Cardiovascular: Regular rate and rhythm, no murmurs, no edema.    Respiratory:  Clear to auscultation bilaterally, no tachypnea or increased work of breathing.  Gastrointestinal:  Soft with active bowel sounds, no tenderness to palpation, no distention.  Genitourinary:  No suprapubic tenderness.  Musculoskeletal:  Moves all extremities with equal strength.    Skin:  Warm and dry, no obvious rashes.    Neuro:   Oriented, conversant, follows commands.  Psych: Good mood, normal affect.      WOUND     None    VASCULAR ACCESS DEVICE     None    LABS AND DIAGNOSTICS       Significant Labs: I have reviewed all relevant and available labs and microbiology. .    CBC LAST 7 DAYS  Lab Results   Component Value Date    WBC 8.11 10/11/2024    RBC 3.77 (L) 10/11/2024    HGB 12.3 10/11/2024    HCT 35.8 (L)  10/11/2024    MCV 95 10/11/2024    MCH 32.6 (H) 10/11/2024    MCHC 34.4 10/11/2024    RDW 13.2 10/11/2024     10/11/2024    MPV 8.8 (L) 10/11/2024    GRAN 4.7 10/11/2024    GRAN 57.8 10/11/2024    LYMPH 2.5 10/11/2024    LYMPH 31.3 10/11/2024    MONO 0.6 10/11/2024    MONO 7.0 10/11/2024    EOS 0.2 10/11/2024    BASO 0.08 10/11/2024    EOSINOPHIL 2.5 10/11/2024    BASOPHIL 1.0 10/11/2024         CHEMISTRY LAST 7 DAYS  CMP  Sodium   Date Value Ref Range Status   10/11/2024 138 136 - 145 mmol/L Final     Potassium   Date Value Ref Range Status   10/11/2024 4.4 3.5 - 5.1 mmol/L Final     Chloride   Date Value Ref Range Status   10/11/2024 106 95 - 110 mmol/L Final     CO2   Date Value Ref Range Status   10/11/2024 26 23 - 29 mmol/L Final     Glucose   Date Value Ref Range Status   10/11/2024 92 70 - 110 mg/dL Final     BUN   Date Value Ref Range Status   10/11/2024 17 8 - 23 mg/dL Final     Creatinine   Date Value Ref Range Status   10/11/2024 0.9 0.5 - 1.4 mg/dL Final     Calcium   Date Value Ref Range Status   10/11/2024 9.7 8.7 - 10.5 mg/dL Final     Total Protein   Date Value Ref Range Status   10/11/2024 7.5 6.0 - 8.4 g/dL Final     Albumin   Date Value Ref Range Status   10/11/2024 3.7 3.5 - 5.2 g/dL Final     Total Bilirubin   Date Value Ref Range Status   10/11/2024 0.6 0.1 - 1.0 mg/dL Final     Comment:     For infants and newborns, interpretation of results should be based  on gestational age, weight and in agreement with clinical  observations.    Premature Infant recommended reference ranges:  Up to 24 hours.............<8.0 mg/dL  Up to 48 hours............<12.0 mg/dL  3-5 days..................<15.0 mg/dL  6-29 days.................<15.0 mg/dL       Alkaline Phosphatase   Date Value Ref Range Status   10/11/2024 65 55 - 135 U/L Final     AST   Date Value Ref Range Status   10/11/2024 14 10 - 40 U/L Final     ALT   Date Value Ref Range Status   10/11/2024 23 10 - 44 U/L Final     Anion Gap   Date  "Value Ref Range Status   10/11/2024 6 (L) 8 - 16 mmol/L Final     eGFR   Date Value Ref Range Status   10/11/2024 >60.0 >60 mL/min/1.73 m^2 Final       Estimated Creatinine Clearance: 48.8 mL/min (based on SCr of 0.9 mg/dL).    INFLAMMATORY/PROCAL  LAST 7 DAYS  @LABRCNTIP[PROCAL:7, ESR:7, CRP:7@  No results found for: "ESR"  CRP   Date Value Ref Range Status   09/24/2024 58.7 (H) 0.0 - 8.2 mg/L Final   09/23/2024 62.1 (H) 0.0 - 8.2 mg/L Final       PRIOR  MICROBIOLOGY:    Susceptibility data from last 90 days.  Collected Specimen Info Organism   09/16/24 Blood from Peripheral, Antecubital, Left No growth after 5 days.   09/16/24 Blood from Peripheral, Antecubital, Right No growth after 5 days.       LAST 7 DAYS MICROBIOLOGY   Microbiology Results (last 7 days)       ** No results found for the last 168 hours. **            PATHOLOGY  Specimens (From admission, onward)      None            CURRENT/PREVIOUS VISIT EKG  Results for orders placed or performed in visit on 10/10/24   IN OFFICE EKG 12-LEAD (to [place])    Collection Time: 10/10/24 10:59 AM   Result Value Ref Range    QRS Duration 88 ms    [facility] QTC Calculation 422 ms    Narrative    Test Reason : I50.20,I48.0,    Vent. Rate : 102 BPM     Atrial Rate : 102 BPM     P-R Int : 260 ms          QRS Dur : 088 ms      QT Int : 324 ms       P-R-T Axes : 055 204 221 degrees     QTc Int : 422 ms    Sinus tachycardia with 1st degree A-V block  Right atrial enlargement  Right superior axis deviation  Pulmonary disease pattern  T wave abnormality, consider inferior ischemia  T wave abnormality, consider anterolateral ischemia  Abnormal ECG  When compared with ECG of 10-OCT-2024 09:56,  QT has shortened    Referred By:             Confirmed By:      ECHO 09/16/2024   Left Ventricle: The left ventricle is normal in size. Normal wall thickness. Regional wall motion abnormalities present. There is akinesis of mid to distal segments and the apex. Normal contractility of the basal " segments. Pattern suggestive of takotsubo cardiomyopathy. There is moderately reduced systolic function with a visually estimated ejection fraction of 30 - 35%. Grade II diastolic dysfunction.   Right Ventricle: Normal right ventricular cavity size. Systolic function is normal.   Mitral Valve: There is mild regurgitation.   Tricuspid Valve: There is mild to moderate regurgitation.   Pulmonary Artery: There is pulmonary hypertension. The estimated pulmonary artery systolic pressure is 46 mmHg.   IVC/SVC: Intermediate venous pressure at 8 mmHg.    Significant Diagnostics: I have reviewed all relevant and available diagnostic results.    MRI brain 09/19/2024   1. No significant interval detrimental change in the appearance of the brain as compared to the prior exam dated 09/17/2024.  Persistent scattered abnormal T2/FLAIR hyperintense signal within the supratentorial brain, particularly involving the right mesial temporal lobe, similar to prior exam.  No definite new edema or mass effect.  Findings again are nonspecific with similar differential considerations including inflammatory/infectious, or autoimmune/paraneoplastic encephalitis, status epilepticus, or potential neoplastic process.  Recommend continued close clinical surveillance and consider short-term follow-up imaging with contrast enhanced MRI of the brain, as clinically warranted.      CTA Chest Non-Coronary (PE Studies) 09/24/24 1028   No evidence for pulmonary embolus.   Moderate bilateral pleural effusions and features suggesting mild congestive failure.   Small pericardial effusion.   Small gallstones.   Osseous demineralization and multiple age-indeterminate thoracolumbar compression fractures.    ASSESSMENT AND PLAN:     Meningitis    HFrEF (heart failure with reduced ejection fraction)    Takotsubo cardiomyopathy       Follow-up with primary care provider     Follow-up with Infectious Disease as needed     Follow up if symptoms worsen or fail to  improve.    I spent a total of 30 minutes on the day of the visit.  This includes face to face time and non-face to face time preparing to see the patient (eg, review of tests), obtaining and/or reviewing separately obtained history, documenting clinical information in the electronic or other health record, independently interpreting results and communicating results to the patient/family/caregiver, or care coordinator.     Arielle Pineda NP  Date of Service: 10/14/2024      This note was created using [employer]  voice recognition software that occasionally misinterpreted phrases or words.

## 2024-10-15 ENCOUNTER — PATIENT MESSAGE (OUTPATIENT)
Dept: FAMILY MEDICINE | Facility: CLINIC | Age: 69
End: 2024-10-15
Payer: MEDICARE

## 2024-10-16 ENCOUNTER — TELEPHONE (OUTPATIENT)
Dept: CARDIOLOGY | Facility: CLINIC | Age: 69
End: 2024-10-16
Payer: MEDICARE

## 2024-10-16 NOTE — LETTER
[facility]  [address]  [address]  [place] [address]  Phone: [phone]  Fax: [phone]

## 2024-10-16 NOTE — TELEPHONE ENCOUNTER
----- Message from Rebecca sent at 10/16/2024 10:59 AM CDT -----  Contact: Farrukh [phone]  Type: Needs Medical Advice  Who Called:  Jeff w/ [facility]     Best Call Back Number: [phone]      Additional Information: Farrukh calling to f/u on physical therapy orders for pt after leaving the ER. Pls call back and advise

## 2024-10-16 NOTE — LETTER
..  [facility]  [address]  [address]  [place] [address]  Phone: [phone]  Fax: [phone] Date: 10/16/2024    Patient:  Masha Hobbs                   MRN#:[id number]  : 1955  Referring Physician:              Procedure: Physical Therapy & OT    Current Outpatient Medications   Medication Sig Dispense Refill    atorvastatin (LIPITOR) 10 MG tablet Take 4 tablets (40 mg total) by mouth every evening. 120 tablet 0    calcium carbonate/vitamin D3 (CALCIUM 600 + D,3, ORAL) Take 1 tablet by mouth Daily.      empagliflozin (JARDIANCE) 10 mg tablet Take 1 tablet (10 mg total) by mouth once daily. 90 tablet 3    Lactobacillus acidophilus (PROBIOTIC ACIDOPHILUS ORAL) Take 1 capsule by mouth Daily.      losartan (COZAAR) 25 MG tablet Take 0.5 tablets (12.5 mg total) by mouth once daily. 45 tablet 3    metoprolol succinate (TOPROL-XL) 50 MG 24 hr tablet Take 1 tablet (50 mg total) by mouth once daily. 30 tablet 0    omeprazole (PRILOSEC) 20 MG capsule Take 20 mg by mouth once daily.      vits A-C-E-B complx-min-lutein (LIPOTRIAD, WITH LUTEIN,) 5,000 unit- 120 mg-60 unit TbSR Take 1 tablet by mouth once daily.       No current facility-administered medications for this visit.     Patient is cleared to return to physical therapy from cardiac standpoint with no restriction.      If you have any questions regarding the above, please contact my office at [phone]    Clearing Clinician:

## 2024-10-16 NOTE — TELEPHONE ENCOUNTER
----- Message from Laura sent at 10/16/2024  9:51 AM CDT -----  Contact: [facility]  Type: Needs Medical Advice  Who Called:  Nayely   Best Call Back Number: [phone]  Additional Information: Pt is requesting to resume her Home Health PT and OT after her procedure, can we please call back to confirm. Thank You

## 2024-10-16 NOTE — TELEPHONE ENCOUNTER
----- Message from Christelle Herring NP sent at 10/16/2024  9:47 AM CDT -----  Regarding: RE: Needs Medical Advice  Contact: Farrukh Duval with [facility] at [phone]  Can you send me a clearance?  ----- Message -----  From: Adrian Atkinson MA  Sent: 10/15/2024   4:00 PM CDT  To: Christelle Herring NP  Subject: FW: Needs Medical Advice                         FYI  ----- Message -----  From: Jennifer Dahl  Sent: 10/15/2024   3:23 PM CDT  To: [facility] Staff  Subject: Needs Medical Advice                             Type: Needs Medical Advice    Who Called:  Farrukh Duval with [facility] at [phone]    Additional Information: needs to get confirmation to continue physical therapy with patient after ER visit. Please call and advise. Thank you

## 2024-10-17 ENCOUNTER — EXTERNAL HOME HEALTH (OUTPATIENT)
Dept: HOME HEALTH SERVICES | Facility: HOSPITAL | Age: 69
End: 2024-10-17
Payer: MEDICARE

## 2024-10-21 ENCOUNTER — OFFICE VISIT (OUTPATIENT)
Dept: FAMILY MEDICINE | Facility: CLINIC | Age: 69
End: 2024-10-21
Payer: MEDICARE

## 2024-10-21 VITALS
SYSTOLIC BLOOD PRESSURE: 130 MMHG | OXYGEN SATURATION: 99 % | TEMPERATURE: 98 F | HEIGHT: 61 IN | HEART RATE: 95 BPM | WEIGHT: 130 LBS | BODY MASS INDEX: 24.55 KG/M2 | DIASTOLIC BLOOD PRESSURE: 80 MMHG | RESPIRATION RATE: 18 BRPM

## 2024-10-21 DIAGNOSIS — I50.20 HFREF (HEART FAILURE WITH REDUCED EJECTION FRACTION): Primary | ICD-10-CM

## 2024-10-21 DIAGNOSIS — I51.81 BROKEN HEART SYNDROME: ICD-10-CM

## 2024-10-21 PROCEDURE — 99999 PR PBB SHADOW E&M-EST. PATIENT-LVL IV: CPT | Mod: PBBFAC,HCNC,, | Performed by: FAMILY MEDICINE

## 2024-10-21 NOTE — PROGRESS NOTES
Subjective:       Patient ID: Masha Hobbs is a 68 y.o. female.    Chief Complaint: HFU        SUBJECTIVE:  Patient ID: Masha Hobbs is a 68 y.o. female.    Chief Complaint: HFU    [unfilled]    Admit Date: 10/10/24  Discharge Date: 10/11/24  Discharge Facility: Hospital      Family and/or Caretaker present at visit? No  Medication Reconciliation:  Medications changed/added/deleted. See list  New Prescriptions filled after discharge: yes  Discharge summary reviewed:  See list  Diagnostic tests reviewed/disposition: I have reviewed all completed as well as pending diagnostic tests at the time of discharge  Disease/illness education:  Completed  Follow up appointments scheduled:  yes              with Cardiology   Follow up labs/tests ordered:   not applicable  Home Health ordered on discharge:  Getting OT and PT through [facility].  Home Health company name:  [facility]  Establishment or re-establishment of referral orders for community resources: No other necessary community resources  DME ordered at discharge:   no  How patient is feeling since discharge from the hospital?  better     Discussion with other health care providers: No discussion with other health care providers necessary  Patient follow up phone call documented on separate encounter.    Admission on 10/10/2024, Discharged on 10/11/2024  WBC                                           Date: 10/10/2024  Value: 8.01        Ref range: 3.90 - 12.70 K/uL  Status: Final  RBC                                           Date: 10/10/2024  Value: 4.10        Ref range: 4.00 - 5.40 M/uL   Status: Final  Hemoglobin                                    Date: 10/10/2024  Value: 13.4        Ref range: 12.0 - 16.0 g/dL   Status: Final  Hematocrit                                    Date: 10/10/2024  Value: 39.1        Ref range: 37.0 - 48.5 %      Status: Final  MCV                                           Date: 10/10/2024  Value: 95          Ref range: 82 - 98 fL          Status: Final  MCH                                           Date: 10/10/2024  Value: 32.7 (H)    Ref range: 27.0 - 31.0 pg     Status: Final  MCHC                                          Date: 10/10/2024  Value: 34.3        Ref range: 32.0 - 36.0 g/dL   Status: Final  RDW                                           Date: 10/10/2024  Value: 13.1        Ref range: 11.5 - 14.5 %      Status: Final  Platelets                                     Date: 10/10/2024  Value: 375         Ref range: 150 - 450 K/uL     Status: Final  MPV                                           Date: 10/10/2024  Value: 9.0 (L)     Ref range: 9.2 - 12.9 fL      Status: Final  Immature Granulocytes                         Date: 10/10/2024  Value: 0.1         Ref range: 0.0 - 0.5 %        Status: Final  Gran # (ANC)                                  Date: 10/10/2024  Value: 5.5         Ref range: 1.8 - 7.7 K/uL     Status: Final  Immature Grans (Abs)                          Date: 10/10/2024  Value: 0.01        Ref range: 0.00 - 0.04 K/uL   Status: Final  Lymph #                                       Date: 10/10/2024  Value: 1.9         Ref range: 1.0 - 4.8 K/uL     Status: Final  Mono #                                        Date: 10/10/2024  Value: 0.5         Ref range: 0.3 - 1.0 K/uL     Status: Final  Eos #                                         Date: 10/10/2024  Value: 0.1         Ref range: 0.0 - 0.5 K/uL     Status: Final  Baso #                                        Date: 10/10/2024  Value: 0.06        Ref range: 0.00 - 0.20 K/uL   Status: Final  nRBC                                          Date: 10/10/2024  Value: 0           Ref range: 0 /100 WBC         Status: Final  Gran %                                        Date: 10/10/2024  Value: 68.6        Ref range: 38.0 - 73.0 %      Status: Final  Lymph %                                       Date: 10/10/2024  Value: 23.5        Ref range: 18.0 - 48.0 %      Status: Final  Mono %                                         Date: 10/10/2024  Value: 6.2         Ref range: 4.0 - 15.0 %       Status: Final  Eosinophil %                                  Date: 10/10/2024  Value: 0.9         Ref range: 0.0 - 8.0 %        Status: Final  Basophil %                                    Date: 10/10/2024  Value: 0.7         Ref range: 0.0 - 1.9 %        Status: Final  Differential Method                           Date: 10/10/2024  Value: Automated     Status: Final  Sodium                                        Date: 10/10/2024  Value: 137         Ref range: 136 - 145 mmol/L   Status: Final  Potassium                                     Date: 10/10/2024  Value: 3.9         Ref range: 3.5 - 5.1 mmol/L   Status: Final  Chloride                                      Date: 10/10/2024  Value: 103         Ref range: 95 - 110 mmol/L    Status: Final  CO2                                           Date: 10/10/2024  Value: 23          Ref range: 23 - 29 mmol/L     Status: Final  Glucose                                       Date: 10/10/2024  Value: 98          Ref range: 70 - 110 mg/dL     Status: Final  BUN                                           Date: 10/10/2024  Value: 20          Ref range: 8 - 23 mg/dL       Status: Final  Creatinine                                    Date: 10/10/2024  Value: 0.8         Ref range: 0.5 - 1.4 mg/dL    Status: Final  Calcium                                       Date: 10/10/2024  Value: 9.9         Ref range: 8.7 - 10.5 mg/dL   Status: Final  Anion Gap                                     Date: 10/10/2024  Value: 11          Ref range: 8 - 16 mmol/L      Status: Final  eGFR                                          Date: 10/10/2024  Value: >60.0       Ref range: >60 mL/min/1.73 *  Status: Final  Troponin I High Sensitivity                   Date: 10/10/2024  Value: 11.0        Ref range: 0.0 - 14.9 pg/mL   Status: Final  Troponin I High Sensitivity                   Date: 10/10/2024  Value:  15.3 (H)    Ref range: 0.0 - 14.9 pg/mL   Status: Final  Sodium                                        Date: 10/11/2024  Value: 138         Ref range: 136 - 145 mmol/L   Status: Final  Potassium                                     Date: 10/11/2024  Value: 4.4         Ref range: 3.5 - 5.1 mmol/L   Status: Final  Chloride                                      Date: 10/11/2024  Value: 106         Ref range: 95 - 110 mmol/L    Status: Final  CO2                                           Date: 10/11/2024  Value: 26          Ref range: 23 - 29 mmol/L     Status: Final  Glucose                                       Date: 10/11/2024  Value: 92          Ref range: 70 - 110 mg/dL     Status: Final  BUN                                           Date: 10/11/2024  Value: 17          Ref range: 8 - 23 mg/dL       Status: Final  Creatinine                                    Date: 10/11/2024  Value: 0.9         Ref range: 0.5 - 1.4 mg/dL    Status: Final  Calcium                                       Date: 10/11/2024  Value: 9.7         Ref range: 8.7 - 10.5 mg/dL   Status: Final  Total Protein                                 Date: 10/11/2024  Value: 7.5         Ref range: 6.0 - 8.4 g/dL     Status: Final  Albumin                                       Date: 10/11/2024  Value: 3.7         Ref range: 3.5 - 5.2 g/dL     Status: Final  Total Bilirubin                               Date: 10/11/2024  Value: 0.6         Ref range: 0.1 - 1.0 mg/dL    Status: Final  Alkaline Phosphatase                          Date: 10/11/2024  Value: 65          Ref range: 55 - 135 U/L       Status: Final  AST                                           Date: 10/11/2024  Value: 14          Ref range: 10 - 40 U/L        Status: Final  ALT                                           Date: 10/11/2024  Value: 23          Ref range: 10 - 44 U/L        Status: Final  eGFR                                          Date: 10/11/2024  Value: >60.0       Ref range: >60  mL/min/1.73 *  Status: Final  Anion Gap                                     Date: 10/11/2024  Value: 6 (L)       Ref range: 8 - 16 mmol/L      Status: Final  Magnesium                                     Date: 10/11/2024  Value: 1.9         Ref range: 1.6 - 2.6 mg/dL    Status: Final  WBC                                           Date: 10/11/2024  Value: 8.11        Ref range: 3.90 - 12.70 K/uL  Status: Final  RBC                                           Date: 10/11/2024  Value: 3.77 (L)    Ref range: 4.00 - 5.40 M/uL   Status: Final  Hemoglobin                                    Date: 10/11/2024  Value: 12.3        Ref range: 12.0 - 16.0 g/dL   Status: Final  Hematocrit                                    Date: 10/11/2024  Value: 35.8 (L)    Ref range: 37.0 - 48.5 %      Status: Final  MCV                                           Date: 10/11/2024  Value: 95          Ref range: 82 - 98 fL         Status: Final  MCH                                           Date: 10/11/2024  Value: 32.6 (H)    Ref range: 27.0 - 31.0 pg     Status: Final  MCHC                                          Date: 10/11/2024  Value: 34.4        Ref range: 32.0 - 36.0 g/dL   Status: Final  RDW                                           Date: 10/11/2024  Value: 13.2        Ref range: 11.5 - 14.5 %      Status: Final  Platelets                                     Date: 10/11/2024  Value: 359         Ref range: 150 - 450 K/uL     Status: Final  MPV                                           Date: 10/11/2024  Value: 8.8 (L)     Ref range: 9.2 - 12.9 fL      Status: Final  Immature Granulocytes                         Date: 10/11/2024  Value: 0.4         Ref range: 0.0 - 0.5 %        Status: Final  Gran # (ANC)                                  Date: 10/11/2024  Value: 4.7         Ref range: 1.8 - 7.7 K/uL     Status: Final  Immature Grans (Abs)                          Date: 10/11/2024  Value: 0.03        Ref range: 0.00 - 0.04 K/uL   Status:  Final  Lymph #                                       Date: 10/11/2024  Value: 2.5         Ref range: 1.0 - 4.8 K/uL     Status: Final  Mono #                                        Date: 10/11/2024  Value: 0.6         Ref range: 0.3 - 1.0 K/uL     Status: Final  Eos #                                         Date: 10/11/2024  Value: 0.2         Ref range: 0.0 - 0.5 K/uL     Status: Final  Baso #                                        Date: 10/11/2024  Value: 0.08        Ref range: 0.00 - 0.20 K/uL   Status: Final  nRBC                                          Date: 10/11/2024  Value: 0           Ref range: 0 /100 WBC         Status: Final  Gran %                                        Date: 10/11/2024  Value: 57.8        Ref range: 38.0 - 73.0 %      Status: Final  Lymph %                                       Date: 10/11/2024  Value: 31.3        Ref range: 18.0 - 48.0 %      Status: Final  Mono %                                        Date: 10/11/2024  Value: 7.0         Ref range: 4.0 - 15.0 %       Status: Final  Eosinophil %                                  Date: 10/11/2024  Value: 2.5         Ref range: 0.0 - 8.0 %        Status: Final  Basophil %                                    Date: 10/11/2024  Value: 1.0         Ref range: 0.0 - 1.9 %        Status: Final  Differential Method                           Date: 10/11/2024  Value: Automated     Status: Final  Cholesterol                                   Date: 10/11/2024  Value: 178         Ref range: 120 - 199 mg/dL    Status: Final  Triglycerides                                 Date: 10/11/2024  Value: 61          Ref range: 30 - 150 mg/dL     Status: Final  HDL                                           Date: 10/11/2024  Value: 42          Ref range: 40 - 75 mg/dL      Status: Final  LDL Cholesterol                               Date: 10/11/2024  Value: 123.8       Ref range: 63.0 - 159.0 mg/*  Status: Final  HDL/Cholesterol Ratio                          Date: 10/11/2024  Value: 23.6        Ref range: 20.0 - 50.0 %      Status: Final  Total Cholesterol/HDL Ratio                   Date: 10/11/2024  Value: 4.2         Ref range: 2.0 - 5.0          Status: Final  Non-HDL Cholesterol                           Date: 10/11/2024  Value: 136         Ref range: mg/dL              Status: Final  aPTT                                          Date: 10/11/2024  Value: 27.6        Ref range: 21.0 - 32.0 sec    Status: Final  Prothrombin Time                              Date: 10/11/2024  Value: 11.9        Ref range: 9.0 - 12.5 sec     Status: Final  INR                                           Date: 10/11/2024  Value: 1.1         Ref range: 0.8 - 1.2          Status: Final  Lui's Biplane MOD Ejection Fra*           Date: 10/11/2024  Value: 61          Ref range: %                  Status: Final  A2C EF                                        Date: 10/11/2024  Value: 60          Ref range: %                  Status: Final  A4C EF                                        Date: 10/11/2024  Value: 57          Ref range: %                  Status: Final  LVIDd                                         Date: 10/11/2024  Value: 4.3         Ref range: 3.5 - 6.0 cm       Status: Final  LV Systolic Volume                            Date: 10/11/2024  Value: 37.90       Ref range: mL                 Status: Final  LV Systolic Volume Index                      Date: 10/11/2024  Value: 24.0        Ref range: mL/m2              Status: Final  LVIDs                                         Date: 10/11/2024  Value: 3.1         Ref range: 2.1 - 4.0 cm       Status: Final  LV Diastolic Volume                           Date: 10/11/2024  Value: 83.10       Ref range: mL                 Status: Final  LV Diastolic Volume Index                     Date: 10/11/2024  Value: 52.59       Ref range: mL/m2              Status: Final  LV EDV A2C                                    Date: 10/11/2024  Value:  [id number]                     Ref range: mL                 Status: Final  LV EDV A4C                                    Date: 10/11/2024  Value: 57.90       Ref range: mL                 Status: Final  Left Ventricular End Systolic Volu*           Date: 10/11/2024  Value: 37.90       Ref range: mL                 Status: Final  Left Ventricular End Diastolic Vol*           Date: 10/11/2024  Value: 83.10       Ref range: mL                 Status: Final  IVS                                           Date: 10/11/2024  Value: 1.2 (A)     Ref range: 0.6 - 1.1 cm       Status: Final  FS                                            Date: 10/11/2024  Value: 27.9 (A)    Ref range: 28 - 44 %          Status: Final  Left Ventricle Relative Wall Thick*           Date: 10/11/2024  Value: 0.51        Ref range: cm                 Status: Final  PW                                            Date: 10/11/2024  Value: 1.1         Ref range: 0.6 - 1.1 cm       Status: Final  LV mass                                       Date: 10/11/2024  Value: 173.6       Ref range: g                  Status: Final  LV Mass Index                                 Date: 10/11/2024  Value: 109.9       Ref range: g/m2               Status: Final  ZLVIDS                                        Date: 10/11/2024  Value: 0.78          Status: Final  ZLVIDD                                        Date: 10/11/2024  Value: -0.51         Status: Final  BSA                                           Date: 10/11/2024  Value: 1.6         Ref range: m2                 Status: Final  Est. RA pres                                  Date: 10/11/2024  Value: 3           Ref range: mmHg               Status: Final  EF                                            Date: 10/11/2024  Value: 61          Ref range: %                  Status: Final  ------------  Office Visit on 10/10/2024  QRS Duration                                  Date: 10/10/2024  Value: 88          Ref  range: ms                 Status: In process  [facility] QTC Calculation                           Date: 10/10/2024  Value: 422         Ref range: ms                 Status: In process  ------------  Admission on 09/16/2024, Discharged on 09/30/2024  No results displayed because visit has over 200 results.    ------------  Admission on 06/06/2024, Discharged on 06/06/2024  QRS Duration                                  Date: 06/06/2024  Value: 88          Ref range: ms                 Status: Final  [facility] QTC Calculation                           Date: 06/06/2024  Value: 623         Ref range: ms                 Status: Final  WBC                                           Date: 06/06/2024  Value: 11.60       Ref range: 3.90 - 12.70 K/uL  Status: Final  RBC                                           Date: 06/06/2024  Value: 4.31        Ref range: 4.00 - 5.40 M/uL   Status: Final  Hemoglobin                                    Date: 06/06/2024  Value: 13.5        Ref range: 12.0 - 16.0 g/dL   Status: Final  Hematocrit                                    Date: 06/06/2024  Value: 40.9        Ref range: 37.0 - 48.5 %      Status: Final  MCV                                           Date: 06/06/2024  Value: 95          Ref range: 82 - 98 fL         Status: Final  MCH                                           Date: 06/06/2024  Value: 31.3 (H)    Ref range: 27.0 - 31.0 pg     Status: Final  MCHC                                          Date: 06/06/2024  Value: 33.0        Ref range: 32.0 - 36.0 g/dL   Status: Final  RDW                                           Date: 06/06/2024  Value: 13.2        Ref range: 11.5 - 14.5 %      Status: Final  Platelets                                     Date: 06/06/2024  Value: 317         Ref range: 150 - 450 K/uL     Status: Final  MPV                                           Date: 06/06/2024  Value: 9.5         Ref range: 9.2 - 12.9 fL      Status: Final  Immature Granulocytes                          Date: 06/06/2024  Value: 0.3         Ref range: 0.0 - 0.5 %        Status: Final  Gran # (ANC)                                  Date: 06/06/2024  Value: 8.1 (H)     Ref range: 1.8 - 7.7 K/uL     Status: Final  Immature Grans (Abs)                          Date: 06/06/2024  Value: 0.03        Ref range: 0.00 - 0.04 K/uL   Status: Final  Lymph #                                       Date: 06/06/2024  Value: 2.5         Ref range: 1.0 - 4.8 K/uL     Status: Final  Mono #                                        Date: 06/06/2024  Value: 0.9         Ref range: 0.3 - 1.0 K/uL     Status: Final  Eos #                                         Date: 06/06/2024  Value: 0.0         Ref range: 0.0 - 0.5 K/uL     Status: Final  Baso #                                        Date: 06/06/2024  Value: 0.08        Ref range: 0.00 - 0.20 K/uL   Status: Final  nRBC                                          Date: 06/06/2024  Value: 0           Ref range: 0 /100 WBC         Status: Final  Gran %                                        Date: 06/06/2024  Value: 69.9        Ref range: 38.0 - 73.0 %      Status: Final  Lymph %                                       Date: 06/06/2024  Value: 21.4        Ref range: 18.0 - 48.0 %      Status: Final  Mono %                                        Date: 06/06/2024  Value: 7.4         Ref range: 4.0 - 15.0 %       Status: Final  Eosinophil %                                  Date: 06/06/2024  Value: 0.3         Ref range: 0.0 - 8.0 %        Status: Final  Basophil %                                    Date: 06/06/2024  Value: 0.7         Ref range: 0.0 - 1.9 %        Status: Final  Differential Method                           Date: 06/06/2024  Value: Automated     Status: Final  Sodium                                        Date: 06/06/2024  Value: 138         Ref range: 136 - 145 mmol/L   Status: Final  Potassium                                     Date: 06/06/2024  Value: 4.2         Ref range: 3.5 -  5.1 mmol/L   Status: Final  Chloride                                      Date: 06/06/2024  Value: 104         Ref range: 95 - 110 mmol/L    Status: Final  CO2                                           Date: 06/06/2024  Value: 26          Ref range: 23 - 29 mmol/L     Status: Final  Glucose                                       Date: 06/06/2024  Value: 82          Ref range: 70 - 110 mg/dL     Status: Final  BUN                                           Date: 06/06/2024  Value: 24 (H)      Ref range: 8 - 23 mg/dL       Status: Final  Creatinine                                    Date: 06/06/2024  Value: 0.9         Ref range: 0.5 - 1.4 mg/dL    Status: Final  Calcium                                       Date: 06/06/2024  Value: 10.0        Ref range: 8.7 - 10.5 mg/dL   Status: Final  Total Protein                                 Date: 06/06/2024  Value: 8.1         Ref range: 6.0 - 8.4 g/dL     Status: Final  Albumin                                       Date: 06/06/2024  Value: 4.4         Ref range: 3.5 - 5.2 g/dL     Status: Final  Total Bilirubin                               Date: 06/06/2024  Value: 0.4         Ref range: 0.1 - 1.0 mg/dL    Status: Final  Alkaline Phosphatase                          Date: 06/06/2024  Value: 64          Ref range: 55 - 135 U/L       Status: Final  AST                                           Date: 06/06/2024  Value: 21          Ref range: 10 - 40 U/L        Status: Final  ALT                                           Date: 06/06/2024  Value: 19          Ref range: 10 - 44 U/L        Status: Final  eGFR                                          Date: 06/06/2024  Value: >60.0       Ref range: >60 mL/min/1.73 *  Status: Final  Anion Gap                                     Date: 06/06/2024  Value: 8           Ref range: 8 - 16 mmol/L      Status: Final  Lipase                                        Date: 06/06/2024  Value: 8           Ref range: 4 - 60 U/L         Status:  Final  Prothrombin Time                              Date: 06/06/2024  Value: 11.0        Ref range: 9.0 - 12.5 sec     Status: Final  INR                                           Date: 06/06/2024  Value: 1.0         Ref range: 0.8 - 1.2          Status: Final  POC Creatinine                                Date: 06/06/2024  Value: 0.9         Ref range: 0.5 - 1.4 mg/dL    Status: Final  Sample                                        Date: 06/06/2024  Value: VENOUS        Status: Final  ------------    Past Medical History:  3/4/2020: Age-related osteoporosis without current pathological   fracture      Comment:  Based upon bone density showing osteoporosis both at                hips and lumbar spine.  Patient notified.  May consider                bisphosphonates.  Past Surgical History:  10/11/2024: LEFT HEART CATHETERIZATION; Left      Comment:  Procedure: Left heart cath;  Surgeon: Roman Javed MD;  Location: [facility] CATH/EP LAB;  Service: Cardiology;                 Laterality: Left;  No date: TONSILLECTOMY  2021: WRIST SURGERY; Right  Review of patient's family history indicates:  Problem: Diabetes      Relation: Mother          Name:               Age of Onset: (Not Specified)  Problem: Heart disease      Relation: Mother          Name:               Age of Onset: (Not Specified)  Problem: Cancer      Relation: Mother          Name:               Age of Onset: (Not Specified)  Problem: Breast cancer      Relation: Mother          Name:               Age of Onset: (Not Specified)  Problem: Cancer      Relation: Father          Name:               Age of Onset: (Not Specified)  Problem: Hyperlipidemia      Relation: Father          Name:               Age of Onset: (Not Specified)  Problem: Heart disease      Relation: Father          Name:               Age of Onset: (Not Specified)  Problem: Diabetes      Relation: Father          Name:               Age of Onset: (Not Specified)  Problem:  "Stroke      Relation: Father          Name:               Age of Onset: (Not Specified)      Marital Status:   Alcohol History:  reports current alcohol use of about 1.0 - 2.0 standard drink of alcohol per week.  Tobacco History:  reports that she quit smoking about 35 years ago. Her smoking use included cigarettes. She has never used smokeless tobacco.  Drug History:  reports that she does not currently use drugs.    Review of patient's allergies indicates:  No Known Allergies  Current Outpatient Medications: ·  atorvastatin (LIPITOR) 10 MG tablet, Take 4 tablets (40 mg total) by mouth every evening., Disp: 120 tablet, Rfl: 0·  calcium carbonate/vitamin D3 (CALCIUM 600 + D,3, ORAL), Take 1 tablet by mouth Daily., Disp: , Rfl: ·  empagliflozin (JARDIANCE) 10 mg tablet, Take 1 tablet (10 mg total) by mouth once daily., Disp: 90 tablet, Rfl: 3·  Lactobacillus acidophilus (PROBIOTIC ACIDOPHILUS ORAL), Take 1 capsule by mouth Daily., Disp: , Rfl: ·  losartan (COZAAR) 25 MG tablet, Take 0.5 tablets (12.5 mg total) by mouth once daily., Disp: 45 tablet, Rfl: 3·  metoprolol succinate (TOPROL-XL) 50 MG 24 hr tablet, Take 1 tablet (50 mg total) by mouth once daily., Disp: 30 tablet, Rfl: 0·  omeprazole (PRILOSEC) 20 MG capsule, Take 20 mg by mouth once daily., Disp: , Rfl: ·  vits A-C-E-B complx-min-lutein (LIPOTRIAD, WITH LUTEIN,) 5,000 unit- 120 mg-60 unit TbSR, Take 1 tablet by mouth once daily. (Patient not taking: Reported on 10/21/2024), Disp: , Rfl:     [unfilled]    Objective:    ---------------------------               10/21/24                      0845         ---------------------------   BP:          130/80         Pulse:         95           Resp:          18           Temp:   98.4 °F (36.9 °C)   TempSrc:      Oral          SpO2:          99%          Weight:  59 kg (130 lb)     Height:  5' 1" (1.549 m)   ---------------------------  [unfilled]   Assessment:     HFrEF " (heart failure with reduced ejection fraction)  (primary encounter diagnosis)  Broken heart syndrome    Plan:     · HFrEF (heart failure with reduced ejection fraction)    · Broken heart syndrome    No follow-ups on file.                 Allergies and Medications:   Review of patient's allergies indicates:  No Known Allergies  Current Outpatient Medications   Medication Sig Dispense Refill    atorvastatin (LIPITOR) 10 MG tablet Take 4 tablets (40 mg total) by mouth every evening. 120 tablet 0    calcium carbonate/vitamin D3 (CALCIUM 600 + D,3, ORAL) Take 1 tablet by mouth Daily.      empagliflozin (JARDIANCE) 10 mg tablet Take 1 tablet (10 mg total) by mouth once daily. 90 tablet 3    Lactobacillus acidophilus (PROBIOTIC ACIDOPHILUS ORAL) Take 1 capsule by mouth Daily.      losartan (COZAAR) 25 MG tablet Take 0.5 tablets (12.5 mg total) by mouth once daily. 45 tablet 3    metoprolol succinate (TOPROL-XL) 50 MG 24 hr tablet Take 1 tablet (50 mg total) by mouth once daily. 30 tablet 0    omeprazole (PRILOSEC) 20 MG capsule Take 20 mg by mouth once daily.      vits A-C-E-B complx-min-lutein (LIPOTRIAD, WITH LUTEIN,) 5,000 unit- 120 mg-60 unit TbSR Take 1 tablet by mouth once daily. (Patient not taking: Reported on 10/21/2024)       No current facility-administered medications for this visit.       Family History:   Family History   Problem Relation Name Age of Onset    Diabetes Mother      Heart disease Mother      Cancer Mother      Breast cancer Mother      Cancer Father      Hyperlipidemia Father      Heart disease Father      Diabetes Father      Stroke Father         Social History:   Social History     Socioeconomic History    Marital status:      Spouse name: Elias Hobbs    Number of children: 2   Occupational History    Occupation: Part Time realtor/   Tobacco Use    Smoking status: Former     Current packs/day: 0.00     Types: Cigarettes     Quit date: 1989     Years since quittin.8     "Smokeless tobacco: Never   Substance and Sexual Activity    Alcohol use: Yes     Alcohol/week: 1.0 - 2.0 standard drink of alcohol     Types: 1 - 2 Shots of liquor per week     Comment: "Occasional"    Drug use: Not Currently    Sexual activity: Yes     Partners: Male   Social History Narrative    G 39, B35     Social Drivers of Health     Financial Resource Strain: Low Risk  (10/11/2024)    Overall Financial Resource Strain (CARDIA)     Difficulty of Paying Living Expenses: Not hard at all   Food Insecurity: No Food Insecurity (10/11/2024)    Hunger Vital Sign     Worried About Running Out of Food in the Last Year: Never true     Ran Out of Food in the Last Year: Never true   Transportation Needs: No Transportation Needs (10/11/2024)    TRANSPORTATION NEEDS     Transportation : No   Physical Activity: Inactive (10/11/2024)    Exercise Vital Sign     Days of Exercise per Week: 0 days     Minutes of Exercise per Session: 0 min   Stress: No Stress Concern Present (10/11/2024)    [employer] [place] [employer] - Occupational Stress Questionnaire     Feeling of Stress : Not at all   Housing Stability: Low Risk  (10/11/2024)    Housing Stability Vital Sign     Unable to Pay for Housing in the Last Year: No     Homeless in the Last Year: No       Review of Systems    Objective:     Vitals:    10/21/24 0845   BP: 130/80   Pulse: 95   Resp: 18   Temp: 98.4 °F (36.9 °C)        Physical Exam  Vitals and nursing note reviewed.   Constitutional:       General: She is not in acute distress.     Appearance: Normal appearance. She is well-developed and normal weight. She is not ill-appearing, toxic-appearing or diaphoretic.   HENT:      Head: Normocephalic and atraumatic.   Eyes:      Pupils: Pupils are equal, round, and reactive to light.   Cardiovascular:      Rate and Rhythm: Normal rate and regular rhythm.      Heart sounds: Normal heart sounds. No murmur heard.     No friction rub. No gallop.   Pulmonary:      " Effort: Pulmonary effort is normal. No respiratory distress.      Breath sounds: Normal breath sounds. No stridor. No wheezing, rhonchi or rales.   Chest:      Chest wall: No tenderness.   Musculoskeletal:      Right lower leg: No edema.      Left lower leg: No edema.   Neurological:      Mental Status: She is alert.   Psychiatric:         Behavior: Behavior normal.         Thought Content: Thought content normal.         Judgment: Judgment normal.         Assessment:       1. HFrEF (heart failure with reduced ejection fraction)    2. Broken heart syndrome        Plan:       Masha was seen today for hfu.    Diagnoses and all orders for this visit:    HFrEF (heart failure with reduced ejection fraction)    Broken heart syndrome         Follow up for HRA with Jeff Salinas or [facility].

## 2024-10-24 ENCOUNTER — OFFICE VISIT (OUTPATIENT)
Dept: CARDIOLOGY | Facility: CLINIC | Age: 69
End: 2024-10-24
Payer: MEDICARE

## 2024-10-24 VITALS
BODY MASS INDEX: 24.77 KG/M2 | OXYGEN SATURATION: 100 % | HEIGHT: 61 IN | DIASTOLIC BLOOD PRESSURE: 80 MMHG | HEART RATE: 91 BPM | SYSTOLIC BLOOD PRESSURE: 140 MMHG | WEIGHT: 131.19 LBS

## 2024-10-24 DIAGNOSIS — I50.20 HFREF (HEART FAILURE WITH REDUCED EJECTION FRACTION): ICD-10-CM

## 2024-10-24 DIAGNOSIS — R94.31 NONSPECIFIC ABNORMAL ELECTROCARDIOGRAM (ECG) (EKG): ICD-10-CM

## 2024-10-24 DIAGNOSIS — I48.0 PAROXYSMAL ATRIAL FIBRILLATION: ICD-10-CM

## 2024-10-24 DIAGNOSIS — I70.0 AORTIC ATHEROSCLEROSIS: Primary | ICD-10-CM

## 2024-10-24 PROCEDURE — 3077F SYST BP >= 140 MM HG: CPT | Mod: HCNC,CPTII,S$GLB,

## 2024-10-24 PROCEDURE — 99214 OFFICE O/P EST MOD 30 MIN: CPT | Mod: HCNC,S$GLB,,

## 2024-10-24 PROCEDURE — 1160F RVW MEDS BY RX/DR IN RCRD: CPT | Mod: HCNC,CPTII,S$GLB,

## 2024-10-24 PROCEDURE — 1159F MED LIST DOCD IN RCRD: CPT | Mod: HCNC,CPTII,S$GLB,

## 2024-10-24 PROCEDURE — 99999 PR PBB SHADOW E&M-EST. PATIENT-LVL III: CPT | Mod: PBBFAC,HCNC,,

## 2024-10-24 PROCEDURE — 4010F ACE/ARB THERAPY RXD/TAKEN: CPT | Mod: HCNC,CPTII,S$GLB,

## 2024-10-24 PROCEDURE — 1101F PT FALLS ASSESS-DOCD LE1/YR: CPT | Mod: HCNC,CPTII,S$GLB,

## 2024-10-24 PROCEDURE — 3288F FALL RISK ASSESSMENT DOCD: CPT | Mod: HCNC,CPTII,S$GLB,

## 2024-10-24 PROCEDURE — 1126F AMNT PAIN NOTED NONE PRSNT: CPT | Mod: HCNC,CPTII,S$GLB,

## 2024-10-24 PROCEDURE — 3079F DIAST BP 80-89 MM HG: CPT | Mod: HCNC,CPTII,S$GLB,

## 2024-10-24 PROCEDURE — 1111F DSCHRG MED/CURRENT MED MERGE: CPT | Mod: HCNC,CPTII,S$GLB,

## 2024-10-24 PROCEDURE — 3008F BODY MASS INDEX DOCD: CPT | Mod: HCNC,CPTII,S$GLB,

## 2024-10-24 RX ORDER — ATORVASTATIN CALCIUM 20 MG/1
20 TABLET, FILM COATED ORAL NIGHTLY
Qty: 90 TABLET | Refills: 3 | Status: SHIPPED | OUTPATIENT
Start: 2024-10-24 | End: 2025-10-19

## 2024-10-24 NOTE — PROGRESS NOTES
" Subjective:    Patient ID:  Masha Hobbs is a 68 y.o. female patient here for evaluation Hospital Follow Up and Atrial Fibrillation      History of Present Illness:     Patient is here for a check up. S/p hospitalization.  Cardiac cath performed 10/11/24 nonobstructive coronaries noted.  Repeat echo shows EF improvement fom 35% 24 to 61% on 10/11/24.  No acute valvular abnormalities.  Denies chest pain, shortness of breath, lightheadedness, dizziness, jaw/neck/arm pain, palpitations, orthopnea, PND, edema, or bleeding.      BP mildly low at times .  She is checking BP TID and weighing herself daily.      Euvolemic.    BP stable at home.  Complains of fatigue at times.      Review of patient's allergies indicates:  No Known Allergies    Past Medical History:   Diagnosis Date    Age-related osteoporosis without current pathological fracture 3/4/2020    Based upon bone density showing osteoporosis both at hips and lumbar spine.  Patient notified.  May consider bisphosphonates.     Past Surgical History:   Procedure Laterality Date    LEFT HEART CATHETERIZATION Left 10/11/2024    Procedure: Left heart cath;  Surgeon: Roman Javed MD;  Location: [facility] CATH[facility]EP LAB;  Service: Cardiology;  Laterality: Left;    TONSILLECTOMY      WRIST SURGERY Right      Social History     Tobacco Use    Smoking status: Former     Current packs/day: 0.00     Types: Cigarettes     Quit date: 1989     Years since quittin.8    Smokeless tobacco: Never   Substance Use Topics    Alcohol use: Yes     Alcohol/week: 1.0 - 2.0 standard drink of alcohol     Types: 1 - 2 Shots of liquor per week     Comment: "Occasional"    Drug use: Not Currently        Review of Systems:    As noted in HPI in addition      REVIEW OF SYSTEMS  CARDIOVASCULAR: No recent chest pain, palpitations, arm, neck, or jaw pain  RESPIRATORY: No recent fever, cough chills, SOB or congestion  : No blood in the urine  GI: No Nausea, vomiting, " constipation, diarrhea, blood, or reflux.  MUSCULOSKELETAL: No myalgias  NEURO: No lightheadedness or dizziness  EYES: No Double vision, blurry, vision or headache              Objective        Vitals:    10/24/24 1346   BP: (!) 140/80   Pulse: 91       LIPIDS - LAST 2   Lab Results   Component Value Date    CHOL 178 10/11/2024    CHOL 215 (H) 03/02/2020    HDL 42 10/11/2024    HDL 70 03/02/2020    LDLCALC 123.8 10/11/2024    LDLCALC 130.8 03/02/2020    TRIG 61 10/11/2024    TRIG 71 03/02/2020    CHOLHDL 23.6 10/11/2024    CHOLHDL 32.6 03/02/2020       CBC - LAST 2  Lab Results   Component Value Date    WBC 8.11 10/11/2024    WBC 8.01 10/10/2024    RBC 3.77 (L) 10/11/2024    RBC 4.10 10/10/2024    HGB 12.3 10/11/2024    HGB 13.4 10/10/2024    HCT 35.8 (L) 10/11/2024    HCT 39.1 10/10/2024    MCV 95 10/11/2024    MCV 95 10/10/2024    MCH 32.6 (H) 10/11/2024    MCH 32.7 (H) 10/10/2024    MCHC 34.4 10/11/2024    MCHC 34.3 10/10/2024    RDW 13.2 10/11/2024    RDW 13.1 10/10/2024     10/11/2024     10/10/2024    MPV 8.8 (L) 10/11/2024    MPV 9.0 (L) 10/10/2024    GRAN 4.7 10/11/2024    GRAN 57.8 10/11/2024    LYMPH 2.5 10/11/2024    LYMPH 31.3 10/11/2024    MONO 0.6 10/11/2024    MONO 7.0 10/11/2024    BASO 0.08 10/11/2024    BASO 0.06 10/10/2024    NRBC 0 10/11/2024    NRBC 0 10/10/2024       CHEMISTRY & LIVER FUNCTION - LAST 2  Lab Results   Component Value Date     10/11/2024     10/10/2024    K 4.4 10/11/2024    K 3.9 10/10/2024     10/11/2024     10/10/2024    CO2 26 10/11/2024    CO2 23 10/10/2024    ANIONGAP 6 (L) 10/11/2024    ANIONGAP 11 10/10/2024    BUN 17 10/11/2024    BUN 20 10/10/2024    CREATININE 0.9 10/11/2024    CREATININE 0.8 10/10/2024    GLU 92 10/11/2024    GLU 98 10/10/2024    CALCIUM 9.7 10/11/2024    CALCIUM 9.9 10/10/2024    MG 1.9 10/11/2024    MG 1.9 09/29/2024    ALBUMIN 3.7 10/11/2024    ALBUMIN 2.4 (L) 09/28/2024    PROT 7.5 10/11/2024    PROT 6.0  09/28/2024    ALKPHOS 65 10/11/2024    ALKPHOS 54 (L) 09/28/2024    ALT 23 10/11/2024    ALT 82 (H) 09/28/2024    AST 14 10/11/2024    AST 39 09/28/2024    BILITOT 0.6 10/11/2024    BILITOT 0.3 09/28/2024        CARDIAC PROFILE - LAST 2  Lab Results   Component Value Date    TROPONINIHS 15.3 (H) 10/10/2024    TROPONINIHS 11.0 10/10/2024        COAGULATION - LAST 2  Lab Results   Component Value Date    INR 1.1 10/11/2024    INR 1.0 06/06/2024    APTT 27.6 10/11/2024       ENDOCRINE & PSA - LAST 2  Lab Results   Component Value Date    TSH 0.622 09/18/2024    PROCAL 0.06 09/24/2024    PROCAL 0.06 09/23/2024        ECHOCARDIOGRAM RESULTS  Results for orders placed during the hospital encounter of 10/10/24    Echo    Interpretation Summary    Left Ventricle: The left ventricle is normal in size. Mildly increased wall thickness. There is mild concentric hypertrophy. There is normal systolic function. Ejection fraction is approximately 61%.    Right Ventricle: Normal right ventricular cavity size. Wall thickness is normal. Systolic function is normal.    A limited echo was performed using limited 2D      CURRENT/PREVIOUS VISIT EKG  Results for orders placed or performed in visit on 10/10/24   IN OFFICE EKG 12-LEAD (to [place])    Collection Time: 10/10/24 10:59 AM   Result Value Ref Range    QRS Duration 88 ms    [facility] QTC Calculation 422 ms    Narrative    Test Reason : I50.20,I48.0,    Vent. Rate : 102 BPM     Atrial Rate : 102 BPM     P-R Int : 260 ms          QRS Dur : 088 ms      QT Int : 324 ms       P-R-T Axes : 055 204 221 degrees     QTc Int : 422 ms    Sinus tachycardia with 1st degree A-V block  Right atrial enlargement  Right superior axis deviation  Pulmonary disease pattern  T wave abnormality, consider inferior ischemia  T wave abnormality, consider anterolateral ischemia  Abnormal ECG  When compared with ECG of 10-OCT-2024 09:56,  QT has shortened    Referred By:             Confirmed By:      No valid  procedures specified.   No results found for this or any previous visit.    No valid procedures specified.    PHYSICAL EXAM  CONSTITUTIONAL: Well built, well nourished in no apparent distress  NECK: no carotid bruit, no JVD  LUNGS: CTA  CHEST WALL: no tenderness  HEART: regular rate and rhythm, S1, S2 normal, no murmur, click, rub or gallop   ABDOMEN: soft, non-tender; bowel sounds normal; no masses,  no organomegaly  EXTREMITIES: Extremities normal, no edema, no calf tenderness noted  NEURO: AAO X 3    I HAVE REVIEWED :    The vital signs, nurses notes, and all the pertinent radiology and labs.        Current Outpatient Medications   Medication Instructions    atorvastatin (LIPITOR) 20 mg, Oral, Nightly    calcium carbonate/vitamin D3 (CALCIUM 600 + D,3, ORAL) 1 tablet, Daily    empagliflozin (JARDIANCE) 10 mg, Oral, Daily    Lactobacillus acidophilus (PROBIOTIC ACIDOPHILUS ORAL) 1 capsule, Daily    losartan (COZAAR) 12.5 mg, Oral, Daily    metoprolol succinate (TOPROL-XL) 50 mg, Oral, Daily    omeprazole (PRILOSEC) 20 mg, Daily    vits A-C-E-B complx-min-lutein (LIPOTRIAD, WITH LUTEIN,) 5,000 unit- 120 mg-60 unit TbSR 1 tablet, Daily          Assessment & Plan     Aortic atherosclerosis  Continue risk factor modification.  Continue statin therapy.  Low-sodium heart healthy diet.    HFrEF (heart failure with reduced ejection fraction)  Improved.  Recent echocardiogram shows EF of 61%.  Euvolemic.  Continue metoprolol 50 mg daily, Jardiance 10 mg daily, and losartan 12.5 mg daily.  2 g salt restriction.  1.5 L fluid restriction.  Cardiac catheterization recently showed nonobstructive coronaries.    Nonspecific abnormal electrocardiogram (ECG) (EKG)  Recent hospitalization for further evaluation of abnormal EKG with T-wave inversion in inferior, anterior and lateral leads.  Cardiac catheterization was performed that showed nonobstructive coronaries and echocardiogram was repeated that showed EF of 61%.  Improvement  in takotsubo cardiomyopathy.  Continue GDMT.  Low-sodium heart healthy diet.  2 g salt restriction.  1.5 L fluid restriction.    Paroxysmal atrial fibrillation  Sinus rhythm today in office.  Not on anticoagulation.  Denies palpitations.          Follow up in about 3 months (around 1/24/2025).

## 2024-10-24 NOTE — ASSESSMENT & PLAN NOTE
Recent hospitalization for further evaluation of abnormal EKG with T-wave inversion in inferior, anterior and lateral leads.  Cardiac catheterization was performed that showed nonobstructive coronaries and echocardiogram was repeated that showed EF of 61%.  Improvement in takotsubo cardiomyopathy.  Continue GDMT.  Low-sodium heart healthy diet.  2 g salt restriction.  1.5 L fluid restriction.

## 2024-10-24 NOTE — ASSESSMENT & PLAN NOTE
Improved.  Recent echocardiogram shows EF of 61%.  Euvolemic.  Continue metoprolol 50 mg daily, Jardiance 10 mg daily, and losartan 12.5 mg daily.  2 g salt restriction.  1.5 L fluid restriction.  Cardiac catheterization recently showed nonobstructive coronaries.

## 2024-10-28 ENCOUNTER — PATIENT MESSAGE (OUTPATIENT)
Dept: CARDIOLOGY | Facility: CLINIC | Age: 69
End: 2024-10-28
Payer: MEDICARE

## 2024-11-11 RX ORDER — METOPROLOL SUCCINATE 50 MG/1
TABLET, EXTENDED RELEASE ORAL
Qty: 90 TABLET | Refills: 3 | Status: SHIPPED | OUTPATIENT
Start: 2024-11-11

## 2024-11-28 ENCOUNTER — PATIENT MESSAGE (OUTPATIENT)
Dept: CARDIOLOGY | Facility: CLINIC | Age: 69
End: 2024-11-28
Payer: MEDICARE

## 2024-11-29 ENCOUNTER — TELEPHONE (OUTPATIENT)
Dept: CARDIOLOGY | Facility: CLINIC | Age: 69
End: 2024-11-29
Payer: MEDICARE

## 2024-11-29 NOTE — TELEPHONE ENCOUNTER
Spoke with patient is back to taking 12.5 mg of losartan and full dose metoprolol full dosage 4 hours later blood pressure is much better. Pt is logging and will bring back in for follow up appt

## 2024-11-29 NOTE — TELEPHONE ENCOUNTER
----- Message from Rissa sent at 11/29/2024  9:21 AM CST -----  Contact:   Type: Needs Medical Advice  Who Called:  Lynn Kearns Call Back Number: [phone]  Additional Information: States she would like to speak with office regarding pt BP being elevated.Please call back   Patient here alone.   Patient  would like communication of their results via:      Cell Phone:   Telephone Information:   Mobile [phone]     Okay to leave a message containing results? Yes.     Patient (and/ or parents) gives verbal permission for their test results to be communicated to Self.

## 2024-12-10 ENCOUNTER — EXTERNAL HOME HEALTH (OUTPATIENT)
Dept: HOME HEALTH SERVICES | Facility: HOSPITAL | Age: 69
End: 2024-12-10
Payer: MEDICARE

## 2024-12-12 ENCOUNTER — TELEPHONE (OUTPATIENT)
Dept: FAMILY MEDICINE | Facility: CLINIC | Age: 69
End: 2024-12-12

## 2024-12-12 ENCOUNTER — OFFICE VISIT (OUTPATIENT)
Dept: FAMILY MEDICINE | Facility: CLINIC | Age: 69
End: 2024-12-12
Payer: MEDICARE

## 2024-12-12 VITALS
DIASTOLIC BLOOD PRESSURE: 92 MMHG | SYSTOLIC BLOOD PRESSURE: 158 MMHG | BODY MASS INDEX: 25.42 KG/M2 | HEART RATE: 101 BPM | WEIGHT: 134.63 LBS | OXYGEN SATURATION: 100 % | HEIGHT: 61 IN

## 2024-12-12 DIAGNOSIS — I10 HYPERTENSION, UNSPECIFIED TYPE: Primary | ICD-10-CM

## 2024-12-12 DIAGNOSIS — J06.9 VIRAL URI: ICD-10-CM

## 2024-12-12 DIAGNOSIS — F41.9 ANXIETY: ICD-10-CM

## 2024-12-12 PROCEDURE — 3288F FALL RISK ASSESSMENT DOCD: CPT | Mod: CPTII,S$GLB,, | Performed by: NURSE PRACTITIONER

## 2024-12-12 PROCEDURE — 99999 PR PBB SHADOW E&M-EST. PATIENT-LVL IV: CPT | Mod: PBBFAC,,, | Performed by: NURSE PRACTITIONER

## 2024-12-12 PROCEDURE — 3008F BODY MASS INDEX DOCD: CPT | Mod: CPTII,S$GLB,, | Performed by: NURSE PRACTITIONER

## 2024-12-12 PROCEDURE — 1159F MED LIST DOCD IN RCRD: CPT | Mod: CPTII,S$GLB,, | Performed by: NURSE PRACTITIONER

## 2024-12-12 PROCEDURE — 4010F ACE/ARB THERAPY RXD/TAKEN: CPT | Mod: CPTII,S$GLB,, | Performed by: NURSE PRACTITIONER

## 2024-12-12 PROCEDURE — 1101F PT FALLS ASSESS-DOCD LE1/YR: CPT | Mod: CPTII,S$GLB,, | Performed by: NURSE PRACTITIONER

## 2024-12-12 PROCEDURE — 99214 OFFICE O/P EST MOD 30 MIN: CPT | Mod: S$GLB,,, | Performed by: NURSE PRACTITIONER

## 2024-12-12 PROCEDURE — 3080F DIAST BP >= 90 MM HG: CPT | Mod: CPTII,S$GLB,, | Performed by: NURSE PRACTITIONER

## 2024-12-12 PROCEDURE — 3077F SYST BP >= 140 MM HG: CPT | Mod: CPTII,S$GLB,, | Performed by: NURSE PRACTITIONER

## 2024-12-12 RX ORDER — BUSPIRONE HYDROCHLORIDE 10 MG/1
10 TABLET ORAL 3 TIMES DAILY PRN
Qty: 90 TABLET | Refills: 0 | Status: SHIPPED | OUTPATIENT
Start: 2024-12-12 | End: 2025-12-12

## 2024-12-12 NOTE — PATIENT INSTRUCTIONS
Flonase or nasonex nasal spray. Morning.   Coricidin HBP  Claritin at night.   Fluids and rest.  Tylenol for fever, chills, malaise, body aches, or headache.

## 2024-12-12 NOTE — PROGRESS NOTES
This dictation has been generated using Modal Fluency Dictation some phonetic errors may occur. Please contact author for clarification if needed.     1. Hypertension, unspecified type    2. Anxiety  -     busPIRone (BUSPAR) 10 MG tablet; Take 1 tablet (10 mg total) by mouth 3 (three) times daily as needed (anxiety).  Dispense: 90 tablet; Refill: 0    3. Viral URI         Hypertension blood pressure elevated consider anxiety contributing .  Avoid salt  Anxiety buspirone as needed   Viral URI recommended over-the-counter therapies   Patient Instructions   Flonase or nasonex nasal spray. Morning.   Coricidin HBP  Claritin at night.   Fluids and rest.  Tylenol for fever, chills, malaise, body aches, or headache.      No follow-ups on file.    ________________________________________________________________  ________________________________________________________________      Chief Complaint   Patient presents with    Follow-up     Hosp f/u 9/14/24 10/01/24 HTN/Anxiety also sneezing, coughing and runny nose x4 days     History of present illness    This 69 y.o. presents today for complaint of  elevated blood pressure readings anxiety and recent onset of viral URI symptoms   Patient notes recent cardiac issues and visits with Cardiology.  She notes elevated blood pressure readings at home.  Denies any chest pain shortness a breath or headaches.  She had acute elevation of her blood pressure last night.  Notes not feeling well.  Symptoms started approximately 2 days ago with scratchy throat.  She has had some cough and congestion.  Denies any headaches or body aches.  No nausea vomiting diarrhea.  No sinus pain.   Feels like the anxiety is contributing to her elevated blood pressure.    Limited review of systems negative    Past Medical History:   Diagnosis Date    Age-related osteoporosis without current pathological fracture 3/4/2020    Based upon bone density showing osteoporosis both at hips and lumbar spine.   "Patient notified.  May consider bisphosphonates.       Past Surgical History:   Procedure Laterality Date    LEFT HEART CATHETERIZATION Left 10/11/2024    Procedure: Left heart cath;  Surgeon: Roman Javed MD;  Location: [facility] CATH/EP LAB;  Service: Cardiology;  Laterality: Left;    TONSILLECTOMY      WRIST SURGERY Right        Family History   Problem Relation Name Age of Onset    Diabetes Mother      Heart disease Mother      Cancer Mother      Breast cancer Mother      Cancer Father      Hyperlipidemia Father      Heart disease Father      Diabetes Father      Stroke Father         Social History     Socioeconomic History    Marital status:      Spouse name: Elias Hobbs    Number of children: 2   Occupational History    Occupation: Part Time realtor/   Tobacco Use    Smoking status: Former     Current packs/day: 0.00     Types: Cigarettes     Quit date: 1989     Years since quittin.9    Smokeless tobacco: Never   Substance and Sexual Activity    Alcohol use: Yes     Alcohol/week: 1.0 - 2.0 standard drink of alcohol     Types: 1 - 2 Shots of liquor per week     Comment: "Occasional"    Drug use: Not Currently    Sexual activity: Yes     Partners: Male   Social History Narrative    G 39, B35     Social Drivers of Health     Financial Resource Strain: Low Risk  (10/11/2024)    Overall Financial Resource Strain (CARDIA)     Difficulty of Paying Living Expenses: Not hard at all   Food Insecurity: No Food Insecurity (10/11/2024)    Hunger Vital Sign     Worried About Running Out of Food in the Last Year: Never true     Ran Out of Food in the Last Year: Never true   Transportation Needs: No Transportation Needs (10/11/2024)    TRANSPORTATION NEEDS     Transportation : No   Physical Activity: Inactive (10/11/2024)    Exercise Vital Sign     Days of Exercise per Week: 0 days     Minutes of Exercise per Session: 0 min   Stress: No Stress Concern Present (10/11/2024)    [facility] of " "Occupational Health - Occupational Stress Questionnaire     Feeling of Stress : Not at all   Housing Stability: Low Risk  (10/11/2024)    Housing Stability Vital Sign     Unable to Pay for Housing in the Last Year: No     Homeless in the Last Year: No       Current Outpatient Medications   Medication Sig Dispense Refill    atorvastatin (LIPITOR) 20 MG tablet Take 1 tablet (20 mg total) by mouth every evening. 90 tablet 3    calcium carbonate/vitamin D3 (CALCIUM 600 + D,3, ORAL) Take 1 tablet by mouth Daily.      empagliflozin (JARDIANCE) 10 mg tablet Take 1 tablet (10 mg total) by mouth once daily. 90 tablet 3    Lactobacillus acidophilus (PROBIOTIC ACIDOPHILUS ORAL) Take 1 capsule by mouth Daily.      losartan (COZAAR) 25 MG tablet Take 0.5 tablets (12.5 mg total) by mouth once daily. 45 tablet 3    metoprolol succinate (TOPROL-XL) 50 MG 24 hr tablet TAKE 1 TABLET(50 MG) BY MOUTH DAILY 90 tablet 3    omeprazole (PRILOSEC) 20 MG capsule Take 20 mg by mouth once daily.      busPIRone (BUSPAR) 10 MG tablet Take 1 tablet (10 mg total) by mouth 3 (three) times daily as needed (anxiety). 90 tablet 0    vits A-C-E-B complx-min-lutein (LIPOTRIAD, WITH LUTEIN,) 5,000 unit- 120 mg-60 unit TbSR Take 1 tablet by mouth once daily.       No current facility-administered medications for this visit.       Review of patient's allergies indicates:  No Known Allergies    Physical examination  Vitals Reviewed  BP (!) 158/92 (BP Location: Right arm, Patient Position: Sitting)   Pulse 101   Ht 5' 1" (1.549 m)   Wt 61.1 kg (134 lb 9.6 oz)   LMP 01/01/1942 (Approximate) Comment: Menopause early  SpO2 100%   BMI 25.43 kg/m²  Body mass index is 25.43 kg/m².     BP Readings from Last 3 Encounters:   12/12/24 (!) 158/92   10/24/24 (!) 140/80   10/21/24 130/80       Wt Readings from Last 3 Encounters:   12/12/24 61.1 kg (134 lb 9.6 oz)   10/24/24 59.5 kg (131 lb 2.8 oz)   10/21/24 59 kg (130 lb)     Gen. Well-dressed well-nourished "   Skin warm dry and intact.  No rashes noted.  HEENT.  TM intact bilateral with normal light reflex.  No mastoid tenderness during percussion.  Nares patent bilateral.  Pharynx is unremarkable except blotchy erythematous changes without exudate.  No maxillary or frontal sinus tenderness when percussed.    Neck is supple without adenopathy  Chest.  Respirations are even unlabored.  Lungs are clear to auscultation.  Cardiac regular rate and rhythm.  No chest wall adenopathy noted.  Neuro. Awake alert oriented x4.  Normal judgment and cognition noted.  Extremities no clubbing cyanosis or edema noted.     Call or return to clinic prn if these symptoms worsen or fail to improve as anticipated.

## 2024-12-26 ENCOUNTER — OFFICE VISIT (OUTPATIENT)
Dept: FAMILY MEDICINE | Facility: CLINIC | Age: 69
End: 2024-12-26
Payer: MEDICARE

## 2024-12-26 VITALS
HEIGHT: 61 IN | HEART RATE: 91 BPM | SYSTOLIC BLOOD PRESSURE: 136 MMHG | RESPIRATION RATE: 18 BRPM | TEMPERATURE: 99 F | WEIGHT: 133 LBS | DIASTOLIC BLOOD PRESSURE: 88 MMHG | OXYGEN SATURATION: 98 % | BODY MASS INDEX: 25.11 KG/M2

## 2024-12-26 DIAGNOSIS — I10 PRIMARY HYPERTENSION: ICD-10-CM

## 2024-12-26 DIAGNOSIS — I50.42 CHRONIC COMBINED SYSTOLIC AND DIASTOLIC HEART FAILURE: ICD-10-CM

## 2024-12-26 DIAGNOSIS — F41.9 ANXIETY: Primary | ICD-10-CM

## 2024-12-26 PROBLEM — G93.40 ACUTE ENCEPHALOPATHY: Status: RESOLVED | Noted: 2024-09-17 | Resolved: 2024-12-26

## 2024-12-26 PROBLEM — I50.20 HFREF (HEART FAILURE WITH REDUCED EJECTION FRACTION): Status: RESOLVED | Noted: 2024-09-19 | Resolved: 2024-12-26

## 2024-12-26 PROCEDURE — 1101F PT FALLS ASSESS-DOCD LE1/YR: CPT | Mod: HCNC,CPTII,S$GLB, | Performed by: FAMILY MEDICINE

## 2024-12-26 PROCEDURE — 3075F SYST BP GE 130 - 139MM HG: CPT | Mod: HCNC,CPTII,S$GLB, | Performed by: FAMILY MEDICINE

## 2024-12-26 PROCEDURE — 4010F ACE/ARB THERAPY RXD/TAKEN: CPT | Mod: HCNC,CPTII,S$GLB, | Performed by: FAMILY MEDICINE

## 2024-12-26 PROCEDURE — 99999 PR PBB SHADOW E&M-EST. PATIENT-LVL III: CPT | Mod: PBBFAC,HCNC,, | Performed by: FAMILY MEDICINE

## 2024-12-26 PROCEDURE — 1159F MED LIST DOCD IN RCRD: CPT | Mod: HCNC,CPTII,S$GLB, | Performed by: FAMILY MEDICINE

## 2024-12-26 PROCEDURE — 99213 OFFICE O/P EST LOW 20 MIN: CPT | Mod: HCNC,S$GLB,, | Performed by: FAMILY MEDICINE

## 2024-12-26 PROCEDURE — 3288F FALL RISK ASSESSMENT DOCD: CPT | Mod: HCNC,CPTII,S$GLB, | Performed by: FAMILY MEDICINE

## 2024-12-26 PROCEDURE — 1126F AMNT PAIN NOTED NONE PRSNT: CPT | Mod: HCNC,CPTII,S$GLB, | Performed by: FAMILY MEDICINE

## 2024-12-26 PROCEDURE — 3079F DIAST BP 80-89 MM HG: CPT | Mod: HCNC,CPTII,S$GLB, | Performed by: FAMILY MEDICINE

## 2024-12-26 PROCEDURE — 3008F BODY MASS INDEX DOCD: CPT | Mod: HCNC,CPTII,S$GLB, | Performed by: FAMILY MEDICINE

## 2024-12-26 RX ORDER — ESCITALOPRAM OXALATE 10 MG/1
10 TABLET ORAL DAILY
Qty: 90 TABLET | Refills: 3 | Status: SHIPPED | OUTPATIENT
Start: 2024-12-26 | End: 2025-12-26

## 2024-12-26 NOTE — PROGRESS NOTES
Subjective:       Patient ID: Masha Hobbs is a 69 y.o. female.    Chief Complaint: Hypertension    BP Readings from Last 3 Encounters:   12/26/24 136/88   12/12/24 (!) 158/92   10/24/24 (!) 140/80     Lab Results   Component Value Date    WBC 8.11 10/11/2024    HGB 12.3 10/11/2024    HCT 35.8 (L) 10/11/2024     10/11/2024    CHOL 178 10/11/2024    TRIG 61 10/11/2024    HDL 42 10/11/2024    ALT 23 10/11/2024    AST 14 10/11/2024     10/11/2024    K 4.4 10/11/2024     10/11/2024    CREATININE 0.9 10/11/2024    BUN 17 10/11/2024    CO2 26 10/11/2024    TSH 0.622 09/18/2024    INR 1.1 10/11/2024           History of Present Illness    CHIEF COMPLAINT:  Ms. Hobbs presents for follow-up on hypertension and reports developing anxiety related to checking her blood pressure.    HPI:  Ms. Hobbs reports developing anxiety issues specifically related to checking her blood pressure. Elevated readings on her blood pressure monitor cause further increases in her blood pressure. Home health care nurses have also observed this phenomenon. Ms. Hobbs feels anxious before taking her blood pressure, leading to higher readings. This anxiety has affected her daily life, causing her to become overly concerned about her diet and posture. Ms. Hobbs has never had anxiety issues before and has never been on anti-anxiety medication prior to this.    Due to this anxiety, the patient visited urgent care when her regular doctor was unavailable. The urgent care provider suggested it was more of an anxiety issue than a blood pressure issue. As a result, the patient was prescribed Buspirone by a nurse. It helped initially but then became ineffective.    To manage her anxiety, the patient stopped checking her blood pressure altogether for over a week. She felt calm when not checking her blood pressure and did not need the Buspirone during this time.    Ms. Hobbs's most recent blood pressure reading at home before stopping was higher  than 158/92, recorded on December 12th. Today's reading in the office was 136/88, which the patient considers good. She felt anxious about 1.5 hours before the appointment but felt the anxiety ease after seeing the good blood pressure result.    Ms. Hobbs reports having a severe cough 2 weeks ago when she visited urgent care. She still coughs a little bit, but it tested negative for COVID and flu. Her  developed the same cough after her.    Ms. Hobbs denies being anxious about anything other than her blood pressure. She also denies current atrial fibrillation and active heart failure.    MEDICATIONS:  Ms. Hobbs is on Losartan 25 mg, taking half a tablet twice daily (morning and evening) for hypertension. She is also on Metoprolol 50 mg for hypertension and anxiety-induced hypertension. She takes Buspirone as needed for anxiety, which takes about 2 hours to take effect.    MEDICAL HISTORY:  Ms. Hobbs has a history of hypertension. She previously experienced heart failure with slightly reduced EF and broken heart syndrome, which has since resolved. She also has a history of atrial fibrillation (resolved), plaque in the aorta, and meningitis.    SURGICAL HISTORY:  Ms. Hobbs has undergone an angiogram.    IMAGING:  She had an echocardiogram on October 11, which was completely normal. An angiogram was performed prior to October 11.    SOCIAL HISTORY:  Ms. Hobbs has a  history, having served in the [employer], which was mentioned as her longest period of exploitation.      ROS:  Respiratory: +cough  Cardiovascular: -palpitations  Psychiatric: +anxiety          Allergies and Medications:   Review of patient's allergies indicates:  No Known Allergies  Current Outpatient Medications   Medication Sig Dispense Refill    atorvastatin (LIPITOR) 20 MG tablet Take 1 tablet (20 mg total) by mouth every evening. 90 tablet 3    busPIRone (BUSPAR) 10 MG tablet Take 1 tablet (10 mg total) by mouth 3 (three) times daily as needed  "(anxiety). 90 tablet 0    calcium carbonate/vitamin D3 (CALCIUM 600 + D,3, ORAL) Take 1 tablet by mouth Daily.      empagliflozin (JARDIANCE) 10 mg tablet Take 1 tablet (10 mg total) by mouth once daily. 90 tablet 3    Lactobacillus acidophilus (PROBIOTIC ACIDOPHILUS ORAL) Take 1 capsule by mouth Daily.      losartan (COZAAR) 25 MG tablet Take 0.5 tablets (12.5 mg total) by mouth once daily. 45 tablet 3    metoprolol succinate (TOPROL-XL) 50 MG 24 hr tablet TAKE 1 TABLET(50 MG) BY MOUTH DAILY 90 tablet 3    omeprazole (PRILOSEC) 20 MG capsule Take 20 mg by mouth once daily.      EScitalopram oxalate (LEXAPRO) 10 MG tablet Take 1 tablet (10 mg total) by mouth once daily. 90 tablet 3     No current facility-administered medications for this visit.       Family History:   Family History   Problem Relation Name Age of Onset    Diabetes Mother      Heart disease Mother      Cancer Mother      Breast cancer Mother      Cancer Father      Hyperlipidemia Father      Heart disease Father      Diabetes Father      Stroke Father         Social History:   Social History     Socioeconomic History    Marital status:      Spouse name: Elias Hobbs    Number of children: 2   Occupational History    Occupation: Part Time realtor/   Tobacco Use    Smoking status: Former     Current packs/day: 0.00     Types: Cigarettes     Quit date: 1989     Years since quittin.0    Smokeless tobacco: Never   Substance and Sexual Activity    Alcohol use: Yes     Alcohol/week: 1.0 - 2.0 standard drink of alcohol     Types: 1 - 2 Shots of liquor per week     Comment: "Occasional"    Drug use: Not Currently    Sexual activity: Yes     Partners: Male   Social History Narrative    G 39, B34     Social Drivers of Health     Financial Resource Strain: Low Risk  (10/11/2024)    Overall Financial Resource Strain (CARDIA)     Difficulty of Paying Living Expenses: Not hard at all   Food Insecurity: No Food Insecurity (10/11/2024)    Hunger " Vital Sign     Worried About Running Out of Food in the Last Year: Never true     Ran Out of Food in the Last Year: Never true   Transportation Needs: No Transportation Needs (10/11/2024)    TRANSPORTATION NEEDS     Transportation : No   Physical Activity: Inactive (10/11/2024)    Exercise Vital Sign     Days of Exercise per Week: 0 days     Minutes of Exercise per Session: 0 min   Stress: No Stress Concern Present (10/11/2024)    [employer] - Occupational Stress Questionnaire     Feeling of Stress : Not at all   Housing Stability: Low Risk  (10/11/2024)    Housing Stability Vital Sign     Unable to Pay for Housing in the Last Year: No     Homeless in the Last Year: No           Objective:     Vitals:    12/26/24 1045   BP: 136/88   Pulse: 91   Resp: 18   Temp: 98.6 °F (37 °C)        Physical Exam    Physical Exam    Assessment:       1. Anxiety    2. Chronic combined systolic and diastolic heart failure    3. Primary hypertension         Plan:       Anxiety  -     EScitalopram oxalate (LEXAPRO) 10 MG tablet; Take 1 tablet (10 mg total) by mouth once daily.  Dispense: 90 tablet; Refill: 3  -     Hypertension Digital Medicine (HDMP) Enrollment Order    Chronic combined systolic and diastolic heart failure    Primary hypertension  -     Hypertension Digital Medicine (HDMP) Enrollment Order      Follow up in about 1 month (around 1/26/2025) for follow up.          Vitals: Blood pressure: 136/88.  General: No acute distress. Well-developed. Well-nourished.  Eyes: EOMI. Sclerae anicteric.  HENT: Normocephalic. Atraumatic. Nares patent. Moist oral mucosa.  Cardiovascular: Regular rate. Regular rhythm. No murmurs. No rubs. No gallops. Normal S1, S2. Capillary refill is adequate. Dorsalis pedis pulse is symmetrical bilaterally.  Respiratory: Normal respiratory effort. Clear to auscultation bilaterally. No rales. No rhonchi. No wheezing.  Musculoskeletal: No  obvious deformity.  Extremities: No  lower extremity edema.  Neurological: Alert & oriented x3. No slurred speech. Normal gait.  Psychiatric: Normal mood. Normal affect. Good insight. Good judgment.  Skin: Warm. Dry. No rash.  Neck: No thyromegaly on palpation.            Assessment:       1. Anxiety    2. Chronic combined systolic and diastolic heart failure    3. Primary hypertension        Plan:       Assessment & Plan    IMPRESSION:  - Assessed patient's anxiety related to blood pressure monitoring and its impact on hypertension management  - Evaluated current antihypertensive regimen, determining Losartan and Metoprolol doses are appropriate given today's blood pressure reading  - Reviewed patient's cardiac status, noting resolution of previous heart failure diagnosis based on October echo results  - Considered switching from as-needed Buspirone to daily Lexapro for anxiety management, aiming to reduce blood pressure spikes    HYPERTENSION:  - Continued Losartan 25mg, taken as half tablet 2 times daily (morning and evening).  - Continued Metoprolol 50mg.  - Ms. Hobbs to resume regular blood pressure monitoring, 2 times daily (morning and afternoon).  - Explained the relationship between anxiety and blood pressure elevation.    ANXIETY DISORDER:  - Discussed the mechanism of action for SSRIs like Lexapro in managing anxiety.  - Provided information on expected side effects and onset of action for Lexapro.  - Started Lexapro daily for anxiety management.  - Discontinued regular use of Buspirone, may use as needed for acute anxiety.  - Recommend practicing deep breathing techniques before taking blood pressure readings to manage anxiety.  - Educated on the importance of consistent blood pressure monitoring to reduce anxiety associated with measurements.    FOLLOW-UP:  - Follow up in 1 month to assess response to Lexapro and blood pressure management.        Masha was seen today for hypertension.    Diagnoses and all orders for this  visit:    Anxiety  -     EScitalopram oxalate (LEXAPRO) 10 MG tablet; Take 1 tablet (10 mg total) by mouth once daily.  -     Hypertension Digital Medicine (HDMP) Enrollment Order    Chronic combined systolic and diastolic heart failure    Primary hypertension  -     Hypertension Digital Medicine (HDMP) Enrollment Order         Follow up in about 1 month (around 1/26/2025) for follow up.  This note was generated with the assistance of ambient listening technology. Verbal consent was obtained by the patient and accompanying visitor(s) for the recording of patient appointment to facilitate this note. I attest to having reviewed and edited the generated note for accuracy, though some syntax or spelling errors may persist. Please contact the author of this note for any clarification.

## 2025-01-08 ENCOUNTER — OFFICE VISIT (OUTPATIENT)
Dept: CARDIOLOGY | Facility: CLINIC | Age: 70
End: 2025-01-08
Payer: MEDICARE

## 2025-01-08 VITALS
HEART RATE: 74 BPM | BODY MASS INDEX: 25.74 KG/M2 | DIASTOLIC BLOOD PRESSURE: 70 MMHG | WEIGHT: 136.25 LBS | OXYGEN SATURATION: 100 % | SYSTOLIC BLOOD PRESSURE: 140 MMHG

## 2025-01-08 DIAGNOSIS — I70.0 AORTIC ATHEROSCLEROSIS: ICD-10-CM

## 2025-01-08 DIAGNOSIS — I51.81 BROKEN HEART SYNDROME: Primary | ICD-10-CM

## 2025-01-08 DIAGNOSIS — I48.0 PAROXYSMAL ATRIAL FIBRILLATION: ICD-10-CM

## 2025-01-08 DIAGNOSIS — R94.31 NONSPECIFIC ABNORMAL ELECTROCARDIOGRAM (ECG) (EKG): ICD-10-CM

## 2025-01-08 DIAGNOSIS — I10 PRIMARY HYPERTENSION: ICD-10-CM

## 2025-01-08 PROCEDURE — 99999 PR PBB SHADOW E&M-EST. PATIENT-LVL III: CPT | Mod: PBBFAC,HCNC,,

## 2025-01-08 PROCEDURE — 3008F BODY MASS INDEX DOCD: CPT | Mod: HCNC,CPTII,S$GLB,

## 2025-01-08 PROCEDURE — 99214 OFFICE O/P EST MOD 30 MIN: CPT | Mod: HCNC,S$GLB,,

## 2025-01-08 PROCEDURE — 3078F DIAST BP <80 MM HG: CPT | Mod: HCNC,CPTII,S$GLB,

## 2025-01-08 PROCEDURE — 3077F SYST BP >= 140 MM HG: CPT | Mod: HCNC,CPTII,S$GLB,

## 2025-01-08 PROCEDURE — 1160F RVW MEDS BY RX/DR IN RCRD: CPT | Mod: HCNC,CPTII,S$GLB,

## 2025-01-08 PROCEDURE — 1126F AMNT PAIN NOTED NONE PRSNT: CPT | Mod: HCNC,CPTII,S$GLB,

## 2025-01-08 PROCEDURE — 1159F MED LIST DOCD IN RCRD: CPT | Mod: HCNC,CPTII,S$GLB,

## 2025-01-08 RX ORDER — FOLIC ACID 0.8 MG
800 TABLET ORAL DAILY
COMMUNITY

## 2025-01-08 RX ORDER — CYANOCOBALAMIN (VITAMIN B-12) 50 MCG
LOZENGE ORAL
COMMUNITY

## 2025-01-08 RX ORDER — LANOLIN ALCOHOL/MO/W.PET/CERES
400 CREAM (GRAM) TOPICAL DAILY
Qty: 90 TABLET | Refills: 3 | Status: SHIPPED | OUTPATIENT
Start: 2025-01-08

## 2025-01-08 RX ORDER — OMEPRAZOLE 20 MG/1
20 CAPSULE, DELAYED RELEASE ORAL DAILY
Qty: 90 CAPSULE | Refills: 3 | Status: SHIPPED | OUTPATIENT
Start: 2025-01-08

## 2025-01-08 NOTE — ASSESSMENT & PLAN NOTE
Resolved.  Last echocardiogram showed normal systolic function.  Denies chest pain or anginal equivalent symptoms.  Euvolemic today in office.  Her takotsubo cardiomyopathy had resolved prior to starting Jardiance.  We will discontinue Jardiance for now.  Continue current antihypertensive regimen, losartan 12.5 mg b.i.d. and metoprolol succinate 50 mg daily.  Low-sodium heart healthy diet.

## 2025-01-08 NOTE — ASSESSMENT & PLAN NOTE
Denies anginal equivalent symptoms.  Last echocardiogram showed normal systolic function.  Euvolemic today in office.

## 2025-01-08 NOTE — ASSESSMENT & PLAN NOTE
Blood pressure is 140/70 today in office.  Patient has blood pressure log from home with her.  She does have anxiety when checking her blood pressure but it is overall well-controlled.  Continue current antihypertensive regimen.  Low-sodium heart healthy diet.

## 2025-01-08 NOTE — PROGRESS NOTES
Subjective:    Patient ID:  Masha Hobbs is a 69 y.o. female patient here for evaluation Hypertension (Follow up 3 months )      History of Present Illness:       Mrs. Hobbs is here today for a checkup.  Denies chest pain, shortness of breath, lightheadedness, dizziness, jaw/neck/arm pain, palpitations, orthopnea, PND, edema, or bleeding.     Blood pressure is 140/70.  She developed a lot of anxiety related to checking her blood pressure at home around Thanksgiving time.  She will become stressed and worried that it would be elevated and therefore her blood pressure would increase.  She was started on BuSpar 10 mg t.i.d. by PCP and has noticed some improvement in her blood pressure.  She was also started on Lexapro but has not started this medication as she does not feel she has any depression and her anxiety is much improved.  She has not taken buspirone in approximately 2 weeks.  Euvolemic.  She has completely cut out caffeine.  She still has difficulty sleeping.  She is taking losartan 12.5 mg q.a.m. and p.m. and metoprolol succinate 50 mg daily.  She typically gets her omeprazole over-the-counter med takes this daily as well.        Review of patient's allergies indicates:  No Known Allergies    Past Medical History:   Diagnosis Date    Acute encephalopathy 09/17/2024    Age-related osteoporosis without current pathological fracture 03/04/2020    Based upon bone density showing osteoporosis both at hips and lumbar spine.  Patient notified.  May consider bisphosphonates.    Chronic combined systolic and diastolic heart failure 10/10/2024    HFrEF (heart failure with reduced ejection fraction) 09/19/2024     Past Surgical History:   Procedure Laterality Date    LEFT HEART CATHETERIZATION Left 10/11/2024    Procedure: Left heart cath;  Surgeon: Roman Javed MD;  [facility] CATH[facility]EP LAB;  Service: Cardiology;  Laterality: Left;    TONSILLECTOMY      WRIST SURGERY Right 2021     Social History     Tobacco Use  "   Smoking status: Former     Current packs/day: 0.00     Types: Cigarettes     Quit date: 1989     Years since quittin.0    Smokeless tobacco: Never   Substance Use Topics    Alcohol use: Yes     Alcohol/week: 1.0 - 2.0 standard drink of alcohol     Types: 1 - 2 Shots of liquor per week     Comment: "Occasional"    Drug use: Not Currently        Review of Systems:    As noted in HPI in addition      REVIEW OF SYSTEMS  CARDIOVASCULAR: No recent chest pain, palpitations, arm, neck, or jaw pain  RESPIRATORY: No recent fever, cough chills, SOB or congestion  : No blood in the urine  GI: No Nausea, vomiting, constipation, diarrhea, blood, or reflux.  MUSCULOSKELETAL: No myalgias  NEURO: No lightheadedness or dizziness  EYES: No Double vision, blurry, vision or headache              Objective        Vitals:    25 1406   BP: (!) 140/70   Pulse: 74       LIPIDS - LAST 2   Lab Results   Component Value Date    CHOL 178 10/11/2024    CHOL 215 (H) 2020    HDL 42 10/11/2024    HDL 70 2020    LDLCALC 123.8 10/11/2024    LDLCALC 130.8 2020    TRIG 61 10/11/2024    TRIG 71 2020    CHOLHDL 23.6 10/11/2024    CHOLHDL 32.6 2020       CBC - LAST 2  Lab Results   Component Value Date    WBC 8.11 10/11/2024    WBC 8.01 10/10/2024    RBC 3.77 (L) 10/11/2024    RBC 4.10 10/10/2024    HGB 12.3 10/11/2024    HGB 13.4 10/10/2024    HCT 35.8 (L) 10/11/2024    HCT 39.1 10/10/2024    MCV 95 10/11/2024    MCV 95 10/10/2024    MCH 32.6 (H) 10/11/2024    MCH 32.7 (H) 10/10/2024    MCHC 34.4 10/11/2024    MCHC 34.3 10/10/2024    RDW 13.2 10/11/2024    RDW 13.1 10/10/2024     10/11/2024     10/10/2024    MPV 8.8 (L) 10/11/2024    MPV 9.0 (L) 10/10/2024    GRAN 4.7 10/11/2024    GRAN 57.8 10/11/2024    LYMPH 2.5 10/11/2024    LYMPH 31.3 10/11/2024    MONO 0.6 10/11/2024    MONO 7.0 10/11/2024    BASO 0.08 10/11/2024    BASO 0.06 10/10/2024    NRBC 0 10/11/2024    NRBC 0 10/10/2024 "       CHEMISTRY & LIVER FUNCTION - LAST 2  Lab Results   Component Value Date     10/11/2024     10/10/2024    K 4.4 10/11/2024    K 3.9 10/10/2024     10/11/2024     10/10/2024    CO2 26 10/11/2024    CO2 23 10/10/2024    ANIONGAP 6 (L) 10/11/2024    ANIONGAP 11 10/10/2024    BUN 17 10/11/2024    BUN 20 10/10/2024    CREATININE 0.9 10/11/2024    CREATININE 0.8 10/10/2024    GLU 92 10/11/2024    GLU 98 10/10/2024    CALCIUM 9.7 10/11/2024    CALCIUM 9.9 10/10/2024    MG 1.9 10/11/2024    MG 1.9 09/29/2024    ALBUMIN 3.7 10/11/2024    ALBUMIN 2.4 (L) 09/28/2024    PROT 7.5 10/11/2024    PROT 6.0 09/28/2024    ALKPHOS 65 10/11/2024    ALKPHOS 54 (L) 09/28/2024    ALT 23 10/11/2024    ALT 82 (H) 09/28/2024    AST 14 10/11/2024    AST 39 09/28/2024    BILITOT 0.6 10/11/2024    BILITOT 0.3 09/28/2024        CARDIAC PROFILE - LAST 2  Lab Results   Component Value Date    TROPONINIHS 15.3 (H) 10/10/2024    TROPONINIHS 11.0 10/10/2024        COAGULATION - LAST 2  Lab Results   Component Value Date    INR 1.1 10/11/2024    INR 1.0 06/06/2024    APTT 27.6 10/11/2024       ENDOCRINE & PSA - LAST 2  Lab Results   Component Value Date    TSH 0.622 09/18/2024    PROCAL 0.06 09/24/2024    PROCAL 0.06 09/23/2024        ECHOCARDIOGRAM RESULTS  Results for orders placed during the hospital encounter of 10/10/24    Echo    Interpretation Summary    Left Ventricle: The left ventricle is normal in size. Mildly increased wall thickness. There is mild concentric hypertrophy. There is normal systolic function. Ejection fraction is approximately 61%.    Right Ventricle: Normal right ventricular cavity size. Wall thickness is normal. Systolic function is normal.    A limited echo was performed using limited 2D      CURRENT/PREVIOUS VISIT EKG  Results for orders placed or performed in visit on 10/10/24   IN OFFICE EKG 12-LEAD (to [place])    Collection Time: 10/10/24 10:59 AM   Result Value Ref Range    QRS Duration 88 ms     OHS QTC Calculation 422 ms    Narrative    Test Reason : I50.20,I48.0,    Vent. Rate : 102 BPM     Atrial Rate : 102 BPM     P-R Int : 260 ms          QRS Dur :  88 ms      QT Int : 324 ms       P-R-T Axes :  55 204 221 degrees    QTcB Int : 422 ms    Sinus tachycardia with 1st degree A-V block  Right atrial enlargement  Right superior axis deviation  Pulmonary disease pattern  T wave abnormality, consider inferior ischemia  T wave abnormality, consider anterolateral ischemia  Abnormal ECG  When compared with ECG of 10-OCT-2024 09:56,  QT has shortened  Confirmed by Valentino Mathew ([id number]) on 11/18/2024 12:57:34 PM    Referred By:            Confirmed By: Valentino Mathew     No valid procedures specified.   No results found for this or any previous visit.    No valid procedures specified.    PHYSICAL EXAM  CONSTITUTIONAL: Well built, well nourished in no apparent distress  NECK: no carotid bruit, no JVD  LUNGS: CTA  CHEST WALL: no tenderness  HEART: regular rate and rhythm, S1, S2 normal, no murmur, click, rub or gallop   ABDOMEN: soft, non-tender; bowel sounds normal; no masses,  no organomegaly  EXTREMITIES: Extremities normal, no edema, no calf tenderness noted  NEURO: AAO X 3    I HAVE REVIEWED :    The vital signs, nurses notes, and all the pertinent radiology and labs.        Current Outpatient Medications   Medication Instructions    atorvastatin (LIPITOR) 20 mg, Oral, Nightly    busPIRone (BUSPAR) 10 mg, Oral, 3 times daily PRN    calcium carbonate/vitamin D3 (CALCIUM 600 + D,3, ORAL) 1 tablet, Daily    cyanocobalamin, vitamin B-12, (VITAMIN B-12) 50 mcg Lozg Take by mouth.    folic acid (FOLVITE) 800 mcg, Daily    Lactobacillus acidophilus (PROBIOTIC ACIDOPHILUS ORAL) 1 capsule, Daily    losartan (COZAAR) 12.5 mg, Oral, Daily    magnesium oxide (MAG-OX) 400 mg, Oral, Daily    metoprolol succinate (TOPROL-XL) 50 MG 24 hr tablet TAKE 1 TABLET(50 MG) BY MOUTH DAILY    omeprazole (PRILOSEC) 20 mg, Oral, Daily           Assessment & Plan     Broken heart syndrome  Resolved.  Last echocardiogram showed normal systolic function.  Denies chest pain or anginal equivalent symptoms.  Euvolemic today in office.  Her takotsubo cardiomyopathy had resolved prior to starting Jardiance.  We will discontinue Jardiance for now.  Continue current antihypertensive regimen, losartan 12.5 mg b.i.d. and metoprolol succinate 50 mg daily.  Low-sodium heart healthy diet.    Aortic atherosclerosis  Continue risk factor modification.  Continue statin therapy.  Low-sodium heart healthy diet.       Primary hypertension  Blood pressure is 140/70 today in office.  Patient has blood pressure log from home with her.  She does have anxiety when checking her blood pressure but it is overall well-controlled.  Continue current antihypertensive regimen.  Low-sodium heart healthy diet.    Paroxysmal atrial fibrillation  Sinus rhythm.  No further PAF.  Denies palpitations.  Start magnesium oxide 400 mg daily    Nonspecific abnormal electrocardiogram (ECG) (EKG)  Denies anginal equivalent symptoms.  Last echocardiogram showed normal systolic function.  Euvolemic today in office.    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      Follow up in about 6 months (around 7/8/2025).

## 2025-01-14 ENCOUNTER — PATIENT MESSAGE (OUTPATIENT)
Dept: ADMINISTRATIVE | Facility: HOSPITAL | Age: 70
End: 2025-01-14
Payer: MEDICARE

## 2025-02-07 RX ORDER — LOSARTAN POTASSIUM 25 MG/1
12.5 TABLET ORAL DAILY
Qty: 45 TABLET | Refills: 3 | Status: SHIPPED | OUTPATIENT
Start: 2025-02-07 | End: 2026-02-07

## 2025-02-07 NOTE — TELEPHONE ENCOUNTER
----- Message from Alice sent at 2/7/2025 10:09 AM CST -----  Regarding: refill  Contact: patient  Type:  RX Refill Request    Who Called:  patient  Refill or New Rx:  refill  RX Name and Strength:  losartan (COZAAR) 25 MG tablet  How is the patient currently taking it? (ex. 1XDay):  1/2 in am 1/2 in pm  Is this a 30 day or 90 day RX:  90  Preferred Pharmacy with phone number:    [id number] [address] & [address] [address]  Phone: [phone] Fax: [phone]      Local or Mail Order:  local  Ordering Provider:  Christelle Herring  Best Call Back Number:  [phone] (home)     Additional Information:  patient is out of medication.  Please call patient to advise.  Thanks!

## 2025-02-21 ENCOUNTER — PATIENT OUTREACH (OUTPATIENT)
Dept: ADMINISTRATIVE | Facility: HOSPITAL | Age: 70
End: 2025-02-21
Payer: MEDICARE

## 2025-02-21 ENCOUNTER — PATIENT MESSAGE (OUTPATIENT)
Dept: ADMINISTRATIVE | Facility: HOSPITAL | Age: 70
End: 2025-02-21
Payer: MEDICARE

## 2025-02-21 VITALS — DIASTOLIC BLOOD PRESSURE: 69 MMHG | SYSTOLIC BLOOD PRESSURE: 122 MMHG

## 2025-02-24 DIAGNOSIS — Z00.00 ENCOUNTER FOR MEDICARE ANNUAL WELLNESS EXAM: ICD-10-CM

## 2025-02-25 ENCOUNTER — PATIENT MESSAGE (OUTPATIENT)
Dept: ADMINISTRATIVE | Facility: HOSPITAL | Age: 70
End: 2025-02-25
Payer: MEDICARE

## 2025-03-24 ENCOUNTER — PATIENT MESSAGE (OUTPATIENT)
Dept: FAMILY MEDICINE | Facility: CLINIC | Age: 70
End: 2025-03-24
Payer: MEDICARE

## 2025-07-08 ENCOUNTER — OFFICE VISIT (OUTPATIENT)
Dept: CARDIOLOGY | Facility: CLINIC | Age: 70
End: 2025-07-08
Payer: MEDICARE

## 2025-07-08 VITALS
OXYGEN SATURATION: 95 % | HEIGHT: 61 IN | DIASTOLIC BLOOD PRESSURE: 89 MMHG | BODY MASS INDEX: 27.45 KG/M2 | SYSTOLIC BLOOD PRESSURE: 148 MMHG | HEART RATE: 100 BPM | WEIGHT: 145.38 LBS

## 2025-07-08 DIAGNOSIS — I10 PRIMARY HYPERTENSION: ICD-10-CM

## 2025-07-08 DIAGNOSIS — I70.0 AORTIC ATHEROSCLEROSIS: Primary | ICD-10-CM

## 2025-07-08 DIAGNOSIS — I51.81 BROKEN HEART SYNDROME: ICD-10-CM

## 2025-07-08 PROCEDURE — 3077F SYST BP >= 140 MM HG: CPT | Mod: CPTII,HCNC,S$GLB,

## 2025-07-08 PROCEDURE — 3008F BODY MASS INDEX DOCD: CPT | Mod: CPTII,HCNC,S$GLB,

## 2025-07-08 PROCEDURE — 3288F FALL RISK ASSESSMENT DOCD: CPT | Mod: CPTII,HCNC,S$GLB,

## 2025-07-08 PROCEDURE — 99214 OFFICE O/P EST MOD 30 MIN: CPT | Mod: HCNC,S$GLB,,

## 2025-07-08 PROCEDURE — 1159F MED LIST DOCD IN RCRD: CPT | Mod: CPTII,HCNC,S$GLB,

## 2025-07-08 PROCEDURE — 1101F PT FALLS ASSESS-DOCD LE1/YR: CPT | Mod: CPTII,HCNC,S$GLB,

## 2025-07-08 PROCEDURE — 4010F ACE/ARB THERAPY RXD/TAKEN: CPT | Mod: CPTII,HCNC,S$GLB,

## 2025-07-08 PROCEDURE — 3079F DIAST BP 80-89 MM HG: CPT | Mod: CPTII,HCNC,S$GLB,

## 2025-07-08 PROCEDURE — 1125F AMNT PAIN NOTED PAIN PRSNT: CPT | Mod: CPTII,HCNC,S$GLB,

## 2025-07-08 PROCEDURE — 1160F RVW MEDS BY RX/DR IN RCRD: CPT | Mod: CPTII,HCNC,S$GLB,

## 2025-07-08 PROCEDURE — 99999 PR PBB SHADOW E&M-EST. PATIENT-LVL IV: CPT | Mod: PBBFAC,HCNC,,

## 2025-07-08 RX ORDER — LOSARTAN POTASSIUM 25 MG/1
25 TABLET ORAL DAILY
Qty: 90 TABLET | Refills: 3 | Status: SHIPPED | OUTPATIENT
Start: 2025-07-08 | End: 2026-07-08

## 2025-07-08 NOTE — PROGRESS NOTES
Subjective:    Patient ID:  Masha Hobbs is a 69 y.o. female patient here for evaluation Follow-up (6 month f/u  no issue stated by pt )      History of Present Illness    CHIEF COMPLAINT:  Patient presents today for follow up of cardiac condition and medication management.    CARDIOVASCULAR:  She reports variable heart rate ranging from  BPM. During treadmill exercise, her heart rate typically remains between 92-96 BPM. She occasionally experiences palpitations described as racing but denies other significant symptoms. Her morning BP was higher than usual. She actively monitors BP, though notes previous challenges with consistent measurement.    EXERCISE AND WEIGHT MANAGEMENT:  She walks 1 mile on the treadmill and performs light weight training at home using three-pound weights. Exercise was recently limited due to a back injury but she feels ready to resume her routine. She reports significant weight gain since discontinuing Jardiance in January and expresses frustration with difficulty losing weight. She maintains a healthy diet, avoiding restricted foods and choosing Atkins and Keto-friendly snacks for sweet cravings.    MEDICATIONS:  She takes losartan 25mg (split half morning and half evening), berberine twice daily. She has reduced caffeine intake, now consuming half-caffeinated beverages and caffeine-free diet soda.        ROS:  General: -fever, -chills, -fatigue, +weight gain, -weight loss  Cardiovascular: -chest pain, -palpitations, -lower extremity edema, +feelings of slow heart rate  Respiratory: -cough, -shortness of breath  Gastrointestinal: -abdominal pain, -nausea, -vomiting, -diarrhea, -constipation, -blood in stool  Genitourinary: -dysuria, -hematuria, -frequency  Musculoskeletal: -joint pain, -muscle pain, +back pain  Skin: -rash, -lesion  Neurological: -headache, -dizziness, -numbness, -tingling  Psychiatric: +anxiety, -depression, -sleep difficulty                    Review of  patient's allergies indicates:  No Known Allergies    Past Medical History:   Diagnosis Date    Acute encephalopathy 09/17/2024    Age-related osteoporosis without current pathological fracture 03/04/2020    Based upon bone density showing osteoporosis both at hips and lumbar spine.  Patient notified.  May consider bisphosphonates.    Chronic combined systolic and diastolic heart failure 10/10/2024    HFrEF (heart failure with reduced ejection fraction) 09/19/2024     Past Surgical History:   Procedure Laterality Date    LEFT HEART CATHETERIZATION Left 10/11/2024    Procedure: Left heart cath;  Surgeon: Roman Javed MD;  Location: [facility] CATH/EP LAB;  Service: Cardiology;  Laterality: Left;    TONSILLECTOMY      WRIST SURGERY Right 2021     Social History[1]                Objective        Vitals:    07/08/25 1435   BP: (!) 148/89   Pulse: 100       LIPIDS - LAST 2   Lab Results   Component Value Date    CHOL 178 10/11/2024    CHOL 215 (H) 03/02/2020    HDL 42 10/11/2024    HDL 70 03/02/2020    LDLCALC 123.8 10/11/2024    LDLCALC 130.8 03/02/2020    TRIG 61 10/11/2024    TRIG 71 03/02/2020    CHOLHDL 23.6 10/11/2024    CHOLHDL 32.6 03/02/2020       CBC - LAST 2  Lab Results   Component Value Date    WBC 8.11 10/11/2024    WBC 8.01 10/10/2024    RBC 3.77 (L) 10/11/2024    RBC 4.10 10/10/2024    HGB 12.3 10/11/2024    HGB 13.4 10/10/2024    HCT 35.8 (L) 10/11/2024    HCT 39.1 10/10/2024    MCV 95 10/11/2024    MCV 95 10/10/2024    MCH 32.6 (H) 10/11/2024    MCH 32.7 (H) 10/10/2024    MCHC 34.4 10/11/2024    MCHC 34.3 10/10/2024    RDW 13.2 10/11/2024    RDW 13.1 10/10/2024     10/11/2024     10/10/2024    MPV 8.8 (L) 10/11/2024    MPV 9.0 (L) 10/10/2024    GRAN 4.7 10/11/2024    GRAN 57.8 10/11/2024    LYMPH 2.5 10/11/2024    LYMPH 31.3 10/11/2024    MONO 0.6 10/11/2024    MONO 7.0 10/11/2024    BASO 0.08 10/11/2024    BASO 0.06 10/10/2024    NRBC 0 10/11/2024    NRBC 0 10/10/2024       CHEMISTRY &  LIVER FUNCTION - LAST 2  Lab Results   Component Value Date     10/11/2024     10/10/2024    K 4.4 10/11/2024    K 3.9 10/10/2024     10/11/2024     10/10/2024    CO2 26 10/11/2024    CO2 23 10/10/2024    ANIONGAP 6 (L) 10/11/2024    ANIONGAP 11 10/10/2024    BUN 17 10/11/2024    BUN 20 10/10/2024    CREATININE 0.9 10/11/2024    CREATININE 0.8 10/10/2024    GLU 92 10/11/2024    GLU 98 10/10/2024    CALCIUM 9.7 10/11/2024    CALCIUM 9.9 10/10/2024    MG 1.9 10/11/2024    MG 1.9 09/29/2024    ALBUMIN 3.7 10/11/2024    ALBUMIN 2.4 (L) 09/28/2024    PROT 7.5 10/11/2024    PROT 6.0 09/28/2024    ALKPHOS 65 10/11/2024    ALKPHOS 54 (L) 09/28/2024    ALT 23 10/11/2024    ALT 82 (H) 09/28/2024    AST 14 10/11/2024    AST 39 09/28/2024    BILITOT 0.6 10/11/2024    BILITOT 0.3 09/28/2024        CARDIAC PROFILE - LAST 2  Lab Results   Component Value Date    TROPONINIHS 15.3 (H) 10/10/2024    TROPONINIHS 11.0 10/10/2024        COAGULATION - LAST 2  Lab Results   Component Value Date    INR 1.1 10/11/2024    INR 1.0 06/06/2024    APTT 27.6 10/11/2024       ENDOCRINE & PSA - LAST 2  Lab Results   Component Value Date    TSH 0.622 09/18/2024    PROCAL 0.06 09/24/2024    PROCAL 0.06 09/23/2024        ECHOCARDIOGRAM RESULTS  Results for orders placed during the hospital encounter of 10/10/24    Echo    Interpretation Summary    Left Ventricle: The left ventricle is normal in size. Mildly increased wall thickness. There is mild concentric hypertrophy. There is normal systolic function. Ejection fraction is approximately 61%.    Right Ventricle: Normal right ventricular cavity size. Wall thickness is normal. Systolic function is normal.    A limited echo was performed using limited 2D      CURRENT/PREVIOUS VISIT EKG  Results for orders placed or performed in visit on 10/10/24   IN OFFICE EKG 12-LEAD (to [place])    Collection Time: 10/10/24 10:59 AM   Result Value Ref Range    QRS Duration 88 ms    [facility] QTC  Calculation 422 ms    Narrative    Test Reason : I50.20,I48.0,    Vent. Rate : 102 BPM     Atrial Rate : 102 BPM     P-R Int : 260 ms          QRS Dur :  88 ms      QT Int : 324 ms       P-R-T Axes :  55 204 221 degrees    QTcB Int : 422 ms    Sinus tachycardia with 1st degree A-V block  Right atrial enlargement  Right superior axis deviation  Pulmonary disease pattern  T wave abnormality, consider inferior ischemia  T wave abnormality, consider anterolateral ischemia  Abnormal ECG  When compared with ECG of 10-OCT-2024 09:56,  QT has shortened  Confirmed by Valentino Mathew ([id number]) on 11/18/2024 12:57:34 PM    Referred By:            Confirmed By: Valentino Mathew     No valid procedures specified.   No results found for this or any previous visit.    No valid procedures specified.    Physical Exam    General: No acute distress. Well-developed. Well-nourished.  HENT: Normocephalic. Atraumatic. l  Cardiovascular: Regular rate. Regular rhythm. No murmurs. No rubs. No gallops. Normal S1, S2.  Respiratory: Normal respiratory effort. Clear to auscultation bilaterally. No rales. No rhonchi. No wheezing.  Abdomen: Soft. Non-tender. Non-distended. Normoactive bowel sounds.  Musculoskeletal: No  obvious deformity.  Extremities: No lower extremity edema.  Neurological: Alert & oriented x3. No slurred speech. Normal gait.  Psychiatric: Normal mood. Normal affect. Good insight. Good judgment.  Skin: Warm. Dry. No rash.         I HAVE REVIEWED :    The vital signs, nurses notes, and all the pertinent radiology and labs.        Current Outpatient Medications   Medication Instructions    atorvastatin (LIPITOR) 20 mg, Oral, Nightly    calcium carbonate/vitamin D3 (CALCIUM 600 + D,3, ORAL) 1 tablet, Daily    collagenase (SANTYL) ointment Daily    cyanocobalamin, vitamin B-12, (VITAMIN B-12) 50 mcg Lozg Take by mouth.    dihydroberberine (BERBERINE ES-5 ORAL) Take by mouth.    empagliflozin (JARDIANCE) 10 mg, Oral, Daily    folic acid  (FOLVITE) 800 mcg, Daily    ginkgo/choline bitartrate (BRAINSTRONG MEMORY SUPPORT ORAL) Take by mouth.    losartan (COZAAR) 25 mg, Oral, Daily    magnesium oxide (MAG-OX) 400 mg, Oral, Daily    metoprolol succinate (TOPROL-XL) 50 MG 24 hr tablet TAKE 1 TABLET(50 MG) BY MOUTH DAILY    omeprazole (PRILOSEC) 20 mg, Oral, Daily    vitamins-lipotropics Tab 1 tablet, Oral, Daily          Assessment & Plan   Assessment & Plan    I70.0 Aortic atherosclerosis  I10 Primary hypertension  I51.81 Broken heart syndrome    PLAN SUMMARY:  - Ordered routine lab work including CBC, kidney and liver function tests, thyroid panel, and cholesterol check (fasting required)  - Started Jardiance 10 mg daily in the morning for cardioprotective benefits  - Continued folate supplement  - Continued calcium (Abdiel Bone Support)  - Continued collagen supplement  - Changed losartan to 25 mg daily instead of splitting dose  - Continued Berberine twice daily  - Continued Abdiel Advanced DHA (brain supplement)  - Continued lipo triad multivitamin daily  - Follow up in 3 months    AORTIC ATHEROSCLEROSIS:  - Started Jardiance 10 mg daily in the morning for cardioprotective benefits.  - Continued Berberine twice daily.  - Ordered routine lab work including complete blood count, kidney and liver function tests, thyroid panel, and cholesterol check at patient's convenience at any [facility] facility (fasting required).    PRIMARY HYPERTENSION:  - Blood pressure and heart rate trends show some elevated readings.  - Changed losartan to 25 mg daily instead of splitting dose.  - Discussed normal ranges for blood pressure (ideally 130s or less systolic).  - Patient to monitor blood pressure before taking medication and becoming active.  - Started Jardiance 10 mg daily in the morning for cardioprotective benefits.    BROKEN HEART SYNDROME:  - Blood pressure and heart rate trends show some elevated readings.  - Discussed normal ranges for heart rate (60-90  "bpm).  - Patient to continue current exercise regimen including weights for muscle mass maintenance.  - Patient to check heart rate before taking metoprolol.  - Metoprolol dosage reviewed in light of intermittently low heart rates; hold if heart rate is less than 60 bpm before taking; decrease to half dose if heart rate consistently less than 60 bpm before taking; notify doctor if this occurs.  - Contact office if metoprolol dosage is reduced to half pill consistently.    ADDITIONAL RECOMMENDATIONS (NOT SPECIFIC TO LISTED ICD-10 CODES):  - Continued folate supplement.  - Continued calcium (Abdiel Bone Support).  - Continued Abdiel Advanced DHA (brain supplement).  - Continued collagen supplement.  - Continued lipo triad multivitamin daily.  - Follow up in 3 months.           This note was generated with the assistance of ambient listening technology. Verbal consent was obtained by the patient and accompanying visitor(s) for the recording of patient appointment to facilitate this note. I attest to having reviewed and edited the generated note for accuracy, though some syntax or spelling errors may persist. Please contact the author of this note for any clarification.             [1]   Social History  Tobacco Use    Smoking status: Former     Current packs/day: 0.00     Types: Cigarettes     Quit date: 1989     Years since quittin.5    Smokeless tobacco: Never   Substance Use Topics    Alcohol use: Yes     Alcohol/week: 1.0 - 2.0 standard drink of alcohol     Types: 1 - 2 Shots of liquor per week     Comment: "Occasional"    Drug use: Not Currently     "

## 2025-08-12 RX ORDER — METOPROLOL SUCCINATE 50 MG/1
TABLET, EXTENDED RELEASE ORAL
Qty: 90 TABLET | Refills: 3 | Status: SHIPPED | OUTPATIENT
Start: 2025-08-12

## 2025-08-20 ENCOUNTER — OFFICE VISIT (OUTPATIENT)
Dept: FAMILY MEDICINE | Facility: CLINIC | Age: 70
End: 2025-08-20
Payer: MEDICARE

## 2025-08-20 VITALS
TEMPERATURE: 99 F | DIASTOLIC BLOOD PRESSURE: 84 MMHG | HEART RATE: 74 BPM | SYSTOLIC BLOOD PRESSURE: 122 MMHG | HEIGHT: 61 IN | WEIGHT: 142.63 LBS | BODY MASS INDEX: 26.93 KG/M2 | OXYGEN SATURATION: 96 %

## 2025-08-20 DIAGNOSIS — I10 PRIMARY HYPERTENSION: ICD-10-CM

## 2025-08-20 DIAGNOSIS — R79.9 ABNORMAL FINDING OF BLOOD CHEMISTRY, UNSPECIFIED: ICD-10-CM

## 2025-08-20 DIAGNOSIS — I48.0 PAROXYSMAL ATRIAL FIBRILLATION: ICD-10-CM

## 2025-08-20 DIAGNOSIS — M81.0 AGE-RELATED OSTEOPOROSIS WITHOUT CURRENT PATHOLOGICAL FRACTURE: ICD-10-CM

## 2025-08-20 DIAGNOSIS — I70.0 AORTIC ATHEROSCLEROSIS: ICD-10-CM

## 2025-08-20 DIAGNOSIS — Z00.00 ENCOUNTER FOR MEDICARE ANNUAL WELLNESS EXAM: Primary | ICD-10-CM

## 2025-08-20 DIAGNOSIS — Z12.11 COLON CANCER SCREENING: ICD-10-CM

## 2025-08-20 DIAGNOSIS — I51.81 BROKEN HEART SYNDROME: ICD-10-CM

## 2025-08-20 DIAGNOSIS — Z12.31 ENCOUNTER FOR SCREENING MAMMOGRAM FOR MALIGNANT NEOPLASM OF BREAST: ICD-10-CM

## 2025-08-20 PROBLEM — R94.31 NONSPECIFIC ABNORMAL ELECTROCARDIOGRAM (ECG) (EKG): Status: RESOLVED | Noted: 2024-10-10 | Resolved: 2025-08-20

## 2025-08-20 PROBLEM — R07.81 RIB PAIN ON LEFT SIDE: Status: RESOLVED | Noted: 2024-07-02 | Resolved: 2025-08-20

## 2025-08-20 PROBLEM — G03.9 MENINGITIS: Status: RESOLVED | Noted: 2024-09-17 | Resolved: 2025-08-20

## 2025-08-20 PROBLEM — G89.29 CHRONIC MIDLINE LOW BACK PAIN WITHOUT SCIATICA: Status: RESOLVED | Noted: 2020-02-20 | Resolved: 2025-08-20

## 2025-08-20 PROBLEM — M54.50 CHRONIC MIDLINE LOW BACK PAIN WITHOUT SCIATICA: Status: RESOLVED | Noted: 2020-02-20 | Resolved: 2025-08-20

## 2025-08-20 PROBLEM — M53.84 DECREASED ROM OF THORACIC SPINE: Status: RESOLVED | Noted: 2024-08-13 | Resolved: 2025-08-20

## 2025-08-20 PROBLEM — R06.02 SHORTNESS OF BREATH: Status: RESOLVED | Noted: 2024-10-10 | Resolved: 2025-08-20

## 2025-08-20 PROBLEM — D72.829 LEUKOCYTOSIS: Status: RESOLVED | Noted: 2024-09-17 | Resolved: 2025-08-20

## 2025-08-20 PROBLEM — R29.898 WEAKNESS OF BOTH SHOULDERS: Status: RESOLVED | Noted: 2024-08-13 | Resolved: 2025-08-20

## 2025-08-20 PROCEDURE — 99999 PR PBB SHADOW E&M-EST. PATIENT-LVL V: CPT | Mod: PBBFAC,HCNC,, | Performed by: PHYSICIAN ASSISTANT

## 2025-08-20 RX ORDER — IBANDRONATE SODIUM 150 MG/1
150 TABLET, FILM COATED ORAL
Qty: 1 TABLET | Refills: 11 | Status: SHIPPED | OUTPATIENT
Start: 2025-08-20 | End: 2026-08-20

## (undated) DEVICE — CATH INFINITI 4F JL4 .042X100

## (undated) DEVICE — KIT GLIDESHEATH SLEND 6FR 10CM

## (undated) DEVICE — GUIDEWIRE INQWIRE SE 3MM JTIP

## (undated) DEVICE — CATH IMPULSE 5F 100CM FR4

## (undated) DEVICE — HEMOSTAT VASC BAND REG 24CM

## (undated) DEVICE — WIRE WHOLEY MOD J 175X0.035MM

## (undated) DEVICE — GLIDESHEATH SLENDER SS 5FR10CM

## (undated) DEVICE — CATH DXTERITY JL35 100CM 5FR